# Patient Record
Sex: FEMALE | Race: WHITE | Employment: OTHER | URBAN - NONMETROPOLITAN AREA
[De-identification: names, ages, dates, MRNs, and addresses within clinical notes are randomized per-mention and may not be internally consistent; named-entity substitution may affect disease eponyms.]

---

## 2017-01-15 ENCOUNTER — OFFICE VISIT (OUTPATIENT)
Dept: PRIMARY CARE CLINIC | Age: 67
End: 2017-01-15

## 2017-01-15 VITALS
HEIGHT: 67 IN | DIASTOLIC BLOOD PRESSURE: 80 MMHG | WEIGHT: 230.4 LBS | BODY MASS INDEX: 36.16 KG/M2 | OXYGEN SATURATION: 98 % | SYSTOLIC BLOOD PRESSURE: 132 MMHG | TEMPERATURE: 98.3 F | HEART RATE: 80 BPM

## 2017-01-15 DIAGNOSIS — J31.0 RHINOSINUSITIS: ICD-10-CM

## 2017-01-15 DIAGNOSIS — J32.9 RHINOSINUSITIS: ICD-10-CM

## 2017-01-15 DIAGNOSIS — J06.9 ACUTE URI: Primary | ICD-10-CM

## 2017-01-15 PROCEDURE — 3014F SCREEN MAMMO DOC REV: CPT | Performed by: FAMILY MEDICINE

## 2017-01-15 PROCEDURE — G8419 CALC BMI OUT NRM PARAM NOF/U: HCPCS | Performed by: FAMILY MEDICINE

## 2017-01-15 PROCEDURE — 4040F PNEUMOC VAC/ADMIN/RCVD: CPT | Performed by: FAMILY MEDICINE

## 2017-01-15 PROCEDURE — G8427 DOCREV CUR MEDS BY ELIG CLIN: HCPCS | Performed by: FAMILY MEDICINE

## 2017-01-15 PROCEDURE — 3017F COLORECTAL CA SCREEN DOC REV: CPT | Performed by: FAMILY MEDICINE

## 2017-01-15 PROCEDURE — 99213 OFFICE O/P EST LOW 20 MIN: CPT | Performed by: FAMILY MEDICINE

## 2017-01-15 PROCEDURE — G8400 PT W/DXA NO RESULTS DOC: HCPCS | Performed by: FAMILY MEDICINE

## 2017-01-15 PROCEDURE — 1123F ACP DISCUSS/DSCN MKR DOCD: CPT | Performed by: FAMILY MEDICINE

## 2017-01-15 PROCEDURE — 1090F PRES/ABSN URINE INCON ASSESS: CPT | Performed by: FAMILY MEDICINE

## 2017-01-15 PROCEDURE — 1036F TOBACCO NON-USER: CPT | Performed by: FAMILY MEDICINE

## 2017-01-15 PROCEDURE — G8484 FLU IMMUNIZE NO ADMIN: HCPCS | Performed by: FAMILY MEDICINE

## 2017-01-15 RX ORDER — AZITHROMYCIN 250 MG/1
TABLET, FILM COATED ORAL
Qty: 11 TABLET | Refills: 0 | Status: SHIPPED | OUTPATIENT
Start: 2017-01-15 | End: 2017-04-20 | Stop reason: ALTCHOICE

## 2017-01-15 ASSESSMENT — ENCOUNTER SYMPTOMS
RHINORRHEA: 1
SORE THROAT: 1
SINUS PRESSURE: 1
COUGH: 0
RESPIRATORY NEGATIVE: 1
EYES NEGATIVE: 1
GASTROINTESTINAL NEGATIVE: 1
ALLERGIC/IMMUNOLOGIC NEGATIVE: 1

## 2017-04-13 ENCOUNTER — HOSPITAL ENCOUNTER (OUTPATIENT)
Age: 67
Setting detail: SPECIMEN
Discharge: HOME | End: 2017-04-13
Payer: MEDICARE

## 2017-04-13 LAB
HEPATITIS C ANTIBODY: NONREACTIVE
HIV AG/AB: NONREACTIVE

## 2017-04-20 ENCOUNTER — OFFICE VISIT (OUTPATIENT)
Dept: FAMILY MEDICINE CLINIC | Age: 67
End: 2017-04-20
Payer: MEDICARE

## 2017-04-20 VITALS
WEIGHT: 233.6 LBS | SYSTOLIC BLOOD PRESSURE: 116 MMHG | RESPIRATION RATE: 16 BRPM | HEART RATE: 64 BPM | DIASTOLIC BLOOD PRESSURE: 80 MMHG | HEIGHT: 67 IN | BODY MASS INDEX: 36.66 KG/M2

## 2017-04-20 DIAGNOSIS — K21.9 GASTROESOPHAGEAL REFLUX DISEASE WITHOUT ESOPHAGITIS: ICD-10-CM

## 2017-04-20 DIAGNOSIS — E78.2 MIXED HYPERLIPIDEMIA: ICD-10-CM

## 2017-04-20 DIAGNOSIS — M15.9 PRIMARY OSTEOARTHRITIS INVOLVING MULTIPLE JOINTS: ICD-10-CM

## 2017-04-20 DIAGNOSIS — I10 ESSENTIAL HYPERTENSION: ICD-10-CM

## 2017-04-20 DIAGNOSIS — E11.9 CONTROLLED TYPE 2 DIABETES MELLITUS WITHOUT COMPLICATION, WITHOUT LONG-TERM CURRENT USE OF INSULIN (HCC): Primary | ICD-10-CM

## 2017-04-20 DIAGNOSIS — Z00.00 MEDICARE ANNUAL WELLNESS VISIT, INITIAL: ICD-10-CM

## 2017-04-20 PROCEDURE — G8417 CALC BMI ABV UP PARAM F/U: HCPCS | Performed by: FAMILY MEDICINE

## 2017-04-20 PROCEDURE — 1036F TOBACCO NON-USER: CPT | Performed by: FAMILY MEDICINE

## 2017-04-20 PROCEDURE — 3014F SCREEN MAMMO DOC REV: CPT | Performed by: FAMILY MEDICINE

## 2017-04-20 PROCEDURE — 99214 OFFICE O/P EST MOD 30 MIN: CPT | Performed by: FAMILY MEDICINE

## 2017-04-20 PROCEDURE — G0438 PPPS, INITIAL VISIT: HCPCS | Performed by: FAMILY MEDICINE

## 2017-04-20 PROCEDURE — 3017F COLORECTAL CA SCREEN DOC REV: CPT | Performed by: FAMILY MEDICINE

## 2017-04-20 PROCEDURE — 1123F ACP DISCUSS/DSCN MKR DOCD: CPT | Performed by: FAMILY MEDICINE

## 2017-04-20 PROCEDURE — G8400 PT W/DXA NO RESULTS DOC: HCPCS | Performed by: FAMILY MEDICINE

## 2017-04-20 PROCEDURE — 4040F PNEUMOC VAC/ADMIN/RCVD: CPT | Performed by: FAMILY MEDICINE

## 2017-04-20 PROCEDURE — 1090F PRES/ABSN URINE INCON ASSESS: CPT | Performed by: FAMILY MEDICINE

## 2017-04-20 PROCEDURE — 3044F HG A1C LEVEL LT 7.0%: CPT | Performed by: FAMILY MEDICINE

## 2017-04-20 PROCEDURE — G8427 DOCREV CUR MEDS BY ELIG CLIN: HCPCS | Performed by: FAMILY MEDICINE

## 2017-04-20 RX ORDER — ATENOLOL 25 MG/1
25 TABLET ORAL DAILY
Qty: 90 TABLET | Refills: 1 | Status: SHIPPED | OUTPATIENT
Start: 2017-04-20 | End: 2017-10-17 | Stop reason: SDUPTHER

## 2017-04-20 RX ORDER — LORATADINE 10 MG/1
10 TABLET ORAL DAILY
Qty: 90 TABLET | Refills: 3 | Status: SHIPPED | OUTPATIENT
Start: 2017-04-20 | End: 2018-04-25 | Stop reason: SDUPTHER

## 2017-04-20 RX ORDER — MELOXICAM 7.5 MG/1
7.5 TABLET ORAL DAILY
Qty: 90 TABLET | Refills: 1 | Status: SHIPPED | OUTPATIENT
Start: 2017-04-20 | End: 2017-10-17 | Stop reason: SDUPTHER

## 2017-04-20 ASSESSMENT — PATIENT HEALTH QUESTIONNAIRE - PHQ9
SUM OF ALL RESPONSES TO PHQ9 QUESTIONS 1 & 2: 1
2. FEELING DOWN, DEPRESSED OR HOPELESS: 1
SUM OF ALL RESPONSES TO PHQ QUESTIONS 1-9: 1
1. LITTLE INTEREST OR PLEASURE IN DOING THINGS: 0

## 2017-06-17 ENCOUNTER — OFFICE VISIT (OUTPATIENT)
Dept: PRIMARY CARE CLINIC | Age: 67
End: 2017-06-17
Payer: MEDICARE

## 2017-06-17 VITALS
SYSTOLIC BLOOD PRESSURE: 136 MMHG | RESPIRATION RATE: 14 BRPM | WEIGHT: 232.8 LBS | DIASTOLIC BLOOD PRESSURE: 78 MMHG | TEMPERATURE: 98.7 F | HEART RATE: 77 BPM | BODY MASS INDEX: 36.54 KG/M2 | HEIGHT: 67 IN | OXYGEN SATURATION: 93 %

## 2017-06-17 DIAGNOSIS — K08.89 TOOTH PAIN: ICD-10-CM

## 2017-06-17 DIAGNOSIS — K04.7 DENTAL ABSCESS: Primary | ICD-10-CM

## 2017-06-17 PROCEDURE — 1090F PRES/ABSN URINE INCON ASSESS: CPT | Performed by: FAMILY MEDICINE

## 2017-06-17 PROCEDURE — 3014F SCREEN MAMMO DOC REV: CPT | Performed by: FAMILY MEDICINE

## 2017-06-17 PROCEDURE — 1036F TOBACCO NON-USER: CPT | Performed by: FAMILY MEDICINE

## 2017-06-17 PROCEDURE — G8417 CALC BMI ABV UP PARAM F/U: HCPCS | Performed by: FAMILY MEDICINE

## 2017-06-17 PROCEDURE — 3017F COLORECTAL CA SCREEN DOC REV: CPT | Performed by: FAMILY MEDICINE

## 2017-06-17 PROCEDURE — 4040F PNEUMOC VAC/ADMIN/RCVD: CPT | Performed by: FAMILY MEDICINE

## 2017-06-17 PROCEDURE — G8400 PT W/DXA NO RESULTS DOC: HCPCS | Performed by: FAMILY MEDICINE

## 2017-06-17 PROCEDURE — G8427 DOCREV CUR MEDS BY ELIG CLIN: HCPCS | Performed by: FAMILY MEDICINE

## 2017-06-17 PROCEDURE — 99213 OFFICE O/P EST LOW 20 MIN: CPT | Performed by: FAMILY MEDICINE

## 2017-06-17 PROCEDURE — 1123F ACP DISCUSS/DSCN MKR DOCD: CPT | Performed by: FAMILY MEDICINE

## 2017-06-17 RX ORDER — AZITHROMYCIN 250 MG/1
TABLET, FILM COATED ORAL
Qty: 11 TABLET | Refills: 0 | Status: SHIPPED | OUTPATIENT
Start: 2017-06-17 | End: 2017-07-18 | Stop reason: SDUPTHER

## 2017-06-17 ASSESSMENT — ENCOUNTER SYMPTOMS
GASTROINTESTINAL NEGATIVE: 1
EYES NEGATIVE: 1
SORE THROAT: 0
SINUS PRESSURE: 0
RHINORRHEA: 0
RESPIRATORY NEGATIVE: 1

## 2017-07-03 DIAGNOSIS — E11.9 DIABETES MELLITUS TYPE II, CONTROLLED (HCC): ICD-10-CM

## 2017-07-18 ENCOUNTER — OFFICE VISIT (OUTPATIENT)
Dept: PRIMARY CARE CLINIC | Age: 67
End: 2017-07-18
Payer: MEDICARE

## 2017-07-18 VITALS
SYSTOLIC BLOOD PRESSURE: 130 MMHG | DIASTOLIC BLOOD PRESSURE: 80 MMHG | WEIGHT: 234 LBS | RESPIRATION RATE: 16 BRPM | TEMPERATURE: 98.3 F | OXYGEN SATURATION: 94 % | HEIGHT: 67 IN | BODY MASS INDEX: 36.73 KG/M2 | HEART RATE: 92 BPM

## 2017-07-18 DIAGNOSIS — J30.89 SEASONAL ALLERGIC RHINITIS DUE TO FUNGAL SPORES: ICD-10-CM

## 2017-07-18 DIAGNOSIS — J01.40 ACUTE NON-RECURRENT PANSINUSITIS: Primary | ICD-10-CM

## 2017-07-18 PROCEDURE — 1036F TOBACCO NON-USER: CPT | Performed by: FAMILY MEDICINE

## 2017-07-18 PROCEDURE — 1123F ACP DISCUSS/DSCN MKR DOCD: CPT | Performed by: FAMILY MEDICINE

## 2017-07-18 PROCEDURE — 3017F COLORECTAL CA SCREEN DOC REV: CPT | Performed by: FAMILY MEDICINE

## 2017-07-18 PROCEDURE — 99214 OFFICE O/P EST MOD 30 MIN: CPT | Performed by: FAMILY MEDICINE

## 2017-07-18 PROCEDURE — 3014F SCREEN MAMMO DOC REV: CPT | Performed by: FAMILY MEDICINE

## 2017-07-18 PROCEDURE — G8400 PT W/DXA NO RESULTS DOC: HCPCS | Performed by: FAMILY MEDICINE

## 2017-07-18 PROCEDURE — G8417 CALC BMI ABV UP PARAM F/U: HCPCS | Performed by: FAMILY MEDICINE

## 2017-07-18 PROCEDURE — 1090F PRES/ABSN URINE INCON ASSESS: CPT | Performed by: FAMILY MEDICINE

## 2017-07-18 PROCEDURE — G8427 DOCREV CUR MEDS BY ELIG CLIN: HCPCS | Performed by: FAMILY MEDICINE

## 2017-07-18 PROCEDURE — 96372 THER/PROPH/DIAG INJ SC/IM: CPT | Performed by: FAMILY MEDICINE

## 2017-07-18 PROCEDURE — 4040F PNEUMOC VAC/ADMIN/RCVD: CPT | Performed by: FAMILY MEDICINE

## 2017-07-18 RX ORDER — AZITHROMYCIN 250 MG/1
TABLET, FILM COATED ORAL
Qty: 11 TABLET | Refills: 0 | Status: SHIPPED | OUTPATIENT
Start: 2017-07-18 | End: 2017-07-28

## 2017-07-18 RX ORDER — TRIAMCINOLONE ACETONIDE 40 MG/ML
40 INJECTION, SUSPENSION INTRA-ARTICULAR; INTRAMUSCULAR ONCE
Status: COMPLETED | OUTPATIENT
Start: 2017-07-18 | End: 2017-07-18

## 2017-07-18 RX ADMIN — TRIAMCINOLONE ACETONIDE 40 MG: 40 INJECTION, SUSPENSION INTRA-ARTICULAR; INTRAMUSCULAR at 10:31

## 2017-07-18 ASSESSMENT — ENCOUNTER SYMPTOMS
SINUS PRESSURE: 1
TROUBLE SWALLOWING: 0
SINUS COMPLAINT: 1
NAUSEA: 0
CONSTIPATION: 0
RHINORRHEA: 0
DIARRHEA: 0
EYE DISCHARGE: 0
EYE REDNESS: 0
COUGH: 0
SHORTNESS OF BREATH: 0
ABDOMINAL PAIN: 0
WHEEZING: 0
SORE THROAT: 0
VOMITING: 0

## 2017-07-25 ENCOUNTER — HOSPITAL ENCOUNTER (OUTPATIENT)
Age: 67
Discharge: HOME | End: 2017-07-25
Payer: MEDICARE

## 2017-08-02 ENCOUNTER — HOSPITAL ENCOUNTER (OUTPATIENT)
Age: 67
Discharge: HOME | End: 2017-08-02
Payer: MEDICARE

## 2017-10-13 ENCOUNTER — HOSPITAL ENCOUNTER (OUTPATIENT)
Dept: LAB | Age: 67
Setting detail: SPECIMEN
Discharge: HOME | End: 2017-10-13
Payer: MEDICARE

## 2017-10-13 DIAGNOSIS — E11.9 CONTROLLED TYPE 2 DIABETES MELLITUS WITHOUT COMPLICATION, WITHOUT LONG-TERM CURRENT USE OF INSULIN (HCC): ICD-10-CM

## 2017-10-13 DIAGNOSIS — E78.2 MIXED HYPERLIPIDEMIA: ICD-10-CM

## 2017-10-13 LAB
ABSOLUTE EOS #: 0.2 K/UL (ref 0–0.4)
ABSOLUTE LYMPH #: 2.2 K/UL (ref 1–4.8)
ABSOLUTE MONO #: 0.7 K/UL (ref 0.1–1.2)
ALBUMIN SERPL-MCNC: 4.2 G/DL (ref 3.5–5.2)
ALBUMIN/GLOBULIN RATIO: 1.4 (ref 1–2.5)
ALP BLD-CCNC: 82 U/L (ref 35–104)
ALT SERPL-CCNC: 19 U/L (ref 5–33)
ANION GAP SERPL CALCULATED.3IONS-SCNC: 11 MMOL/L (ref 9–17)
AST SERPL-CCNC: 20 U/L
BASOPHILS # BLD: 1 % (ref 0–1)
BASOPHILS ABSOLUTE: 0 K/UL (ref 0–0.2)
BILIRUB SERPL-MCNC: 0.25 MG/DL (ref 0.3–1.2)
BUN BLDV-MCNC: 12 MG/DL (ref 8–23)
BUN/CREAT BLD: 18 (ref 9–20)
CALCIUM SERPL-MCNC: 9.6 MG/DL (ref 8.6–10.4)
CHLORIDE BLD-SCNC: 103 MMOL/L (ref 98–107)
CHOLESTEROL/HDL RATIO: 5
CHOLESTEROL: 190 MG/DL
CO2: 30 MMOL/L (ref 20–31)
CREAT SERPL-MCNC: 0.67 MG/DL (ref 0.5–0.9)
DIFFERENTIAL TYPE: ABNORMAL
EOSINOPHILS RELATIVE PERCENT: 3 % (ref 1–7)
ESTIMATED AVERAGE GLUCOSE: 143 MG/DL
GFR AFRICAN AMERICAN: >60 ML/MIN
GFR NON-AFRICAN AMERICAN: >60 ML/MIN
GFR SERPL CREATININE-BSD FRML MDRD: ABNORMAL ML/MIN/{1.73_M2}
GFR SERPL CREATININE-BSD FRML MDRD: ABNORMAL ML/MIN/{1.73_M2}
GLUCOSE BLD-MCNC: 124 MG/DL (ref 70–99)
HBA1C MFR BLD: 6.6 % (ref 4.8–5.9)
HCT VFR BLD CALC: 41.3 % (ref 36–46)
HDLC SERPL-MCNC: 38 MG/DL
HEMOGLOBIN: 13.3 G/DL (ref 12–16)
LDL CHOLESTEROL: 111 MG/DL (ref 0–130)
LYMPHOCYTES # BLD: 27 % (ref 16–46)
MCH RBC QN AUTO: 28.2 PG (ref 26–34)
MCHC RBC AUTO-ENTMCNC: 32.2 G/DL (ref 31–37)
MCV RBC AUTO: 87.6 FL (ref 80–100)
MONOCYTES # BLD: 8 % (ref 4–11)
PDW BLD-RTO: 14.6 % (ref 11–14.5)
PLATELET # BLD: 242 K/UL (ref 140–450)
PLATELET ESTIMATE: ABNORMAL
PMV BLD AUTO: 7.8 FL (ref 6–12)
POTASSIUM SERPL-SCNC: 4.4 MMOL/L (ref 3.7–5.3)
RBC # BLD: 4.72 M/UL (ref 4–5.2)
RBC # BLD: ABNORMAL 10*6/UL
SEG NEUTROPHILS: 61 % (ref 43–77)
SEGMENTED NEUTROPHILS ABSOLUTE COUNT: 5 K/UL (ref 1.8–7.7)
SODIUM BLD-SCNC: 144 MMOL/L (ref 135–144)
TOTAL PROTEIN: 7.2 G/DL (ref 6.4–8.3)
TRIGL SERPL-MCNC: 204 MG/DL
TSH SERPL DL<=0.05 MIU/L-ACNC: 2.45 MIU/L (ref 0.3–5)
VLDLC SERPL CALC-MCNC: ABNORMAL MG/DL (ref 1–30)
WBC # BLD: 8.1 K/UL (ref 3.5–11)
WBC # BLD: ABNORMAL 10*3/UL

## 2017-10-13 PROCEDURE — 80061 LIPID PANEL: CPT

## 2017-10-13 PROCEDURE — 84443 ASSAY THYROID STIM HORMONE: CPT

## 2017-10-13 PROCEDURE — 36415 COLL VENOUS BLD VENIPUNCTURE: CPT

## 2017-10-13 PROCEDURE — 80053 COMPREHEN METABOLIC PANEL: CPT

## 2017-10-13 PROCEDURE — 83036 HEMOGLOBIN GLYCOSYLATED A1C: CPT

## 2017-10-13 PROCEDURE — 85025 COMPLETE CBC W/AUTO DIFF WBC: CPT

## 2017-10-17 RX ORDER — ATENOLOL 25 MG/1
25 TABLET ORAL DAILY
Qty: 90 TABLET | Refills: 1 | Status: SHIPPED | OUTPATIENT
Start: 2017-10-17 | End: 2018-04-16 | Stop reason: SDUPTHER

## 2017-10-17 RX ORDER — MELOXICAM 7.5 MG/1
7.5 TABLET ORAL DAILY
Qty: 90 TABLET | Refills: 1 | Status: SHIPPED | OUTPATIENT
Start: 2017-10-17 | End: 2018-04-16 | Stop reason: SDUPTHER

## 2017-10-24 ENCOUNTER — OFFICE VISIT (OUTPATIENT)
Dept: FAMILY MEDICINE CLINIC | Age: 67
End: 2017-10-24
Payer: MEDICARE

## 2017-10-24 VITALS
SYSTOLIC BLOOD PRESSURE: 120 MMHG | HEART RATE: 68 BPM | RESPIRATION RATE: 16 BRPM | BODY MASS INDEX: 36.57 KG/M2 | HEIGHT: 67 IN | WEIGHT: 233 LBS | DIASTOLIC BLOOD PRESSURE: 78 MMHG

## 2017-10-24 DIAGNOSIS — E11.9 CONTROLLED TYPE 2 DIABETES MELLITUS WITHOUT COMPLICATION, WITHOUT LONG-TERM CURRENT USE OF INSULIN (HCC): Primary | ICD-10-CM

## 2017-10-24 DIAGNOSIS — E78.2 MIXED HYPERLIPIDEMIA: ICD-10-CM

## 2017-10-24 DIAGNOSIS — I10 ESSENTIAL HYPERTENSION: ICD-10-CM

## 2017-10-24 PROCEDURE — 1090F PRES/ABSN URINE INCON ASSESS: CPT | Performed by: FAMILY MEDICINE

## 2017-10-24 PROCEDURE — 1036F TOBACCO NON-USER: CPT | Performed by: FAMILY MEDICINE

## 2017-10-24 PROCEDURE — G8400 PT W/DXA NO RESULTS DOC: HCPCS | Performed by: FAMILY MEDICINE

## 2017-10-24 PROCEDURE — G8427 DOCREV CUR MEDS BY ELIG CLIN: HCPCS | Performed by: FAMILY MEDICINE

## 2017-10-24 PROCEDURE — G8417 CALC BMI ABV UP PARAM F/U: HCPCS | Performed by: FAMILY MEDICINE

## 2017-10-24 PROCEDURE — 1123F ACP DISCUSS/DSCN MKR DOCD: CPT | Performed by: FAMILY MEDICINE

## 2017-10-24 PROCEDURE — G8484 FLU IMMUNIZE NO ADMIN: HCPCS | Performed by: FAMILY MEDICINE

## 2017-10-24 PROCEDURE — 3017F COLORECTAL CA SCREEN DOC REV: CPT | Performed by: FAMILY MEDICINE

## 2017-10-24 PROCEDURE — 99214 OFFICE O/P EST MOD 30 MIN: CPT | Performed by: FAMILY MEDICINE

## 2017-10-24 PROCEDURE — 3044F HG A1C LEVEL LT 7.0%: CPT | Performed by: FAMILY MEDICINE

## 2017-10-24 PROCEDURE — 4040F PNEUMOC VAC/ADMIN/RCVD: CPT | Performed by: FAMILY MEDICINE

## 2017-10-24 PROCEDURE — 3014F SCREEN MAMMO DOC REV: CPT | Performed by: FAMILY MEDICINE

## 2017-10-24 RX ORDER — INFLUENZA VACCINE, ADJUVANTED 15; 15; 15 UG/.5ML; UG/.5ML; UG/.5ML
INJECTION, SUSPENSION INTRAMUSCULAR
Refills: 0 | COMMUNITY
Start: 2017-10-06 | End: 2017-10-24 | Stop reason: ALTCHOICE

## 2017-10-24 RX ORDER — ERYTHROMYCIN 5 MG/G
OINTMENT OPHTHALMIC
Refills: 0 | COMMUNITY
Start: 2017-10-20 | End: 2018-04-25 | Stop reason: ALTCHOICE

## 2017-10-24 RX ORDER — PNEUMOCOCCAL VACCINE POLYVALENT 25; 25; 25; 25; 25; 25; 25; 25; 25; 25; 25; 25; 25; 25; 25; 25; 25; 25; 25; 25; 25; 25; 25 UG/.5ML; UG/.5ML; UG/.5ML; UG/.5ML; UG/.5ML; UG/.5ML; UG/.5ML; UG/.5ML; UG/.5ML; UG/.5ML; UG/.5ML; UG/.5ML; UG/.5ML; UG/.5ML; UG/.5ML; UG/.5ML; UG/.5ML; UG/.5ML; UG/.5ML; UG/.5ML; UG/.5ML; UG/.5ML; UG/.5ML
INJECTION, SOLUTION INTRAMUSCULAR; SUBCUTANEOUS
Refills: 0 | COMMUNITY
Start: 2017-10-06 | End: 2017-10-24 | Stop reason: ALTCHOICE

## 2017-10-24 ASSESSMENT — ENCOUNTER SYMPTOMS
TROUBLE SWALLOWING: 0
DIARRHEA: 0
SHORTNESS OF BREATH: 0
SINUS PRESSURE: 0
ABDOMINAL PAIN: 0
SORE THROAT: 0
RHINORRHEA: 0
NAUSEA: 0
CONSTIPATION: 0
EYE REDNESS: 0
EYE DISCHARGE: 0
COUGH: 0
VOMITING: 0
WHEEZING: 0

## 2017-10-24 NOTE — PATIENT INSTRUCTIONS
 ALT 10/13/2017 19  5 - 33 U/L Final    AST 10/13/2017 20  <32 U/L Final    Total Bilirubin 10/13/2017 0.25* 0.3 - 1.2 mg/dL Final    Total Protein 10/13/2017 7.2  6.4 - 8.3 g/dL Final    Alb 10/13/2017 4.2  3.5 - 5.2 g/dL Final    Albumin/Globulin Ratio 10/13/2017 1.4  1.0 - 2.5 Final    GFR Non- 10/13/2017 >60  >60 mL/min Final    GFR  10/13/2017 >60  >60 mL/min Final    GFR Comment 10/13/2017        Final    Comment: Average GFR for 61-76 years old:   80 mL/min/1.73sq m  Chronic Kidney Disease:   <60 mL/min/1.73sq m  Kidney failure:   <15 mL/min/1.73sq m              eGFR calculated using average adult body mass. Additional eGFR calculator   available at:        [email or web address]        Performed at [facility] [address] [facility] [address] Gabrielle Atkinson [phone]      GFR Staging 10/13/2017 NOT REPORTED   Final    Cholesterol 10/13/2017 190  <200 mg/dL Final    Comment:    Cholesterol Guidelines:      <200  Desirable   200-240  Borderline      >240  Undesirable         HDL 10/13/2017 38* >40 mg/dL Final    Comment:    HDL Guidelines:    <40     Undesirable   40-59    Borderline    >59     Desirable         LDL Cholesterol 10/13/2017 111  0 - 130 mg/dL Final    Comment:    LDL Guidelines:     <100    Desirable   100-129   Near to/above Desirable   130-159   Borderline      >159   Undesirable     Direct (measured) LDL and calculated LDL are not interchangeable tests.  Chol/HDL Ratio 10/13/2017 5.0* <5 Final    Triglycerides 10/13/2017 204* <150 mg/dL Final    Comment:    Triglyceride Guidelines:     <150   Desirable   150-199  Borderline   200-499  High     >499   Very high   Based on AHA Guidelines for fasting triglyceride, October 2012. Performed at [facility] [address] [address] [phone]      VLDL 10/13/2017 NOT REPORTED of quitting for good. Follow-up care is a key part of your treatment and safety. Be sure to make and go to all appointments, and call your doctor if you are having problems. It's also a good idea to know your test results and keep a list of the medicines you take. Where can you learn more? Go to [email or web address] and sign in to your [employer] account. Enter N180 in the Replicon box to learn more about \"Learning About Diabetes and Your Teeth. \"     If you do not have an account, please click on the \"Sign Up Now\" link. Current as of: March 13, 2017  Content Version: 11.3  © 9452-3802 [employer]. Care instructions adapted under license by [facility] ([facility]). If you have questions about a medical condition or this instruction, always ask your healthcare professional. [address] any warranty or liability for your use of this information.

## 2017-10-24 NOTE — PROGRESS NOTES
Chronic Disease Visit Information    BP Readings from Last 3 Encounters:   10/24/17 120/78   07/18/17 130/80   06/17/17 136/78          Hemoglobin A1C (%)   Date Value   10/13/2017 6.6 (H)   04/13/2017 6.5 (H)   10/18/2016 6.7 (H)     Microalb/Crt. Ratio (mcg/mg creat)   Date Value   04/13/2017 CANNOT BE CALCULATED     LDL Cholesterol (mg/dL)   Date Value   10/13/2017 111     HDL (mg/dL)   Date Value   10/13/2017 38 (L)     BUN (mg/dL)   Date Value   10/13/2017 12     CREATININE (mg/dL)   Date Value   10/13/2017 0.67     Glucose (mg/dL)   Date Value   10/13/2017 124 (H)            Have you changed or started any medications since your last visit including any over-the-counter medicines, vitamins, or herbal medicines? no   Are you having any side effects from any of your medications? -  no  Have you stopped taking any of your medications? Is so, why? -  no    Have you seen any other physician or provider since your last visit? Yes - Records Obtained  Have you had any other diagnostic tests since your last visit? Yes - Records Obtained  Have you been seen in the emergency room and/or had an admission to a hospital since we last saw you? No  Have you had your annual diabetic retinal (eye) exam? Yes - Records Requested  Have you had your routine dental cleaning in the past 6 months? yes - May 2017    Have you activated your "[employer]" account? If not, what are your barriers?  Yes     Patient Care Team:  Flavoi Hidalgo DO as PCP - General (Family Medicine)  Flavio Hidalgo DO as PCP - [facility] Attributed Provider         Medical History Review  Past Medical, Family, and Social History reviewed and does contribute to the patient presenting condition    Health Maintenance   Topic Date Due    Diabetic retinal exam  03/23/1960    Diabetic microalbuminuria test  04/13/2018    Diabetic foot exam  04/20/2018    Breast cancer screen  05/11/2018    Diabetic hemoglobin A1C test  10/13/2018    Lipid screen  10/13/2018    Colon

## 2017-10-24 NOTE — PROGRESS NOTES
etiology and not the toenail fungus. At this point it's not severe and just noticeable so she is just going to monitor. Patient otherwise has no other acute medical concerns. .  Patient's recent lab reports are as follows:    Results for orders placed or performed during the hospital encounter of 10/13/17   CBC Auto Differential   Result Value Ref Range    WBC 8.1 3.5 - 11.0 k/uL    RBC 4.72 4.0 - 5.2 m/uL    Hemoglobin 13.3 12.0 - 16.0 g/dL    Hematocrit 41.3 36 - 46 %    MCV 87.6 80 - 100 fL    MCH 28.2 26 - 34 pg    MCHC 32.2 31 - 37 g/dL    RDW 14.6 (H) 11.0 - 14.5 %    Platelets 499 063 - 177 k/uL    MPV 7.8 6.0 - 12.0 fL    Differential Type NOT REPORTED     WBC Morphology NOT REPORTED     RBC Morphology NOT REPORTED     Platelet Estimate NOT REPORTED     Seg Neutrophils 61 43 - 77 %    Lymphocytes 27 16 - 46 %    Monocytes 8 4 - 11 %    Eosinophils % 3 1 - 7 %    Basophils 1 0 - 1 %    Segs Absolute 5.00 1.8 - 7.7 k/uL    Absolute Lymph # 2.20 1.0 - 4.8 k/uL    Absolute Mono # 0.70 0.1 - 1.2 k/uL    Absolute Eos # 0.20 0.0 - 0.4 k/uL    Basophils # 0.00 0.0 - 0.2 k/uL   Comprehensive Metabolic Panel   Result Value Ref Range    Glucose 124 (H) 70 - 99 mg/dL    BUN 12 8 - 23 mg/dL    CREATININE 0.67 0.50 - 0.90 mg/dL    Bun/Cre Ratio 18 9 - 20    Calcium 9.6 8.6 - 10.4 mg/dL    Sodium 144 135 - 144 mmol/L    Potassium 4.4 3.7 - 5.3 mmol/L    Chloride 103 98 - 107 mmol/L    CO2 30 20 - 31 mmol/L    Anion Gap 11 9 - 17 mmol/L    Alkaline Phosphatase 82 35 - 104 U/L    ALT 19 5 - 33 U/L    AST 20 <32 U/L    Total Bilirubin 0.25 (L) 0.3 - 1.2 mg/dL    Total Protein 7.2 6.4 - 8.3 g/dL    Alb 4.2 3.5 - 5.2 g/dL    Albumin/Globulin Ratio 1.4 1.0 - 2.5    GFR Non-African American >60 >60 mL/min    GFR African American >60 >60 mL/min    GFR Comment          GFR Staging NOT REPORTED    Lipid Panel   Result Value Ref Range    Cholesterol 190 <200 mg/dL    HDL 38 (L) >40 mg/dL    LDL Cholesterol 111 0 - 130 mg/dL exercise for optimal cholesterol control. Did recommend statin medication but she did not want to add at this time. Patient is to continue with low-carb/low sugar diet and increase exercise for optimal blood sugar control. Patient is to continue on the rest of her current medical regimen. No additional changes are made at this time. Patient is to return to my office in 6 months duration or sooner if any further problems or symptoms arise.     (Please note that portions of this note were completed with a voice recognition program. Efforts were made to edit the dictations but occasionally words are mis-transcribed.)

## 2017-12-29 ENCOUNTER — OFFICE VISIT (OUTPATIENT)
Dept: PRIMARY CARE CLINIC | Age: 67
End: 2017-12-29
Payer: MEDICARE

## 2017-12-29 VITALS
DIASTOLIC BLOOD PRESSURE: 72 MMHG | BODY MASS INDEX: 36.1 KG/M2 | WEIGHT: 230 LBS | HEART RATE: 72 BPM | HEIGHT: 67 IN | OXYGEN SATURATION: 94 % | SYSTOLIC BLOOD PRESSURE: 128 MMHG | TEMPERATURE: 98.1 F

## 2017-12-29 DIAGNOSIS — J01.40 ACUTE NON-RECURRENT PANSINUSITIS: Primary | ICD-10-CM

## 2017-12-29 PROCEDURE — 4040F PNEUMOC VAC/ADMIN/RCVD: CPT | Performed by: FAMILY MEDICINE

## 2017-12-29 PROCEDURE — G8427 DOCREV CUR MEDS BY ELIG CLIN: HCPCS | Performed by: FAMILY MEDICINE

## 2017-12-29 PROCEDURE — 1123F ACP DISCUSS/DSCN MKR DOCD: CPT | Performed by: FAMILY MEDICINE

## 2017-12-29 PROCEDURE — 3014F SCREEN MAMMO DOC REV: CPT | Performed by: FAMILY MEDICINE

## 2017-12-29 PROCEDURE — G8484 FLU IMMUNIZE NO ADMIN: HCPCS | Performed by: FAMILY MEDICINE

## 2017-12-29 PROCEDURE — 99214 OFFICE O/P EST MOD 30 MIN: CPT | Performed by: FAMILY MEDICINE

## 2017-12-29 PROCEDURE — G8400 PT W/DXA NO RESULTS DOC: HCPCS | Performed by: FAMILY MEDICINE

## 2017-12-29 PROCEDURE — 1090F PRES/ABSN URINE INCON ASSESS: CPT | Performed by: FAMILY MEDICINE

## 2017-12-29 PROCEDURE — G8417 CALC BMI ABV UP PARAM F/U: HCPCS | Performed by: FAMILY MEDICINE

## 2017-12-29 PROCEDURE — 1036F TOBACCO NON-USER: CPT | Performed by: FAMILY MEDICINE

## 2017-12-29 PROCEDURE — 3017F COLORECTAL CA SCREEN DOC REV: CPT | Performed by: FAMILY MEDICINE

## 2017-12-29 RX ORDER — PREDNISONE 20 MG/1
20 TABLET ORAL DAILY
Qty: 5 TABLET | Refills: 0 | Status: SHIPPED | OUTPATIENT
Start: 2017-12-29 | End: 2018-01-03

## 2017-12-29 RX ORDER — CEFDINIR 300 MG/1
300 CAPSULE ORAL 2 TIMES DAILY
Qty: 20 CAPSULE | Refills: 0 | Status: SHIPPED | OUTPATIENT
Start: 2017-12-29 | End: 2018-01-08

## 2017-12-29 ASSESSMENT — ENCOUNTER SYMPTOMS
TROUBLE SWALLOWING: 0
SORE THROAT: 1
CONSTIPATION: 0
SINUS COMPLAINT: 1
DIARRHEA: 0
WHEEZING: 0
COUGH: 1
SINUS PRESSURE: 1
NAUSEA: 0
CHEST TIGHTNESS: 0
SHORTNESS OF BREATH: 1
CHOKING: 0
SINUS PAIN: 1

## 2017-12-29 NOTE — PROGRESS NOTES
[facility]  [address]  Dept: [phone]  Dept Fax: [phone]  Loc: [phone]    Marisa Nieves is a 78 y.o. female who presents today for her medical conditions/complaints as noted below. Marisa Nieves is c/o of   Chief Complaint   Patient presents with    Sinus Problem     facial pain, fever, cough, body aches, HA       HPI:     Here today for sinusitis. Sinus Problem   This is a new problem. The current episode started 1 to 4 weeks ago (10 days). The problem has been gradually worsening since onset. The maximum temperature recorded prior to her arrival was 100.4 - 100.9 F. The fever has been present for less than 1 day. Her pain is at a severity of 6/10. The pain is moderate. Associated symptoms include chills, congestion, coughing, ear pain (right ear), headaches, shortness of breath, sinus pressure and a sore throat. Pertinent negatives include no sneezing. Past treatments include oral decongestants and acetaminophen (afrin, robitussion, claritin). The treatment provided no relief. Past Medical History:   Diagnosis Date    Allergic rhinitis     Anxiety     due to situational stress    Bursitis     Bursitis of hip, right     recurrent    Carpal tunnel syndrome, bilateral     GERD (gastroesophageal reflux disease)     Hyperlipidemia     Hypertension     Impaired fasting glucose     Labyrinthitis     causing continuted vertigo    Melanoma in situ ([facility] [address] 10/2017     to bridge of nose; Dr Sky Tellez in [address] in situ of nose ([facility] [address] 10/05/2017    biopsy confirmed on 10/05/2017 by Dr [facility]ah [facility]egas [facility]     Osteoarthritis     Palpitations           Social History   Substance Use Topics    Smoking status: Passive Smoke Exposure - Never Smoker    Smokeless tobacco: Never Used      Comment: very remotely would smoke a few cigarettes on weekends/college days    Alcohol use 0.0 oz/week

## 2018-02-15 ENCOUNTER — OFFICE VISIT (OUTPATIENT)
Dept: PRIMARY CARE CLINIC | Age: 68
End: 2018-02-15
Payer: MEDICARE

## 2018-02-15 VITALS
DIASTOLIC BLOOD PRESSURE: 82 MMHG | HEART RATE: 84 BPM | OXYGEN SATURATION: 94 % | HEIGHT: 66 IN | SYSTOLIC BLOOD PRESSURE: 130 MMHG | BODY MASS INDEX: 36.8 KG/M2 | TEMPERATURE: 97 F | WEIGHT: 229 LBS

## 2018-02-15 DIAGNOSIS — J32.9 SINUSITIS, UNSPECIFIED CHRONICITY, UNSPECIFIED LOCATION: Primary | ICD-10-CM

## 2018-02-15 PROCEDURE — 4040F PNEUMOC VAC/ADMIN/RCVD: CPT | Performed by: NURSE PRACTITIONER

## 2018-02-15 PROCEDURE — 1036F TOBACCO NON-USER: CPT | Performed by: NURSE PRACTITIONER

## 2018-02-15 PROCEDURE — 99213 OFFICE O/P EST LOW 20 MIN: CPT | Performed by: NURSE PRACTITIONER

## 2018-02-15 PROCEDURE — G8427 DOCREV CUR MEDS BY ELIG CLIN: HCPCS | Performed by: NURSE PRACTITIONER

## 2018-02-15 PROCEDURE — 3017F COLORECTAL CA SCREEN DOC REV: CPT | Performed by: NURSE PRACTITIONER

## 2018-02-15 PROCEDURE — G8417 CALC BMI ABV UP PARAM F/U: HCPCS | Performed by: NURSE PRACTITIONER

## 2018-02-15 PROCEDURE — G8484 FLU IMMUNIZE NO ADMIN: HCPCS | Performed by: NURSE PRACTITIONER

## 2018-02-15 PROCEDURE — 1123F ACP DISCUSS/DSCN MKR DOCD: CPT | Performed by: NURSE PRACTITIONER

## 2018-02-15 PROCEDURE — 1090F PRES/ABSN URINE INCON ASSESS: CPT | Performed by: NURSE PRACTITIONER

## 2018-02-15 PROCEDURE — 3014F SCREEN MAMMO DOC REV: CPT | Performed by: NURSE PRACTITIONER

## 2018-02-15 PROCEDURE — G8400 PT W/DXA NO RESULTS DOC: HCPCS | Performed by: NURSE PRACTITIONER

## 2018-02-15 RX ORDER — METHYLPREDNISOLONE 4 MG/1
TABLET ORAL
Qty: 1 KIT | Refills: 0 | Status: SHIPPED | OUTPATIENT
Start: 2018-02-15 | End: 2018-04-25 | Stop reason: ALTCHOICE

## 2018-02-15 RX ORDER — AZITHROMYCIN 250 MG/1
TABLET, FILM COATED ORAL
Qty: 11 TABLET | Refills: 0 | Status: SHIPPED | OUTPATIENT
Start: 2018-02-15 | End: 2018-04-25 | Stop reason: ALTCHOICE

## 2018-02-15 ASSESSMENT — ENCOUNTER SYMPTOMS
SINUS PRESSURE: 1
SORE THROAT: 1
COUGH: 1

## 2018-02-15 NOTE — PROGRESS NOTES
(MEDROL DOSEPACK) 4 MG tablet           Plan:      Stay hydrated and drink plenty of fluids. May use cough suppressant prn comfort as directed. Encouraged patient to complete full course of antibiotic and to return to clinic if no improvement in 3-4 days. May use OTC acetaminophen or ibuprofen prn pain/fever. Recommend Mucinex, Neti Pot. Recommend OTC Flonase and/or Antihistamine daily. Take Prednisone as directed. Allergies   Allergen Reactions    Aspirin Other (See Comments)     Abdominal pain    Bactrim [Sulfamethoxazole-Trimethoprim] Nausea Only    Ibuprofen Other (See Comments)     Abdominal pain    Penicillins Rash     Current Outpatient Prescriptions   Medication Sig Dispense Refill    azithromycin (ZITHROMAX) 250 MG tablet Take 500 mg on day 1 and then 250 mg on days 2-9 11 tablet 0    methylPREDNISolone (MEDROL DOSEPACK) 4 MG tablet Take as directed 1 kit 0    erythromycin (ROMYCIN) 5 MG/GM ophthalmic ointment apply into affected eye twice a day  0    meloxicam (MOBIC) 7.5 MG tablet Take 1 tablet by mouth daily 90 tablet 1    atenolol (TENORMIN) 25 MG tablet Take 1 tablet by mouth daily 90 tablet 1    glucose blood VI test strips (ONE TOUCH TEST STRIPS) strip Tests 1 X daily. DX E11.9 100 each 3    ONE TOUCH LANCETS MISC Tests 1 x daily DX. E11.9 100 each 3    loratadine (CLARITIN) 10 MG tablet Take 1 tablet by mouth daily 90 tablet 3    omeprazole (PRILOSEC) 20 MG capsule Take 1 capsule by mouth 2 times daily. (Patient taking differently: Take 20 mg by mouth Daily OTC) 30 capsule 6    pseudoephedrine (SUDAFED) 30 MG tablet Take 30 mg by mouth every 4 hours as needed.  diphenhydrAMINE-APAP, sleep, (TYLENOL PM EXTRA STRENGTH)  MG tablet Take 2 tablets by mouth nightly as needed.  therapeutic multivitamin-minerals (THERAGRAN-M) tablet Take 1 tablet by mouth daily.  Ascorbic Acid (VITAMIN C) 500 MG tablet Take 500 mg by mouth daily.        No current

## 2018-04-17 RX ORDER — ATENOLOL 25 MG/1
TABLET ORAL
Qty: 90 TABLET | Refills: 1 | Status: SHIPPED | OUTPATIENT
Start: 2018-04-17 | End: 2018-10-12 | Stop reason: SDUPTHER

## 2018-04-17 RX ORDER — MELOXICAM 7.5 MG/1
TABLET ORAL
Qty: 90 TABLET | Refills: 1 | Status: SHIPPED | OUTPATIENT
Start: 2018-04-17 | End: 2018-10-26 | Stop reason: SDUPTHER

## 2018-04-18 ENCOUNTER — HOSPITAL ENCOUNTER (OUTPATIENT)
Dept: LAB | Age: 68
Setting detail: SPECIMEN
Discharge: HOME | End: 2018-04-18
Payer: MEDICARE

## 2018-04-18 DIAGNOSIS — E11.9 CONTROLLED TYPE 2 DIABETES MELLITUS WITHOUT COMPLICATION, WITHOUT LONG-TERM CURRENT USE OF INSULIN (HCC): ICD-10-CM

## 2018-04-18 DIAGNOSIS — E78.2 MIXED HYPERLIPIDEMIA: ICD-10-CM

## 2018-04-18 LAB
ABSOLUTE EOS #: 0.2 K/UL (ref 0–0.4)
ABSOLUTE IMMATURE GRANULOCYTE: ABNORMAL K/UL (ref 0–0.3)
ABSOLUTE LYMPH #: 2.3 K/UL (ref 1–4.8)
ABSOLUTE MONO #: 0.6 K/UL (ref 0.1–1.2)
ALBUMIN SERPL-MCNC: 4.2 G/DL (ref 3.5–5.2)
ALBUMIN/GLOBULIN RATIO: 1.2 (ref 1–2.5)
ALP BLD-CCNC: 79 U/L (ref 35–104)
ALT SERPL-CCNC: 17 U/L (ref 5–33)
ANION GAP SERPL CALCULATED.3IONS-SCNC: 10 MMOL/L (ref 9–17)
AST SERPL-CCNC: 18 U/L
BASOPHILS # BLD: 1 % (ref 0–1)
BASOPHILS ABSOLUTE: 0.1 K/UL (ref 0–0.2)
BILIRUB SERPL-MCNC: 0.28 MG/DL (ref 0.3–1.2)
BUN BLDV-MCNC: 14 MG/DL (ref 8–23)
BUN/CREAT BLD: 21 (ref 9–20)
CALCIUM SERPL-MCNC: 9.4 MG/DL (ref 8.6–10.4)
CHLORIDE BLD-SCNC: 101 MMOL/L (ref 98–107)
CHOLESTEROL/HDL RATIO: 5.1
CHOLESTEROL: 182 MG/DL
CO2: 31 MMOL/L (ref 20–31)
CREAT SERPL-MCNC: 0.67 MG/DL (ref 0.5–0.9)
CREATININE URINE: 130.6 MG/DL (ref 28–217)
DIFFERENTIAL TYPE: ABNORMAL
EOSINOPHILS RELATIVE PERCENT: 3 % (ref 1–7)
ESTIMATED AVERAGE GLUCOSE: 148 MG/DL
GFR AFRICAN AMERICAN: >60 ML/MIN
GFR NON-AFRICAN AMERICAN: >60 ML/MIN
GFR SERPL CREATININE-BSD FRML MDRD: ABNORMAL ML/MIN/{1.73_M2}
GFR SERPL CREATININE-BSD FRML MDRD: ABNORMAL ML/MIN/{1.73_M2}
GLUCOSE BLD-MCNC: 131 MG/DL (ref 70–99)
HBA1C MFR BLD: 6.8 % (ref 4.8–5.9)
HCT VFR BLD CALC: 41.5 % (ref 36–46)
HDLC SERPL-MCNC: 36 MG/DL
HEMOGLOBIN: 13.7 G/DL (ref 12–16)
IMMATURE GRANULOCYTES: ABNORMAL %
LDL CHOLESTEROL: 101 MG/DL (ref 0–130)
LYMPHOCYTES # BLD: 29 % (ref 16–46)
MCH RBC QN AUTO: 28.7 PG (ref 26–34)
MCHC RBC AUTO-ENTMCNC: 32.9 G/DL (ref 31–37)
MCV RBC AUTO: 87.3 FL (ref 80–100)
MICROALBUMIN/CREAT 24H UR: <12 MG/L
MICROALBUMIN/CREAT UR-RTO: NORMAL MCG/MG CREAT
MONOCYTES # BLD: 7 % (ref 4–11)
NRBC AUTOMATED: ABNORMAL PER 100 WBC
PDW BLD-RTO: 15 % (ref 11–14.5)
PLATELET # BLD: 241 K/UL (ref 140–450)
PLATELET ESTIMATE: ABNORMAL
PMV BLD AUTO: 8 FL (ref 6–12)
POTASSIUM SERPL-SCNC: 4.4 MMOL/L (ref 3.7–5.3)
RBC # BLD: 4.75 M/UL (ref 4–5.2)
RBC # BLD: ABNORMAL 10*6/UL
SEG NEUTROPHILS: 60 % (ref 43–77)
SEGMENTED NEUTROPHILS ABSOLUTE COUNT: 4.8 K/UL (ref 1.8–7.7)
SODIUM BLD-SCNC: 142 MMOL/L (ref 135–144)
TOTAL PROTEIN: 7.6 G/DL (ref 6.4–8.3)
TRIGL SERPL-MCNC: 227 MG/DL
VLDLC SERPL CALC-MCNC: ABNORMAL MG/DL (ref 1–30)
WBC # BLD: 8 K/UL (ref 3.5–11)
WBC # BLD: ABNORMAL 10*3/UL

## 2018-04-18 PROCEDURE — 83036 HEMOGLOBIN GLYCOSYLATED A1C: CPT

## 2018-04-18 PROCEDURE — 80061 LIPID PANEL: CPT

## 2018-04-18 PROCEDURE — 85025 COMPLETE CBC W/AUTO DIFF WBC: CPT

## 2018-04-18 PROCEDURE — 82043 UR ALBUMIN QUANTITATIVE: CPT

## 2018-04-18 PROCEDURE — 36415 COLL VENOUS BLD VENIPUNCTURE: CPT

## 2018-04-18 PROCEDURE — 80053 COMPREHEN METABOLIC PANEL: CPT

## 2018-04-18 PROCEDURE — 82570 ASSAY OF URINE CREATININE: CPT

## 2018-04-25 ENCOUNTER — OFFICE VISIT (OUTPATIENT)
Dept: FAMILY MEDICINE CLINIC | Age: 68
End: 2018-04-25
Payer: MEDICARE

## 2018-04-25 ENCOUNTER — HOSPITAL ENCOUNTER (OUTPATIENT)
Age: 68
Setting detail: SPECIMEN
Discharge: HOME | End: 2018-04-25
Payer: MEDICARE

## 2018-04-25 VITALS
HEART RATE: 66 BPM | BODY MASS INDEX: 36.38 KG/M2 | DIASTOLIC BLOOD PRESSURE: 82 MMHG | SYSTOLIC BLOOD PRESSURE: 138 MMHG | HEIGHT: 67 IN | WEIGHT: 231.8 LBS

## 2018-04-25 DIAGNOSIS — Z01.419 CERVICAL SMEAR, AS PART OF ROUTINE GYNECOLOGICAL EXAMINATION: ICD-10-CM

## 2018-04-25 DIAGNOSIS — I10 ESSENTIAL HYPERTENSION: ICD-10-CM

## 2018-04-25 DIAGNOSIS — G56.01 RIGHT CARPAL TUNNEL SYNDROME: ICD-10-CM

## 2018-04-25 DIAGNOSIS — K21.9 GASTROESOPHAGEAL REFLUX DISEASE WITHOUT ESOPHAGITIS: ICD-10-CM

## 2018-04-25 DIAGNOSIS — M70.62 TROCHANTERIC BURSITIS OF LEFT HIP: ICD-10-CM

## 2018-04-25 DIAGNOSIS — E11.9 CONTROLLED TYPE 2 DIABETES MELLITUS WITHOUT COMPLICATION, WITHOUT LONG-TERM CURRENT USE OF INSULIN (HCC): Primary | ICD-10-CM

## 2018-04-25 DIAGNOSIS — E78.2 MIXED HYPERLIPIDEMIA: ICD-10-CM

## 2018-04-25 DIAGNOSIS — M77.11 RIGHT LATERAL EPICONDYLITIS: ICD-10-CM

## 2018-04-25 PROCEDURE — G0145 SCR C/V CYTO,THINLAYER,RESCR: HCPCS

## 2018-04-25 PROCEDURE — 3017F COLORECTAL CA SCREEN DOC REV: CPT | Performed by: FAMILY MEDICINE

## 2018-04-25 PROCEDURE — 99214 OFFICE O/P EST MOD 30 MIN: CPT | Performed by: FAMILY MEDICINE

## 2018-04-25 PROCEDURE — 2022F DILAT RTA XM EVC RTNOPTHY: CPT | Performed by: FAMILY MEDICINE

## 2018-04-25 PROCEDURE — G8417 CALC BMI ABV UP PARAM F/U: HCPCS | Performed by: FAMILY MEDICINE

## 2018-04-25 PROCEDURE — G0101 CA SCREEN;PELVIC/BREAST EXAM: HCPCS | Performed by: FAMILY MEDICINE

## 2018-04-25 PROCEDURE — 1090F PRES/ABSN URINE INCON ASSESS: CPT | Performed by: FAMILY MEDICINE

## 2018-04-25 PROCEDURE — 1123F ACP DISCUSS/DSCN MKR DOCD: CPT | Performed by: FAMILY MEDICINE

## 2018-04-25 PROCEDURE — 3044F HG A1C LEVEL LT 7.0%: CPT | Performed by: FAMILY MEDICINE

## 2018-04-25 PROCEDURE — G8427 DOCREV CUR MEDS BY ELIG CLIN: HCPCS | Performed by: FAMILY MEDICINE

## 2018-04-25 PROCEDURE — G8400 PT W/DXA NO RESULTS DOC: HCPCS | Performed by: FAMILY MEDICINE

## 2018-04-25 PROCEDURE — 1036F TOBACCO NON-USER: CPT | Performed by: FAMILY MEDICINE

## 2018-04-25 PROCEDURE — 4040F PNEUMOC VAC/ADMIN/RCVD: CPT | Performed by: FAMILY MEDICINE

## 2018-04-25 RX ORDER — PREDNISONE 20 MG/1
TABLET ORAL
Qty: 15 TABLET | Refills: 0 | Status: SHIPPED | OUTPATIENT
Start: 2018-04-25 | End: 2018-10-26 | Stop reason: ALTCHOICE

## 2018-04-25 RX ORDER — LORATADINE 10 MG/1
10 TABLET ORAL DAILY
Qty: 90 TABLET | Refills: 3 | Status: SHIPPED | OUTPATIENT
Start: 2018-04-25 | End: 2019-06-17 | Stop reason: SDUPTHER

## 2018-04-25 ASSESSMENT — ENCOUNTER SYMPTOMS
WHEEZING: 0
COUGH: 0
SHORTNESS OF BREATH: 0
EYE REDNESS: 0
SORE THROAT: 0
TROUBLE SWALLOWING: 0
NAUSEA: 0
VOMITING: 0
RHINORRHEA: 0
CONSTIPATION: 0
EYE DISCHARGE: 0
SINUS PRESSURE: 0
DIARRHEA: 0
ABDOMINAL PAIN: 0

## 2018-04-25 ASSESSMENT — PATIENT HEALTH QUESTIONNAIRE - PHQ9
1. LITTLE INTEREST OR PLEASURE IN DOING THINGS: 0
SUM OF ALL RESPONSES TO PHQ QUESTIONS 1-9: 0
SUM OF ALL RESPONSES TO PHQ9 QUESTIONS 1 & 2: 0
2. FEELING DOWN, DEPRESSED OR HOPELESS: 0

## 2018-05-09 ENCOUNTER — TELEPHONE (OUTPATIENT)
Dept: MAMMOGRAPHY | Age: 68
End: 2018-05-09

## 2018-05-09 DIAGNOSIS — Z12.39 SCREENING FOR BREAST CANCER: Primary | ICD-10-CM

## 2018-05-15 ENCOUNTER — HOSPITAL ENCOUNTER (OUTPATIENT)
Dept: MAMMOGRAPHY | Age: 68
Discharge: HOME | End: 2018-05-17
Payer: MEDICARE

## 2018-05-15 DIAGNOSIS — Z12.39 SCREENING FOR BREAST CANCER: ICD-10-CM

## 2018-05-15 PROCEDURE — 77067 SCR MAMMO BI INCL CAD: CPT

## 2018-05-18 LAB — CYTOLOGY REPORT: NORMAL

## 2018-09-26 PROBLEM — Z00.00 MEDICARE ANNUAL WELLNESS VISIT, INITIAL: Status: RESOLVED | Noted: 2017-04-20 | Resolved: 2018-09-26

## 2018-10-12 RX ORDER — ATENOLOL 25 MG/1
25 TABLET ORAL DAILY
Qty: 90 TABLET | Refills: 1 | Status: SHIPPED | OUTPATIENT
Start: 2018-10-12 | End: 2019-03-24 | Stop reason: SDUPTHER

## 2018-10-12 RX ORDER — ATENOLOL 25 MG/1
TABLET ORAL
Qty: 90 TABLET | Refills: 1 | OUTPATIENT
Start: 2018-10-12

## 2018-10-12 NOTE — TELEPHONE ENCOUNTER
Asha Buchanan called requesting a refill of the below medication which has been pended for you: duplicate request    Requested Prescriptions     Pending Prescriptions Disp Refills    atenolol (TENORMIN) 25 MG tablet [Pharmacy Med Name: ATENOLOL 25 MG TABLET] 90 tablet 1     Sig: take 1 tablet by mouth once daily       Last Appointment Date: 4/25/2018  Next Appointment Date: 10/12/2018    Allergies   Allergen Reactions    Aspirin Other (See Comments)     Abdominal pain    Bactrim [Sulfamethoxazole-Trimethoprim] Nausea Only    Ibuprofen Other (See Comments)     Abdominal pain    Penicillins Rash

## 2018-10-17 ENCOUNTER — PATIENT MESSAGE (OUTPATIENT)
Dept: FAMILY MEDICINE CLINIC | Age: 68
End: 2018-10-17

## 2018-10-17 NOTE — TELEPHONE ENCOUNTER
From: Shamar Singh  To: Lanette Mata DO  Sent: 10/17/2018 1:19 PM EDT  Subject: Non-Urgent Medical Question    I have an appointment with Dr. Ciarra Marks next friday the 26 for a check-up. Tomorrow morning I am scheduled for the lab work. Yesterday I received two cortisone shots in my arm which they told me it would raise my sugar up by about a 100 points. This morning my sugar was 191. My question is do you still want me to do the lab work tomorrow or would you rather reschedule it for a day next week for the sugar to come down.

## 2018-10-18 ENCOUNTER — HOSPITAL ENCOUNTER (OUTPATIENT)
Dept: LAB | Age: 68
Discharge: HOME | End: 2018-10-18
Payer: MEDICARE

## 2018-10-18 DIAGNOSIS — I10 ESSENTIAL HYPERTENSION: ICD-10-CM

## 2018-10-18 DIAGNOSIS — E11.9 CONTROLLED TYPE 2 DIABETES MELLITUS WITHOUT COMPLICATION, WITHOUT LONG-TERM CURRENT USE OF INSULIN (HCC): ICD-10-CM

## 2018-10-18 DIAGNOSIS — E78.2 MIXED HYPERLIPIDEMIA: ICD-10-CM

## 2018-10-18 LAB
ABSOLUTE EOS #: 0.1 K/UL (ref 0–0.4)
ABSOLUTE IMMATURE GRANULOCYTE: ABNORMAL K/UL (ref 0–0.3)
ABSOLUTE LYMPH #: 2.4 K/UL (ref 1–4.8)
ABSOLUTE MONO #: 0.6 K/UL (ref 0.1–1.2)
ALBUMIN SERPL-MCNC: 4.1 G/DL (ref 3.5–5.2)
ALBUMIN/GLOBULIN RATIO: 1.2 (ref 1–2.5)
ALP BLD-CCNC: 81 U/L (ref 35–104)
ALT SERPL-CCNC: 16 U/L (ref 5–33)
ANION GAP SERPL CALCULATED.3IONS-SCNC: 12 MMOL/L (ref 9–17)
AST SERPL-CCNC: 20 U/L
BASOPHILS # BLD: 1 % (ref 0–1)
BASOPHILS ABSOLUTE: 0.1 K/UL (ref 0–0.2)
BILIRUB SERPL-MCNC: 0.28 MG/DL (ref 0.3–1.2)
BUN BLDV-MCNC: 14 MG/DL (ref 8–23)
BUN/CREAT BLD: 23 (ref 9–20)
CALCIUM SERPL-MCNC: 9.5 MG/DL (ref 8.6–10.4)
CHLORIDE BLD-SCNC: 104 MMOL/L (ref 98–107)
CHOLESTEROL/HDL RATIO: 4.3
CHOLESTEROL: 187 MG/DL
CO2: 28 MMOL/L (ref 20–31)
CREAT SERPL-MCNC: 0.62 MG/DL (ref 0.5–0.9)
DIFFERENTIAL TYPE: ABNORMAL
EOSINOPHILS RELATIVE PERCENT: 1 % (ref 1–7)
ESTIMATED AVERAGE GLUCOSE: 143 MG/DL
GFR AFRICAN AMERICAN: >60 ML/MIN
GFR NON-AFRICAN AMERICAN: >60 ML/MIN
GFR SERPL CREATININE-BSD FRML MDRD: ABNORMAL ML/MIN/{1.73_M2}
GFR SERPL CREATININE-BSD FRML MDRD: ABNORMAL ML/MIN/{1.73_M2}
GLUCOSE BLD-MCNC: 111 MG/DL (ref 70–99)
HBA1C MFR BLD: 6.6 % (ref 4.8–5.9)
HCT VFR BLD CALC: 41.6 % (ref 36–46)
HDLC SERPL-MCNC: 44 MG/DL
HEMOGLOBIN: 13.4 G/DL (ref 12–16)
IMMATURE GRANULOCYTES: ABNORMAL %
LDL CHOLESTEROL: 118 MG/DL (ref 0–130)
LYMPHOCYTES # BLD: 23 % (ref 16–46)
MCH RBC QN AUTO: 28.1 PG (ref 26–34)
MCHC RBC AUTO-ENTMCNC: 32.1 G/DL (ref 31–37)
MCV RBC AUTO: 87.6 FL (ref 80–100)
MONOCYTES # BLD: 6 % (ref 4–11)
NRBC AUTOMATED: ABNORMAL PER 100 WBC
PDW BLD-RTO: 15 % (ref 11–14.5)
PLATELET # BLD: 268 K/UL (ref 140–450)
PLATELET ESTIMATE: ABNORMAL
PMV BLD AUTO: 8 FL (ref 6–12)
POTASSIUM SERPL-SCNC: 4 MMOL/L (ref 3.7–5.3)
RBC # BLD: 4.75 M/UL (ref 4–5.2)
RBC # BLD: ABNORMAL 10*6/UL
SEG NEUTROPHILS: 69 % (ref 43–77)
SEGMENTED NEUTROPHILS ABSOLUTE COUNT: 7.2 K/UL (ref 1.8–7.7)
SODIUM BLD-SCNC: 144 MMOL/L (ref 135–144)
TOTAL PROTEIN: 7.5 G/DL (ref 6.4–8.3)
TRIGL SERPL-MCNC: 124 MG/DL
TSH SERPL DL<=0.05 MIU/L-ACNC: 2.74 MIU/L (ref 0.3–5)
VLDLC SERPL CALC-MCNC: NORMAL MG/DL (ref 1–30)
WBC # BLD: 10.5 K/UL (ref 3.5–11)
WBC # BLD: ABNORMAL 10*3/UL

## 2018-10-18 PROCEDURE — 80053 COMPREHEN METABOLIC PANEL: CPT

## 2018-10-18 PROCEDURE — 85025 COMPLETE CBC W/AUTO DIFF WBC: CPT

## 2018-10-18 PROCEDURE — 36415 COLL VENOUS BLD VENIPUNCTURE: CPT

## 2018-10-18 PROCEDURE — 83036 HEMOGLOBIN GLYCOSYLATED A1C: CPT

## 2018-10-18 PROCEDURE — 84443 ASSAY THYROID STIM HORMONE: CPT

## 2018-10-18 PROCEDURE — 80061 LIPID PANEL: CPT

## 2018-10-26 ENCOUNTER — OFFICE VISIT (OUTPATIENT)
Dept: FAMILY MEDICINE CLINIC | Age: 68
End: 2018-10-26
Payer: MEDICARE

## 2018-10-26 VITALS
WEIGHT: 221 LBS | BODY MASS INDEX: 35.52 KG/M2 | RESPIRATION RATE: 16 BRPM | SYSTOLIC BLOOD PRESSURE: 124 MMHG | HEIGHT: 66 IN | HEART RATE: 64 BPM | DIASTOLIC BLOOD PRESSURE: 70 MMHG

## 2018-10-26 DIAGNOSIS — E78.2 MIXED HYPERLIPIDEMIA: ICD-10-CM

## 2018-10-26 DIAGNOSIS — Z12.11 ENCOUNTER FOR SCREENING COLONOSCOPY: ICD-10-CM

## 2018-10-26 DIAGNOSIS — I10 ESSENTIAL HYPERTENSION: ICD-10-CM

## 2018-10-26 DIAGNOSIS — K21.9 GASTROESOPHAGEAL REFLUX DISEASE WITHOUT ESOPHAGITIS: ICD-10-CM

## 2018-10-26 DIAGNOSIS — Z12.31 SCREENING MAMMOGRAM, ENCOUNTER FOR: ICD-10-CM

## 2018-10-26 DIAGNOSIS — E11.9 CONTROLLED TYPE 2 DIABETES MELLITUS WITHOUT COMPLICATION, WITHOUT LONG-TERM CURRENT USE OF INSULIN (HCC): Primary | ICD-10-CM

## 2018-10-26 DIAGNOSIS — Z00.00 MEDICARE ANNUAL WELLNESS VISIT, SUBSEQUENT: ICD-10-CM

## 2018-10-26 PROCEDURE — G8400 PT W/DXA NO RESULTS DOC: HCPCS | Performed by: FAMILY MEDICINE

## 2018-10-26 PROCEDURE — G0439 PPPS, SUBSEQ VISIT: HCPCS | Performed by: FAMILY MEDICINE

## 2018-10-26 PROCEDURE — 1036F TOBACCO NON-USER: CPT | Performed by: FAMILY MEDICINE

## 2018-10-26 PROCEDURE — 1123F ACP DISCUSS/DSCN MKR DOCD: CPT | Performed by: FAMILY MEDICINE

## 2018-10-26 PROCEDURE — G8427 DOCREV CUR MEDS BY ELIG CLIN: HCPCS | Performed by: FAMILY MEDICINE

## 2018-10-26 PROCEDURE — 4040F PNEUMOC VAC/ADMIN/RCVD: CPT | Performed by: FAMILY MEDICINE

## 2018-10-26 PROCEDURE — 99214 OFFICE O/P EST MOD 30 MIN: CPT | Performed by: FAMILY MEDICINE

## 2018-10-26 PROCEDURE — G8482 FLU IMMUNIZE ORDER/ADMIN: HCPCS | Performed by: FAMILY MEDICINE

## 2018-10-26 PROCEDURE — 3017F COLORECTAL CA SCREEN DOC REV: CPT | Performed by: FAMILY MEDICINE

## 2018-10-26 PROCEDURE — 3044F HG A1C LEVEL LT 7.0%: CPT | Performed by: FAMILY MEDICINE

## 2018-10-26 PROCEDURE — 2022F DILAT RTA XM EVC RTNOPTHY: CPT | Performed by: FAMILY MEDICINE

## 2018-10-26 PROCEDURE — 1101F PT FALLS ASSESS-DOCD LE1/YR: CPT | Performed by: FAMILY MEDICINE

## 2018-10-26 PROCEDURE — 1090F PRES/ABSN URINE INCON ASSESS: CPT | Performed by: FAMILY MEDICINE

## 2018-10-26 PROCEDURE — G8417 CALC BMI ABV UP PARAM F/U: HCPCS | Performed by: FAMILY MEDICINE

## 2018-10-26 RX ORDER — MELOXICAM 7.5 MG/1
TABLET ORAL
Qty: 90 TABLET | Refills: 1 | Status: SHIPPED | OUTPATIENT
Start: 2018-10-26 | End: 2019-05-30 | Stop reason: SDUPTHER

## 2018-10-26 ASSESSMENT — ENCOUNTER SYMPTOMS
DIARRHEA: 0
EYE REDNESS: 0
CONSTIPATION: 0
ABDOMINAL PAIN: 0
SHORTNESS OF BREATH: 0
EYE DISCHARGE: 0
SINUS PRESSURE: 0
RHINORRHEA: 0
VOMITING: 0
NAUSEA: 0
TROUBLE SWALLOWING: 0
WHEEZING: 0
COUGH: 0
SORE THROAT: 0

## 2018-10-26 ASSESSMENT — PATIENT HEALTH QUESTIONNAIRE - PHQ9
SUM OF ALL RESPONSES TO PHQ QUESTIONS 1-9: 0
SUM OF ALL RESPONSES TO PHQ QUESTIONS 1-9: 0

## 2018-10-26 ASSESSMENT — LIFESTYLE VARIABLES: HOW OFTEN DO YOU HAVE A DRINK CONTAINING ALCOHOL: 0

## 2018-10-26 ASSESSMENT — ANXIETY QUESTIONNAIRES: GAD7 TOTAL SCORE: 0

## 2018-10-26 NOTE — PROGRESS NOTES
Influenza Virus Vaccine 08/27/2012, 10/17/2013, 10/01/2014    Influenza, Triv, inactivated, subunit, adjuvanted, IM (Fluad 65 yrs and older) 10/06/2017, 09/27/2018    Pneumococcal 13-valent Conjugate ([id number]) 10/20/2016    Pneumococcal Polysaccharide ([id number]) 10/06/2017    Zoster Live (Zostavax) 11/12/2013        Health Maintenance   Topic Date Due    Shingles Vaccine (1 of 2 - 2 Dose Series) 01/12/2014, waiting on injection to be available    Colon cancer screen colonoscopy  12/08/2018, to schedule    Diabetic retinal exam  04/16/2019    Diabetic microalbuminuria test  04/18/2019    Diabetic foot exam  04/25/2019    Breast cancer screen  05/15/2019    A1C test (Diabetic or Prediabetic)  10/18/2019    Lipid screen  10/18/2019    DTaP/Tdap/Td vaccine (2 - Tdap) 12/30/2019    DEXA (modify frequency per FRAX score)  Addressed, Recommended, patient declined    Flu vaccine  Completed    Pneumococcal low/med risk  Completed    Hepatitis C screen  Completed     Recommendations for Preventive Services Due: see orders and patient instructions/AVS.  .   Recommended screening schedule for the next 5-10 years is provided to the patient in written form: see Patient Instructions/AVS.

## 2018-10-26 NOTE — PROGRESS NOTES
list, allergies, social history, fam history, pmhx and pshx today as noted in the record. Preventative measures are reviewed today. See health maintenance section for complete preventative plan of care. Review of Systems   Constitutional: Negative for chills, fatigue and fever. HENT: Negative for congestion, ear pain, postnasal drip, rhinorrhea, sinus pressure, sore throat and trouble swallowing. Eyes: Negative for discharge and redness. Respiratory: Negative for cough, shortness of breath and wheezing. Cardiovascular: Negative for chest pain. Gastrointestinal: Negative for abdominal pain, constipation, diarrhea, nausea and vomiting. Musculoskeletal: Positive for arthralgias (chronic bilateral hip pain, better with PRB). Negative for myalgias and neck pain. Skin: Negative for rash and wound. Allergic/Immunologic: Negative for environmental allergies. Neurological: Negative for dizziness, weakness, light-headedness and headaches. Hematological: Negative for adenopathy. Psychiatric/Behavioral: Negative. Prior to Visit Medications    Medication Sig Taking? Authorizing Provider   meloxicam (MOBIC) 7.5 MG tablet take 1 tablet by mouth once daily Yes Rafael Encarnacion DO   atenolol (TENORMIN) 25 MG tablet Take 1 tablet by mouth daily Yes Rafael Encarnacion DO   loratadine (CLARITIN) 10 MG tablet Take 1 tablet by mouth daily Yes Rafael Encarnacion DO   glucose blood VI test strips (ONE TOUCH TEST STRIPS) strip Tests 1 X daily. DX E11.9 Yes Rafael Encarnacion DO   ONE TOUCH LANCETS MISC Tests 1 x daily DX. E11.9 Yes Rafael Encarnacion DO   omeprazole (PRILOSEC) 20 MG capsule Take 1 capsule by mouth 2 times daily. Patient taking differently: Take 20 mg by mouth Daily OTC Yes Rafael Encarnacion DO   pseudoephedrine (SUDAFED) 30 MG tablet Take 30 mg by mouth every 4 hours as needed.  Yes Historical Provider, MD   diphenhydrAMINE-APAP, sleep, (TYLENOL PM EXTRA STRENGTH)  MG tablet Psychiatric: She has a normal mood and affect. Her behavior is normal. Judgment and thought content normal.   Nursing note and vitals reviewed. ASSESSMENT/PLAN:  1. Controlled type 2 diabetes mellitus without complication, without long-term current use of insulin (HCC)  Stable and controlled with dietary efforts. Continued low carb/low sugar diet and increased exercise for optimal blood sugar control.  - Hemoglobin A1C; Future  - Microalbumin, Ur; Future    2. Essential hypertension  Stable and controlled on her current medical therapy. - CBC Auto Differential; Future  - Comprehensive Metabolic Panel; Future    3. Mixed hyperlipidemia  Stable and controlled with dietary efforts. Continue low-fat/high-fiber diet keep this optimally controlled. - Lipid Panel; Future    4. Gastroesophageal reflux disease without esophagitis  GERD is stable and controlled on her current mental therapy. 5. Screening mammogram, encounter for  Will schedule for patient. - TITI DIGITAL SCREEN W CAD BILATERAL; Future    6. Encounter for screening colonoscopy  Will schedule for patient. - Maryam Montenegro MD, General Surgery [place]    7. Medicare annual wellness visit, subsequent  See separate annual wellness visit note for further review. Patient is to return to my office in 6 months duration or sooner if any further problems or symptoms arise. (Please note that portions of this note were completed with a voice recognition program. Efforts were made to edit the dictations but occasionally words are mis-transcribed.)          Return in about 6 months (around 4/26/2019). An electronic signature was used to authenticate this note.     --Tabitha Gtz, DO on 10/26/2018 at 3:46 PM

## 2018-12-12 ENCOUNTER — NURSE ONLY (OUTPATIENT)
Dept: SURGERY | Age: 68
End: 2018-12-12

## 2018-12-12 VITALS
HEIGHT: 66 IN | SYSTOLIC BLOOD PRESSURE: 140 MMHG | WEIGHT: 209 LBS | DIASTOLIC BLOOD PRESSURE: 88 MMHG | BODY MASS INDEX: 33.59 KG/M2 | HEART RATE: 70 BPM

## 2018-12-12 DIAGNOSIS — Z80.0 FAMILY HISTORY OF COLON CANCER: Primary | ICD-10-CM

## 2018-12-12 NOTE — PROGRESS NOTES
H &P EGD and Colonoscopy  Patient: Keven Helm                 :1950  (yes) patient has seen Dr. Kayden Matthews prior to procedure  PCP: Dr. Bustamante Ill cramps, rectal bleeding         Family history of colon cancer (sister), family history of UC (mother)        Heartburn    HPI:        Colonoscopy  Abd pain: gets lower abd cramps prior to and during BM  Anemia: no  Bloating:no  Diarrhea: no  Constipation: yes  Melena: no  Hematochezia:yes  Rectal Bleeding:yes  Rectal/Anal Pain:occasional  Pruritus: no  Family history colon Cancer: yes, sister age 52's or 63's, mother with UC  Previous colon cancer: no  Previous Colon Polyp: no  Change in bowels: no  Decrease caliber of stool: yes  Change in color of stool: no  Previous work up date: -colonoscopy,normal except for hemorrhoids, Dr. Kayden Matthews     EGD  Nausea: yes  Vomiting: yes  Heartburn:yes  Dysphagia:very seldom  Hematemesis:no  Epigastric pain:no  Anemia: no  Previous work up date:no previous EGD  Current Treatment:Prilosec daily and TUMS for many years    Family History   Problem Relation Age of Onset    Prostate Cancer Father     Breast Cancer Sister     Cancer Sister         breast    Cancer Sister         colorectal    Other Mother         ulcerative colitis    Other Brother 79        coronary artery disease    Other Son         ulcerative colitis     Social History     Social History    Marital status:      Spouse name: N/A    Number of children: N/A    Years of education: N/A     Occupational History    Not on file.      Social History Main Topics    Smoking status: Passive Smoke Exposure - Never Smoker    Smokeless tobacco: Never Used      Comment: very remotely would smoke a few cigarettes on weekends/college days    Alcohol use 0.0 oz/week      Comment: occasional use    Drug use: No    Sexual activity: Not on file     Other Topics Concern    Not on file     Social History Narrative    No narrative on file     Past

## 2019-01-18 DIAGNOSIS — E11.9 DIABETES MELLITUS TYPE II, CONTROLLED (HCC): ICD-10-CM

## 2019-01-21 ENCOUNTER — OFFICE VISIT (OUTPATIENT)
Dept: PRIMARY CARE CLINIC | Age: 69
End: 2019-01-21
Payer: MEDICARE

## 2019-01-21 VITALS
HEIGHT: 66 IN | SYSTOLIC BLOOD PRESSURE: 138 MMHG | BODY MASS INDEX: 34.55 KG/M2 | RESPIRATION RATE: 18 BRPM | DIASTOLIC BLOOD PRESSURE: 80 MMHG | WEIGHT: 215 LBS | OXYGEN SATURATION: 96 % | HEART RATE: 64 BPM | TEMPERATURE: 98.2 F

## 2019-01-21 DIAGNOSIS — S76.311A STRAIN OF PIRIFORMIS MUSCLE, RIGHT, INITIAL ENCOUNTER: Primary | ICD-10-CM

## 2019-01-21 PROCEDURE — 99214 OFFICE O/P EST MOD 30 MIN: CPT | Performed by: FAMILY MEDICINE

## 2019-01-21 PROCEDURE — 4040F PNEUMOC VAC/ADMIN/RCVD: CPT | Performed by: FAMILY MEDICINE

## 2019-01-21 PROCEDURE — 1101F PT FALLS ASSESS-DOCD LE1/YR: CPT | Performed by: FAMILY MEDICINE

## 2019-01-21 PROCEDURE — G8417 CALC BMI ABV UP PARAM F/U: HCPCS | Performed by: FAMILY MEDICINE

## 2019-01-21 PROCEDURE — G8427 DOCREV CUR MEDS BY ELIG CLIN: HCPCS | Performed by: FAMILY MEDICINE

## 2019-01-21 PROCEDURE — 3017F COLORECTAL CA SCREEN DOC REV: CPT | Performed by: FAMILY MEDICINE

## 2019-01-21 PROCEDURE — 1090F PRES/ABSN URINE INCON ASSESS: CPT | Performed by: FAMILY MEDICINE

## 2019-01-21 PROCEDURE — G8482 FLU IMMUNIZE ORDER/ADMIN: HCPCS | Performed by: FAMILY MEDICINE

## 2019-01-21 PROCEDURE — 1123F ACP DISCUSS/DSCN MKR DOCD: CPT | Performed by: FAMILY MEDICINE

## 2019-01-21 PROCEDURE — G8400 PT W/DXA NO RESULTS DOC: HCPCS | Performed by: FAMILY MEDICINE

## 2019-01-21 PROCEDURE — 1036F TOBACCO NON-USER: CPT | Performed by: FAMILY MEDICINE

## 2019-01-21 RX ORDER — PREDNISONE 20 MG/1
TABLET ORAL
Qty: 15 TABLET | Refills: 0 | Status: SHIPPED | OUTPATIENT
Start: 2019-01-21 | End: 2019-02-06 | Stop reason: ALTCHOICE

## 2019-01-21 ASSESSMENT — PATIENT HEALTH QUESTIONNAIRE - PHQ9
SUM OF ALL RESPONSES TO PHQ9 QUESTIONS 1 & 2: 0
1. LITTLE INTEREST OR PLEASURE IN DOING THINGS: 0
SUM OF ALL RESPONSES TO PHQ QUESTIONS 1-9: 0
SUM OF ALL RESPONSES TO PHQ QUESTIONS 1-9: 0
2. FEELING DOWN, DEPRESSED OR HOPELESS: 0

## 2019-01-21 ASSESSMENT — ENCOUNTER SYMPTOMS
EYES NEGATIVE: 1
GASTROINTESTINAL NEGATIVE: 1
RESPIRATORY NEGATIVE: 1
BACK PAIN: 1

## 2019-02-06 ENCOUNTER — OFFICE VISIT (OUTPATIENT)
Dept: PRIMARY CARE CLINIC | Age: 69
End: 2019-02-06
Payer: MEDICARE

## 2019-02-06 VITALS
OXYGEN SATURATION: 95 % | TEMPERATURE: 97.7 F | RESPIRATION RATE: 16 BRPM | BODY MASS INDEX: 33.91 KG/M2 | HEIGHT: 66 IN | WEIGHT: 211 LBS | HEART RATE: 72 BPM | SYSTOLIC BLOOD PRESSURE: 130 MMHG | DIASTOLIC BLOOD PRESSURE: 80 MMHG

## 2019-02-06 DIAGNOSIS — G57.01 PIRIFORMIS SYNDROME OF RIGHT SIDE: Primary | ICD-10-CM

## 2019-02-06 PROCEDURE — 1036F TOBACCO NON-USER: CPT | Performed by: FAMILY MEDICINE

## 2019-02-06 PROCEDURE — G8417 CALC BMI ABV UP PARAM F/U: HCPCS | Performed by: FAMILY MEDICINE

## 2019-02-06 PROCEDURE — G8400 PT W/DXA NO RESULTS DOC: HCPCS | Performed by: FAMILY MEDICINE

## 2019-02-06 PROCEDURE — G8482 FLU IMMUNIZE ORDER/ADMIN: HCPCS | Performed by: FAMILY MEDICINE

## 2019-02-06 PROCEDURE — 1123F ACP DISCUSS/DSCN MKR DOCD: CPT | Performed by: FAMILY MEDICINE

## 2019-02-06 PROCEDURE — 99214 OFFICE O/P EST MOD 30 MIN: CPT | Performed by: FAMILY MEDICINE

## 2019-02-06 PROCEDURE — 1101F PT FALLS ASSESS-DOCD LE1/YR: CPT | Performed by: FAMILY MEDICINE

## 2019-02-06 PROCEDURE — G8427 DOCREV CUR MEDS BY ELIG CLIN: HCPCS | Performed by: FAMILY MEDICINE

## 2019-02-06 PROCEDURE — 3017F COLORECTAL CA SCREEN DOC REV: CPT | Performed by: FAMILY MEDICINE

## 2019-02-06 PROCEDURE — 4040F PNEUMOC VAC/ADMIN/RCVD: CPT | Performed by: FAMILY MEDICINE

## 2019-02-06 PROCEDURE — 1090F PRES/ABSN URINE INCON ASSESS: CPT | Performed by: FAMILY MEDICINE

## 2019-02-06 RX ORDER — PREDNISONE 20 MG/1
TABLET ORAL
Qty: 15 TABLET | Refills: 0 | Status: SHIPPED | OUTPATIENT
Start: 2019-02-06 | End: 2019-02-20

## 2019-02-06 ASSESSMENT — ENCOUNTER SYMPTOMS
EYES NEGATIVE: 1
BACK PAIN: 0
GASTROINTESTINAL NEGATIVE: 1
RESPIRATORY NEGATIVE: 1

## 2019-02-14 ENCOUNTER — ANESTHESIA (OUTPATIENT)
Dept: OPERATING ROOM | Age: 69
End: 2019-02-14
Payer: MEDICARE

## 2019-02-14 ENCOUNTER — HOSPITAL ENCOUNTER (OUTPATIENT)
Age: 69
Setting detail: OUTPATIENT SURGERY
Discharge: HOME | End: 2019-02-14
Attending: SURGERY | Admitting: SURGERY
Payer: MEDICARE

## 2019-02-14 ENCOUNTER — ANESTHESIA EVENT (OUTPATIENT)
Dept: OPERATING ROOM | Age: 69
End: 2019-02-14
Payer: MEDICARE

## 2019-02-14 VITALS
WEIGHT: 206.13 LBS | OXYGEN SATURATION: 98 % | SYSTOLIC BLOOD PRESSURE: 136 MMHG | BODY MASS INDEX: 32.35 KG/M2 | RESPIRATION RATE: 18 BRPM | HEART RATE: 68 BPM | HEIGHT: 67 IN | TEMPERATURE: 98.1 F | DIASTOLIC BLOOD PRESSURE: 60 MMHG

## 2019-02-14 VITALS — SYSTOLIC BLOOD PRESSURE: 113 MMHG | OXYGEN SATURATION: 96 % | DIASTOLIC BLOOD PRESSURE: 56 MMHG

## 2019-02-14 LAB — GLUCOSE BLD-MCNC: 115 MG/DL (ref 65–105)

## 2019-02-14 PROCEDURE — 3700000000 HC ANESTHESIA ATTENDED CARE: Performed by: SURGERY

## 2019-02-14 PROCEDURE — 6360000002 HC RX W HCPCS: Performed by: NURSE ANESTHETIST, CERTIFIED REGISTERED

## 2019-02-14 PROCEDURE — 7100000011 HC PHASE II RECOVERY - ADDTL 15 MIN: Performed by: SURGERY

## 2019-02-14 PROCEDURE — 6370000000 HC RX 637 (ALT 250 FOR IP): Performed by: NURSE ANESTHETIST, CERTIFIED REGISTERED

## 2019-02-14 PROCEDURE — 88305 TISSUE EXAM BY PATHOLOGIST: CPT

## 2019-02-14 PROCEDURE — 2580000003 HC RX 258: Performed by: SURGERY

## 2019-02-14 PROCEDURE — 3609009500 HC COLONOSCOPY DIAGNOSTIC OR SCREENING: Performed by: SURGERY

## 2019-02-14 PROCEDURE — 3700000001 HC ADD 15 MINUTES (ANESTHESIA): Performed by: SURGERY

## 2019-02-14 PROCEDURE — 43239 EGD BIOPSY SINGLE/MULTIPLE: CPT | Performed by: SURGERY

## 2019-02-14 PROCEDURE — 00813 ANES UPR LWR GI NDSC PX: CPT | Performed by: NURSE ANESTHETIST, CERTIFIED REGISTERED

## 2019-02-14 PROCEDURE — 2709999900 HC NON-CHARGEABLE SUPPLY: Performed by: SURGERY

## 2019-02-14 PROCEDURE — 2500000003 HC RX 250 WO HCPCS: Performed by: NURSE ANESTHETIST, CERTIFIED REGISTERED

## 2019-02-14 PROCEDURE — 45378 DIAGNOSTIC COLONOSCOPY: CPT | Performed by: SURGERY

## 2019-02-14 PROCEDURE — 3609012400 HC EGD TRANSORAL BIOPSY SINGLE/MULTIPLE: Performed by: SURGERY

## 2019-02-14 PROCEDURE — 7100000010 HC PHASE II RECOVERY - FIRST 15 MIN: Performed by: SURGERY

## 2019-02-14 PROCEDURE — 82947 ASSAY GLUCOSE BLOOD QUANT: CPT

## 2019-02-14 RX ORDER — PROPOFOL 10 MG/ML
INJECTION, EMULSION INTRAVENOUS PRN
Status: DISCONTINUED | OUTPATIENT
Start: 2019-02-14 | End: 2019-02-14 | Stop reason: SDUPTHER

## 2019-02-14 RX ORDER — SODIUM CHLORIDE, SODIUM LACTATE, POTASSIUM CHLORIDE, CALCIUM CHLORIDE 600; 310; 30; 20 MG/100ML; MG/100ML; MG/100ML; MG/100ML
INJECTION, SOLUTION INTRAVENOUS CONTINUOUS
Status: DISCONTINUED | OUTPATIENT
Start: 2019-02-14 | End: 2019-02-14 | Stop reason: HOSPADM

## 2019-02-14 RX ORDER — LIDOCAINE HYDROCHLORIDE 20 MG/ML
INJECTION, SOLUTION EPIDURAL; INFILTRATION; INTRACAUDAL; PERINEURAL PRN
Status: DISCONTINUED | OUTPATIENT
Start: 2019-02-14 | End: 2019-02-14 | Stop reason: SDUPTHER

## 2019-02-14 RX ORDER — SCOLOPAMINE TRANSDERMAL SYSTEM 1 MG/1
1 PATCH, EXTENDED RELEASE TRANSDERMAL
Status: DISCONTINUED | OUTPATIENT
Start: 2019-02-14 | End: 2019-02-14 | Stop reason: HOSPADM

## 2019-02-14 RX ADMIN — LIDOCAINE HYDROCHLORIDE 50 MG: 20 INJECTION, SOLUTION EPIDURAL; INFILTRATION; INTRACAUDAL; PERINEURAL at 11:02

## 2019-02-14 RX ADMIN — SODIUM CHLORIDE, POTASSIUM CHLORIDE, SODIUM LACTATE AND CALCIUM CHLORIDE: 600; 310; 30; 20 INJECTION, SOLUTION INTRAVENOUS at 09:55

## 2019-02-14 RX ADMIN — SODIUM CHLORIDE, POTASSIUM CHLORIDE, SODIUM LACTATE AND CALCIUM CHLORIDE: 600; 310; 30; 20 INJECTION, SOLUTION INTRAVENOUS at 11:10

## 2019-02-14 RX ADMIN — PROPOFOL 120 MG: 10 INJECTION, EMULSION INTRAVENOUS at 11:11

## 2019-02-14 RX ADMIN — PROPOFOL 200 MG: 10 INJECTION, EMULSION INTRAVENOUS at 11:02

## 2019-02-14 ASSESSMENT — PAIN SCALES - GENERAL
PAINLEVEL_OUTOF10: 0

## 2019-02-14 ASSESSMENT — PAIN - FUNCTIONAL ASSESSMENT: PAIN_FUNCTIONAL_ASSESSMENT: 0-10

## 2019-02-15 LAB — COMMENT: NORMAL

## 2019-02-18 LAB — SURGICAL PATHOLOGY REPORT: NORMAL

## 2019-02-20 ENCOUNTER — PATIENT MESSAGE (OUTPATIENT)
Dept: FAMILY MEDICINE CLINIC | Age: 69
End: 2019-02-20

## 2019-02-20 RX ORDER — PREDNISONE 20 MG/1
TABLET ORAL
Qty: 15 TABLET | Refills: 0 | Status: SHIPPED | OUTPATIENT
Start: 2019-02-20 | End: 2019-03-28 | Stop reason: ALTCHOICE

## 2019-03-13 ENCOUNTER — TELEPHONE (OUTPATIENT)
Dept: SURGERY | Age: 69
End: 2019-03-13

## 2019-03-13 RX ORDER — RANITIDINE 150 MG/1
150 TABLET ORAL NIGHTLY
Qty: 60 TABLET | Refills: 3 | Status: SHIPPED | OUTPATIENT
Start: 2019-03-13 | End: 2019-11-21

## 2019-03-25 RX ORDER — ATENOLOL 25 MG/1
TABLET ORAL
Qty: 90 TABLET | Refills: 0 | Status: SHIPPED | OUTPATIENT
Start: 2019-03-25 | End: 2019-07-11 | Stop reason: SDUPTHER

## 2019-03-28 ENCOUNTER — OFFICE VISIT (OUTPATIENT)
Dept: PRIMARY CARE CLINIC | Age: 69
End: 2019-03-28
Payer: MEDICARE

## 2019-03-28 VITALS
RESPIRATION RATE: 14 BRPM | DIASTOLIC BLOOD PRESSURE: 64 MMHG | HEART RATE: 67 BPM | HEIGHT: 67 IN | SYSTOLIC BLOOD PRESSURE: 116 MMHG | BODY MASS INDEX: 33.59 KG/M2 | OXYGEN SATURATION: 95 % | WEIGHT: 214 LBS | TEMPERATURE: 96.4 F

## 2019-03-28 DIAGNOSIS — H61.22 CERUMINOSIS, LEFT: ICD-10-CM

## 2019-03-28 DIAGNOSIS — J32.4 CHRONIC PANSINUSITIS: Primary | ICD-10-CM

## 2019-03-28 PROCEDURE — 3017F COLORECTAL CA SCREEN DOC REV: CPT | Performed by: PHYSICIAN ASSISTANT

## 2019-03-28 PROCEDURE — G8482 FLU IMMUNIZE ORDER/ADMIN: HCPCS | Performed by: PHYSICIAN ASSISTANT

## 2019-03-28 PROCEDURE — 1090F PRES/ABSN URINE INCON ASSESS: CPT | Performed by: PHYSICIAN ASSISTANT

## 2019-03-28 PROCEDURE — 99213 OFFICE O/P EST LOW 20 MIN: CPT | Performed by: PHYSICIAN ASSISTANT

## 2019-03-28 PROCEDURE — 96372 THER/PROPH/DIAG INJ SC/IM: CPT | Performed by: PHYSICIAN ASSISTANT

## 2019-03-28 PROCEDURE — 1123F ACP DISCUSS/DSCN MKR DOCD: CPT | Performed by: PHYSICIAN ASSISTANT

## 2019-03-28 PROCEDURE — G8427 DOCREV CUR MEDS BY ELIG CLIN: HCPCS | Performed by: PHYSICIAN ASSISTANT

## 2019-03-28 PROCEDURE — G8400 PT W/DXA NO RESULTS DOC: HCPCS | Performed by: PHYSICIAN ASSISTANT

## 2019-03-28 PROCEDURE — 69210 REMOVE IMPACTED EAR WAX UNI: CPT | Performed by: PHYSICIAN ASSISTANT

## 2019-03-28 PROCEDURE — 1036F TOBACCO NON-USER: CPT | Performed by: PHYSICIAN ASSISTANT

## 2019-03-28 PROCEDURE — G8417 CALC BMI ABV UP PARAM F/U: HCPCS | Performed by: PHYSICIAN ASSISTANT

## 2019-03-28 PROCEDURE — 4040F PNEUMOC VAC/ADMIN/RCVD: CPT | Performed by: PHYSICIAN ASSISTANT

## 2019-03-28 RX ORDER — TRIAMCINOLONE ACETONIDE 40 MG/ML
40 INJECTION, SUSPENSION INTRA-ARTICULAR; INTRAMUSCULAR ONCE
Status: COMPLETED | OUTPATIENT
Start: 2019-03-28 | End: 2019-03-28

## 2019-03-28 RX ORDER — AZITHROMYCIN 250 MG/1
250 TABLET, FILM COATED ORAL SEE ADMIN INSTRUCTIONS
Qty: 6 TABLET | Refills: 1 | Status: SHIPPED | OUTPATIENT
Start: 2019-03-28 | End: 2019-04-02

## 2019-03-28 RX ADMIN — TRIAMCINOLONE ACETONIDE 40 MG: 40 INJECTION, SUSPENSION INTRA-ARTICULAR; INTRAMUSCULAR at 10:38

## 2019-03-28 ASSESSMENT — ENCOUNTER SYMPTOMS
COUGH: 0
SINUS PAIN: 1
SORE THROAT: 1
SHORTNESS OF BREATH: 0
RHINORRHEA: 1
SINUS PRESSURE: 1
GASTROINTESTINAL NEGATIVE: 1

## 2019-03-28 ASSESSMENT — PATIENT HEALTH QUESTIONNAIRE - PHQ9
1. LITTLE INTEREST OR PLEASURE IN DOING THINGS: 0
SUM OF ALL RESPONSES TO PHQ9 QUESTIONS 1 & 2: 0
2. FEELING DOWN, DEPRESSED OR HOPELESS: 0
SUM OF ALL RESPONSES TO PHQ QUESTIONS 1-9: 0
SUM OF ALL RESPONSES TO PHQ QUESTIONS 1-9: 0

## 2019-05-09 ENCOUNTER — HOSPITAL ENCOUNTER (OUTPATIENT)
Dept: LAB | Age: 69
Discharge: HOME | End: 2019-05-09
Payer: MEDICARE

## 2019-05-09 DIAGNOSIS — I10 ESSENTIAL HYPERTENSION: ICD-10-CM

## 2019-05-09 DIAGNOSIS — E11.9 CONTROLLED TYPE 2 DIABETES MELLITUS WITHOUT COMPLICATION, WITHOUT LONG-TERM CURRENT USE OF INSULIN (HCC): ICD-10-CM

## 2019-05-09 DIAGNOSIS — E78.2 MIXED HYPERLIPIDEMIA: ICD-10-CM

## 2019-05-09 LAB
ABSOLUTE EOS #: 0.2 K/UL (ref 0–0.4)
ABSOLUTE IMMATURE GRANULOCYTE: NORMAL K/UL (ref 0–0.3)
ABSOLUTE LYMPH #: 2.1 K/UL (ref 1–4.8)
ABSOLUTE MONO #: 0.6 K/UL (ref 0.1–1.2)
ALBUMIN SERPL-MCNC: 4.3 G/DL (ref 3.5–5.2)
ALBUMIN/GLOBULIN RATIO: 1.3 (ref 1–2.5)
ALP BLD-CCNC: 81 U/L (ref 35–104)
ALT SERPL-CCNC: 16 U/L (ref 5–33)
ANION GAP SERPL CALCULATED.3IONS-SCNC: 13 MMOL/L (ref 9–17)
AST SERPL-CCNC: 21 U/L
BASOPHILS # BLD: 0 % (ref 0–1)
BASOPHILS ABSOLUTE: 0 K/UL (ref 0–0.2)
BILIRUB SERPL-MCNC: 0.35 MG/DL (ref 0.3–1.2)
BUN BLDV-MCNC: 15 MG/DL (ref 8–23)
BUN/CREAT BLD: 19 (ref 9–20)
CALCIUM SERPL-MCNC: 9.6 MG/DL (ref 8.6–10.4)
CHLORIDE BLD-SCNC: 101 MMOL/L (ref 98–107)
CHOLESTEROL/HDL RATIO: 4.5
CHOLESTEROL: 198 MG/DL
CO2: 29 MMOL/L (ref 20–31)
CREAT SERPL-MCNC: 0.78 MG/DL (ref 0.5–0.9)
CREATININE URINE: 133.4 MG/DL (ref 28–217)
DIFFERENTIAL TYPE: NORMAL
EOSINOPHILS RELATIVE PERCENT: 2 % (ref 1–7)
ESTIMATED AVERAGE GLUCOSE: 126 MG/DL
GFR AFRICAN AMERICAN: >60 ML/MIN
GFR NON-AFRICAN AMERICAN: >60 ML/MIN
GFR SERPL CREATININE-BSD FRML MDRD: ABNORMAL ML/MIN/{1.73_M2}
GFR SERPL CREATININE-BSD FRML MDRD: ABNORMAL ML/MIN/{1.73_M2}
GLUCOSE BLD-MCNC: 118 MG/DL (ref 70–99)
HBA1C MFR BLD: 6 % (ref 4.8–5.9)
HCT VFR BLD CALC: 44 % (ref 36–46)
HDLC SERPL-MCNC: 44 MG/DL
HEMOGLOBIN: 14.2 G/DL (ref 12–16)
IMMATURE GRANULOCYTES: NORMAL %
LDL CHOLESTEROL: 125 MG/DL (ref 0–130)
LYMPHOCYTES # BLD: 26 % (ref 16–46)
MCH RBC QN AUTO: 29 PG (ref 26–34)
MCHC RBC AUTO-ENTMCNC: 32.4 G/DL (ref 31–37)
MCV RBC AUTO: 89.7 FL (ref 80–100)
MICROALBUMIN/CREAT 24H UR: <12 MG/L
MICROALBUMIN/CREAT UR-RTO: NORMAL MCG/MG CREAT
MONOCYTES # BLD: 7 % (ref 4–11)
NRBC AUTOMATED: NORMAL PER 100 WBC
PDW BLD-RTO: 14.5 % (ref 11–14.5)
PLATELET # BLD: 263 K/UL (ref 140–450)
PLATELET ESTIMATE: NORMAL
PMV BLD AUTO: 8.1 FL (ref 6–12)
POTASSIUM SERPL-SCNC: 4.3 MMOL/L (ref 3.7–5.3)
RBC # BLD: 4.91 M/UL (ref 4–5.2)
RBC # BLD: NORMAL 10*6/UL
SEG NEUTROPHILS: 65 % (ref 43–77)
SEGMENTED NEUTROPHILS ABSOLUTE COUNT: 5.1 K/UL (ref 1.8–7.7)
SODIUM BLD-SCNC: 143 MMOL/L (ref 135–144)
TOTAL PROTEIN: 7.6 G/DL (ref 6.4–8.3)
TRIGL SERPL-MCNC: 145 MG/DL
VLDLC SERPL CALC-MCNC: NORMAL MG/DL (ref 1–30)
WBC # BLD: 8 K/UL (ref 3.5–11)
WBC # BLD: NORMAL 10*3/UL

## 2019-05-09 PROCEDURE — 82570 ASSAY OF URINE CREATININE: CPT

## 2019-05-09 PROCEDURE — 80053 COMPREHEN METABOLIC PANEL: CPT

## 2019-05-09 PROCEDURE — 85025 COMPLETE CBC W/AUTO DIFF WBC: CPT

## 2019-05-09 PROCEDURE — 82043 UR ALBUMIN QUANTITATIVE: CPT

## 2019-05-09 PROCEDURE — 83036 HEMOGLOBIN GLYCOSYLATED A1C: CPT

## 2019-05-09 PROCEDURE — 36415 COLL VENOUS BLD VENIPUNCTURE: CPT

## 2019-05-09 PROCEDURE — 80061 LIPID PANEL: CPT

## 2019-05-16 ENCOUNTER — OFFICE VISIT (OUTPATIENT)
Dept: FAMILY MEDICINE CLINIC | Age: 69
End: 2019-05-16
Payer: MEDICARE

## 2019-05-16 ENCOUNTER — HOSPITAL ENCOUNTER (OUTPATIENT)
Dept: MAMMOGRAPHY | Age: 69
Discharge: HOME | End: 2019-05-18
Payer: MEDICARE

## 2019-05-16 VITALS
BODY MASS INDEX: 33.74 KG/M2 | WEIGHT: 215 LBS | RESPIRATION RATE: 16 BRPM | HEART RATE: 72 BPM | HEIGHT: 67 IN | SYSTOLIC BLOOD PRESSURE: 120 MMHG | DIASTOLIC BLOOD PRESSURE: 80 MMHG

## 2019-05-16 DIAGNOSIS — E11.9 CONTROLLED TYPE 2 DIABETES MELLITUS WITHOUT COMPLICATION, WITHOUT LONG-TERM CURRENT USE OF INSULIN (HCC): ICD-10-CM

## 2019-05-16 DIAGNOSIS — E78.2 MIXED HYPERLIPIDEMIA: ICD-10-CM

## 2019-05-16 DIAGNOSIS — Z12.31 SCREENING MAMMOGRAM, ENCOUNTER FOR: ICD-10-CM

## 2019-05-16 DIAGNOSIS — I10 ESSENTIAL HYPERTENSION: Primary | ICD-10-CM

## 2019-05-16 DIAGNOSIS — K21.9 GASTROESOPHAGEAL REFLUX DISEASE WITHOUT ESOPHAGITIS: ICD-10-CM

## 2019-05-16 PROCEDURE — 4040F PNEUMOC VAC/ADMIN/RCVD: CPT | Performed by: FAMILY MEDICINE

## 2019-05-16 PROCEDURE — G8427 DOCREV CUR MEDS BY ELIG CLIN: HCPCS | Performed by: FAMILY MEDICINE

## 2019-05-16 PROCEDURE — 1123F ACP DISCUSS/DSCN MKR DOCD: CPT | Performed by: FAMILY MEDICINE

## 2019-05-16 PROCEDURE — G8400 PT W/DXA NO RESULTS DOC: HCPCS | Performed by: FAMILY MEDICINE

## 2019-05-16 PROCEDURE — 99214 OFFICE O/P EST MOD 30 MIN: CPT | Performed by: FAMILY MEDICINE

## 2019-05-16 PROCEDURE — 2022F DILAT RTA XM EVC RTNOPTHY: CPT | Performed by: FAMILY MEDICINE

## 2019-05-16 PROCEDURE — 77063 BREAST TOMOSYNTHESIS BI: CPT

## 2019-05-16 PROCEDURE — 1090F PRES/ABSN URINE INCON ASSESS: CPT | Performed by: FAMILY MEDICINE

## 2019-05-16 PROCEDURE — 3044F HG A1C LEVEL LT 7.0%: CPT | Performed by: FAMILY MEDICINE

## 2019-05-16 PROCEDURE — 3017F COLORECTAL CA SCREEN DOC REV: CPT | Performed by: FAMILY MEDICINE

## 2019-05-16 PROCEDURE — 1036F TOBACCO NON-USER: CPT | Performed by: FAMILY MEDICINE

## 2019-05-16 PROCEDURE — G8417 CALC BMI ABV UP PARAM F/U: HCPCS | Performed by: FAMILY MEDICINE

## 2019-05-16 RX ORDER — ATORVASTATIN CALCIUM 20 MG/1
20 TABLET, FILM COATED ORAL DAILY
Qty: 90 TABLET | Refills: 1 | Status: SHIPPED | OUTPATIENT
Start: 2019-05-16 | End: 2019-11-21

## 2019-05-16 ASSESSMENT — ENCOUNTER SYMPTOMS
DIARRHEA: 0
NAUSEA: 0
RHINORRHEA: 0
EYE REDNESS: 0
VOMITING: 0
SORE THROAT: 0
COUGH: 0
TROUBLE SWALLOWING: 0
SHORTNESS OF BREATH: 0
EYE DISCHARGE: 0
SINUS PRESSURE: 0
CONSTIPATION: 0
ABDOMINAL PAIN: 0
WHEEZING: 0

## 2019-05-16 ASSESSMENT — PATIENT HEALTH QUESTIONNAIRE - PHQ9
SUM OF ALL RESPONSES TO PHQ QUESTIONS 1-9: 0
2. FEELING DOWN, DEPRESSED OR HOPELESS: 0
SUM OF ALL RESPONSES TO PHQ9 QUESTIONS 1 & 2: 0
1. LITTLE INTEREST OR PLEASURE IN DOING THINGS: 0
SUM OF ALL RESPONSES TO PHQ QUESTIONS 1-9: 0

## 2019-05-16 NOTE — PROGRESS NOTES
2019     Ernestina Kulkarni (:  1950) is a 77 y.o. female, here for evaluation of the following medical concerns:    HPI  Patient comes in today for follow up of chronic health issues   Chief Complaint   Patient presents with    Hypertension     6 mo f/u    Hyperlipidemia     6 mo f/u    Anxiety     with depression; 6 mo f/u    Gastroesophageal Reflux     6 mo f/u    Other     fibromyalgia    Discuss Labs     drawn 19    Diabetes     6 mo f/u; foot exam   . Patient seems to be doing relatively well overall. Continues to work on maintaining adequate exercise and dietary measures to help with weight loss and overall health. Does have a known history of hypertension which is stable and controlled on her current medical therapy. Has a known history of hyperlipidemia and her cholesterol levels are just borderline at this time but I did have a lengthy discussion with her regarding adding statin medication due to known history of diabetes mellitus type 2. She has really been trying to work hard on dietary efforts in order to avoid additional medical therapy but after discussion a day about primary prevention she is agreeable to starting statin medication. Did discuss potential side effects of this medical therapy. She has a known history of diabetes -2 which is stable and controlled with dietary efforts. Encouraged continued low carb/low sugar diet and routine exercise to keep this optimally controlled. Has a known history of anxiety and depression which is stable and controlled without any ongoing medical therapy at this time. Does have known GERD and this remained stable controlled with continued use of Prilosec and ranitidine. Otherwise today no other acute medical concerns. .  Patient's recent lab reports are as follows:    Results for orders placed or performed during the hospital encounter of 19   Hemoglobin A1C   Result Value Ref Range    Hemoglobin A1C 6.0 (H) 4.8 - 5.9 % Estimated Avg Glucose 126 mg/dL   Lipid Panel   Result Value Ref Range    Cholesterol 198 <200 mg/dL    HDL 44 >40 mg/dL    LDL Cholesterol 125 0 - 130 mg/dL    Chol/HDL Ratio 4.5 <5    Triglycerides 145 <150 mg/dL    VLDL NOT REPORTED 1 - 30 mg/dL   Comprehensive Metabolic Panel   Result Value Ref Range    Glucose 118 (H) 70 - 99 mg/dL    BUN 15 8 - 23 mg/dL    CREATININE 0.78 0.50 - 0.90 mg/dL    Bun/Cre Ratio 19 9 - 20    Calcium 9.6 8.6 - 10.4 mg/dL    Sodium 143 135 - 144 mmol/L    Potassium 4.3 3.7 - 5.3 mmol/L    Chloride 101 98 - 107 mmol/L    CO2 29 20 - 31 mmol/L    Anion Gap 13 9 - 17 mmol/L    Alkaline Phosphatase 81 35 - 104 U/L    ALT 16 5 - 33 U/L    AST 21 <32 U/L    Total Bilirubin 0.35 0.3 - 1.2 mg/dL    Total Protein 7.6 6.4 - 8.3 g/dL    Alb 4.3 3.5 - 5.2 g/dL    Albumin/Globulin Ratio 1.3 1.0 - 2.5    GFR Non-African American >60 >60 mL/min    GFR African American >60 >60 mL/min    GFR Comment          GFR Staging NOT REPORTED    CBC Auto Differential   Result Value Ref Range    WBC 8.0 3.5 - 11.0 k/uL    RBC 4.91 4.0 - 5.2 m/uL    Hemoglobin 14.2 12.0 - 16.0 g/dL    Hematocrit 44.0 36 - 46 %    MCV 89.7 80 - 100 fL    MCH 29.0 26 - 34 pg    MCHC 32.4 31 - 37 g/dL    RDW 14.5 11.0 - 14.5 %    Platelets 492 099 - 043 k/uL    MPV 8.1 6.0 - 12.0 fL    NRBC Automated NOT REPORTED per 100 WBC    Differential Type NOT REPORTED     Immature Granulocytes NOT REPORTED 0 %    Absolute Immature Granulocyte NOT REPORTED 0.00 - 0.30 k/uL    WBC Morphology NOT REPORTED     RBC Morphology NOT REPORTED     Platelet Estimate NOT REPORTED     Seg Neutrophils 65 43 - 77 %    Lymphocytes 26 16 - 46 %    Monocytes 7 4 - 11 %    Eosinophils % 2 1 - 7 %    Basophils 0 0 - 1 %    Segs Absolute 5.10 1.8 - 7.7 k/uL    Absolute Lymph # 2.10 1.0 - 4.8 k/uL    Absolute Mono # 0.60 0.1 - 1.2 k/uL    Absolute Eos # 0.20 0.0 - 0.4 k/uL    Basophils # 0.00 0.0 - 0.2 k/uL   Microalbumin, Ur   Result Value Ref Range    Microalb, Ur <12 <21 mg/L    Creatinine, Ur 133.4 28.0 - 217.0 mg/dL    Microalb/Crt. Ratio CANNOT BE CALCULATED <25 mcg/mg creat     Did discuss dietary modification and increased exercise to keep cholesterol and blood sugar under good control. Other review of systems are as noted below. Did review patient's med list, allergies, social history, fam history, pmhx and pshx today as noted in the record. Preventative measures are reviewed today. See health maintenance section for complete preventative plan of care. Review of Systems   Constitutional: Negative for chills, fatigue and fever. HENT: Negative for congestion, ear pain, postnasal drip, rhinorrhea, sinus pressure, sore throat and trouble swallowing. Eyes: Negative for discharge and redness. Respiratory: Negative for cough, shortness of breath and wheezing. Cardiovascular: Negative for chest pain. Gastrointestinal: Negative for abdominal pain, constipation, diarrhea, nausea and vomiting. Genitourinary: Negative. Musculoskeletal: Negative for arthralgias, myalgias and neck pain. Skin: Negative for rash and wound. Allergic/Immunologic: Negative for environmental allergies. Neurological: Negative for dizziness, weakness, light-headedness and headaches. Hematological: Negative for adenopathy. Psychiatric/Behavioral: Negative. Prior to Visit Medications    Medication Sig Taking? Authorizing Provider   atorvastatin (LIPITOR) 20 MG tablet Take 1 tablet by mouth daily Yes Joanne Simon DO   atenolol (TENORMIN) 25 MG tablet take 1 tablet by mouth once daily Yes Yeny Sarabia MD   ranitidine (ZANTAC) 150 MG tablet Take 1 tablet by mouth nightly Yes Wilma Velez MD   blood glucose test strips (ONE TOUCH TEST STRIPS) strip Tests 1 X daily.   DX E11.9 Yes Joanne Simon DO   ONE TOUCH LANCETS MISC Tests 1 x daily DX. E11.9 Yes Joanne Simon DO   meloxicam (MOBIC) 7.5 MG tablet take 1 tablet by mouth once daily Yes Karl Bernstein Perri Velarde, DO   loratadine (CLARITIN) 10 MG tablet Take 1 tablet by mouth daily Yes Ivana Menendez DO   omeprazole (PRILOSEC) 20 MG capsule Take 1 capsule by mouth 2 times daily. Patient taking differently: Take 20 mg by mouth Daily OTC Yes Ivana Menendez DO   pseudoephedrine (SUDAFED) 30 MG tablet Take 30 mg by mouth every 4 hours as needed. Yes Historical Provider, MD   diphenhydrAMINE-APAP, sleep, (TYLENOL PM EXTRA STRENGTH)  MG tablet Take 2 tablets by mouth nightly as needed. Yes Historical Provider, MD   therapeutic multivitamin-minerals (THERAGRAN-M) tablet Take 1 tablet by mouth daily. Yes Historical Provider, MD   Ascorbic Acid (VITAMIN C) 500 MG tablet Take 500 mg by mouth daily. Yes Historical Provider, MD        Social History     Tobacco Use    Smoking status: Passive Smoke Exposure - Never Smoker    Smokeless tobacco: Never Used    Tobacco comment: very remotely would smoke a few cigarettes on weekends/college days   Substance Use Topics    Alcohol use: Yes     Alcohol/week: 0.0 oz     Comment: occasional use        Vitals:    05/16/19 1011   BP: 120/80   Site: Left Upper Arm   Position: Sitting   Cuff Size: Large Adult   Pulse: 72   Resp: 16   Weight: 215 lb (97.5 kg)   Height: 5' 7\" (1.702 m)     Estimated body mass index is 33.67 kg/m² as calculated from the following:    Height as of this encounter: 5' 7\" (1.702 m). Weight as of this encounter: 215 lb (97.5 kg). Physical Exam   Constitutional: She is oriented to person, place, and time. She appears well-developed and well-nourished. No distress. HENT:   Head: Normocephalic and atraumatic. Right Ear: External ear normal.   Left Ear: External ear normal.   Nose: Nose normal.   Mouth/Throat: Oropharynx is clear and moist. No oropharyngeal exudate. Eyes: Pupils are equal, round, and reactive to light. Conjunctivae and EOM are normal. Right eye exhibits no discharge. Left eye exhibits no discharge.    Neck: Normal range of motion. Neck supple. No thyromegaly present. Cardiovascular: Normal rate, regular rhythm and normal heart sounds. Pulmonary/Chest: Effort normal and breath sounds normal. She has no wheezes. She has no rales. Abdominal: Soft. Bowel sounds are normal.   Musculoskeletal: She exhibits no edema. Patient had a diabetic foot exam today. No open areas or ulcerations noted. Fine filament testing to the entire dorsal and plantar aspect of the foot reveals good sensation in all areas. No cyanosis. +2/4 pedal pulses, symmetric bilaterally   Lymphadenopathy:     She has no cervical adenopathy. Neurological: She is alert and oriented to person, place, and time. Skin: Skin is warm and dry. No rash noted. She is not diaphoretic. Psychiatric: She has a normal mood and affect. Her behavior is normal. Judgment and thought content normal.   Nursing note and vitals reviewed. ASSESSMENT/PLAN:  1. Essential hypertension  Stable and controlled on her current medical therapy. - CBC Auto Differential; Future  - Comprehensive Metabolic Panel; Future    2. Mixed hyperlipidemia  After discussion today patient agreeable to adding statin medication. Cholesterol levels are borderline at this time and with known diabetes and is advisable for her to take statin for primary prevention.  - Lipid Panel; Future    3. Controlled type 2 diabetes mellitus without complication, without long-term current use of insulin (HCC)  Stable and controlled with dietary efforts. Continue low carb/low sugar diet and routine exercise for optimal health. - Hemoglobin A1C; Future  -  DIABETES FOOT EXAM    4.  GERD  Stable and controlled on her current medical therapy    Orders Placed This Encounter   Medications    atorvastatin (LIPITOR) 20 MG tablet     Sig: Take 1 tablet by mouth daily     Dispense:  90 tablet     Refill:  1     Orders Placed This Encounter   Procedures    CBC Auto Differential     To be done in 6 months     Standing Status: Future     Standing Expiration Date:   5/15/2020    Comprehensive Metabolic Panel     To be done in 6 months     Standing Status:   Future     Standing Expiration Date:   5/15/2020    Lipid Panel     To be done in 6 months     Standing Status:   Future     Standing Expiration Date:   5/15/2020     Order Specific Question:   Is Patient Fasting?/# of Hours     Answer:   12 hours    Hemoglobin A1C     To be done in 6 months     Standing Status:   Future     Standing Expiration Date:   5/15/2020     DIABETES FOOT EXAM       Patient is to return to my office in 6 months duration or sooner if any further problems or symptoms arise. (Please note that portions of this note were completed with a voice recognition program. Efforts were made to edit the dictations but occasionally words are mis-transcribed.)      Return in about 6 months (around 11/16/2019). An electronic signature was used to authenticate this note.     --India Orona, DO on 5/16/2019 at 2:52 PM

## 2019-05-16 NOTE — PROGRESS NOTES
Patient is on a wait list for shingles vaccine; had eye exam recently with Robi Pedroza at [facility]. Will attempt to get records.

## 2019-05-16 NOTE — PATIENT INSTRUCTIONS
 GFR Non- 05/09/2019 >60  >60 mL/min Final    GFR  05/09/2019 >60  >60 mL/min Final    GFR Comment 05/09/2019        Final    Comment: Average GFR for 61-76 years old:   80 mL/min/1.73sq m  Chronic Kidney Disease:   <60 mL/min/1.73sq m  Kidney failure:   <15 mL/min/1.73sq m              eGFR calculated using average adult body mass.  Additional eGFR calculator available at:        [email or web address]            GFR Staging 05/09/2019 NOT REPORTED   Final    WBC 05/09/2019 8.0  3.5 - 11.0 k/uL Final    RBC 05/09/2019 4.91  4.0 - 5.2 m/uL Final    Hemoglobin 05/09/2019 14.2  12.0 - 16.0 g/dL Final    Hematocrit 05/09/2019 44.0  36 - 46 % Final    MCV 05/09/2019 89.7  80 - 100 fL Final    MCH 05/09/2019 29.0  26 - 34 pg Final    MCHC 05/09/2019 32.4  31 - 37 g/dL Final    RDW 05/09/2019 14.5  11.0 - 14.5 % Final    Platelets 06/51/7560 263  140 - 450 k/uL Final    MPV 05/09/2019 8.1  6.0 - 12.0 fL Final    NRBC Automated 05/09/2019 NOT REPORTED  per 100 WBC Final    Differential Type 05/09/2019 NOT REPORTED   Final    Immature Granulocytes 05/09/2019 NOT REPORTED  0 % Final    Absolute Immature Granulocyte 05/09/2019 NOT REPORTED  0.00 - 0.30 k/uL Final    WBC Morphology 05/09/2019 NOT REPORTED   Final    RBC Morphology 05/09/2019 NOT REPORTED   Final    Platelet Estimate 24/64/9234 NOT REPORTED   Final    Seg Neutrophils 05/09/2019 65  43 - 77 % Final    Lymphocytes 05/09/2019 26  16 - 46 % Final    Monocytes 05/09/2019 7  4 - 11 % Final    Eosinophils % 05/09/2019 2  1 - 7 % Final    Basophils 05/09/2019 0  0 - 1 % Final    Segs Absolute 05/09/2019 5.10  1.8 - 7.7 k/uL Final    Absolute Lymph # 05/09/2019 2.10  1.0 - 4.8 k/uL Final    Absolute Mono # 05/09/2019 0.60  0.1 - 1.2 k/uL Final    Absolute Eos # 05/09/2019 0.20  0.0 - 0.4 k/uL Final    Basophils # 05/09/2019 0.00  0.0 - 0.2 k/uL Final    Microalb, Ur 05/09/2019 <12 <21 mg/L Final    Creatinine, Ur 05/09/2019 133.4  28.0 - 217.0 mg/dL Final    Microalb/Crt.  Ratio 05/09/2019 CANNOT BE CALCULATED  <25 mcg/mg creat Final

## 2019-05-31 RX ORDER — MELOXICAM 7.5 MG/1
TABLET ORAL
Qty: 90 TABLET | Refills: 1 | Status: SHIPPED | OUTPATIENT
Start: 2019-05-31 | End: 2019-11-21 | Stop reason: SDUPTHER

## 2019-06-17 RX ORDER — LORATADINE 10 MG/1
TABLET ORAL
Qty: 90 TABLET | Refills: 3 | Status: SHIPPED | OUTPATIENT
Start: 2019-06-17 | End: 2020-05-28

## 2019-07-08 ENCOUNTER — PATIENT MESSAGE (OUTPATIENT)
Dept: FAMILY MEDICINE CLINIC | Age: 69
End: 2019-07-08

## 2019-07-08 ENCOUNTER — OFFICE VISIT (OUTPATIENT)
Dept: PRIMARY CARE CLINIC | Age: 69
End: 2019-07-08
Payer: MEDICARE

## 2019-07-08 ENCOUNTER — HOSPITAL ENCOUNTER (OUTPATIENT)
Age: 69
Setting detail: SPECIMEN
Discharge: HOME | End: 2019-07-08
Payer: MEDICARE

## 2019-07-08 ENCOUNTER — TELEPHONE (OUTPATIENT)
Dept: PRIMARY CARE CLINIC | Age: 69
End: 2019-07-08

## 2019-07-08 VITALS
TEMPERATURE: 96.7 F | BODY MASS INDEX: 32.8 KG/M2 | DIASTOLIC BLOOD PRESSURE: 82 MMHG | WEIGHT: 209 LBS | HEIGHT: 67 IN | HEART RATE: 64 BPM | SYSTOLIC BLOOD PRESSURE: 132 MMHG | RESPIRATION RATE: 16 BRPM

## 2019-07-08 DIAGNOSIS — R10.2 PELVIC PAIN IN FEMALE: Primary | ICD-10-CM

## 2019-07-08 DIAGNOSIS — R10.2 PELVIC PAIN IN FEMALE: ICD-10-CM

## 2019-07-08 LAB
-: NORMAL
AMORPHOUS: NORMAL
BACTERIA: NORMAL
BILIRUBIN URINE: NEGATIVE
CASTS UA: NORMAL /LPF (ref 0–2)
COLOR: ABNORMAL
COMMENT UA: ABNORMAL
CRYSTALS, UA: NORMAL /HPF
DIRECT EXAM: NORMAL
EPITHELIAL CELLS UA: NORMAL /HPF (ref 0–5)
GLUCOSE URINE: NEGATIVE
KETONES, URINE: NEGATIVE
LEUKOCYTE ESTERASE, URINE: NEGATIVE
Lab: NORMAL
MUCUS: NORMAL
NITRITE, URINE: NEGATIVE
OTHER OBSERVATIONS UA: NORMAL
PH UA: 5.5 (ref 5–6)
PROTEIN UA: NEGATIVE
RBC UA: NORMAL /HPF (ref 0–4)
RENAL EPITHELIAL, UA: NORMAL /HPF
SPECIFIC GRAVITY UA: 1 (ref 1.01–1.02)
SPECIMEN DESCRIPTION: NORMAL
TRICHOMONAS: NORMAL
TURBIDITY: ABNORMAL
URINE HGB: NEGATIVE
UROBILINOGEN, URINE: NORMAL
WBC UA: NORMAL /HPF (ref 0–4)
YEAST: NORMAL

## 2019-07-08 PROCEDURE — 1123F ACP DISCUSS/DSCN MKR DOCD: CPT | Performed by: NURSE PRACTITIONER

## 2019-07-08 PROCEDURE — G8417 CALC BMI ABV UP PARAM F/U: HCPCS | Performed by: NURSE PRACTITIONER

## 2019-07-08 PROCEDURE — 87510 GARDNER VAG DNA DIR PROBE: CPT

## 2019-07-08 PROCEDURE — 3017F COLORECTAL CA SCREEN DOC REV: CPT | Performed by: NURSE PRACTITIONER

## 2019-07-08 PROCEDURE — 4040F PNEUMOC VAC/ADMIN/RCVD: CPT | Performed by: NURSE PRACTITIONER

## 2019-07-08 PROCEDURE — 87660 TRICHOMONAS VAGIN DIR PROBE: CPT

## 2019-07-08 PROCEDURE — 99213 OFFICE O/P EST LOW 20 MIN: CPT | Performed by: NURSE PRACTITIONER

## 2019-07-08 PROCEDURE — 1036F TOBACCO NON-USER: CPT | Performed by: NURSE PRACTITIONER

## 2019-07-08 PROCEDURE — 81001 URINALYSIS AUTO W/SCOPE: CPT

## 2019-07-08 PROCEDURE — 87480 CANDIDA DNA DIR PROBE: CPT

## 2019-07-08 PROCEDURE — G8427 DOCREV CUR MEDS BY ELIG CLIN: HCPCS | Performed by: NURSE PRACTITIONER

## 2019-07-08 PROCEDURE — G8400 PT W/DXA NO RESULTS DOC: HCPCS | Performed by: NURSE PRACTITIONER

## 2019-07-08 PROCEDURE — 1090F PRES/ABSN URINE INCON ASSESS: CPT | Performed by: NURSE PRACTITIONER

## 2019-07-08 ASSESSMENT — ENCOUNTER SYMPTOMS
NAUSEA: 0
RESPIRATORY NEGATIVE: 1
DIARRHEA: 0
CONSTIPATION: 1
ABDOMINAL PAIN: 0

## 2019-07-08 NOTE — TELEPHONE ENCOUNTER
Patient notified of negative results of urinalysis and was told results of other testing would be available tomorrow. Reminded to f/u with referral to Gyn.

## 2019-07-08 NOTE — TELEPHONE ENCOUNTER
----- Message from JUDE Hay CNP sent at 7/8/2019 12:05 PM EDT -----  Please call patient with normal UA results. No UTI. We will call with other lab results tomorrow.

## 2019-07-08 NOTE — PROGRESS NOTES
[facility]             [address], [address]                        Telephone [phone]             Fax [phone]     Ronal Thacker  1950  [facility]:[id number]   Date of visit:  7/8/2019    Subjective: Ronal Thacker is a 77 y.o.  female who presents to [facility] today (7/8/2019) for evaluation of:    Chief Complaint   Patient presents with    Pelvic Pain     repeating stabbing pain       Pelvic Pain   The patient's primary symptoms include pelvic pain. The patient's pertinent negatives include no genital itching, genital lesions, genital odor, genital rash, vaginal bleeding or vaginal discharge. This is a new problem. The current episode started in the past 7 days (X 3 days). The problem occurs intermittently. The problem has been gradually improving. Associated symptoms include constipation (chronic; did have some straining with bowel movement on Saturday night and pains began after) and urgency. Pertinent negatives include no abdominal pain, chills, diarrhea, dysuria, fever, frequency, hematuria or nausea. Associated symptoms comments: Sharp pain lasting of a second every 5-20 minutes on Saturday night and all day Sunday. Feels like \"cervix\". No sharp pains this morning, but she feels a \"discomfort\" in middle pelvic area. . Nothing aggravates the symptoms. She has tried nothing for the symptoms. She is not sexually active. No, her partner does not have an STD. She uses nothing for contraception.  She is postmenopausal.       She has the following problem list:  Patient Active Problem List   Diagnosis    Hyperlipidemia    HTN (hypertension)    Osteoarthritis    Allergic rhinitis    GERD (gastroesophageal reflux disease)    Anxiety    Diabetes mellitus type II, controlled ([facility] [address]        Current medications are:  Current Outpatient Medications   Medication Sig Dispense Refill    loratadine

## 2019-07-08 NOTE — PATIENT INSTRUCTIONS
Patient Education        Pelvic Pain: Care Instructions  Your Care Instructions    Pelvic pain, or pain in the lower belly, can have many causes. Often pelvic pain is not serious and gets better in a few days. If your pain continues or gets worse, you may need tests and treatment. Tell your doctor about any new symptoms. These may be signs of a serious problem. Follow-up care is a key part of your treatment and safety. Be sure to make and go to all appointments, and call your doctor if you are having problems. It's also a good idea to know your test results and keep a list of the medicines you take. How can you care for yourself at home? · Rest until you feel better. Lie down, and raise your legs by placing a pillow under your knees. · Drink plenty of fluids. You may find that small, frequent sips are easier on your stomach than if you drink a lot at once. Avoid drinks with carbonation or caffeine, such as soda pop, tea, or coffee. · Try eating several small meals instead of 2 or 3 large ones. Eat mild foods, such as rice, dry toast or crackers, bananas, and applesauce. Avoid fatty and spicy foods, other fruits, and alcohol until 48 hours after your symptoms have gone away. · Take an over-the-counter pain medicine, such as acetaminophen (Tylenol), ibuprofen (Advil, Motrin), or naproxen (Aleve). Read and follow all instructions on the label. · Do not take two or more pain medicines at the same time unless the doctor told you to. Many pain medicines have acetaminophen, which is Tylenol. Too much acetaminophen (Tylenol) can be harmful. · You can put a heating pad, a warm cloth, or moist heat on your belly to relieve pain. When should you call for help?   Call your doctor now or seek immediate medical care if:    · You have a new or higher fever.     · You have unusual vaginal bleeding.     · You have new or worse belly or pelvic pain.     · You have vaginal discharge that has increased in amount or smells

## 2019-07-09 ENCOUNTER — OFFICE VISIT (OUTPATIENT)
Dept: OBGYN | Age: 69
End: 2019-07-09
Payer: MEDICARE

## 2019-07-09 VITALS
BODY MASS INDEX: 34.21 KG/M2 | DIASTOLIC BLOOD PRESSURE: 70 MMHG | RESPIRATION RATE: 20 BRPM | WEIGHT: 218 LBS | SYSTOLIC BLOOD PRESSURE: 124 MMHG | HEIGHT: 67 IN | HEART RATE: 86 BPM

## 2019-07-09 DIAGNOSIS — R10.2 PELVIC PAIN: Primary | ICD-10-CM

## 2019-07-09 PROCEDURE — 99202 OFFICE O/P NEW SF 15 MIN: CPT | Performed by: OBSTETRICS & GYNECOLOGY

## 2019-07-11 ENCOUNTER — HOSPITAL ENCOUNTER (OUTPATIENT)
Dept: ULTRASOUND IMAGING | Age: 69
Discharge: HOME | End: 2019-07-13
Payer: MEDICARE

## 2019-07-11 DIAGNOSIS — R10.2 PELVIC PAIN: ICD-10-CM

## 2019-07-11 PROCEDURE — 76856 US EXAM PELVIC COMPLETE: CPT

## 2019-07-11 PROCEDURE — 76830 TRANSVAGINAL US NON-OB: CPT

## 2019-07-12 RX ORDER — ATENOLOL 25 MG/1
TABLET ORAL
Qty: 90 TABLET | Refills: 1 | Status: SHIPPED | OUTPATIENT
Start: 2019-07-12 | End: 2019-12-30

## 2019-07-15 ENCOUNTER — OFFICE VISIT (OUTPATIENT)
Dept: OBGYN | Age: 69
End: 2019-07-15
Payer: MEDICARE

## 2019-07-15 VITALS
SYSTOLIC BLOOD PRESSURE: 150 MMHG | DIASTOLIC BLOOD PRESSURE: 88 MMHG | HEART RATE: 70 BPM | WEIGHT: 217 LBS | BODY MASS INDEX: 34.06 KG/M2 | HEIGHT: 67 IN

## 2019-07-15 DIAGNOSIS — R10.2 PELVIC PAIN: ICD-10-CM

## 2019-07-15 DIAGNOSIS — D25.1 INTRAMURAL LEIOMYOMA OF UTERUS: ICD-10-CM

## 2019-07-15 DIAGNOSIS — N84.0 ENDOMETRIAL POLYP: Primary | ICD-10-CM

## 2019-07-15 PROCEDURE — 99213 OFFICE O/P EST LOW 20 MIN: CPT | Performed by: OBSTETRICS & GYNECOLOGY

## 2019-07-15 RX ORDER — MISOPROSTOL 100 UG/1
100 TABLET ORAL 4 TIMES DAILY
Qty: 4 TABLET | Refills: 0 | Status: ON HOLD | OUTPATIENT
Start: 2019-07-15 | End: 2019-08-07 | Stop reason: HOSPADM

## 2019-07-30 RX ORDER — SCOLOPAMINE TRANSDERMAL SYSTEM 1 MG/1
1 PATCH, EXTENDED RELEASE TRANSDERMAL
Qty: 1 PATCH | Refills: 0 | Status: SHIPPED | OUTPATIENT
Start: 2019-07-30 | End: 2019-07-31

## 2019-08-01 ENCOUNTER — TELEPHONE (OUTPATIENT)
Dept: OBGYN | Age: 69
End: 2019-08-01

## 2019-08-01 DIAGNOSIS — Z01.818 PRE-OP TESTING: Primary | ICD-10-CM

## 2019-08-01 NOTE — TELEPHONE ENCOUNTER
Height:   Ht Readings from Last 1 Encounters:   07/15/19 5' 7\" (1.702 m)      BMI:  There is no height or weight on file to calculate BMI. Estimated body mass index is 33.99 kg/m² as calculated from the following:    Height as of 7/15/19: 5' 7\" (1.702 m). Weight as of 7/15/19: 217 lb (98.4 kg). body mass index is unknown because there is no height or weight on file. Cardiac Clearance: None   Medical Clearance:None   Appointment for surgery Clearance scheduled for:None     Preoperative Testing: These are the current and completed labs:  CBC:   Lab Results   Component Value Date    WBC 8.0 05/09/2019    RBC 4.91 05/09/2019    HGB 14.2 05/09/2019    HCT 44.0 05/09/2019    MCV 89.7 05/09/2019    RDW 14.5 05/09/2019     05/09/2019     CMP:   Lab Results   Component Value Date     05/09/2019    K 4.3 05/09/2019     05/09/2019    CO2 29 05/09/2019    BUN 15 05/09/2019    CREATININE 0.78 05/09/2019    GFRAA >60 05/09/2019    LABGLOM >60 05/09/2019    GLUCOSE 118 05/09/2019    PROT 7.6 05/09/2019    CALCIUM 9.6 05/09/2019    BILITOT 0.35 05/09/2019    ALKPHOS 81 05/09/2019    AST 21 05/09/2019    ALT 16 05/09/2019     POC Tests: No results for input(s): POCGLU, POCNA, POCK, POCCL, POCBUN, POCHEMO, POCHCT in the last 72 hours.   Coags  No results found for: PROTIME, INR, APTT  HCG (If Applicable) No results found for: PREGTESTUR, PREGSERUM, HCG, HCGQUANT   ABGs No results found for: PHART, PO2ART, MYO6PYY, JPT4LZI, BEART, B9FYWART   Type & Screen (If Applicable)  No results found for: Yudy Needles    Additional ordered pre-operative testing:  [x]CBC    []ABG      [] BMP   []URINALYSIS   []CMP    [x]HCG   []COAGS PT/INR  []T&C  []LFTs   []TYPE AND SCREEN    [] EKG  [] Chest X-Ray  [] Other Radiology    [] Sent to Hospitalist None  [] Sent to Anesthesia for your review: yes   [] Additional Orders: None     Comments:None   Requests: No Special requests    Signed: Sarah Caceres LPN 1/0/6888 7:19 PM

## 2019-08-02 NOTE — TELEPHONE ENCOUNTER
Please add Marco Anderson CRNA to the CRNA pool. This pt needs a EKG due to her HTN and DM. Please refer to the preoperative testing guidelines. Thanks.

## 2019-08-06 ENCOUNTER — HOSPITAL ENCOUNTER (OUTPATIENT)
Dept: NON INVASIVE DIAGNOSTICS | Age: 69
Discharge: HOME | End: 2019-08-06
Payer: MEDICARE

## 2019-08-06 ENCOUNTER — PREP FOR PROCEDURE (OUTPATIENT)
Dept: OBGYN | Age: 69
End: 2019-08-06

## 2019-08-06 ENCOUNTER — HOSPITAL ENCOUNTER (OUTPATIENT)
Dept: LAB | Age: 69
Discharge: HOME | End: 2019-08-06
Payer: MEDICARE

## 2019-08-06 DIAGNOSIS — Z01.818 PRE-OP TESTING: Primary | ICD-10-CM

## 2019-08-06 DIAGNOSIS — Z01.818 PRE-OP TESTING: ICD-10-CM

## 2019-08-06 LAB
ABSOLUTE EOS #: 0.1 K/UL (ref 0–0.4)
ABSOLUTE IMMATURE GRANULOCYTE: ABNORMAL K/UL (ref 0–0.3)
ABSOLUTE LYMPH #: 2 K/UL (ref 1–4.8)
ABSOLUTE MONO #: 0.7 K/UL (ref 0.1–1.2)
BASOPHILS # BLD: 1 % (ref 0–1)
BASOPHILS ABSOLUTE: 0 K/UL (ref 0–0.2)
DIFFERENTIAL TYPE: ABNORMAL
EKG ATRIAL RATE: 66 BPM
EKG P AXIS: 36 DEGREES
EKG P-R INTERVAL: 134 MS
EKG Q-T INTERVAL: 430 MS
EKG QRS DURATION: 94 MS
EKG QTC CALCULATION (BAZETT): 450 MS
EKG R AXIS: 9 DEGREES
EKG T AXIS: 31 DEGREES
EKG VENTRICULAR RATE: 66 BPM
EOSINOPHILS RELATIVE PERCENT: 2 % (ref 1–7)
HCT VFR BLD CALC: 41 % (ref 36–46)
HEMOGLOBIN: 13.7 G/DL (ref 12–16)
IMMATURE GRANULOCYTES: ABNORMAL %
LYMPHOCYTES # BLD: 26 % (ref 16–46)
MCH RBC QN AUTO: 29.7 PG (ref 26–34)
MCHC RBC AUTO-ENTMCNC: 33.5 G/DL (ref 31–37)
MCV RBC AUTO: 88.6 FL (ref 80–100)
MONOCYTES # BLD: 9 % (ref 4–11)
NRBC AUTOMATED: ABNORMAL PER 100 WBC
PDW BLD-RTO: 14.6 % (ref 11–14.5)
PLATELET # BLD: 269 K/UL (ref 140–450)
PLATELET ESTIMATE: ABNORMAL
PMV BLD AUTO: 7.8 FL (ref 6–12)
RBC # BLD: 4.63 M/UL (ref 4–5.2)
RBC # BLD: ABNORMAL 10*6/UL
SEG NEUTROPHILS: 62 % (ref 43–77)
SEGMENTED NEUTROPHILS ABSOLUTE COUNT: 4.9 K/UL (ref 1.8–7.7)
WBC # BLD: 7.7 K/UL (ref 3.5–11)
WBC # BLD: ABNORMAL 10*3/UL

## 2019-08-06 PROCEDURE — 93005 ELECTROCARDIOGRAM TRACING: CPT | Performed by: OBSTETRICS & GYNECOLOGY

## 2019-08-06 PROCEDURE — 85025 COMPLETE CBC W/AUTO DIFF WBC: CPT

## 2019-08-06 PROCEDURE — 36415 COLL VENOUS BLD VENIPUNCTURE: CPT

## 2019-08-06 RX ORDER — SODIUM CHLORIDE 0.9 % (FLUSH) 0.9 %
10 SYRINGE (ML) INJECTION PRN
Status: CANCELLED | OUTPATIENT
Start: 2019-08-06

## 2019-08-06 RX ORDER — SODIUM CHLORIDE 0.9 % (FLUSH) 0.9 %
10 SYRINGE (ML) INJECTION EVERY 12 HOURS SCHEDULED
Status: CANCELLED | OUTPATIENT
Start: 2019-08-06

## 2019-08-06 NOTE — H&P
Pre-operative Visit    Martin Bernabe  1950  8/6/2019  Primary Care Physician: King Luo DO    HISTORY & PHYSICAL      8/6/2019       HOSPITAL: [facility]    HPI: Martin Bernabe is a 77 y.o. female No obstetric history on file. For Hysteroscopic polypectomy with D&C  HPI    No chief complaint on file. No diagnosis found. Patient Active Problem List   Diagnosis    Hyperlipidemia    HTN (hypertension)    Osteoarthritis    Allergic rhinitis    GERD (gastroesophageal reflux disease)    Anxiety    Diabetes mellitus type II, controlled ([address]       OB History   No data available     Past Medical History:   Diagnosis Date    Allergic rhinitis     Anxiety     due to situational stress    Bursitis     Bursitis of hip, right     recurrent    Carpal tunnel syndrome, bilateral     GERD (gastroesophageal reflux disease)     Hyperlipidemia     Hypertension     Impaired fasting glucose     Labyrinthitis     causing continuted vertigo    Melanoma in situ ([address] 10/2017     to bridge of nose; Dr Gutiérrez Standing in [address] in situ of nose ([facility] [address] 10/05/2017    biopsy confirmed on 10/05/2017 by [facility] Steff [facility]l [facility]     Osteoarthritis     Palpitations        Past Surgical History:   Procedure Laterality Date    BREAST LUMPECTOMY Right 2003    BREAST SURGERY Right     lumpectomy    CATARACT REMOVAL WITH IMPLANT Right 07/29/2017    Dr Elyssa Sharma Left 08/05/2017    Dr Emily Wright COLONOSCOPY  12/2008    normal, repeat in 5 years   [facility]  2003    COLONOSCOPY N/A 2/14/2019    few diverticuli, Dr. Joleen Roque Right 2003    SOFT TISSUE BIOPSY Left 12/21/2017    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 Bianca Landeros  07/03/1983   [id number] [facility] GASTROINTESTINAL ENDOSCOPY N/A 2/14/2019 risk of postoperative infection, the risk of damage to adjacent organs (incuding but not limited to the bowel and bladder) that could require more surgery than expected. The patient is aware that this procedure may not alleviate her symptoms. That there may be a necessity for a second surgery and that there may be an incomplete removal of abnormal tissue.

## 2019-08-06 NOTE — H&P (VIEW-ONLY)
mild gastritis, mild esophagitis, Dr. Gordon Finn History     Socioeconomic History    Marital status:      Spouse name: Not on file    Number of children: Not on file    Years of education: Not on file    Highest education level: Not on file   Occupational History    Not on file   Social Needs    Financial resource strain: Not on file    Food insecurity:     Worry: Not on file     Inability: Not on file    Transportation needs:     Medical: Not on file     Non-medical: Not on file   Tobacco Use    Smoking status: Passive Smoke Exposure - Never Smoker    Smokeless tobacco: Never Used    Tobacco comment: very remotely would smoke a few cigarettes on weekends/college days   Substance and Sexual Activity    Alcohol use:  Yes     Alcohol/week: 0.0 standard drinks     Comment: occasional use    Drug use: No    Sexual activity: Not on file   Lifestyle    Physical activity:     Days per week: Not on file     Minutes per session: Not on file    Stress: Not on file   Relationships    Social connections:     Talks on phone: Not on file     Gets together: Not on file     Attends Judaism service: Not on file     Active member of club or organization: Not on file     Attends meetings of clubs or organizations: Not on file     Relationship status: Not on file    Intimate partner violence:     Fear of current or ex partner: Not on file     Emotionally abused: Not on file     Physically abused: Not on file     Forced sexual activity: Not on file   Other Topics Concern    Not on file   Social History Narrative    Not on file         MEDICATIONS:  Current Outpatient Medications   Medication Sig Dispense Refill    misoprostol (CYTOTEC) 100 MCG tablet Take 1 tablet by mouth 4 times daily for 1 day The day before surgery 4 tablet 0    atenolol (TENORMIN) 25 MG tablet take 1 tablet by mouth once daily 90 tablet 1    loratadine (CLARITIN) 10 MG tablet take 1 tablet by mouth once daily 90 tablet 3    x 2.1 cm Ultrasound Findings: Uterus: Small fibroid measuring 1.0 x 1.0 x 0.7 cm. Endometrial stripe: A polyp is identified within the endometrial canal at the level of the fundus measuring 1.8 x 1.4 x 0.8 cm with surrounding fluid/blood. Right Ovary: Right ovary is within normal limits. Left Ovary:  Left ovary is within normal limits. Free Fluid: No evidence of free fluid. 1. A polyp is identified within the endometrial canal at the level the fundus measuring 1.8 x 1.4 x 0.8 cm. 2. Fibroid uterus. 3. Normal ovaries. Labs:  Results for orders placed or performed during the hospital encounter of 08/06/19   EKG 12 Lead   Result Value Ref Range    Ventricular Rate 66 BPM    Atrial Rate 66 BPM    P-R Interval 134 ms    QRS Duration 94 ms    Q-T Interval 430 ms    QTc Calculation (Bazett) 450 ms    P Axis 36 degrees    R Axis 9 degrees    T Axis 31 degrees           ASSESSMENT:    For Hysteroscopic polypectomy with D&C    Patient Active Problem List    Diagnosis Date Noted    [facility]22[facility]2016    Hyperlipidemia 09/20/2013    HTN (hypertension) 09/20/2013    Osteoarthritis 09/20/2013    Allergic rhinitis 09/20/2013    GERD (gastroesophageal reflux disease) 09/20/2013    Anxiety 09/20/2013          Plan:  For Hysteroscopic polypectomy with D&C    Counseling: The patient was counseled on all options both medical and surgical, conservative as well as definitive. She has elected to proceed with the procedure as stated above. The patient was counseled on the procedure. The patients orders and labs have been completed. The history and physical as well as all supporting surgical documentation will be forwarded to the pre-operative holding area. Consent was explained and signed. All questions were answered.   Specifically, the patient was informed of the possibility of anesthesia complications, the risk of bleeding (including possibility of blood transfusion), the risk of postoperative infection, the risk of damage to adjacent organs (incuding but not limited to the bowel and bladder) that could require more surgery than expected. The patient is aware that this procedure may not alleviate her symptoms. That there may be a necessity for a second surgery and that there may be an incomplete removal of abnormal tissue.

## 2019-08-07 ENCOUNTER — HOSPITAL ENCOUNTER (OUTPATIENT)
Age: 69
Setting detail: OUTPATIENT SURGERY
Discharge: HOME | End: 2019-08-07
Attending: OBSTETRICS & GYNECOLOGY | Admitting: OBSTETRICS & GYNECOLOGY
Payer: MEDICARE

## 2019-08-07 ENCOUNTER — ANESTHESIA EVENT (OUTPATIENT)
Dept: OPERATING ROOM | Age: 69
End: 2019-08-07
Payer: MEDICARE

## 2019-08-07 ENCOUNTER — ANESTHESIA (OUTPATIENT)
Dept: OPERATING ROOM | Age: 69
End: 2019-08-07
Payer: MEDICARE

## 2019-08-07 VITALS
HEART RATE: 53 BPM | DIASTOLIC BLOOD PRESSURE: 72 MMHG | TEMPERATURE: 97.9 F | BODY MASS INDEX: 33.65 KG/M2 | SYSTOLIC BLOOD PRESSURE: 138 MMHG | HEIGHT: 67 IN | RESPIRATION RATE: 16 BRPM | OXYGEN SATURATION: 97 % | WEIGHT: 214.4 LBS

## 2019-08-07 VITALS
RESPIRATION RATE: 12 BRPM | TEMPERATURE: 96.8 F | OXYGEN SATURATION: 97 % | SYSTOLIC BLOOD PRESSURE: 112 MMHG | DIASTOLIC BLOOD PRESSURE: 58 MMHG

## 2019-08-07 LAB — GLUCOSE BLD-MCNC: 114 MG/DL (ref 65–105)

## 2019-08-07 PROCEDURE — 82947 ASSAY GLUCOSE BLOOD QUANT: CPT

## 2019-08-07 PROCEDURE — 3700000001 HC ADD 15 MINUTES (ANESTHESIA): Performed by: OBSTETRICS & GYNECOLOGY

## 2019-08-07 PROCEDURE — 58558 HYSTEROSCOPY BIOPSY: CPT | Performed by: OBSTETRICS & GYNECOLOGY

## 2019-08-07 PROCEDURE — 2500000003 HC RX 250 WO HCPCS: Performed by: NURSE ANESTHETIST, CERTIFIED REGISTERED

## 2019-08-07 PROCEDURE — 2580000003 HC RX 258: Performed by: OBSTETRICS & GYNECOLOGY

## 2019-08-07 PROCEDURE — 7100000001 HC PACU RECOVERY - ADDTL 15 MIN: Performed by: OBSTETRICS & GYNECOLOGY

## 2019-08-07 PROCEDURE — 2709999900 HC NON-CHARGEABLE SUPPLY: Performed by: OBSTETRICS & GYNECOLOGY

## 2019-08-07 PROCEDURE — 7100000000 HC PACU RECOVERY - FIRST 15 MIN: Performed by: OBSTETRICS & GYNECOLOGY

## 2019-08-07 PROCEDURE — 3600000003 HC SURGERY LEVEL 3 BASE: Performed by: OBSTETRICS & GYNECOLOGY

## 2019-08-07 PROCEDURE — 6360000002 HC RX W HCPCS: Performed by: NURSE ANESTHETIST, CERTIFIED REGISTERED

## 2019-08-07 PROCEDURE — 6370000000 HC RX 637 (ALT 250 FOR IP): Performed by: NURSE ANESTHETIST, CERTIFIED REGISTERED

## 2019-08-07 PROCEDURE — 88305 TISSUE EXAM BY PATHOLOGIST: CPT

## 2019-08-07 PROCEDURE — 7100000011 HC PHASE II RECOVERY - ADDTL 15 MIN: Performed by: OBSTETRICS & GYNECOLOGY

## 2019-08-07 PROCEDURE — 3700000000 HC ANESTHESIA ATTENDED CARE: Performed by: OBSTETRICS & GYNECOLOGY

## 2019-08-07 PROCEDURE — 7100000010 HC PHASE II RECOVERY - FIRST 15 MIN: Performed by: OBSTETRICS & GYNECOLOGY

## 2019-08-07 PROCEDURE — 3600000013 HC SURGERY LEVEL 3 ADDTL 15MIN: Performed by: OBSTETRICS & GYNECOLOGY

## 2019-08-07 PROCEDURE — 2500000003 HC RX 250 WO HCPCS: Performed by: OBSTETRICS & GYNECOLOGY

## 2019-08-07 RX ORDER — SODIUM CHLORIDE 0.9 % (FLUSH) 0.9 %
10 SYRINGE (ML) INJECTION PRN
Status: DISCONTINUED | OUTPATIENT
Start: 2019-08-07 | End: 2019-08-07 | Stop reason: HOSPADM

## 2019-08-07 RX ORDER — ACETAMINOPHEN 325 MG/1
650 TABLET ORAL EVERY 4 HOURS PRN
Status: CANCELLED | OUTPATIENT
Start: 2019-08-07

## 2019-08-07 RX ORDER — LIDOCAINE HYDROCHLORIDE 20 MG/ML
INJECTION, SOLUTION EPIDURAL; INFILTRATION; INTRACAUDAL; PERINEURAL PRN
Status: DISCONTINUED | OUTPATIENT
Start: 2019-08-07 | End: 2019-08-07 | Stop reason: SDUPTHER

## 2019-08-07 RX ORDER — OXYCODONE HYDROCHLORIDE AND ACETAMINOPHEN 5; 325 MG/1; MG/1
2 TABLET ORAL EVERY 4 HOURS PRN
Status: CANCELLED | OUTPATIENT
Start: 2019-08-07

## 2019-08-07 RX ORDER — OXYCODONE HYDROCHLORIDE AND ACETAMINOPHEN 5; 325 MG/1; MG/1
1 TABLET ORAL EVERY 4 HOURS PRN
Status: CANCELLED | OUTPATIENT
Start: 2019-08-07

## 2019-08-07 RX ORDER — TRANEXAMIC ACID 650 1/1
1300 TABLET ORAL 3 TIMES DAILY
Qty: 15 TABLET | Refills: 0 | Status: SHIPPED | OUTPATIENT
Start: 2019-08-07 | End: 2019-09-06 | Stop reason: ALTCHOICE

## 2019-08-07 RX ORDER — SODIUM CHLORIDE 0.9 % (FLUSH) 0.9 %
10 SYRINGE (ML) INJECTION EVERY 12 HOURS SCHEDULED
Status: DISCONTINUED | OUTPATIENT
Start: 2019-08-07 | End: 2019-08-07 | Stop reason: HOSPADM

## 2019-08-07 RX ORDER — SODIUM CHLORIDE 0.9 % (FLUSH) 0.9 %
10 SYRINGE (ML) INJECTION PRN
Status: CANCELLED | OUTPATIENT
Start: 2019-08-07

## 2019-08-07 RX ORDER — ONDANSETRON 2 MG/ML
4 INJECTION INTRAMUSCULAR; INTRAVENOUS EVERY 8 HOURS PRN
Status: CANCELLED | OUTPATIENT
Start: 2019-08-07

## 2019-08-07 RX ORDER — MIDAZOLAM HYDROCHLORIDE 1 MG/ML
INJECTION INTRAMUSCULAR; INTRAVENOUS PRN
Status: DISCONTINUED | OUTPATIENT
Start: 2019-08-07 | End: 2019-08-07 | Stop reason: SDUPTHER

## 2019-08-07 RX ORDER — PROPOFOL 10 MG/ML
INJECTION, EMULSION INTRAVENOUS PRN
Status: DISCONTINUED | OUTPATIENT
Start: 2019-08-07 | End: 2019-08-07 | Stop reason: SDUPTHER

## 2019-08-07 RX ORDER — SODIUM CHLORIDE, SODIUM LACTATE, POTASSIUM CHLORIDE, CALCIUM CHLORIDE 600; 310; 30; 20 MG/100ML; MG/100ML; MG/100ML; MG/100ML
INJECTION, SOLUTION INTRAVENOUS CONTINUOUS
Status: DISCONTINUED | OUTPATIENT
Start: 2019-08-07 | End: 2019-08-07 | Stop reason: HOSPADM

## 2019-08-07 RX ORDER — ONDANSETRON 2 MG/ML
INJECTION INTRAMUSCULAR; INTRAVENOUS PRN
Status: DISCONTINUED | OUTPATIENT
Start: 2019-08-07 | End: 2019-08-07 | Stop reason: SDUPTHER

## 2019-08-07 RX ORDER — MORPHINE SULFATE 2 MG/ML
2 INJECTION, SOLUTION INTRAMUSCULAR; INTRAVENOUS
Status: CANCELLED | OUTPATIENT
Start: 2019-08-07

## 2019-08-07 RX ORDER — SODIUM CHLORIDE 0.9 % (FLUSH) 0.9 %
10 SYRINGE (ML) INJECTION EVERY 12 HOURS SCHEDULED
Status: CANCELLED | OUTPATIENT
Start: 2019-08-07

## 2019-08-07 RX ORDER — PROPOFOL 10 MG/ML
INJECTION, EMULSION INTRAVENOUS CONTINUOUS PRN
Status: DISCONTINUED | OUTPATIENT
Start: 2019-08-07 | End: 2019-08-07 | Stop reason: SDUPTHER

## 2019-08-07 RX ORDER — DIPHENHYDRAMINE HYDROCHLORIDE 50 MG/ML
INJECTION INTRAMUSCULAR; INTRAVENOUS PRN
Status: DISCONTINUED | OUTPATIENT
Start: 2019-08-07 | End: 2019-08-07 | Stop reason: SDUPTHER

## 2019-08-07 RX ORDER — DEXAMETHASONE SODIUM PHOSPHATE 4 MG/ML
INJECTION, SOLUTION INTRA-ARTICULAR; INTRALESIONAL; INTRAMUSCULAR; INTRAVENOUS; SOFT TISSUE PRN
Status: DISCONTINUED | OUTPATIENT
Start: 2019-08-07 | End: 2019-08-07 | Stop reason: SDUPTHER

## 2019-08-07 RX ORDER — FENTANYL CITRATE 50 UG/ML
INJECTION, SOLUTION INTRAMUSCULAR; INTRAVENOUS PRN
Status: DISCONTINUED | OUTPATIENT
Start: 2019-08-07 | End: 2019-08-07 | Stop reason: SDUPTHER

## 2019-08-07 RX ORDER — MORPHINE SULFATE 4 MG/ML
4 INJECTION, SOLUTION INTRAMUSCULAR; INTRAVENOUS
Status: CANCELLED | OUTPATIENT
Start: 2019-08-07

## 2019-08-07 RX ADMIN — FENTANYL CITRATE 50 MCG: 50 INJECTION, SOLUTION INTRAMUSCULAR; INTRAVENOUS at 07:38

## 2019-08-07 RX ADMIN — DOXYCYCLINE 100 MG: 100 INJECTION, POWDER, LYOPHILIZED, FOR SOLUTION INTRAVENOUS at 07:50

## 2019-08-07 RX ADMIN — SODIUM CHLORIDE, POTASSIUM CHLORIDE, SODIUM LACTATE AND CALCIUM CHLORIDE: 600; 310; 30; 20 INJECTION, SOLUTION INTRAVENOUS at 07:41

## 2019-08-07 RX ADMIN — SODIUM CHLORIDE, POTASSIUM CHLORIDE, SODIUM LACTATE AND CALCIUM CHLORIDE: 600; 310; 30; 20 INJECTION, SOLUTION INTRAVENOUS at 07:16

## 2019-08-07 RX ADMIN — ACETAMINOPHEN 650 MG: 325 SUPPOSITORY RECTAL at 07:50

## 2019-08-07 RX ADMIN — LIDOCAINE HYDROCHLORIDE 100 MG: 20 INJECTION, SOLUTION EPIDURAL; INFILTRATION; INTRACAUDAL; PERINEURAL at 07:44

## 2019-08-07 RX ADMIN — MIDAZOLAM HYDROCHLORIDE 2 MG: 1 INJECTION, SOLUTION INTRAMUSCULAR; INTRAVENOUS at 07:38

## 2019-08-07 RX ADMIN — DIPHENHYDRAMINE HYDROCHLORIDE 15 MG: 50 INJECTION, SOLUTION INTRAMUSCULAR; INTRAVENOUS at 07:50

## 2019-08-07 RX ADMIN — ONDANSETRON 4 MG: 2 INJECTION INTRAMUSCULAR; INTRAVENOUS at 07:50

## 2019-08-07 RX ADMIN — PROPOFOL 150 MCG/KG/MIN: 10 INJECTION, EMULSION INTRAVENOUS at 07:44

## 2019-08-07 RX ADMIN — PROPOFOL 200 MG: 10 INJECTION, EMULSION INTRAVENOUS at 07:44

## 2019-08-07 RX ADMIN — DEXAMETHASONE SODIUM PHOSPHATE 4 MG: 4 INJECTION, SOLUTION INTRAMUSCULAR; INTRAVENOUS at 07:50

## 2019-08-07 RX ADMIN — FENTANYL CITRATE 50 MCG: 50 INJECTION, SOLUTION INTRAMUSCULAR; INTRAVENOUS at 07:44

## 2019-08-07 ASSESSMENT — PULMONARY FUNCTION TESTS
PIF_VALUE: 22
PIF_VALUE: 0
PIF_VALUE: 3
PIF_VALUE: 13
PIF_VALUE: 16
PIF_VALUE: 0
PIF_VALUE: 18
PIF_VALUE: 0
PIF_VALUE: 15
PIF_VALUE: 16
PIF_VALUE: 17
PIF_VALUE: 15
PIF_VALUE: 15
PIF_VALUE: 13
PIF_VALUE: 0
PIF_VALUE: 14
PIF_VALUE: 16
PIF_VALUE: 14
PIF_VALUE: 12
PIF_VALUE: 0
PIF_VALUE: 13
PIF_VALUE: 13
PIF_VALUE: 3
PIF_VALUE: 19
PIF_VALUE: 15
PIF_VALUE: 15
PIF_VALUE: 2
PIF_VALUE: 14
PIF_VALUE: 6
PIF_VALUE: 13
PIF_VALUE: 3
PIF_VALUE: 0
PIF_VALUE: 15
PIF_VALUE: 15

## 2019-08-07 ASSESSMENT — PAIN SCALES - GENERAL
PAINLEVEL_OUTOF10: 0
PAINLEVEL_OUTOF10: 3
PAINLEVEL_OUTOF10: 3
PAINLEVEL_OUTOF10: 0
PAINLEVEL_OUTOF10: 3
PAINLEVEL_OUTOF10: 3

## 2019-08-07 ASSESSMENT — PAIN DESCRIPTION - ORIENTATION
ORIENTATION: LOWER

## 2019-08-07 ASSESSMENT — PAIN DESCRIPTION - LOCATION
LOCATION: ABDOMEN

## 2019-08-07 ASSESSMENT — PAIN DESCRIPTION - PAIN TYPE
TYPE: SURGICAL PAIN

## 2019-08-07 ASSESSMENT — PAIN DESCRIPTION - DESCRIPTORS
DESCRIPTORS: CRAMPING

## 2019-08-07 ASSESSMENT — PAIN - FUNCTIONAL ASSESSMENT: PAIN_FUNCTIONAL_ASSESSMENT: 0-10

## 2019-08-07 NOTE — INTERVAL H&P NOTE
H&P Update    Patient's History and Physical from August 6,  was reviewed. Patient examined. There has been no change.     Stanislaw Garcia

## 2019-08-07 NOTE — ANESTHESIA POSTPROCEDURE EVALUATION
Department of Anesthesiology  Postprocedure Note    Patient: Andres Amin  MRN: [id number]  YOB: 1950  Date of evaluation: 8/7/2019  Time:  8:22 AM     Procedure Summary     Date:  08/07/19 Room / Location:  [address] [place] [address]    Anesthesia Start:  4926 Anesthesia Stop:      Procedure:  D & C Hysteroscopy & polypectomy (N/A ) Diagnosis:  (endometrial polyp, intramural leiomyoma of uterus)    Surgeon:  Eliazar Maldonado MD Responsible Provider:      Anesthesia Type:  general, TIVA ASA Status:  3          Anesthesia Type: general, TIVA    Lissette Phase I: Lissette Score: 10    Lissette Phase II:      Last vitals: Reviewed and per EMR flowsheets.        Anesthesia Post Evaluation    Patient location during evaluation: PACU  Patient participation: complete - patient participated  Level of consciousness: awake and alert  Pain score: 0  Airway patency: patent  Nausea & Vomiting: no nausea  Complications: no  Cardiovascular status: blood pressure returned to baseline and hemodynamically stable  Respiratory status: acceptable  Hydration status: euvolemic

## 2019-08-07 NOTE — DISCHARGE SUMMARY
tablet by mouth nightly             therapeutic multivitamin-minerals (THERAGRAN-M) tablet  Take 1 tablet by mouth daily. tranexamic acid (LYSTEDA) 650 MG TABS tablet  Take 2 tablets by mouth 3 times daily for 5 days                  HPI and Hospital Course:   77 y.o. female presented on 8/7/2019 for HYSTEROSCOPIC POLYPECTOMY WITH D& 2[facility] course was unremarkable. On day of discharge pt was tolerating regular diet, pain controlled with oral medications and ambulating without difficulty.       Electronically signed by Stanislaw Garcia MD on 8/7/2019 at 8:18 AM

## 2019-08-08 LAB — SURGICAL PATHOLOGY REPORT: NORMAL

## 2019-08-22 ENCOUNTER — OFFICE VISIT (OUTPATIENT)
Dept: OBGYN | Age: 69
End: 2019-08-22
Payer: MEDICARE

## 2019-08-22 VITALS
SYSTOLIC BLOOD PRESSURE: 124 MMHG | WEIGHT: 216 LBS | DIASTOLIC BLOOD PRESSURE: 70 MMHG | RESPIRATION RATE: 20 BRPM | HEART RATE: 84 BPM | HEIGHT: 67 IN | BODY MASS INDEX: 33.9 KG/M2

## 2019-08-22 DIAGNOSIS — N84.0 ENDOMETRIAL POLYP: ICD-10-CM

## 2019-08-22 DIAGNOSIS — D25.1 INTRAMURAL LEIOMYOMA OF UTERUS: ICD-10-CM

## 2019-08-22 DIAGNOSIS — Z09 POSTOP CHECK: Primary | ICD-10-CM

## 2019-08-22 PROCEDURE — 99212 OFFICE O/P EST SF 10 MIN: CPT | Performed by: OBSTETRICS & GYNECOLOGY

## 2019-09-03 ENCOUNTER — OFFICE VISIT (OUTPATIENT)
Dept: PRIMARY CARE CLINIC | Age: 69
End: 2019-09-03
Payer: MEDICARE

## 2019-09-03 VITALS
RESPIRATION RATE: 18 BRPM | WEIGHT: 213.4 LBS | TEMPERATURE: 97.2 F | DIASTOLIC BLOOD PRESSURE: 92 MMHG | HEART RATE: 68 BPM | HEIGHT: 67 IN | OXYGEN SATURATION: 97 % | BODY MASS INDEX: 33.49 KG/M2 | SYSTOLIC BLOOD PRESSURE: 138 MMHG

## 2019-09-03 DIAGNOSIS — J01.40 ACUTE NON-RECURRENT PANSINUSITIS: Primary | ICD-10-CM

## 2019-09-03 PROCEDURE — 3017F COLORECTAL CA SCREEN DOC REV: CPT | Performed by: NURSE PRACTITIONER

## 2019-09-03 PROCEDURE — G8427 DOCREV CUR MEDS BY ELIG CLIN: HCPCS | Performed by: NURSE PRACTITIONER

## 2019-09-03 PROCEDURE — 1036F TOBACCO NON-USER: CPT | Performed by: NURSE PRACTITIONER

## 2019-09-03 PROCEDURE — 1090F PRES/ABSN URINE INCON ASSESS: CPT | Performed by: NURSE PRACTITIONER

## 2019-09-03 PROCEDURE — 99213 OFFICE O/P EST LOW 20 MIN: CPT | Performed by: NURSE PRACTITIONER

## 2019-09-03 PROCEDURE — 1123F ACP DISCUSS/DSCN MKR DOCD: CPT | Performed by: NURSE PRACTITIONER

## 2019-09-03 PROCEDURE — 96372 THER/PROPH/DIAG INJ SC/IM: CPT | Performed by: NURSE PRACTITIONER

## 2019-09-03 PROCEDURE — G8400 PT W/DXA NO RESULTS DOC: HCPCS | Performed by: NURSE PRACTITIONER

## 2019-09-03 PROCEDURE — 4040F PNEUMOC VAC/ADMIN/RCVD: CPT | Performed by: NURSE PRACTITIONER

## 2019-09-03 PROCEDURE — G8417 CALC BMI ABV UP PARAM F/U: HCPCS | Performed by: NURSE PRACTITIONER

## 2019-09-03 RX ORDER — AZITHROMYCIN 250 MG/1
TABLET, FILM COATED ORAL
Qty: 1 PACKET | Refills: 0 | Status: SHIPPED | OUTPATIENT
Start: 2019-09-03 | End: 2019-10-11

## 2019-09-03 RX ORDER — TRIAMCINOLONE ACETONIDE 40 MG/ML
40 INJECTION, SUSPENSION INTRA-ARTICULAR; INTRAMUSCULAR ONCE
Status: COMPLETED | OUTPATIENT
Start: 2019-09-03 | End: 2019-09-03

## 2019-09-03 RX ADMIN — TRIAMCINOLONE ACETONIDE 40 MG: 40 INJECTION, SUSPENSION INTRA-ARTICULAR; INTRAMUSCULAR at 15:26

## 2019-09-03 ASSESSMENT — ENCOUNTER SYMPTOMS
SHORTNESS OF BREATH: 0
RHINORRHEA: 1
SINUS COMPLAINT: 1
SINUS PRESSURE: 1
SORE THROAT: 1
COUGH: 1
GASTROINTESTINAL NEGATIVE: 1

## 2019-09-03 NOTE — PATIENT INSTRUCTIONS
Patient Education        Sinusitis: Care Instructions  Your Care Instructions    Sinusitis is an infection of the lining of the sinus cavities in your head. Sinusitis often follows a cold. It causes pain and pressure in your head and face. In most cases, sinusitis gets better on its own in 1 to 2 weeks. But some mild symptoms may last for several weeks. Sometimes antibiotics are needed. Follow-up care is a key part of your treatment and safety. Be sure to make and go to all appointments, and call your doctor if you are having problems. It's also a good idea to know your test results and keep a list of the medicines you take. How can you care for yourself at home? · Take an over-the-counter pain medicine, such as acetaminophen (Tylenol), ibuprofen (Advil, Motrin), or naproxen (Aleve). Read and follow all instructions on the label. · If the doctor prescribed antibiotics, take them as directed. Do not stop taking them just because you feel better. You need to take the full course of antibiotics. · Be careful when taking over-the-counter cold or flu medicines and Tylenol at the same time. Many of these medicines have acetaminophen, which is Tylenol. Read the labels to make sure that you are not taking more than the recommended dose. Too much acetaminophen (Tylenol) can be harmful. · Breathe warm, moist air from a steamy shower, a hot bath, or a sink filled with hot water. Avoid cold, dry air. Using a humidifier in your home may help. Follow the directions for cleaning the machine. · Use saline (saltwater) nasal washes to help keep your nasal passages open and wash out mucus and bacteria. You can buy saline nose drops at a grocery store or drugstore. Or you can make your own at home by adding 1 teaspoon of salt and 1 teaspoon of baking soda to 2 cups of distilled water. If you make your own, fill a bulb syringe with the solution, insert the tip into your nostril, and squeeze gently. [place] your nose.   · Put a hot, wet towel or a warm gel pack on your face 3 or 4 times a day for 5 to 10 minutes each time. · Try a decongestant nasal spray like oxymetazoline (Afrin). Do not use it for more than 3 days in a row. Using it for more than 3 days can make your congestion worse. When should you call for help? Call your doctor now or seek immediate medical care if:    · You have new or worse swelling or redness in your face or around your eyes.     · You have a new or higher fever.    Watch closely for changes in your health, and be sure to contact your doctor if:    · You have new or worse facial pain.     · The mucus from your nose becomes thicker (like pus) or has new blood in it.     · You are not getting better as expected. Where can you learn more? Go to https://[employer].BUYSTAND. org and sign in to your [employer] account. Enter U702 in the [employer] box to learn more about \"Sinusitis: Care Instructions. \"     If you do not have an account, please click on the \"Sign Up Now\" link. Current as of: October 21, 2018  Content Version: 12.1  © 3457-1289 [employer]. Care instructions adapted under license by [facility] ([facility]). If you have questions about a medical condition or this instruction, always ask your healthcare professional. [place] [address] any warranty or liability for your use of this information. Patient Education        Saline Nasal Washes: Care Instructions  Your Care Instructions  Saline nasal washes help keep the nasal passages open by washing out thick or dried mucus. This simple remedy can help relieve symptoms of allergies, sinusitis, and colds. It also can make the nose feel more comfortable by keeping the mucous membranes moist. You may notice a little burning sensation in your nose the first few times you use the solution, but this usually gets better in a few days. Follow-up care is a key part of your treatment and safety.  Be sure to make and go

## 2019-09-06 ENCOUNTER — OFFICE VISIT (OUTPATIENT)
Dept: FAMILY MEDICINE CLINIC | Age: 69
End: 2019-09-06
Payer: MEDICARE

## 2019-09-06 VITALS
RESPIRATION RATE: 16 BRPM | BODY MASS INDEX: 33.59 KG/M2 | HEART RATE: 68 BPM | SYSTOLIC BLOOD PRESSURE: 130 MMHG | DIASTOLIC BLOOD PRESSURE: 76 MMHG | OXYGEN SATURATION: 95 % | WEIGHT: 214 LBS | TEMPERATURE: 97.1 F | HEIGHT: 67 IN

## 2019-09-06 DIAGNOSIS — J45.901 BRONCHITIS, ALLERGIC, UNSPECIFIED ASTHMA SEVERITY, WITH ACUTE EXACERBATION: Primary | ICD-10-CM

## 2019-09-06 DIAGNOSIS — J98.01 ACUTE BRONCHOSPASM: ICD-10-CM

## 2019-09-06 PROCEDURE — 1036F TOBACCO NON-USER: CPT | Performed by: FAMILY MEDICINE

## 2019-09-06 PROCEDURE — 1123F ACP DISCUSS/DSCN MKR DOCD: CPT | Performed by: FAMILY MEDICINE

## 2019-09-06 PROCEDURE — 1090F PRES/ABSN URINE INCON ASSESS: CPT | Performed by: FAMILY MEDICINE

## 2019-09-06 PROCEDURE — 4040F PNEUMOC VAC/ADMIN/RCVD: CPT | Performed by: FAMILY MEDICINE

## 2019-09-06 PROCEDURE — G8400 PT W/DXA NO RESULTS DOC: HCPCS | Performed by: FAMILY MEDICINE

## 2019-09-06 PROCEDURE — G8417 CALC BMI ABV UP PARAM F/U: HCPCS | Performed by: FAMILY MEDICINE

## 2019-09-06 PROCEDURE — G8427 DOCREV CUR MEDS BY ELIG CLIN: HCPCS | Performed by: FAMILY MEDICINE

## 2019-09-06 PROCEDURE — 99214 OFFICE O/P EST MOD 30 MIN: CPT | Performed by: FAMILY MEDICINE

## 2019-09-06 PROCEDURE — 3017F COLORECTAL CA SCREEN DOC REV: CPT | Performed by: FAMILY MEDICINE

## 2019-09-06 RX ORDER — PREDNISONE 20 MG/1
TABLET ORAL
Qty: 15 TABLET | Refills: 0 | Status: SHIPPED | OUTPATIENT
Start: 2019-09-06 | End: 2019-10-11

## 2019-09-06 RX ORDER — ALBUTEROL SULFATE 90 UG/1
2 AEROSOL, METERED RESPIRATORY (INHALATION) EVERY 4 HOURS PRN
Qty: 1 INHALER | Refills: 3 | Status: SHIPPED | OUTPATIENT
Start: 2019-09-06 | End: 2021-01-11 | Stop reason: SDUPTHER

## 2019-09-06 ASSESSMENT — ENCOUNTER SYMPTOMS
WHEEZING: 1
CHEST TIGHTNESS: 1
SHORTNESS OF BREATH: 0
EYE REDNESS: 0
NAUSEA: 0
EYE DISCHARGE: 0
ABDOMINAL PAIN: 0
CONSTIPATION: 0
SINUS PRESSURE: 1
DIARRHEA: 0
VOMITING: 0
TROUBLE SWALLOWING: 0
COUGH: 1
SORE THROAT: 1
RHINORRHEA: 1
SINUS PAIN: 1

## 2019-09-06 NOTE — PROGRESS NOTES
mouth once daily Yes Wood Vergara DO   loratadine (CLARITIN) 10 MG tablet take 1 tablet by mouth once daily Yes Fabby Lima DO   meloxicam (MOBIC) 7.5 MG tablet take 1 tablet by mouth once daily Yes Wood Vergara DO   ranitidine (ZANTAC) 150 MG tablet Take 1 tablet by mouth nightly Yes Mana Ramirez MD   blood glucose test strips (ONE TOUCH TEST STRIPS) strip Tests 1 X daily. DX E11.9 Yes Wood Vergara DO   ONE TOUCH LANCETS MISC Tests 1 x daily DX. E11.9 Yes Wood Vergara DO   omeprazole (PRILOSEC) 20 MG capsule Take 1 capsule by mouth 2 times daily. Patient taking differently: Take 20 mg by mouth Daily OTC Yes Wood Vergara DO   pseudoephedrine (SUDAFED) 30 MG tablet Take 30 mg by mouth every 4 hours as needed. Yes Historical Provider, MD   diphenhydrAMINE-APAP, sleep, (TYLENOL PM EXTRA STRENGTH)  MG tablet Take 2 tablets by mouth nightly as needed. Yes Historical Provider, MD   therapeutic multivitamin-minerals (THERAGRAN-M) tablet Take 1 tablet by mouth daily. Yes Historical Provider, MD   Ascorbic Acid (VITAMIN C) 500 MG tablet Take 500 mg by mouth daily. Yes Historical Provider, MD   atorvastatin (LIPITOR) 20 MG tablet Take 1 tablet by mouth daily  Patient not taking: Reported on 9/6/2019  Wood Vergara DO        Social History     Tobacco Use    Smoking status: Passive Smoke Exposure - Never Smoker    Smokeless tobacco: Never Used    Tobacco comment: very remotely would smoke a few cigarettes on weekends/college days   Substance Use Topics    Alcohol use:  Yes     Alcohol/week: 0.0 standard drinks     Comment: occasional use        Vitals:    09/06/19 0951   BP: 130/76   Site: Right Upper Arm   Position: Sitting   Cuff Size: Large Adult   Pulse: 68   Resp: 16   Temp: 97.1 °F (36.2 °C)   TempSrc: Tympanic   SpO2: 95%   Weight: 214 lb (97.1 kg)   Height: 5' 7\" (1.702 m)     Estimated body mass index is 33.52 kg/m² as calculated from the following:    Height as of authenticate this note.     --Jonathan Rodriguez DO on 9/6/2019 at 10:28 AM

## 2019-10-11 ENCOUNTER — HOSPITAL ENCOUNTER (OUTPATIENT)
Age: 69
Discharge: HOME | End: 2019-10-13
Payer: MEDICARE

## 2019-10-11 ENCOUNTER — HOSPITAL ENCOUNTER (OUTPATIENT)
Dept: GENERAL RADIOLOGY | Age: 69
Discharge: HOME | End: 2019-10-13
Payer: MEDICARE

## 2019-10-11 ENCOUNTER — OFFICE VISIT (OUTPATIENT)
Dept: PRIMARY CARE CLINIC | Age: 69
End: 2019-10-11
Payer: MEDICARE

## 2019-10-11 VITALS
RESPIRATION RATE: 16 BRPM | OXYGEN SATURATION: 94 % | HEIGHT: 67 IN | TEMPERATURE: 98.4 F | DIASTOLIC BLOOD PRESSURE: 88 MMHG | HEART RATE: 70 BPM | BODY MASS INDEX: 34.06 KG/M2 | SYSTOLIC BLOOD PRESSURE: 170 MMHG | WEIGHT: 217 LBS

## 2019-10-11 DIAGNOSIS — J30.1 SEASONAL ALLERGIC RHINITIS DUE TO POLLEN: ICD-10-CM

## 2019-10-11 DIAGNOSIS — M25.552 LEFT HIP PAIN: ICD-10-CM

## 2019-10-11 DIAGNOSIS — M25.552 LEFT HIP PAIN: Primary | ICD-10-CM

## 2019-10-11 PROCEDURE — G8482 FLU IMMUNIZE ORDER/ADMIN: HCPCS | Performed by: FAMILY MEDICINE

## 2019-10-11 PROCEDURE — 1036F TOBACCO NON-USER: CPT | Performed by: FAMILY MEDICINE

## 2019-10-11 PROCEDURE — G8417 CALC BMI ABV UP PARAM F/U: HCPCS | Performed by: FAMILY MEDICINE

## 2019-10-11 PROCEDURE — 99214 OFFICE O/P EST MOD 30 MIN: CPT | Performed by: FAMILY MEDICINE

## 2019-10-11 PROCEDURE — 1090F PRES/ABSN URINE INCON ASSESS: CPT | Performed by: FAMILY MEDICINE

## 2019-10-11 PROCEDURE — G8427 DOCREV CUR MEDS BY ELIG CLIN: HCPCS | Performed by: FAMILY MEDICINE

## 2019-10-11 PROCEDURE — G8400 PT W/DXA NO RESULTS DOC: HCPCS | Performed by: FAMILY MEDICINE

## 2019-10-11 PROCEDURE — 4040F PNEUMOC VAC/ADMIN/RCVD: CPT | Performed by: FAMILY MEDICINE

## 2019-10-11 PROCEDURE — 1123F ACP DISCUSS/DSCN MKR DOCD: CPT | Performed by: FAMILY MEDICINE

## 2019-10-11 PROCEDURE — 73502 X-RAY EXAM HIP UNI 2-3 VIEWS: CPT

## 2019-10-11 PROCEDURE — 3017F COLORECTAL CA SCREEN DOC REV: CPT | Performed by: FAMILY MEDICINE

## 2019-10-11 RX ORDER — PREDNISONE 20 MG/1
TABLET ORAL
Qty: 15 TABLET | Refills: 0 | Status: SHIPPED | OUTPATIENT
Start: 2019-10-11 | End: 2019-11-21 | Stop reason: ALTCHOICE

## 2019-10-11 RX ORDER — CETIRIZINE HYDROCHLORIDE 10 MG/1
10 TABLET ORAL DAILY
Qty: 90 TABLET | Refills: 1 | Status: SHIPPED | OUTPATIENT
Start: 2019-10-11 | End: 2019-11-21

## 2019-10-11 RX ORDER — MONTELUKAST SODIUM 10 MG/1
10 TABLET ORAL DAILY
Qty: 90 TABLET | Refills: 1 | Status: SHIPPED | OUTPATIENT
Start: 2019-10-11 | End: 2019-11-21

## 2019-10-12 ASSESSMENT — ENCOUNTER SYMPTOMS
RESPIRATORY NEGATIVE: 1
SORE THROAT: 0
RHINORRHEA: 1
BACK PAIN: 0
EYE DISCHARGE: 1
EYE ITCHING: 1
SINUS PRESSURE: 0
SINUS PAIN: 0

## 2019-10-30 ENCOUNTER — PATIENT MESSAGE (OUTPATIENT)
Dept: PRIMARY CARE CLINIC | Age: 69
End: 2019-10-30

## 2019-11-19 ENCOUNTER — HOSPITAL ENCOUNTER (OUTPATIENT)
Dept: LAB | Age: 69
Discharge: HOME | End: 2019-11-19
Payer: MEDICARE

## 2019-11-19 DIAGNOSIS — E11.9 CONTROLLED TYPE 2 DIABETES MELLITUS WITHOUT COMPLICATION, WITHOUT LONG-TERM CURRENT USE OF INSULIN (HCC): ICD-10-CM

## 2019-11-19 DIAGNOSIS — I10 ESSENTIAL HYPERTENSION: ICD-10-CM

## 2019-11-19 DIAGNOSIS — E78.2 MIXED HYPERLIPIDEMIA: ICD-10-CM

## 2019-11-19 LAB
ABSOLUTE EOS #: 0.2 K/UL (ref 0–0.4)
ABSOLUTE IMMATURE GRANULOCYTE: ABNORMAL K/UL (ref 0–0.3)
ABSOLUTE LYMPH #: 2.9 K/UL (ref 1–4.8)
ABSOLUTE MONO #: 0.9 K/UL (ref 0.1–1.2)
ALBUMIN SERPL-MCNC: 4.4 G/DL (ref 3.5–5.2)
ALBUMIN/GLOBULIN RATIO: 1.4 (ref 1–2.5)
ALP BLD-CCNC: 80 U/L (ref 35–104)
ALT SERPL-CCNC: 17 U/L (ref 5–33)
ANION GAP SERPL CALCULATED.3IONS-SCNC: 13 MMOL/L (ref 9–17)
AST SERPL-CCNC: 18 U/L
BASOPHILS # BLD: 1 % (ref 0–1)
BASOPHILS ABSOLUTE: 0.1 K/UL (ref 0–0.2)
BILIRUB SERPL-MCNC: 0.35 MG/DL (ref 0.3–1.2)
BUN BLDV-MCNC: 14 MG/DL (ref 8–23)
BUN/CREAT BLD: 21 (ref 9–20)
CALCIUM SERPL-MCNC: 9.7 MG/DL (ref 8.6–10.4)
CHLORIDE BLD-SCNC: 101 MMOL/L (ref 98–107)
CHOLESTEROL/HDL RATIO: 4.1
CHOLESTEROL: 204 MG/DL
CO2: 28 MMOL/L (ref 20–31)
CREAT SERPL-MCNC: 0.66 MG/DL (ref 0.5–0.9)
DIFFERENTIAL TYPE: ABNORMAL
EOSINOPHILS RELATIVE PERCENT: 2 % (ref 1–7)
ESTIMATED AVERAGE GLUCOSE: 143 MG/DL
GFR AFRICAN AMERICAN: >60 ML/MIN
GFR NON-AFRICAN AMERICAN: >60 ML/MIN
GFR SERPL CREATININE-BSD FRML MDRD: ABNORMAL ML/MIN/{1.73_M2}
GFR SERPL CREATININE-BSD FRML MDRD: ABNORMAL ML/MIN/{1.73_M2}
GLUCOSE BLD-MCNC: 121 MG/DL (ref 70–99)
HBA1C MFR BLD: 6.6 % (ref 4.8–5.9)
HCT VFR BLD CALC: 43.6 % (ref 36–46)
HDLC SERPL-MCNC: 50 MG/DL
HEMOGLOBIN: 14.3 G/DL (ref 12–16)
IMMATURE GRANULOCYTES: ABNORMAL %
LDL CHOLESTEROL: 128 MG/DL (ref 0–130)
LYMPHOCYTES # BLD: 26 % (ref 16–46)
MCH RBC QN AUTO: 29.3 PG (ref 26–34)
MCHC RBC AUTO-ENTMCNC: 32.7 G/DL (ref 31–37)
MCV RBC AUTO: 89.5 FL (ref 80–100)
MONOCYTES # BLD: 8 % (ref 4–11)
NRBC AUTOMATED: ABNORMAL PER 100 WBC
PDW BLD-RTO: 14.5 % (ref 11–14.5)
PLATELET # BLD: 323 K/UL (ref 140–450)
PLATELET ESTIMATE: ABNORMAL
PMV BLD AUTO: 7.9 FL (ref 6–12)
POTASSIUM SERPL-SCNC: 4.1 MMOL/L (ref 3.7–5.3)
RBC # BLD: 4.87 M/UL (ref 4–5.2)
RBC # BLD: ABNORMAL 10*6/UL
SEG NEUTROPHILS: 63 % (ref 43–77)
SEGMENTED NEUTROPHILS ABSOLUTE COUNT: 7.1 K/UL (ref 1.8–7.7)
SODIUM BLD-SCNC: 142 MMOL/L (ref 135–144)
TOTAL PROTEIN: 7.6 G/DL (ref 6.4–8.3)
TRIGL SERPL-MCNC: 128 MG/DL
VLDLC SERPL CALC-MCNC: ABNORMAL MG/DL (ref 1–30)
WBC # BLD: 11.2 K/UL (ref 3.5–11)
WBC # BLD: ABNORMAL 10*3/UL

## 2019-11-19 PROCEDURE — 80053 COMPREHEN METABOLIC PANEL: CPT

## 2019-11-19 PROCEDURE — 85025 COMPLETE CBC W/AUTO DIFF WBC: CPT

## 2019-11-19 PROCEDURE — 80061 LIPID PANEL: CPT

## 2019-11-19 PROCEDURE — 36415 COLL VENOUS BLD VENIPUNCTURE: CPT

## 2019-11-19 PROCEDURE — 83036 HEMOGLOBIN GLYCOSYLATED A1C: CPT

## 2019-11-21 ENCOUNTER — OFFICE VISIT (OUTPATIENT)
Dept: FAMILY MEDICINE CLINIC | Age: 69
End: 2019-11-21
Payer: MEDICARE

## 2019-11-21 ENCOUNTER — HOSPITAL ENCOUNTER (OUTPATIENT)
Dept: ULTRASOUND IMAGING | Age: 69
Discharge: HOME | End: 2019-11-23
Payer: MEDICARE

## 2019-11-21 VITALS
HEART RATE: 60 BPM | WEIGHT: 220 LBS | RESPIRATION RATE: 16 BRPM | HEIGHT: 67 IN | DIASTOLIC BLOOD PRESSURE: 80 MMHG | SYSTOLIC BLOOD PRESSURE: 120 MMHG | BODY MASS INDEX: 34.53 KG/M2

## 2019-11-21 DIAGNOSIS — E11.9 CONTROLLED TYPE 2 DIABETES MELLITUS WITHOUT COMPLICATION, WITHOUT LONG-TERM CURRENT USE OF INSULIN (HCC): ICD-10-CM

## 2019-11-21 DIAGNOSIS — Z09 POSTOP CHECK: ICD-10-CM

## 2019-11-21 DIAGNOSIS — Z00.00 ROUTINE GENERAL MEDICAL EXAMINATION AT A HEALTH CARE FACILITY: ICD-10-CM

## 2019-11-21 DIAGNOSIS — K21.9 GASTROESOPHAGEAL REFLUX DISEASE WITHOUT ESOPHAGITIS: ICD-10-CM

## 2019-11-21 DIAGNOSIS — D25.1 INTRAMURAL LEIOMYOMA OF UTERUS: ICD-10-CM

## 2019-11-21 DIAGNOSIS — I10 ESSENTIAL HYPERTENSION: ICD-10-CM

## 2019-11-21 DIAGNOSIS — E78.2 MIXED HYPERLIPIDEMIA: ICD-10-CM

## 2019-11-21 DIAGNOSIS — Z13.6 SCREENING FOR CARDIOVASCULAR CONDITION: ICD-10-CM

## 2019-11-21 DIAGNOSIS — N84.0 ENDOMETRIAL POLYP: ICD-10-CM

## 2019-11-21 DIAGNOSIS — Z12.31 SCREENING MAMMOGRAM, ENCOUNTER FOR: ICD-10-CM

## 2019-11-21 DIAGNOSIS — M25.552 LEFT HIP PAIN: Primary | ICD-10-CM

## 2019-11-21 PROCEDURE — 3017F COLORECTAL CA SCREEN DOC REV: CPT | Performed by: FAMILY MEDICINE

## 2019-11-21 PROCEDURE — 99213 OFFICE O/P EST LOW 20 MIN: CPT | Performed by: FAMILY MEDICINE

## 2019-11-21 PROCEDURE — G8417 CALC BMI ABV UP PARAM F/U: HCPCS | Performed by: FAMILY MEDICINE

## 2019-11-21 PROCEDURE — G8427 DOCREV CUR MEDS BY ELIG CLIN: HCPCS | Performed by: FAMILY MEDICINE

## 2019-11-21 PROCEDURE — 2022F DILAT RTA XM EVC RTNOPTHY: CPT | Performed by: FAMILY MEDICINE

## 2019-11-21 PROCEDURE — 76856 US EXAM PELVIC COMPLETE: CPT

## 2019-11-21 PROCEDURE — 1090F PRES/ABSN URINE INCON ASSESS: CPT | Performed by: FAMILY MEDICINE

## 2019-11-21 PROCEDURE — G8400 PT W/DXA NO RESULTS DOC: HCPCS | Performed by: FAMILY MEDICINE

## 2019-11-21 PROCEDURE — 4040F PNEUMOC VAC/ADMIN/RCVD: CPT | Performed by: FAMILY MEDICINE

## 2019-11-21 PROCEDURE — G0446 INTENS BEHAVE THER CARDIO DX: HCPCS | Performed by: FAMILY MEDICINE

## 2019-11-21 PROCEDURE — 3044F HG A1C LEVEL LT 7.0%: CPT | Performed by: FAMILY MEDICINE

## 2019-11-21 PROCEDURE — 1036F TOBACCO NON-USER: CPT | Performed by: FAMILY MEDICINE

## 2019-11-21 PROCEDURE — 76830 TRANSVAGINAL US NON-OB: CPT

## 2019-11-21 PROCEDURE — G8482 FLU IMMUNIZE ORDER/ADMIN: HCPCS | Performed by: FAMILY MEDICINE

## 2019-11-21 PROCEDURE — 1123F ACP DISCUSS/DSCN MKR DOCD: CPT | Performed by: FAMILY MEDICINE

## 2019-11-21 PROCEDURE — G0439 PPPS, SUBSEQ VISIT: HCPCS | Performed by: FAMILY MEDICINE

## 2019-11-21 RX ORDER — MELOXICAM 7.5 MG/1
TABLET ORAL
Qty: 90 TABLET | Refills: 1 | Status: SHIPPED | OUTPATIENT
Start: 2019-11-21 | End: 2020-05-28 | Stop reason: SDUPTHER

## 2019-11-21 RX ORDER — PREDNISONE 20 MG/1
TABLET ORAL
Qty: 15 TABLET | Refills: 0 | Status: SHIPPED | OUTPATIENT
Start: 2019-11-21 | End: 2020-05-28

## 2019-11-21 ASSESSMENT — ENCOUNTER SYMPTOMS
CONSTIPATION: 0
WHEEZING: 0
SORE THROAT: 0
COUGH: 0
SINUS PRESSURE: 0
VOMITING: 0
EYE DISCHARGE: 0
DIARRHEA: 0
SINUS PAIN: 0
SHORTNESS OF BREATH: 0
RHINORRHEA: 1
EYE REDNESS: 0
TROUBLE SWALLOWING: 0
NAUSEA: 0
ABDOMINAL PAIN: 0

## 2019-11-21 ASSESSMENT — PATIENT HEALTH QUESTIONNAIRE - PHQ9
SUM OF ALL RESPONSES TO PHQ QUESTIONS 1-9: 0
SUM OF ALL RESPONSES TO PHQ QUESTIONS 1-9: 0

## 2019-11-21 ASSESSMENT — LIFESTYLE VARIABLES: HOW OFTEN DO YOU HAVE A DRINK CONTAINING ALCOHOL: 0

## 2019-12-30 RX ORDER — ATENOLOL 25 MG/1
TABLET ORAL
Qty: 90 TABLET | Refills: 0 | Status: SHIPPED | OUTPATIENT
Start: 2019-12-30 | End: 2020-04-02 | Stop reason: SDUPTHER

## 2020-01-07 ENCOUNTER — PATIENT MESSAGE (OUTPATIENT)
Dept: FAMILY MEDICINE CLINIC | Age: 70
End: 2020-01-07

## 2020-01-07 NOTE — TELEPHONE ENCOUNTER
From: Apoorva Barboza  To: Gurjit Hou DO  Sent: 1/7/2020 1:51 PM EST  Subject: Non-Urgent Medical Question    I have recently noticed some small bleeding under the skin on my right arm. The first one went away but I just got another one. Is this something I should be concerned about or is this from taking Mobic? I looked it up and it sounds like purpura? They were both in the same area and quite small.     Apoorva Barboza

## 2020-03-17 ENCOUNTER — PATIENT MESSAGE (OUTPATIENT)
Dept: FAMILY MEDICINE CLINIC | Age: 70
End: 2020-03-17

## 2020-03-17 RX ORDER — SCOLOPAMINE TRANSDERMAL SYSTEM 1 MG/1
1 PATCH, EXTENDED RELEASE TRANSDERMAL ONCE
Qty: 1 PATCH | Refills: 0 | Status: SHIPPED | OUTPATIENT
Start: 2020-03-17 | End: 2020-03-17

## 2020-04-03 RX ORDER — ATENOLOL 25 MG/1
25 TABLET ORAL DAILY
Qty: 90 TABLET | Refills: 1 | Status: SHIPPED | OUTPATIENT
Start: 2020-04-03 | End: 2020-09-28

## 2020-05-21 ENCOUNTER — HOSPITAL ENCOUNTER (OUTPATIENT)
Dept: LAB | Age: 70
Discharge: HOME | End: 2020-05-21
Payer: MEDICARE

## 2020-05-21 ENCOUNTER — HOSPITAL ENCOUNTER (OUTPATIENT)
Dept: MAMMOGRAPHY | Age: 70
Discharge: HOME | End: 2020-05-23
Payer: MEDICARE

## 2020-05-21 LAB
ABSOLUTE EOS #: 0.22 K/UL (ref 0–0.44)
ABSOLUTE IMMATURE GRANULOCYTE: 0.03 K/UL (ref 0–0.3)
ABSOLUTE LYMPH #: 2.25 K/UL (ref 1.1–3.7)
ABSOLUTE MONO #: 0.76 K/UL (ref 0.1–1.2)
ALBUMIN SERPL-MCNC: 4.3 G/DL (ref 3.5–5.2)
ALBUMIN/GLOBULIN RATIO: 1.3 (ref 1–2.5)
ALP BLD-CCNC: 88 U/L (ref 35–104)
ALT SERPL-CCNC: 17 U/L (ref 5–33)
ANION GAP SERPL CALCULATED.3IONS-SCNC: 12 MMOL/L (ref 9–17)
AST SERPL-CCNC: 21 U/L
BASOPHILS # BLD: 0 % (ref 0–2)
BASOPHILS ABSOLUTE: 0.04 K/UL (ref 0–0.2)
BILIRUB SERPL-MCNC: 0.3 MG/DL (ref 0.3–1.2)
BUN BLDV-MCNC: 14 MG/DL (ref 8–23)
BUN/CREAT BLD: 21 (ref 9–20)
CALCIUM SERPL-MCNC: 9.4 MG/DL (ref 8.6–10.4)
CHLORIDE BLD-SCNC: 102 MMOL/L (ref 98–107)
CHOLESTEROL/HDL RATIO: 4.3
CHOLESTEROL: 173 MG/DL
CO2: 27 MMOL/L (ref 20–31)
CREAT SERPL-MCNC: 0.68 MG/DL (ref 0.5–0.9)
CREATININE URINE: 137.5 MG/DL (ref 28–217)
DIFFERENTIAL TYPE: ABNORMAL
EOSINOPHILS RELATIVE PERCENT: 2 % (ref 1–4)
ESTIMATED AVERAGE GLUCOSE: 148 MG/DL
GFR AFRICAN AMERICAN: >60 ML/MIN
GFR NON-AFRICAN AMERICAN: >60 ML/MIN
GFR SERPL CREATININE-BSD FRML MDRD: ABNORMAL ML/MIN/{1.73_M2}
GFR SERPL CREATININE-BSD FRML MDRD: ABNORMAL ML/MIN/{1.73_M2}
GLUCOSE BLD-MCNC: 125 MG/DL (ref 70–99)
HBA1C MFR BLD: 6.8 % (ref 4.8–5.9)
HCT VFR BLD CALC: 42.6 % (ref 36.3–47.1)
HDLC SERPL-MCNC: 40 MG/DL
HEMOGLOBIN: 13.8 G/DL (ref 11.9–15.1)
IMMATURE GRANULOCYTES: 0 %
LDL CHOLESTEROL: 96 MG/DL (ref 0–130)
LYMPHOCYTES # BLD: 22 % (ref 24–43)
MCH RBC QN AUTO: 28.6 PG (ref 25.2–33.5)
MCHC RBC AUTO-ENTMCNC: 32.4 G/DL (ref 25.2–33.5)
MCV RBC AUTO: 88.2 FL (ref 82.6–102.9)
MICROALBUMIN/CREAT 24H UR: <12 MG/L
MICROALBUMIN/CREAT UR-RTO: NORMAL MCG/MG CREAT
MONOCYTES # BLD: 7 % (ref 3–12)
NRBC AUTOMATED: 0 PER 100 WBC
PDW BLD-RTO: 14 % (ref 11.8–14.4)
PLATELET # BLD: 257 K/UL (ref 138–453)
PLATELET ESTIMATE: ABNORMAL
PMV BLD AUTO: 10 FL (ref 8.1–13.5)
POTASSIUM SERPL-SCNC: 4.1 MMOL/L (ref 3.7–5.3)
RBC # BLD: 4.83 M/UL (ref 3.95–5.11)
RBC # BLD: ABNORMAL 10*6/UL
SEG NEUTROPHILS: 69 % (ref 36–65)
SEGMENTED NEUTROPHILS ABSOLUTE COUNT: 7.18 K/UL (ref 1.5–8.1)
SODIUM BLD-SCNC: 141 MMOL/L (ref 135–144)
TOTAL PROTEIN: 7.5 G/DL (ref 6.4–8.3)
TRIGL SERPL-MCNC: 185 MG/DL
TSH SERPL DL<=0.05 MIU/L-ACNC: 2.67 MIU/L (ref 0.3–5)
VLDLC SERPL CALC-MCNC: ABNORMAL MG/DL (ref 1–30)
WBC # BLD: 10.5 K/UL (ref 3.5–11.3)
WBC # BLD: ABNORMAL 10*3/UL

## 2020-05-21 PROCEDURE — 80053 COMPREHEN METABOLIC PANEL: CPT

## 2020-05-21 PROCEDURE — 85025 COMPLETE CBC W/AUTO DIFF WBC: CPT

## 2020-05-21 PROCEDURE — 82570 ASSAY OF URINE CREATININE: CPT

## 2020-05-21 PROCEDURE — 77063 BREAST TOMOSYNTHESIS BI: CPT

## 2020-05-21 PROCEDURE — 83036 HEMOGLOBIN GLYCOSYLATED A1C: CPT

## 2020-05-21 PROCEDURE — 80061 LIPID PANEL: CPT

## 2020-05-21 PROCEDURE — 82043 UR ALBUMIN QUANTITATIVE: CPT

## 2020-05-21 PROCEDURE — 84443 ASSAY THYROID STIM HORMONE: CPT

## 2020-05-21 PROCEDURE — 36415 COLL VENOUS BLD VENIPUNCTURE: CPT

## 2020-05-28 ENCOUNTER — OFFICE VISIT (OUTPATIENT)
Dept: FAMILY MEDICINE CLINIC | Age: 70
End: 2020-05-28
Payer: MEDICARE

## 2020-05-28 VITALS
HEIGHT: 67 IN | BODY MASS INDEX: 34.84 KG/M2 | SYSTOLIC BLOOD PRESSURE: 160 MMHG | DIASTOLIC BLOOD PRESSURE: 85 MMHG | WEIGHT: 222 LBS | HEART RATE: 60 BPM | RESPIRATION RATE: 18 BRPM

## 2020-05-28 PROCEDURE — 3044F HG A1C LEVEL LT 7.0%: CPT | Performed by: FAMILY MEDICINE

## 2020-05-28 PROCEDURE — 3017F COLORECTAL CA SCREEN DOC REV: CPT | Performed by: FAMILY MEDICINE

## 2020-05-28 PROCEDURE — 1123F ACP DISCUSS/DSCN MKR DOCD: CPT | Performed by: FAMILY MEDICINE

## 2020-05-28 PROCEDURE — G8417 CALC BMI ABV UP PARAM F/U: HCPCS | Performed by: FAMILY MEDICINE

## 2020-05-28 PROCEDURE — 4040F PNEUMOC VAC/ADMIN/RCVD: CPT | Performed by: FAMILY MEDICINE

## 2020-05-28 PROCEDURE — G8400 PT W/DXA NO RESULTS DOC: HCPCS | Performed by: FAMILY MEDICINE

## 2020-05-28 PROCEDURE — G8427 DOCREV CUR MEDS BY ELIG CLIN: HCPCS | Performed by: FAMILY MEDICINE

## 2020-05-28 PROCEDURE — 99213 OFFICE O/P EST LOW 20 MIN: CPT | Performed by: FAMILY MEDICINE

## 2020-05-28 PROCEDURE — 1090F PRES/ABSN URINE INCON ASSESS: CPT | Performed by: FAMILY MEDICINE

## 2020-05-28 PROCEDURE — 99214 OFFICE O/P EST MOD 30 MIN: CPT | Performed by: FAMILY MEDICINE

## 2020-05-28 PROCEDURE — 2022F DILAT RTA XM EVC RTNOPTHY: CPT | Performed by: FAMILY MEDICINE

## 2020-05-28 PROCEDURE — 1036F TOBACCO NON-USER: CPT | Performed by: FAMILY MEDICINE

## 2020-05-28 RX ORDER — OMEPRAZOLE 20 MG/1
20 CAPSULE, DELAYED RELEASE ORAL 2 TIMES DAILY
Qty: 180 CAPSULE | Refills: 1 | Status: SHIPPED | OUTPATIENT
Start: 2020-05-28 | End: 2021-03-01

## 2020-05-28 RX ORDER — LEVOCETIRIZINE DIHYDROCHLORIDE 5 MG/1
5 TABLET, FILM COATED ORAL DAILY
Qty: 90 TABLET | Refills: 1 | Status: SHIPPED | OUTPATIENT
Start: 2020-05-28 | End: 2020-11-30

## 2020-05-28 RX ORDER — MELOXICAM 7.5 MG/1
TABLET ORAL
Qty: 90 TABLET | Refills: 1 | Status: SHIPPED | OUTPATIENT
Start: 2020-05-28 | End: 2020-11-30 | Stop reason: SDUPTHER

## 2020-05-28 RX ORDER — PREDNISONE 20 MG/1
TABLET ORAL
Qty: 15 TABLET | Refills: 0 | Status: SHIPPED | OUTPATIENT
Start: 2020-05-28 | End: 2020-11-30

## 2020-05-28 ASSESSMENT — PATIENT HEALTH QUESTIONNAIRE - PHQ9
1. LITTLE INTEREST OR PLEASURE IN DOING THINGS: 0
2. FEELING DOWN, DEPRESSED OR HOPELESS: 2
SUM OF ALL RESPONSES TO PHQ QUESTIONS 1-9: 2
SUM OF ALL RESPONSES TO PHQ9 QUESTIONS 1 & 2: 2
SUM OF ALL RESPONSES TO PHQ QUESTIONS 1-9: 2

## 2020-05-28 ASSESSMENT — ENCOUNTER SYMPTOMS
SHORTNESS OF BREATH: 0
CONSTIPATION: 0
TROUBLE SWALLOWING: 0
DIARRHEA: 0
ABDOMINAL PAIN: 0
VOMITING: 0
SINUS PRESSURE: 0
SORE THROAT: 0
RHINORRHEA: 0
CHEST TIGHTNESS: 1
EYE DISCHARGE: 0
NAUSEA: 0
EYE REDNESS: 0
WHEEZING: 1
COUGH: 0

## 2020-05-28 NOTE — PATIENT INSTRUCTIONS
05/21/2020 12  9 - 17 mmol/L Final    Alkaline Phosphatase 05/21/2020 88  35 - 104 U/L Final    ALT 05/21/2020 17  5 - 33 U/L Final    AST 05/21/2020 21  <32 U/L Final    Total Bilirubin 05/21/2020 0.30  0.3 - 1.2 mg/dL Final    Total Protein 05/21/2020 7.5  6.4 - 8.3 g/dL Final    Alb 05/21/2020 4.3  3.5 - 5.2 g/dL Final    Albumin/Globulin Ratio 05/21/2020 1.3  1.0 - 2.5 Final    GFR Non- 05/21/2020 >60  >60 mL/min Final    GFR  05/21/2020 >60  >60 mL/min Final    GFR Comment 05/21/2020        Final    Comment: Average GFR for 61-76 years old:   80 mL/min/1.73sq m  Chronic Kidney Disease:   <60 mL/min/1.73sq m  Kidney failure:   <15 mL/min/1.73sq m              eGFR calculated using average adult body mass. Additional eGFR calculator available at:        [employer]            GFR Staging 05/21/2020 NOT REPORTED   Final    Hemoglobin A1C 05/21/2020 6.8* 4.8 - 5.9 % Final    Estimated Avg Glucose 05/21/2020 148  mg/dL Final    Cholesterol 05/21/2020 173  <200 mg/dL Final    Comment:    Cholesterol Guidelines:      <200  Desirable   200-240  Borderline      >240  Undesirable         HDL 05/21/2020 40* >40 mg/dL Final    Comment:    HDL Guidelines:    <40     Undesirable   40-59    Borderline    >59     Desirable         LDL Cholesterol 05/21/2020 96  0 - 130 mg/dL Final    Comment:    LDL Guidelines:     <100    Desirable   100-129   Near to/above Desirable   130-159   Borderline      >159   Undesirable     Direct (measured) LDL and calculated LDL are not interchangeable tests.  Chol/HDL Ratio 05/21/2020 4.3  <5 Final            Triglycerides 05/21/2020 185* <150 mg/dL Final    Comment:    Triglyceride Guidelines:     <150   Desirable   150-199  Borderline   200-499  High     >499   Very high   Based on AHA Guidelines for fasting triglyceride, October 2012.          VLDL 05/21/2020 NOT REPORTED* 1 - 30 mg/dL Final    TSH

## 2020-05-28 NOTE — PROGRESS NOTES
2020     Tong Sharma (:  1950) is a 72 y.o. female, here for evaluation of the following medical concerns:    HPI  Patient comes in today for follow up of chronic health issues Patient does seem to be doing relatively well overall. Had a couple of acute issues. She was working in her basement after had gotten down from all the rain and does feel that she has a flare of her underlying asthma. She does typically react to mold and she states over the last couple of days she has had some increased tightness and wheezing. Has used her albuterol inhaler which mildly helps. Is doing Xyzal as well. Has not had any cough or chest congestion. No fever or chills associated with her symptoms. Also she notes that she notices at night when she sitting down to rest the bottoms of her feet get a little bit sore and are burning. Typically after she sits for a while it gets better but she is concerned about possible neuropathy. Does have a known history of diabetes mellitus type 2 and states that she notices that the pain in her feet is worse when she has eaten some she knows she should not. She has higher sugar foods she notices the irritation little bit more. I did advise her this sounds like underlying neuropathy and we could consider medical therapy if it becomes problematic but at this point she knows she does need to really work on dietary measures to keep this from getting flared. She has a known history of hypertension her blood pressure is elevated today but she checks it at home and it typically runs normal.  She states she really has not been around her house much that is coming in here today may have caused her to have her blood pressure elevated she is not used to being around other people. She does have a known history of hyperlipidemia and her cholesterol levels do show a high triglyceride with a low HDL level. Her total cholesterol and LDL are within normal limits.   She has declined time.   Psychiatric:         Behavior: Behavior normal.         Thought Content: Thought content normal.         Judgment: Judgment normal.         ASSESSMENT/PLAN:  Encounter Diagnoses   Name Primary?  Essential hypertension Yes    Mixed hyperlipidemia     Controlled type 2 diabetes mellitus with diabetic polyneuropathy, without long-term current use of insulin ([facility])     Gastroesophageal reflux disease without esophagitis     Mild intermittent asthma with acute exacerbation     Polyneuropathy associated with underlying disease ([facility]     Need for Tdap vaccination      Orders Placed This Encounter   Medications    omeprazole (PRILOSEC) 20 MG delayed release capsule     Sig: Take 1 capsule by mouth 2 times daily     Dispense:  180 capsule     Refill:  1    meloxicam (MOBIC) 7.5 MG tablet     Sig: TAKE 1 TABLET ONCE A DAY     Dispense:  90 tablet     Refill:  1    levocetirizine (XYZAL ALLERGY 24HR) 5 MG tablet     Sig: Take 1 tablet by mouth daily     Dispense:  90 tablet     Refill:  1    Tdap (ADACEL) 5-2-15.5 LF-MCG/0.5 injection     Sig: Inject 0.5 mLs into the muscle once for 1 dose     Dispense:  1 each     Refill:  0    predniSONE (DELTASONE) 20 MG tablet     Si bid for 5 days, 1 qd for 5 days     Dispense:  15 tablet     Refill:  0     Orders Placed This Encounter   Procedures    CBC Auto Differential     Standing Status:   Future     Standing Expiration Date:   2021    Comprehensive Metabolic Panel     Standing Status:   Future     Standing Expiration Date:   2021    Hemoglobin A1C     Standing Status:   Future     Standing Expiration Date:   2021    Lipid Panel     Standing Status:   Future     Standing Expiration Date:   2021     Order Specific Question:   Is Patient Fasting?/# of Hours     Answer:   12 hours    HM DIABETES FOOT EXAM     Patient is given prednisone to help with the acute asthma exacerbation. Likely is related to mold exposure.     Patient is to

## 2020-06-02 ENCOUNTER — PATIENT MESSAGE (OUTPATIENT)
Dept: FAMILY MEDICINE CLINIC | Age: 70
End: 2020-06-02

## 2020-06-02 RX ORDER — AZITHROMYCIN 250 MG/1
TABLET, FILM COATED ORAL
Qty: 1 PACKET | Refills: 1 | Status: SHIPPED | OUTPATIENT
Start: 2020-06-02 | End: 2020-11-30

## 2020-06-02 NOTE — TELEPHONE ENCOUNTER
From: Angel Roa  To: Florence Mcwilliams DO  Sent: 6/2/2020 11:50 AM EDT  Subject: Visit Julienne Munoz Dr, I am taking the steriods as prescribed. I thought they would be enough to get rid of the allergy issues I am having, but I am now having sinus problems , scratchy throat burning nose and sinus pain in my forehead and a drippy nose. I have no fever or body aches. I am wondering if a z-pack would take care of the issue. I have been gargling with salt water and using the rachana pot but am losing the rousseau.     Angel Roa

## 2020-06-15 ENCOUNTER — PATIENT MESSAGE (OUTPATIENT)
Dept: FAMILY MEDICINE CLINIC | Age: 70
End: 2020-06-15

## 2020-06-15 NOTE — TELEPHONE ENCOUNTER
From: Benjy Peck  To: Lauretta Holter, DO  Sent: 6/15/2020 11:32 AM EDT  Subject: Visit Follow-Up Question    Hi, I am still struggling with a burning mouth and throat and my sinus is also burning and hurting. I get chills once in awhile but no fever. One day I think its getting better and the next day it is worse. I don't know if the two are related or separate issues. Last summer I got the burning mouth after working in my garden. It got better after the steroids and antibiotic. Not sure what is causing this,if it is from allergies but it isn't going away. I can come in if you would like me to.     Benjy Peck

## 2020-07-17 ENCOUNTER — OFFICE VISIT (OUTPATIENT)
Dept: PRIMARY CARE CLINIC | Age: 70
End: 2020-07-17
Payer: MEDICARE

## 2020-07-17 VITALS
SYSTOLIC BLOOD PRESSURE: 146 MMHG | DIASTOLIC BLOOD PRESSURE: 90 MMHG | HEART RATE: 72 BPM | HEIGHT: 67 IN | OXYGEN SATURATION: 93 % | WEIGHT: 225 LBS | TEMPERATURE: 97.7 F | BODY MASS INDEX: 35.31 KG/M2

## 2020-07-17 PROCEDURE — G8400 PT W/DXA NO RESULTS DOC: HCPCS | Performed by: NURSE PRACTITIONER

## 2020-07-17 PROCEDURE — 4040F PNEUMOC VAC/ADMIN/RCVD: CPT | Performed by: NURSE PRACTITIONER

## 2020-07-17 PROCEDURE — G8417 CALC BMI ABV UP PARAM F/U: HCPCS | Performed by: NURSE PRACTITIONER

## 2020-07-17 PROCEDURE — 99212 OFFICE O/P EST SF 10 MIN: CPT | Performed by: NURSE PRACTITIONER

## 2020-07-17 PROCEDURE — 1036F TOBACCO NON-USER: CPT | Performed by: NURSE PRACTITIONER

## 2020-07-17 PROCEDURE — G8427 DOCREV CUR MEDS BY ELIG CLIN: HCPCS | Performed by: NURSE PRACTITIONER

## 2020-07-17 PROCEDURE — 1090F PRES/ABSN URINE INCON ASSESS: CPT | Performed by: NURSE PRACTITIONER

## 2020-07-17 PROCEDURE — 1123F ACP DISCUSS/DSCN MKR DOCD: CPT | Performed by: NURSE PRACTITIONER

## 2020-07-17 PROCEDURE — 99213 OFFICE O/P EST LOW 20 MIN: CPT | Performed by: NURSE PRACTITIONER

## 2020-07-17 PROCEDURE — 3017F COLORECTAL CA SCREEN DOC REV: CPT | Performed by: NURSE PRACTITIONER

## 2020-07-17 RX ORDER — ONDANSETRON 4 MG/1
4 TABLET, ORALLY DISINTEGRATING ORAL ONCE
Status: COMPLETED | OUTPATIENT
Start: 2020-07-17 | End: 2020-07-17

## 2020-07-17 RX ORDER — MECLIZINE HYDROCHLORIDE 25 MG/1
25 TABLET ORAL 3 TIMES DAILY PRN
Qty: 30 TABLET | Refills: 0 | Status: SHIPPED | OUTPATIENT
Start: 2020-07-17 | End: 2020-07-27

## 2020-07-17 RX ADMIN — ONDANSETRON 4 MG: 4 TABLET, ORALLY DISINTEGRATING ORAL at 16:25

## 2020-07-17 ASSESSMENT — ENCOUNTER SYMPTOMS
DIARRHEA: 0
ABDOMINAL PAIN: 0
NAUSEA: 1
RESPIRATORY NEGATIVE: 1
VOMITING: 1
SHORTNESS OF BREATH: 0
COUGH: 0
SORE THROAT: 0

## 2020-07-17 NOTE — PROGRESS NOTES
[facility]             [address]                        Telephone [phone]             Fax [phone]     Sabine Whitman  1950  MYE:[id number]   Date of visit:  7/17/2020    Subjective: Sabine Whitman is a 72 y.o.  female who presents to [facility] today (7/17/2020) for evaluation of:    Chief Complaint   Patient presents with    Dizziness     and vomiting. History of virtigo. Onset: today       Dizziness   This is a new problem. The current episode started today (while in the shower she tilted head back and became dizziness). The problem occurs constantly. The problem has been waxing and waning. Associated symptoms include nausea and vomiting. Pertinent negatives include no abdominal pain, chest pain, chills, congestion, coughing, fatigue, fever, headaches, myalgias, rash or sore throat. Nothing aggravates the symptoms. Treatments tried: dramamine. The treatment provided no relief.        She has the following problem list:  Patient Active Problem List   Diagnosis    Hyperlipidemia    HTN (hypertension)    Osteoarthritis    Allergic rhinitis    GERD (gastroesophageal reflux disease)    Anxiety    Diabetes mellitus type II, controlled ([facility] [address]    Endometrial polyp    Postmenopausal bleeding        Current medications are:  Current Outpatient Medications   Medication Sig Dispense Refill    meclizine (ANTIVERT) 25 MG tablet Take 1 tablet by mouth 3 times daily as needed for Dizziness 30 tablet 0    omeprazole (PRILOSEC) 20 MG delayed release capsule Take 1 capsule by mouth 2 times daily 180 capsule 1    meloxicam (MOBIC) 7.5 MG tablet TAKE 1 TABLET ONCE A DAY 90 tablet 1    levocetirizine (XYZAL ALLERGY 24HR) 5 MG tablet Take 1 tablet by mouth daily 90 tablet 1    atenolol (TENORMIN) 25 MG tablet Take 1 tablet by mouth daily 90 tablet 1    pseudoephedrine (SUDAFED) 30 MG tablet Take 30 mg by mouth every 4 hours as needed.  therapeutic multivitamin-minerals (THERAGRAN-M) tablet Take 1 tablet by mouth daily.  Ascorbic Acid (VITAMIN C) 500 MG tablet Take 500 mg by mouth daily.  azithromycin (ZITHROMAX Z-LUIS ALBERTO) 250 MG tablet Take as directed (Patient not taking: Reported on 7/17/2020) 1 packet 1    predniSONE (DELTASONE) 20 MG tablet 1 bid for 5 days, 1 qd for 5 days 15 tablet 0    albuterol sulfate HFA (VENTOLIN HFA) 108 (90 Base) MCG/ACT inhaler Inhale 2 puffs into the lungs every 4 hours as needed for Wheezing (Patient not taking: Reported on 7/17/2020) 1 Inhaler 3    blood glucose test strips (ONE TOUCH TEST STRIPS) strip Tests 1 X daily. DX E11.9 100 each 3    ONE TOUCH LANCETS MISC Tests 1 x daily DX. E11.9 100 each 3     No current facility-administered medications for this visit. She is allergic to aspirin; ibuprofen; sulfamethoxazole-trimethoprim; and penicillins. .    She  reports that she is a non-smoker but has been exposed to tobacco smoke. She has never used smokeless tobacco.      Objective:    Vitals:    07/17/20 1559 07/17/20 1602   BP: (!) 144/88 (!) 146/90   Site: Left Upper Arm Left Upper Arm   Position: Sitting    Pulse: 72    Temp: 97.7 °F (36.5 °C)    TempSrc: Tympanic    SpO2: 93%    Weight: 225 lb (102.1 kg)    Height: 5' 7\" (1.702 m)      Body mass index is 35.24 kg/m². Review of Systems   Constitutional: Negative. Negative for chills, fatigue and fever. HENT: Negative. Negative for congestion and sore throat. Respiratory: Negative. Negative for cough and shortness of breath. Cardiovascular: Positive for palpitations. Negative for chest pain. Gastrointestinal: Positive for nausea and vomiting. Negative for abdominal pain and diarrhea. Musculoskeletal: Negative for myalgias. Skin: Negative for rash. Neurological: Positive for dizziness. Negative for headaches.    Psychiatric/Behavioral: The patient is nervous/anxious. Physical Exam  Vitals signs and nursing note reviewed. Constitutional:       Appearance: She is well-developed. HENT:      Head: Normocephalic. Jaw: There is normal jaw occlusion. Right Ear: Tympanic membrane, ear canal and external ear normal.      Left Ear: Tympanic membrane, ear canal and external ear normal.      Nose: Nose normal.      Mouth/Throat:      Lips: Pink. Mouth: Mucous membranes are moist.      Pharynx: Oropharynx is clear. Uvula midline. Eyes:      Pupils: Pupils are equal, round, and reactive to light. Neck:      Musculoskeletal: Normal range of motion and neck supple. Cardiovascular:      Rate and Rhythm: Normal rate and regular rhythm. Heart sounds: Normal heart sounds. Pulmonary:      Effort: Pulmonary effort is normal.      Breath sounds: Normal breath sounds. Lymphadenopathy:      Cervical: No cervical adenopathy. Skin:     General: Skin is warm and dry. Neurological:      Mental Status: She is alert and oriented to person, place, and time. Comments: Positive left Hermilo Hallpike maneuver with nystagmus   Psychiatric:         Behavior: Behavior normal.         Thought Content: Thought content normal.       Assessment and Plan:    No results found for this visit on 07/17/20. Diagnosis Orders   1. Dizziness  meclizine (ANTIVERT) 25 MG tablet   2. History of vertigo     3. Nausea  ondansetron (ZOFRAN-ODT) disintegrating tablet 4 mg     Physical therapy is not available until 07/21/20. I suggested for patient to go to ER for treatment, patient would like to go home. Take meclizine as directed. Change position slowly. Go to ER if symptoms worsen. The use, risks, benefits, and side effects of prescribed or recommended medications were discussed. All questions were answered and the patient/caregiver voiced understanding. No orders of the defined types were placed in this encounter.         Electronically signed by Dorene Cavazos Aniceto Jimenez - CNP on 7/17/20 at 4:13 PM EDT

## 2020-07-17 NOTE — PATIENT INSTRUCTIONS
Patient Education        Dizziness: Care Instructions  Your Care Instructions  Dizziness is the feeling of unsteadiness or fuzziness in your head. It is different than having vertigo, which is a feeling that the room is spinning or that you are moving or falling. It is also different from lightheadedness, which is the feeling that you are about to faint. It can be hard to know what causes dizziness. Some people feel dizzy when they have migraine headaches. Sometimes bouts of flu can make you feel dizzy. Some medical conditions, such as heart problems or high blood pressure, can make you feel dizzy. Many medicines can cause dizziness, including medicines for high blood pressure, pain, or anxiety. If a medicine causes your symptoms, your doctor may recommend that you stop or change the medicine. If it is a problem with your heart, you may need medicine to help your heart work better. If there is no clear reason for your symptoms, your doctor may suggest watching and waiting for a while to see if the dizziness goes away on its own. Follow-up care is a key part of your treatment and safety. Be sure to make and go to all appointments, and call your doctor if you are having problems. It's also a good idea to know your test results and keep a list of the medicines you take. How can you care for yourself at home? · If your doctor recommends or prescribes medicine, take it exactly as directed. Call your doctor if you think you are having a problem with your medicine. · Do not drive while you feel dizzy. · Try to prevent falls. Steps you can take include:  ? Using nonskid mats, adding grab bars near the tub, and using night-lights. ? Clearing your home so that walkways are free of anything you might trip on.  ? Letting family and friends know that you have been feeling dizzy. This will help them know how to help you. When should you call for help? [id number] anytime you think you may need emergency care.  For example, call if:  · You passed out (lost consciousness). · You have dizziness along with symptoms of a heart attack. These may include:  ? Chest pain or pressure, or a strange feeling in the chest.  ? Sweating. ? Shortness of breath. ? Nausea or vomiting. ? Pain, pressure, or a strange feeling in the back, neck, jaw, or upper belly or in one or both shoulders or arms. ? Lightheadedness or sudden weakness. ? A fast or irregular heartbeat. · You have symptoms of a stroke. These may include:  ? Sudden numbness, tingling, weakness, or loss of movement in your face, arm, or leg, especially on only one side of your body. ? Sudden vision changes. ? Sudden trouble speaking. ? Sudden confusion or trouble understanding simple statements. ? Sudden problems with walking or balance. ? A sudden, severe headache that is different from past headaches. Call your doctor now or seek immediate medical care if:  · You feel dizzy and have a fever, headache, or ringing in your ears. · You have new or increased nausea and vomiting. · Your dizziness does not go away or comes back. Watch closely for changes in your health, and be sure to contact your doctor if:  · You do not get better as expected. Where can you learn more? Go to https://[email or web address].Beats [email or web address] org and sign in to your Gamersband account. Enter P833 in the Futuris.tk box to learn more about \"Dizziness: Care Instructions. \"     If you do not have an account, please click on the \"Sign Up Now\" link. Current as of: June 26, 2019               Content Version: 12.5  © 1104-3771 Healthwise, Incorporated. Care instructions adapted under license by [facility] [email or web address] [facility] ([facility]). If you have questions about a medical condition or this instruction, always ask your healthcare professional. [place] [address] any warranty or liability for your use of this information.          Patient Education        Vertigo: Care Instructions  Your Care Instructions Vertigo is the feeling that you or your surroundings are moving when there is no actual movement. It is often described as a feeling of spinning, whirling, falling, or tilting. Vertigo may make you vomit or feel nauseated. You may have trouble standing or walking and may lose your balance. Vertigo is often related to an inner ear problem, but it can have other more serious causes. If vertigo continues, you may need more tests to find its cause. Follow-up care is a key part of your treatment and safety. Be sure to make and go to all appointments, and call your doctor if you are having problems. It's also a good idea to know your test results and keep a list of the medicines you take. How can you care for yourself at home? · Do not lie flat on your back. Prop yourself up slightly. This may reduce the spinning feeling. Keep your eyes open. · Move slowly so that you do not fall. · If your doctor recommends medicine, take it exactly as directed. · Do not drive while you are having vertigo. Certain exercises, called Felton-Daroff exercises, can help decrease vertigo. To do Felton-Daroff exercises:  · Sit on the edge of a bed or sofa and quickly lie down on the side that causes the worst vertigo. Lie on your side with your ear down. · Stay in this position for at least 30 seconds or until the vertigo goes away. · Sit up. If this causes vertigo, wait for it to stop. · Repeat the procedure on the other side. · Repeat this 10 times. Do these exercises 2 times a day until the vertigo is gone. When should you call for help? [id number] anytime you think you may need emergency care. For example, call if:  · You passed out (lost consciousness). · You have symptoms of a stroke. These may include:  ? Sudden numbness, tingling, weakness, or loss of movement in your face, arm, or leg, especially on only one side of your body. ? Sudden vision changes. ? Sudden trouble speaking.   ? Sudden confusion or trouble understanding simple statements. ? Sudden problems with walking or balance. ? A sudden, severe headache that is different from past headaches. Call your doctor now or seek immediate medical care if:  · Vertigo occurs with a fever, a headache, or ringing in your ears. · You have new or increased nausea and vomiting. Watch closely for changes in your health, and be sure to contact your doctor if:  · Vertigo gets worse or happens more often. · Vertigo has not gotten better after 2 weeks. Where can you learn more? Go to https://[employer] - Amniofilmeb.Xoopit. org and sign in to your Exterity account. Enter D995 in the Mobile Digital Media box to learn more about \"Vertigo: Care Instructions. \"     If you do not have an account, please click on the \"Sign Up Now\" link. Current as of: July 29, 2019               Content Version: 12.5  © 6295-6302 Healthwise, Incorporated. Care instructions adapted under license by [facility] ([facility]). If you have questions about a medical condition or this instruction, always ask your healthcare professional. [address] any warranty or liability for your use of this information.

## 2020-09-23 ENCOUNTER — PATIENT MESSAGE (OUTPATIENT)
Dept: FAMILY MEDICINE CLINIC | Age: 70
End: 2020-09-23

## 2020-09-23 NOTE — TELEPHONE ENCOUNTER
From: Harika Montana  To: Maryanne Michelle DO  Sent: 9/23/2020 1:44 PM EDT  Subject: Non-Urgent Medical Question    Would like your opinion on covid -19. My grandson and his two roommates were in our house for about fifteen minutes on Saturday visiting us. My grandson had a test for work on Saturday morning. On Sunday night and Monday all three started getting symptoms of covid-19. Tuesday morning my grandson received his results from his test and it was positive. [facility] is advising us to quarantine for 14 days but not to get tested unless we show symptoms. I am having a lot of symptoms that are probably allergies or anxiety. No fever or loss of taste which my grandson say they are experiencing. So all three were probably positive when they were in our home. Do you think we should get tested or wait to see if we get symptoms?

## 2020-09-26 ENCOUNTER — OFFICE VISIT (OUTPATIENT)
Dept: PRIMARY CARE CLINIC | Age: 70
End: 2020-09-26
Payer: MEDICARE

## 2020-09-26 ENCOUNTER — HOSPITAL ENCOUNTER (OUTPATIENT)
Age: 70
Setting detail: SPECIMEN
Discharge: HOME | End: 2020-09-26
Payer: MEDICARE

## 2020-09-26 VITALS
OXYGEN SATURATION: 96 % | HEART RATE: 99 BPM | DIASTOLIC BLOOD PRESSURE: 82 MMHG | HEIGHT: 67 IN | RESPIRATION RATE: 18 BRPM | BODY MASS INDEX: 35 KG/M2 | WEIGHT: 223 LBS | TEMPERATURE: 98.5 F | SYSTOLIC BLOOD PRESSURE: 152 MMHG

## 2020-09-26 PROCEDURE — 1090F PRES/ABSN URINE INCON ASSESS: CPT | Performed by: PHYSICIAN ASSISTANT

## 2020-09-26 PROCEDURE — 99212 OFFICE O/P EST SF 10 MIN: CPT | Performed by: PHYSICIAN ASSISTANT

## 2020-09-26 PROCEDURE — U0003 INFECTIOUS AGENT DETECTION BY NUCLEIC ACID (DNA OR RNA); SEVERE ACUTE RESPIRATORY SYNDROME CORONAVIRUS 2 (SARS-COV-2) (CORONAVIRUS DISEASE [COVID-19]), AMPLIFIED PROBE TECHNIQUE, MAKING USE OF HIGH THROUGHPUT TECHNOLOGIES AS DESCRIBED BY CMS-2020-01-R: HCPCS

## 2020-09-26 PROCEDURE — G8400 PT W/DXA NO RESULTS DOC: HCPCS | Performed by: PHYSICIAN ASSISTANT

## 2020-09-26 PROCEDURE — 1123F ACP DISCUSS/DSCN MKR DOCD: CPT | Performed by: PHYSICIAN ASSISTANT

## 2020-09-26 PROCEDURE — 3017F COLORECTAL CA SCREEN DOC REV: CPT | Performed by: PHYSICIAN ASSISTANT

## 2020-09-26 PROCEDURE — G8427 DOCREV CUR MEDS BY ELIG CLIN: HCPCS | Performed by: PHYSICIAN ASSISTANT

## 2020-09-26 PROCEDURE — 1036F TOBACCO NON-USER: CPT | Performed by: PHYSICIAN ASSISTANT

## 2020-09-26 PROCEDURE — G8417 CALC BMI ABV UP PARAM F/U: HCPCS | Performed by: PHYSICIAN ASSISTANT

## 2020-09-26 PROCEDURE — 4040F PNEUMOC VAC/ADMIN/RCVD: CPT | Performed by: PHYSICIAN ASSISTANT

## 2020-09-26 PROCEDURE — 99213 OFFICE O/P EST LOW 20 MIN: CPT | Performed by: PHYSICIAN ASSISTANT

## 2020-09-26 RX ORDER — PREDNISONE 10 MG/1
10 TABLET ORAL 2 TIMES DAILY
Qty: 10 TABLET | Refills: 0 | Status: SHIPPED | OUTPATIENT
Start: 2020-09-26 | End: 2020-10-01

## 2020-09-26 RX ORDER — AZITHROMYCIN 250 MG/1
250 TABLET, FILM COATED ORAL SEE ADMIN INSTRUCTIONS
Qty: 6 TABLET | Refills: 0 | Status: SHIPPED | OUTPATIENT
Start: 2020-09-26 | End: 2020-10-01

## 2020-09-26 ASSESSMENT — ENCOUNTER SYMPTOMS
VOMITING: 0
SORE THROAT: 1
SINUS PAIN: 1
RHINORRHEA: 1
DIARRHEA: 0
TROUBLE SWALLOWING: 0
NAUSEA: 1
SHORTNESS OF BREATH: 0
COUGH: 1
WHEEZING: 0
CHEST TIGHTNESS: 1

## 2020-09-26 ASSESSMENT — PATIENT HEALTH QUESTIONNAIRE - PHQ9
1. LITTLE INTEREST OR PLEASURE IN DOING THINGS: 0
2. FEELING DOWN, DEPRESSED OR HOPELESS: 0
SUM OF ALL RESPONSES TO PHQ9 QUESTIONS 1 & 2: 0
SUM OF ALL RESPONSES TO PHQ QUESTIONS 1-9: 0
SUM OF ALL RESPONSES TO PHQ QUESTIONS 1-9: 0

## 2020-09-26 NOTE — PROGRESS NOTES
[facility]    Subjective:      Patient ID: Umberto Goodwin is a 72 y.o. y.o. female. Patient is seen due to exposure to covid 19 one week ago. Her son and grandson stopped by house briefly and then they notified her they tested positive for covid several days ago. Her sx started 9/22/2020 started with scratchy throat. Using rahel pot. Has runny nose congestion. Slight cough. Productive cough mucous is clear at this point. Taste and smell are intact. Past Medical History:   Diagnosis Date    Allergic rhinitis     Anxiety     due to situational stress    Bursitis     Bursitis of hip, right     recurrent    Carpal tunnel syndrome, bilateral     GERD (gastroesophageal reflux disease)     Hyperlipidemia     Hypertension     Impaired fasting glucose     Labyrinthitis     causing continuted vertigo    Melanoma in situ ([facility] [address] 10/2017     to bridge of nose; Dr Erasmo Solo in [address] in situ of nose ([facility] [address] 10/05/2017    biopsy confirmed on 10/05/2017 [facility]     Osteoarthritis     Palpitations        Past Surgical History:   Procedure Laterality Date    BREAST LUMPECTOMY Right 2003    BREAST SURGERY Right     lumpectomy    CATARACT REMOVAL WITH IMPLANT Right 07/29/2017    Dr Flavio Murray WITH IMPLANT Left 08/05/2017    Dr Omega Lora    COLONOSCOPY  12/2008    normal, repeat in 5 years   [facility] [place]  [facility]    COLONOSCOPY N/A 2/14/2019    few diverticuli, Dr. Mahajan Goods N/A 8/7/2019    D & C Hysteroscopy & polypectomy performed by Cristian Briseno MD at [address] [place] [address] Bilateral     [address] Right 2003    HYSTEROSCOPY  08/07/2019    hysteroscopy with polypectomy and D and C    SOFT TISSUE BIOPSY Left 12/21/2017    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 Sheliah Carrel  07/03/1983    TUBAL LIGATION  1983    UPPER GASTROINTESTINAL ENDOSCOPY N/A 2/14/2019    mild gastritis, mild esophagitis, Dr. Fe Burger       Family History   Problem Relation Age of Onset    Prostate Cancer Father     Breast Cancer Sister     Cancer Sister         breast    Cancer Sister         colorectal    Other Mother         ulcerative colitis    Other Brother 79        coronary artery disease    Other Son         ulcerative colitis       Allergies   Allergen Reactions    Aspirin Other (See Comments)     Abdominal pain    Ibuprofen Other (See Comments)     Abdominal pain    Sulfamethoxazole-Trimethoprim Nausea Only and Other (See Comments)    Penicillins Rash       Current Outpatient Medications   Medication Sig Dispense Refill    predniSONE (DELTASONE) 10 MG tablet Take 1 tablet by mouth 2 times daily for 5 days 10 tablet 0    azithromycin (ZITHROMAX) 250 MG tablet Take 1 tablet by mouth See Admin Instructions for 5 days 500mg on day 1 followed by 250mg on days 2 - 5 6 tablet 0    omeprazole (PRILOSEC) 20 MG delayed release capsule Take 1 capsule by mouth 2 times daily 180 capsule 1    meloxicam (MOBIC) 7.5 MG tablet TAKE 1 TABLET ONCE A DAY 90 tablet 1    levocetirizine (XYZAL ALLERGY 24HR) 5 MG tablet Take 1 tablet by mouth daily 90 tablet 1    atenolol (TENORMIN) 25 MG tablet Take 1 tablet by mouth daily 90 tablet 1    blood glucose test strips (ONE TOUCH TEST STRIPS) strip Tests 1 X daily. DX E11.9 100 each 3    ONE TOUCH LANCETS MISC Tests 1 x daily DX. E11.9 100 each 3    pseudoephedrine (SUDAFED) 30 MG tablet Take 30 mg by mouth every 4 hours as needed.  therapeutic multivitamin-minerals (THERAGRAN-M) tablet Take 1 tablet by mouth daily.       azithromycin (ZITHROMAX Z-LUIS ALBERTO) 250 MG tablet Take as directed (Patient not taking: Reported on 7/17/2020) 1 packet 1    predniSONE (DELTASONE) 20 MG tablet 1 bid for 5 days, 1 qd for 5 days (Patient not taking: Reported on 9/26/2020) 15 tablet 0    albuterol sulfate HFA (VENTOLIN HFA) 108 (90 Base) MCG/ACT inhaler Inhale 2 puffs into the lungs every 4 hours as needed for Wheezing (Patient not taking: Reported on 7/17/2020) 1 Inhaler 3    Ascorbic Acid (VITAMIN C) 500 MG tablet Take 500 mg by mouth daily. No current facility-administered medications for this visit. Review of Systems   Constitutional: Positive for appetite change. Negative for chills, diaphoresis, fatigue and fever. HENT: Positive for congestion, postnasal drip, rhinorrhea, sinus pain and sore throat. Negative for sneezing and trouble swallowing. 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 Does get sinus infections frequently. This feels like nl sinus. Respiratory: Positive for cough and chest tightness. Negative for shortness of breath and wheezing. On and off noting chest pain and tightness wonders if just anxiety. Cardiovascular: Positive for chest pain. Negative for palpitations. Gastrointestinal: Positive for nausea. Negative for diarrhea and vomiting. Thinks her stomach sx are due to nerves. Genitourinary: Negative for difficulty urinating. Musculoskeletal: Negative for myalgias. Skin: Negative for rash. Neurological: Positive for headaches. Negative for dizziness, light-headedness and numbness. Psychiatric/Behavioral: Negative for sleep disturbance. The patient is nervous/anxious. Taking xyzal and 2 tylenol PM's prn. Objective:      BP (!) 152/82   Pulse 99   Temp 98.5 °F (36.9 °C) (Tympanic)   Resp 18   Ht 5' 7\" (1.702 m)   Wt 223 lb (101.2 kg)   SpO2 96%   BMI 34.93 kg/m²     Physical Exam  Vitals signs and nursing note reviewed. Constitutional:       General: She is not in acute distress. Appearance: Normal appearance. She is well-developed. She is not ill-appearing. HENT:      Head: Normocephalic and atraumatic.       Right Ear: Ear canal and external ear normal.      Left Ear: Ear canal and external ear normal.      Ears:      Comments: TM's are dull. She has maxillary pain bilaterally with palpation. Nose: Congestion present. No rhinorrhea. Mouth/Throat:      Mouth: Mucous membranes are moist.      Pharynx: Posterior oropharyngeal erythema present. No oropharyngeal exudate. Comments: Postnasal drip cobblestoning noted. Eyes:      General: No scleral icterus. Conjunctiva/sclera: Conjunctivae normal.   Neck:      Musculoskeletal: Normal range of motion and neck supple. No neck rigidity or muscular tenderness. Cardiovascular:      Rate and Rhythm: Normal rate and regular rhythm. Heart sounds: Normal heart sounds. No murmur. Pulmonary:      Effort: Pulmonary effort is normal. No respiratory distress. Breath sounds: Normal breath sounds. No stridor. No wheezing, rhonchi or rales. Abdominal:      General: Bowel sounds are normal. There is no distension. Palpations: Abdomen is soft. There is no mass. Tenderness: There is no abdominal tenderness. There is no guarding or rebound. Hernia: No hernia is present. Musculoskeletal:         General: No swelling, tenderness, deformity or signs of injury. Right lower leg: No edema. Left lower leg: No edema. Lymphadenopathy:      Cervical: No cervical adenopathy. Skin:     General: Skin is warm and dry. Findings: No erythema or rash. Neurological:      General: No focal deficit present. Mental Status: She is alert and oriented to person, place, and time. Sensory: No sensory deficit. Motor: No weakness. Gait: Gait normal.   Psychiatric:         Mood and Affect: Mood normal.         Behavior: Behavior normal.         Thought Content: Thought content normal.         Judgment: Judgment normal.      Comments: Is anxious. Assessment & Plan:     1. Chronic maxillary sinusitis    - predniSONE (DELTASONE) 10 MG tablet;  Take 1 tablet by mouth 2 times daily for 5 days Dispense: 10 tablet; Refill: 0  - azithromycin (ZITHROMAX) 250 MG tablet; Take 1 tablet by mouth See Admin Instructions for 5 days 500mg on day 1 followed by 250mg on days 2 - 5  Dispense: 6 tablet; Refill: 0    2. Chronic pansinusitis    - predniSONE (DELTASONE) 10 MG tablet; Take 1 tablet by mouth 2 times daily for 5 days  Dispense: 10 tablet; Refill: 0    3. Cough    - COVID-19 Ambulatory; Future    4. Acute rhinitis    - COVID-19 Ambulatory; Future    5. Exposure to COVID-19 virus    - COVID-19 Ambulatory; Future      Fluids tylenol rest  Answered her questions  Diet as tolerated  Return if sx change or worsens  Will be notified of covid results  She states zpak and prednisone really help with sinus sx and allergies and her MD nl prescribes for these sx  Recommended taking zyrtec and singulair at night this is ok.   Her son is worried about her taking the tylenol PM  The xyzal does not help her allergies as much as zyrtec and does not help her sleep  Tried to reassure patient    Abby Recinos  9/26/2020 12:04 PM EDT    (Pleasenote that portions of this note were completed with a voice recognition program.Efforts were made to edit the dictations but occasionally words are mis-transcribed.)

## 2020-09-28 RX ORDER — ATENOLOL 25 MG/1
TABLET ORAL
Qty: 90 TABLET | Refills: 1 | Status: SHIPPED | OUTPATIENT
Start: 2020-09-28 | End: 2021-04-22 | Stop reason: SDUPTHER

## 2020-09-28 NOTE — TELEPHONE ENCOUNTER
Grecia Rivera called requesting a refill of the below medication which has been pended for you:     Requested Prescriptions     Pending Prescriptions Disp Refills    atenolol (TENORMIN) 25 MG tablet [Pharmacy Med Name: ATENOLOL 25 MG TABLET] 90 tablet 1     Sig: take 1 tablet by mouth once daily       Last Appointment Date: 5/28/2020  Next Appointment Date: 11/30/2020    Allergies   Allergen Reactions    Aspirin Other (See Comments)     Abdominal pain    Ibuprofen Other (See Comments)     Abdominal pain    Sulfamethoxazole-Trimethoprim Nausea Only and Other (See Comments)    Penicillins Rash

## 2020-09-30 ENCOUNTER — TELEPHONE (OUTPATIENT)
Dept: PRIMARY CARE CLINIC | Age: 70
End: 2020-09-30

## 2020-09-30 LAB — SARS-COV-2, NAA: NOT DETECTED

## 2020-11-23 ASSESSMENT — LIFESTYLE VARIABLES
HOW OFTEN DURING THE LAST YEAR HAVE YOU BEEN UNABLE TO REMEMBER WHAT HAPPENED THE NIGHT BEFORE BECAUSE YOU HAD BEEN DRINKING: NEVER
HOW OFTEN DO YOU HAVE A DRINK CONTAINING ALCOHOL: 1
AUDIT-C TOTAL SCORE: 1
HOW MANY STANDARD DRINKS CONTAINING ALCOHOL DO YOU HAVE ON A TYPICAL DAY: ONE OR TWO
HAS A RELATIVE, FRIEND, DOCTOR, OR ANOTHER HEALTH PROFESSIONAL EXPRESSED CONCERN ABOUT YOUR DRINKING OR SUGGESTED YOU CUT DOWN: NO
AUDIT TOTAL SCORE: 0
HOW OFTEN DURING THE LAST YEAR HAVE YOU FOUND THAT YOU WERE NOT ABLE TO STOP DRINKING ONCE YOU HAD STARTED: NEVER
AUDIT TOTAL SCORE: 1
HOW MANY STANDARD DRINKS CONTAINING ALCOHOL DO YOU HAVE ON A TYPICAL DAY: 0
HOW OFTEN DURING THE LAST YEAR HAVE YOU FAILED TO DO WHAT WAS NORMALLY EXPECTED FROM YOU BECAUSE OF DRINKING: NEVER
HOW OFTEN DO YOU HAVE A DRINK CONTAINING ALCOHOL: MONTHLY OR LESS
HOW OFTEN DURING THE LAST YEAR HAVE YOU FAILED TO DO WHAT WAS NORMALLY EXPECTED FROM YOU BECAUSE OF DRINKING: 0
HOW OFTEN DURING THE LAST YEAR HAVE YOU NEEDED AN ALCOHOLIC DRINK FIRST THING IN THE MORNING TO GET YOURSELF GOING AFTER A NIGHT OF HEAVY DRINKING: 0
HOW OFTEN DO YOU HAVE SIX OR MORE DRINKS ON ONE OCCASION: 0
HOW OFTEN DURING THE LAST YEAR HAVE YOU NEEDED AN ALCOHOLIC DRINK FIRST THING IN THE MORNING TO GET YOURSELF GOING AFTER A NIGHT OF HEAVY DRINKING: NEVER
HAVE YOU OR SOMEONE ELSE BEEN INJURED AS A RESULT OF YOUR DRINKING: NO
HOW OFTEN DO YOU HAVE SIX OR MORE DRINKS ON ONE OCCASION: NEVER
HOW OFTEN DURING THE LAST YEAR HAVE YOU HAD A FEELING OF GUILT OR REMORSE AFTER DRINKING: NEVER
HOW OFTEN DURING THE LAST YEAR HAVE YOU HAD A FEELING OF GUILT OR REMORSE AFTER DRINKING: 0
HOW OFTEN DURING THE LAST YEAR HAVE YOU BEEN UNABLE TO REMEMBER WHAT HAPPENED THE NIGHT BEFORE BECAUSE YOU HAD BEEN DRINKING: 0
AUDIT-C TOTAL SCORE: 0
HAS A RELATIVE, FRIEND, DOCTOR, OR ANOTHER HEALTH PROFESSIONAL EXPRESSED CONCERN ABOUT YOUR DRINKING OR SUGGESTED YOU CUT DOWN: 0
HAVE YOU OR SOMEONE ELSE BEEN INJURED AS A RESULT OF YOUR DRINKING: 0
HOW OFTEN DURING THE LAST YEAR HAVE YOU FOUND THAT YOU WERE NOT ABLE TO STOP DRINKING ONCE YOU HAD STARTED: 0

## 2020-11-24 ENCOUNTER — HOSPITAL ENCOUNTER (OUTPATIENT)
Dept: LAB | Age: 70
Discharge: HOME | End: 2020-11-24
Payer: MEDICARE

## 2020-11-24 LAB
ABSOLUTE EOS #: 0.25 K/UL (ref 0–0.44)
ABSOLUTE IMMATURE GRANULOCYTE: <0.03 K/UL (ref 0–0.3)
ABSOLUTE LYMPH #: 2.59 K/UL (ref 1.1–3.7)
ABSOLUTE MONO #: 0.62 K/UL (ref 0.1–1.2)
ALBUMIN SERPL-MCNC: 4.2 G/DL (ref 3.5–5.2)
ALBUMIN/GLOBULIN RATIO: 1.4 (ref 1–2.5)
ALP BLD-CCNC: 92 U/L (ref 35–104)
ALT SERPL-CCNC: 17 U/L (ref 5–33)
ANION GAP SERPL CALCULATED.3IONS-SCNC: 9 MMOL/L (ref 9–17)
AST SERPL-CCNC: 21 U/L
BASOPHILS # BLD: 1 % (ref 0–2)
BASOPHILS ABSOLUTE: 0.06 K/UL (ref 0–0.2)
BILIRUB SERPL-MCNC: 0.28 MG/DL (ref 0.3–1.2)
BUN BLDV-MCNC: 13 MG/DL (ref 8–23)
BUN/CREAT BLD: 18 (ref 9–20)
CALCIUM SERPL-MCNC: 10.1 MG/DL (ref 8.6–10.4)
CHLORIDE BLD-SCNC: 102 MMOL/L (ref 98–107)
CHOLESTEROL/HDL RATIO: 4.8
CHOLESTEROL: 192 MG/DL
CO2: 30 MMOL/L (ref 20–31)
CREAT SERPL-MCNC: 0.72 MG/DL (ref 0.5–0.9)
DIFFERENTIAL TYPE: ABNORMAL
EOSINOPHILS RELATIVE PERCENT: 3 % (ref 1–4)
ESTIMATED AVERAGE GLUCOSE: 151 MG/DL
GFR AFRICAN AMERICAN: >60 ML/MIN
GFR NON-AFRICAN AMERICAN: >60 ML/MIN
GFR SERPL CREATININE-BSD FRML MDRD: ABNORMAL ML/MIN/{1.73_M2}
GFR SERPL CREATININE-BSD FRML MDRD: ABNORMAL ML/MIN/{1.73_M2}
GLUCOSE BLD-MCNC: 120 MG/DL (ref 70–99)
HBA1C MFR BLD: 6.9 % (ref 4.8–5.9)
HCT VFR BLD CALC: 43.9 % (ref 36.3–47.1)
HDLC SERPL-MCNC: 40 MG/DL
HEMOGLOBIN: 13.5 G/DL (ref 11.9–15.1)
IMMATURE GRANULOCYTES: 0 %
LDL CHOLESTEROL: 111 MG/DL (ref 0–130)
LYMPHOCYTES # BLD: 28 % (ref 24–43)
MCH RBC QN AUTO: 27.6 PG (ref 25.2–33.5)
MCHC RBC AUTO-ENTMCNC: 30.8 G/DL (ref 25.2–33.5)
MCV RBC AUTO: 89.6 FL (ref 82.6–102.9)
MONOCYTES # BLD: 7 % (ref 3–12)
NRBC AUTOMATED: 0 PER 100 WBC
PDW BLD-RTO: 14.6 % (ref 11.8–14.4)
PLATELET # BLD: 258 K/UL (ref 138–453)
PLATELET ESTIMATE: ABNORMAL
PMV BLD AUTO: 9.7 FL (ref 8.1–13.5)
POTASSIUM SERPL-SCNC: 4.1 MMOL/L (ref 3.7–5.3)
RBC # BLD: 4.9 M/UL (ref 3.95–5.11)
RBC # BLD: ABNORMAL 10*6/UL
SEG NEUTROPHILS: 61 % (ref 36–65)
SEGMENTED NEUTROPHILS ABSOLUTE COUNT: 5.69 K/UL (ref 1.5–8.1)
SODIUM BLD-SCNC: 141 MMOL/L (ref 135–144)
TOTAL PROTEIN: 7.3 G/DL (ref 6.4–8.3)
TRIGL SERPL-MCNC: 203 MG/DL
VLDLC SERPL CALC-MCNC: ABNORMAL MG/DL (ref 1–30)
WBC # BLD: 9.2 K/UL (ref 3.5–11.3)
WBC # BLD: ABNORMAL 10*3/UL

## 2020-11-24 PROCEDURE — 85025 COMPLETE CBC W/AUTO DIFF WBC: CPT

## 2020-11-24 PROCEDURE — 83036 HEMOGLOBIN GLYCOSYLATED A1C: CPT

## 2020-11-24 PROCEDURE — 80053 COMPREHEN METABOLIC PANEL: CPT

## 2020-11-24 PROCEDURE — 36415 COLL VENOUS BLD VENIPUNCTURE: CPT

## 2020-11-24 PROCEDURE — 80061 LIPID PANEL: CPT

## 2020-11-30 ENCOUNTER — OFFICE VISIT (OUTPATIENT)
Dept: FAMILY MEDICINE CLINIC | Age: 70
End: 2020-11-30
Payer: MEDICARE

## 2020-11-30 VITALS
BODY MASS INDEX: 35.31 KG/M2 | DIASTOLIC BLOOD PRESSURE: 84 MMHG | WEIGHT: 225 LBS | RESPIRATION RATE: 18 BRPM | OXYGEN SATURATION: 96 % | HEART RATE: 72 BPM | HEIGHT: 67 IN | SYSTOLIC BLOOD PRESSURE: 126 MMHG

## 2020-11-30 PROCEDURE — G8417 CALC BMI ABV UP PARAM F/U: HCPCS | Performed by: FAMILY MEDICINE

## 2020-11-30 PROCEDURE — 99214 OFFICE O/P EST MOD 30 MIN: CPT | Performed by: FAMILY MEDICINE

## 2020-11-30 PROCEDURE — 2022F DILAT RTA XM EVC RTNOPTHY: CPT | Performed by: FAMILY MEDICINE

## 2020-11-30 PROCEDURE — G8427 DOCREV CUR MEDS BY ELIG CLIN: HCPCS | Performed by: FAMILY MEDICINE

## 2020-11-30 PROCEDURE — 93005 ELECTROCARDIOGRAM TRACING: CPT | Performed by: FAMILY MEDICINE

## 2020-11-30 PROCEDURE — 3017F COLORECTAL CA SCREEN DOC REV: CPT | Performed by: FAMILY MEDICINE

## 2020-11-30 PROCEDURE — 1036F TOBACCO NON-USER: CPT | Performed by: FAMILY MEDICINE

## 2020-11-30 PROCEDURE — G8400 PT W/DXA NO RESULTS DOC: HCPCS | Performed by: FAMILY MEDICINE

## 2020-11-30 PROCEDURE — 1123F ACP DISCUSS/DSCN MKR DOCD: CPT | Performed by: FAMILY MEDICINE

## 2020-11-30 PROCEDURE — 93010 ELECTROCARDIOGRAM REPORT: CPT | Performed by: FAMILY MEDICINE

## 2020-11-30 PROCEDURE — 3044F HG A1C LEVEL LT 7.0%: CPT | Performed by: FAMILY MEDICINE

## 2020-11-30 PROCEDURE — G8484 FLU IMMUNIZE NO ADMIN: HCPCS | Performed by: FAMILY MEDICINE

## 2020-11-30 PROCEDURE — 1090F PRES/ABSN URINE INCON ASSESS: CPT | Performed by: FAMILY MEDICINE

## 2020-11-30 PROCEDURE — 4040F PNEUMOC VAC/ADMIN/RCVD: CPT | Performed by: FAMILY MEDICINE

## 2020-11-30 RX ORDER — MONTELUKAST SODIUM 10 MG/1
10 TABLET ORAL DAILY
Qty: 90 TABLET | Refills: 3 | Status: SHIPPED | OUTPATIENT
Start: 2020-11-30 | End: 2021-11-26 | Stop reason: SDUPTHER

## 2020-11-30 RX ORDER — NYSTATIN 100000 [USP'U]/G
POWDER TOPICAL
Qty: 60 G | Refills: 5 | Status: SHIPPED | OUTPATIENT
Start: 2020-11-30 | End: 2022-06-09

## 2020-11-30 RX ORDER — FLUCONAZOLE 150 MG/1
150 TABLET ORAL EVERY OTHER DAY
Qty: 3 TABLET | Refills: 0 | Status: SHIPPED | OUTPATIENT
Start: 2020-11-30 | End: 2020-12-06

## 2020-11-30 RX ORDER — CETIRIZINE HYDROCHLORIDE 10 MG/1
10 TABLET ORAL DAILY
Qty: 90 TABLET | Refills: 3 | Status: SHIPPED | OUTPATIENT
Start: 2020-11-30 | End: 2021-11-26 | Stop reason: SDUPTHER

## 2020-11-30 RX ORDER — CETIRIZINE HYDROCHLORIDE 10 MG/1
10 TABLET ORAL DAILY
COMMUNITY
End: 2020-11-30

## 2020-11-30 RX ORDER — MELOXICAM 7.5 MG/1
TABLET ORAL
Qty: 90 TABLET | Refills: 1 | Status: SHIPPED | OUTPATIENT
Start: 2020-11-30 | End: 2021-06-07 | Stop reason: SDUPTHER

## 2020-11-30 RX ORDER — MONTELUKAST SODIUM 10 MG/1
10 TABLET ORAL NIGHTLY
COMMUNITY
End: 2020-11-30

## 2020-11-30 ASSESSMENT — ENCOUNTER SYMPTOMS
WHEEZING: 0
RHINORRHEA: 0
TROUBLE SWALLOWING: 0
COUGH: 0
ABDOMINAL PAIN: 0
EYE DISCHARGE: 0
VOMITING: 0
SINUS PRESSURE: 0
CONSTIPATION: 0
NAUSEA: 0
SORE THROAT: 0
SHORTNESS OF BREATH: 0
EYE REDNESS: 0
DIARRHEA: 0

## 2020-11-30 NOTE — PATIENT INSTRUCTIONS
Hospital Outpatient Visit on 11/24/2020   Component Date Value Ref Range Status    Cholesterol 11/24/2020 192  <200 mg/dL Final    Comment:    Cholesterol Guidelines:      <200  Desirable   200-240  Borderline      >240  Undesirable         HDL 11/24/2020 40* >40 mg/dL Final    Comment:    HDL Guidelines:    <40     Undesirable   40-59    Borderline    >59     Desirable         LDL Cholesterol 11/24/2020 111  0 - 130 mg/dL Final    Comment:    LDL Guidelines:     <100    Desirable   100-129   Near to/above Desirable   130-159   Borderline      >159   Undesirable     Direct (measured) LDL and calculated LDL are not interchangeable tests.  Chol/HDL Ratio 11/24/2020 4.8  <5 Final            Triglycerides 11/24/2020 203* <150 mg/dL Final    Comment:    Triglyceride Guidelines:     <150   Desirable   150-199  Borderline   200-499  High     >499   Very high   Based on AHA Guidelines for fasting triglyceride, October 2012.          VLDL 11/24/2020 NOT REPORTED* 1 - 30 mg/dL Final    Hemoglobin A1C 11/24/2020 6.9* 4.8 - 5.9 % Final    Estimated Avg Glucose 11/24/2020 151  mg/dL Final    Glucose 11/24/2020 120* 70 - 99 mg/dL Final    BUN 11/24/2020 13  8 - 23 mg/dL Final    CREATININE 11/24/2020 0.72  0.50 - 0.90 mg/dL Final    Bun/Cre Ratio 11/24/2020 18  9 - 20 Final    Calcium 11/24/2020 10.1  8.6 - 10.4 mg/dL Final    Sodium 11/24/2020 141  135 - 144 mmol/L Final    Potassium 11/24/2020 4.1  3.7 - 5.3 mmol/L Final    Chloride 11/24/2020 102  98 - 107 mmol/L Final    CO2 11/24/2020 30  20 - 31 mmol/L Final    Anion Gap 11/24/2020 9  9 - 17 mmol/L Final    Alkaline Phosphatase 11/24/2020 92  35 - 104 U/L Final    ALT 11/24/2020 17  5 - 33 U/L Final    AST 11/24/2020 21  <32 U/L Final    Total Bilirubin 11/24/2020 0.28* 0.3 - 1.2 mg/dL Final    Total Protein 11/24/2020 7.3  6.4 - 8.3 g/dL Final    Alb 11/24/2020 4.2  3.5 - 5.2 g/dL Final    Albumin/Globulin Ratio 11/24/2020 1.4  1.0 - 2.5 Final  GFR Non- 11/24/2020 >60  >60 mL/min Final    GFR  11/24/2020 >60  >60 mL/min Final    GFR Comment 11/24/2020        Final    Comment: Average GFR for 61-76 years old:   80 mL/min/1.73sq m  Chronic Kidney Disease:   <60 mL/min/1.73sq m  Kidney failure:   <15 mL/min/1.73sq m              eGFR calculated using average adult body mass.  Additional eGFR calculator available at:        [email or web address]            GFR Staging 11/24/2020 NOT REPORTED   Final    WBC 11/24/2020 9.2  3.5 - 11.3 k/uL Final    RBC 11/24/2020 4.90  3.95 - 5.11 m/uL Final    Hemoglobin 11/24/2020 13.5  11.9 - 15.1 g/dL Final    Hematocrit 11/24/2020 43.9  36.3 - 47.1 % Final    MCV 11/24/2020 89.6  82.6 - 102.9 fL Final    MCH 11/24/2020 27.6  25.2 - 33.5 pg Final    MCHC 11/24/2020 30.8  25.2 - 33.5 g/dL Final    RDW 11/24/2020 14.6* 11.8 - 14.4 % Final    Platelets 15/78/2738 258  138 - 453 k/uL Final    MPV 11/24/2020 9.7  8.1 - 13.5 fL Final    NRBC Automated 11/24/2020 0.0  0.0 per 100 WBC Final    Differential Type 11/24/2020 NOT REPORTED   Final    Seg Neutrophils 11/24/2020 61  36 - 65 % Final    Lymphocytes 11/24/2020 28  24 - 43 % Final    Monocytes 11/24/2020 7  3 - 12 % Final    Eosinophils % 11/24/2020 3  1 - 4 % Final    Basophils 11/24/2020 1  0 - 2 % Final    Immature Granulocytes 11/24/2020 0  0 % Final    Segs Absolute 11/24/2020 5.69  1.50 - 8.10 k/uL Final    Absolute Lymph # 11/24/2020 2.59  1.10 - 3.70 k/uL Final    Absolute Mono # 11/24/2020 0.62  0.10 - 1.20 k/uL Final    Absolute Eos # 11/24/2020 0.25  0.00 - 0.44 k/uL Final    Basophils Absolute 11/24/2020 0.06  0.00 - 0.20 k/uL Final    Absolute Immature Granulocyte 11/24/2020 <0.03  0.00 - 0.30 k/uL Final    WBC Morphology 11/24/2020 NOT REPORTED   Final    RBC Morphology 11/24/2020 ANISOCYTOSIS PRESENT   Final    Platelet Estimate 89/34/0097 NOT REPORTED   Final

## 2020-11-30 NOTE — PROGRESS NOTES
2020     Lisbeth More (:  1950) is a 72 y.o. female, here for evaluation of the following medical concerns:    HPI  Patient comes in today for follow up of chronic health issues Patient does state that she has had continued intermittent issues with chest pain that seems to be more left-sided. She states she has had this for a long time but it seems to be more noticeable over the last 2 to 3 months duration. Had a stress test back in 2016 and at that time showed some mild anterior ischemia. She did see cardiology and at that time they had advised her that as she was able to ambulate and do her exercise without having chest pain they could just monitor. She has not had any further cardiac work-up since that time. I did talk to her about the concern that this could be underlying cardiac disease and she states she does not want to do further cardiac testing at this time due to the Covid pandemic. I did advise her that underlying heart disease that would go undiagnosed could cause a heart attack and put her at high risk for increased mortality and ultimately she agrees to do an EKG in the office today. Did advise her that if she has persistent chest pain she does need to go to the ER for evaluation. She has a known history of diabetes mellitus type 2 which is stable and controlled on her current medical regimen. Did discuss with her continued low-carb/low sugar diet and routine exercise to keep this optimally controlled. Has a known history of hypertension and her blood pressure is stable and controlled on her current medical therapy. Has a known history of hyperlipidemia and her cholesterol levels are little more elevated than previous check. She states she is not been eating as well so we did talk about lipid-lowering diet in order to get this under better control. Has a known history of GERD which is stable and controlled on her current medical therapy.   Patient does report to me that she has been having some numbness and swelling in her left hand and did see the orthopedist for this. Was to have an EMG but this did get rescheduled due to the fact that she was on quarantine for possible Covid exposure. This was rescheduled in January but she states she is going to hold off on doing this for now until the Covid pandemic improves. Functionally is able to use her hand but just has a lot of itching and numbness. Without further testing difficult to say where the impingement is coming from. She does express understanding of this. Patient does report that she has had off-and-on vaginal itching as well. Has not had a discharge. She does swim for exercise and wondered if this could be contributing to her symptoms. Also notes that she wears a small panty liner due to some urinary incontinence issues and she does not change it probably as frequently as she should. She notes that the itching developed after having a recent antibiotic for a sinus infection. With this in mind likely is underlying yeast and so I did talk about treatment for yeast.  Patient otherwise has no other acute medical concerns. .  Patient's recent lab reports are as follows:    Results for orders placed or performed during the hospital encounter of 11/24/20   Lipid Panel   Result Value Ref Range    Cholesterol 192 <200 mg/dL    HDL 40 (L) >40 mg/dL    LDL Cholesterol 111 0 - 130 mg/dL    Chol/HDL Ratio 4.8 <5    Triglycerides 203 (H) <150 mg/dL    VLDL NOT REPORTED (H) 1 - 30 mg/dL   Hemoglobin A1C   Result Value Ref Range    Hemoglobin A1C 6.9 (H) 4.8 - 5.9 %    Estimated Avg Glucose 151 mg/dL   Comprehensive Metabolic Panel   Result Value Ref Range    Glucose 120 (H) 70 - 99 mg/dL    BUN 13 8 - 23 mg/dL    CREATININE 0.72 0.50 - 0.90 mg/dL    Bun/Cre Ratio 18 9 - 20    Calcium 10.1 8.6 - 10.4 mg/dL    Sodium 141 135 - 144 mmol/L    Potassium 4.1 3.7 - 5.3 mmol/L    Chloride 102 98 - 107 mmol/L    CO2 30 20 - 31 mmol/L Anion Gap 9 9 - 17 mmol/L    Alkaline Phosphatase 92 35 - 104 U/L    ALT 17 5 - 33 U/L    AST 21 <32 U/L    Total Bilirubin 0.28 (L) 0.3 - 1.2 mg/dL    Total Protein 7.3 6.4 - 8.3 g/dL    Alb 4.2 3.5 - 5.2 g/dL    Albumin/Globulin Ratio 1.4 1.0 - 2.5    GFR Non-African American >60 >60 mL/min    GFR African American >60 >60 mL/min    GFR Comment          GFR Staging NOT REPORTED    CBC Auto Differential   Result Value Ref Range    WBC 9.2 3.5 - 11.3 k/uL    RBC 4.90 3.95 - 5.11 m/uL    Hemoglobin 13.5 11.9 - 15.1 g/dL    Hematocrit 43.9 36.3 - 47.1 %    MCV 89.6 82.6 - 102.9 fL    MCH 27.6 25.2 - 33.5 pg    MCHC 30.8 25.2 - 33.5 g/dL    RDW 14.6 (H) 11.8 - 14.4 %    Platelets 097 601 - 475 k/uL    MPV 9.7 8.1 - 13.5 fL    NRBC Automated 0.0 0.0 per 100 WBC    Differential Type NOT REPORTED     Seg Neutrophils 61 36 - 65 %    Lymphocytes 28 24 - 43 %    Monocytes 7 3 - 12 %    Eosinophils % 3 1 - 4 %    Basophils 1 0 - 2 %    Immature Granulocytes 0 0 %    Segs Absolute 5.69 1.50 - 8.10 k/uL    Absolute Lymph # 2.59 1.10 - 3.70 k/uL    Absolute Mono # 0.62 0.10 - 1.20 k/uL    Absolute Eos # 0.25 0.00 - 0.44 k/uL    Basophils Absolute 0.06 0.00 - 0.20 k/uL    Absolute Immature Granulocyte <0.03 0.00 - 0.30 k/uL    WBC Morphology NOT REPORTED     RBC Morphology ANISOCYTOSIS PRESENT     Platelet Estimate NOT REPORTED       Other review of systems are as noted below. Preventative measures are reviewed today. See health maintenance section for complete preventative plan of care. Did review patient's med list, allergies, social history, fam history, pmhx and pshx today as noted in the record. Review of Systems   Constitutional: Negative for chills, fatigue and fever. HENT: Negative for congestion, ear pain, postnasal drip, rhinorrhea, sinus pressure, sore throat and trouble swallowing. Eyes: Negative for discharge and redness. Respiratory: Negative for cough, shortness of breath and wheezing. (VITAMIN C) 500 MG tablet Take 500 mg by mouth daily. Historical Provider, MD        Social History     Tobacco Use    Smoking status: Passive Smoke Exposure - Never Smoker    Smokeless tobacco: Never Used    Tobacco comment: very remotely would smoke a few cigarettes on weekends/college days   Substance Use Topics    Alcohol use: Yes     Alcohol/week: 0.0 standard drinks     Comment: occasional use        There were no vitals filed for this visit. Estimated body mass index is 34.93 kg/m² as calculated from the following:    Height as of 9/26/20: 5' 7\" (1.702 m). Weight as of 9/26/20: 223 lb (101.2 kg). Physical Exam  Vitals signs and nursing note reviewed. Constitutional:       General: She is not in acute distress. Appearance: Normal appearance. She is well-developed. She is not diaphoretic. HENT:      Head: Normocephalic and atraumatic. Right Ear: Tympanic membrane, ear canal and external ear normal.      Left Ear: Tympanic membrane, ear canal and external ear normal.      Nose: Nose normal.      Mouth/Throat:      Mouth: Mucous membranes are moist.      Pharynx: Oropharynx is clear. No oropharyngeal exudate. Eyes:      General:         Right eye: No discharge. Left eye: No discharge. Conjunctiva/sclera: Conjunctivae normal.      Pupils: Pupils are equal, round, and reactive to light. Neck:      Musculoskeletal: Normal range of motion and neck supple. Thyroid: No thyromegaly. Cardiovascular:      Rate and Rhythm: Normal rate and regular rhythm. Heart sounds: Normal heart sounds. Pulmonary:      Effort: Pulmonary effort is normal.      Breath sounds: Normal breath sounds. No wheezing or rales. Abdominal:      General: Bowel sounds are normal. There is no distension. Palpations: Abdomen is soft. Tenderness: There is no abdominal tenderness. Musculoskeletal: Normal range of motion. General: No swelling or tenderness.    Lymphadenopathy: Cervical: No cervical adenopathy. Skin:     General: Skin is warm and dry. Findings: No rash. Neurological:      Mental Status: She is alert and oriented to person, place, and time. Psychiatric:         Behavior: Behavior normal.         Thought Content: Thought content normal.         Judgment: Judgment normal.           ASSESSMENT/PLAN:  Encounter Diagnoses   Name Primary?  Chest pain, unspecified type Yes    Controlled type 2 diabetes mellitus with diabetic polyneuropathy, without long-term current use of insulin (HCC)     Essential hypertension     Mixed hyperlipidemia     Vaginal itching     Gastroesophageal reflux disease without esophagitis     Encounter for screening mammogram for malignant neoplasm of breast     Need for Tdap vaccination        Orders Placed This Encounter   Medications    meloxicam (MOBIC) 7.5 MG tablet     Sig: TAKE 1 TABLET ONCE A DAY     Dispense:  90 tablet     Refill:  1    montelukast (SINGULAIR) 10 MG tablet     Sig: Take 1 tablet by mouth daily     Dispense:  90 tablet     Refill:  3    cetirizine (ZYRTEC ALLERGY) 10 MG tablet     Sig: Take 1 tablet by mouth daily     Dispense:  90 tablet     Refill:  3    Tdap (ADACEL) 5-2-15.5 LF-MCG/0.5 injection     Sig: Inject 0.5 mLs into the muscle once for 1 dose     Dispense:  1 each     Refill:  0    fluconazole (DIFLUCAN) 150 MG tablet     Sig: Take 1 tablet by mouth every other day for 6 days     Dispense:  3 tablet     Refill:  0    nystatin (MYCOSTATIN) 750199 UNIT/GM powder     Sig: Apply 2 times daily as needed for itching or irritation.      Dispense:  60 g     Refill:  5     Orders Placed This Encounter   Procedures    TITI CODY DIGITAL SCREEN BILATERAL     Standing Status:   Future     Standing Expiration Date:   1/30/2022     Order Specific Question:   Reason for exam:     Answer:   screening mammogram    CBC Auto Differential     Standing Status:   Future     Standing Expiration Date:   11/30/2021  Comprehensive Metabolic Panel     Standing Status:   Future     Standing Expiration Date:   11/30/2021    Hemoglobin A1C     Standing Status:   Future     Standing Expiration Date:   11/30/2021    Lipid Panel     Standing Status:   Future     Standing Expiration Date:   11/30/2021     Order Specific Question:   Is Patient Fasting?/# of Hours     Answer:   12 hours    Microalbumin, Ur     Standing Status:   Future     Standing Expiration Date:   11/30/2021     Scheduling Instructions: To be done in 6 months    EKG 12 Lead     Order Specific Question:   Reason for Exam?     Answer:   Chest pain     EKG is performed today which does show NSR    Patient is advised that she should have cardiac follow-up for further evaluation. She declines at this time. I did advise her that the chest pain is concerning for underlying cardiac issue and with an abnormal stress test 4 years ago would be concerned that there could be underlying cardiac disease that would go undiagnosed and untreated without further evaluation. She does express understanding of this but declines at this time. Patient is to follow a low-carb/low sugar/low-fat diet and increase exercise for optimal blood sugar and cholesterol control. Patient is given Diflucan to help decolonize any yeast infection and also gave her nystatin to use to the inguinal and genital region as needed to help wick away moisture and help with any further yeast colonization. Patient is to continue on the rest of her current medical therapy. No additional changes are made at this time. Patient is to return to my office in 6 months duration or sooner if any further problems or symptoms arise. (Please note that portions of this note were completed with a voice recognition program. Efforts were made to edit the dictations but occasionally words are mis-transcribed.)        No follow-ups on file.     An electronic signature was used to authenticate this note.    --Joy Ortiz,  on 11/30/2020 at 7:42 AM

## 2020-12-02 ENCOUNTER — VIRTUAL VISIT (OUTPATIENT)
Dept: FAMILY MEDICINE CLINIC | Age: 70
End: 2020-12-02
Payer: MEDICARE

## 2020-12-02 VITALS — TEMPERATURE: 97.9 F | BODY MASS INDEX: 35.31 KG/M2 | HEIGHT: 67 IN | WEIGHT: 225 LBS

## 2020-12-02 PROCEDURE — 3017F COLORECTAL CA SCREEN DOC REV: CPT | Performed by: FAMILY MEDICINE

## 2020-12-02 PROCEDURE — 4040F PNEUMOC VAC/ADMIN/RCVD: CPT | Performed by: FAMILY MEDICINE

## 2020-12-02 PROCEDURE — G8484 FLU IMMUNIZE NO ADMIN: HCPCS | Performed by: FAMILY MEDICINE

## 2020-12-02 PROCEDURE — G0439 PPPS, SUBSEQ VISIT: HCPCS | Performed by: FAMILY MEDICINE

## 2020-12-02 PROCEDURE — 1123F ACP DISCUSS/DSCN MKR DOCD: CPT | Performed by: FAMILY MEDICINE

## 2020-12-02 ASSESSMENT — PATIENT HEALTH QUESTIONNAIRE - PHQ9
SUM OF ALL RESPONSES TO PHQ QUESTIONS 1-9: 0
2. FEELING DOWN, DEPRESSED OR HOPELESS: 0
SUM OF ALL RESPONSES TO PHQ9 QUESTIONS 1 & 2: 0
SUM OF ALL RESPONSES TO PHQ QUESTIONS 1-9: 0
SUM OF ALL RESPONSES TO PHQ QUESTIONS 1-9: 0
1. LITTLE INTEREST OR PLEASURE IN DOING THINGS: 0

## 2020-12-02 ASSESSMENT — LIFESTYLE VARIABLES
HOW OFTEN DO YOU HAVE A DRINK CONTAINING ALCOHOL: 1
AUDIT-C TOTAL SCORE: 1
HOW OFTEN DURING THE LAST YEAR HAVE YOU NEEDED AN ALCOHOLIC DRINK FIRST THING IN THE MORNING TO GET YOURSELF GOING AFTER A NIGHT OF HEAVY DRINKING: 0
HOW OFTEN DO YOU HAVE SIX OR MORE DRINKS ON ONE OCCASION: 0
HAS A RELATIVE, FRIEND, DOCTOR, OR ANOTHER HEALTH PROFESSIONAL EXPRESSED CONCERN ABOUT YOUR DRINKING OR SUGGESTED YOU CUT DOWN: 0
HOW OFTEN DURING THE LAST YEAR HAVE YOU FOUND THAT YOU WERE NOT ABLE TO STOP DRINKING ONCE YOU HAD STARTED: 0
HAVE YOU OR SOMEONE ELSE BEEN INJURED AS A RESULT OF YOUR DRINKING: 0
HOW MANY STANDARD DRINKS CONTAINING ALCOHOL DO YOU HAVE ON A TYPICAL DAY: 0
HOW OFTEN DURING THE LAST YEAR HAVE YOU FAILED TO DO WHAT WAS NORMALLY EXPECTED FROM YOU BECAUSE OF DRINKING: 0
AUDIT TOTAL SCORE: 1
HOW OFTEN DURING THE LAST YEAR HAVE YOU HAD A FEELING OF GUILT OR REMORSE AFTER DRINKING: 0
HOW OFTEN DURING THE LAST YEAR HAVE YOU BEEN UNABLE TO REMEMBER WHAT HAPPENED THE NIGHT BEFORE BECAUSE YOU HAD BEEN DRINKING: 0

## 2020-12-02 NOTE — PROGRESS NOTES
Medicare Annual Wellness Visit  Name: Alyssia Read Date: 2020   MRN: [id number] Sex: Female   Age: 72 y.o. Ethnicity: Non-/Non    : 1950 Race: Lizy Ruelas is here for Medicare AWV (subsequent; last 2019)    Screenings for behavioral, psychosocial and functional/safety risks, and cognitive dysfunction are all negative except as indicated below. These results, as well as other patient data from the [address] form, are documented in Flowsheets linked to this Encounter. Allergies   Allergen Reactions    Aspirin Other (See Comments)     Abdominal pain    Ibuprofen Other (See Comments)     Abdominal pain    Sulfamethoxazole-Trimethoprim Nausea Only and Other (See Comments)    Penicillins Rash       Prior to Visit Medications    Medication Sig Taking? Authorizing Provider   VITAMIN D PO Take 2,000 Units by mouth daily Yes Historical Provider, MD   meloxicam (MOBIC) 7.5 MG tablet TAKE 1 TABLET ONCE A DAY Yes Fabby Lima, DO   montelukast (SINGULAIR) 10 MG tablet Take 1 tablet by mouth daily Yes Roldan Loge, DO   cetirizine (ZYRTEC ALLERGY) 10 MG tablet Take 1 tablet by mouth daily Yes Roldan Loge, DO   fluconazole (DIFLUCAN) 150 MG tablet Take 1 tablet by mouth every other day for 6 days Yes Roldan Loge, DO   nystatin (MYCOSTATIN) 869380 UNIT/GM powder Apply 2 times daily as needed for itching or irritation. Yes Roldan Loge, DO   atenolol (TENORMIN) 25 MG tablet take 1 tablet by mouth once daily Yes Roldan Loge, DO   omeprazole (PRILOSEC) 20 MG delayed release capsule Take 1 capsule by mouth 2 times daily Yes Roldan Loge, DO   albuterol sulfate HFA (VENTOLIN HFA) 108 (90 Base) MCG/ACT inhaler Inhale 2 puffs into the lungs every 4 hours as needed for Wheezing Yes Roldan Loge, DO   blood glucose test strips (ONE TOUCH TEST STRIPS) strip Tests 1 X daily.   DX E11.9 Yes Roldan Loge, DO   ONE [facility] LANCETS MISC Tests 1 x daily DX. E11.9 Yes Allie Ramirez,    pseudoephedrine (SUDAFED) 30 MG tablet Take 30 mg by mouth every 4 hours as needed. Yes Historical Provider, MD   therapeutic multivitamin-minerals (THERAGRAN-M) tablet Take 1 tablet by mouth daily. Yes Historical Provider, MD   Ascorbic Acid (VITAMIN C) 500 MG tablet Take 500 mg by mouth daily. Yes Historical Provider, MD       Past Medical History:   Diagnosis Date    Allergic rhinitis     Anxiety     due to situational stress    Bursitis     Bursitis of hip, right     recurrent    Carpal tunnel syndrome, bilateral     GERD (gastroesophageal reflux disease)     Hyperlipidemia     Hypertension     Impaired fasting glucose     Labyrinthitis     causing continuted vertigo    Melanoma in situ ([facility] [address] 10/2017     to bridge of nose; Dr Bear Karla in [address] in situ of nose ([facility] [address] 10/05/2017    biopsy confirmed on 10/05/2017 by [facility]     Osteoarthritis     Palpitations        Past Surgical History:   Procedure Laterality Date    BREAST LUMPECTOMY Right 2003    BREAST SURGERY Right     lumpectomy    CATARACT REMOVAL WITH IMPLANT Right 07/29/2017    Dr Ishan Hung WITH IMPLANT Left 08/05/2017    Dr Cristy Vences    COLONOSCOPY  12/2008    normal, repeat in 5 years   [facility]  2003    COLONOSCOPY N/A 2/14/2019    few diverticuli, Dr. Jan Basilio N/A 8/7/2019    D & C Hysteroscopy & polypectomy performed by Jeanette Manuel MD at [address] [place] [address] Bilateral    [facility] Right 2003    HYSTEROSCOPY  08/07/2019    hysteroscopy with polypectomy and D and C    SOFT TISSUE BIOPSY Left 12/21/2017    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 Ramírez Para  07/03/1983   [facility] ENDOSCOPY N/A 2/14/2019    mild gastritis, mild esophagitis, Dr. Harrison Lowers       Family History   Problem Relation Age of Onset    Prostate Cancer Father     Breast Cancer Sister     Cancer Sister         breast    Cancer Sister         colorectal    Other Mother         ulcerative colitis    Other Brother 79        coronary artery disease    Other Son         ulcerative colitis       CareTeam (Including outside providers/suppliers regularly involved in providing care):   Patient Care Team:  Roldan Ferreira DO as PCP - General (Family Medicine)  Roldan Ferreira DO as PCP - [facility] Empaneled Provider    Wt Readings from Last 3 Encounters:   12/02/20 225 lb (102.1 kg)   11/30/20 225 lb (102.1 kg)   09/26/20 223 lb (101.2 kg)     Vitals:    12/02/20 1439   Temp: 97.9 °F (36.6 °C)   TempSrc: Tympanic   Weight: 225 lb (102.1 kg)   Height: 5' 7\" (1.702 m)     Body mass index is 35.24 kg/m². Based upon direct observation of the patient, evaluation of cognition reveals recent and remote memory intact. Patient's complete Health Risk Assessment and screening values have been reviewed and are found in Flowsheets. The following problems were reviewed today and where indicated follow up appointments were made and/or referrals ordered. Positive Risk Factor Screenings with Interventions:     General Health and ACP:  General  In general, how would you say your health is?: Good  In the past 7 days, have you experienced any of the following?  New or Increased Pain, New or Increased Fatigue, Loneliness, Social Isolation, Stress or Anger?: None of These  Do you get the social and emotional support that you need?: Yes  Do you have a Living Will?: Yes  Advance Directives     Power of  Living Will ACP-Advance Directive ACP-Power of     Not on File Not on File Filed [address] [facility] [place] Risk Interventions:  · No Living Will: Advance Care Planning addressed with patient today-discussed bringing a copy to scan to [address] Habits/Nutrition:  Health Habits/Nutrition  Do you exercise for at least 20 minutes 2-3 times per week?: Yes  Have you lost any weight without trying in the past 3 months?: No  Do you eat fewer than 2 meals per day?: No  Have you seen a dentist within the past year?: Yes  Body mass index: (!) 35.24  Health Habits/Nutrition Interventions:  · Nutritional issues:  educational materials for healthy, well-balanced diet provided    Personalized Preventive Plan   Current Health Maintenance Status  Immunization History   Administered Date(s) Administered    DTaP 12/30/2009    Influenza Virus Vaccine 09/30/2010, 08/27/2012, 10/17/2013, 10/01/2014, 10/30/2015, 09/14/2020    Influenza, High Dose (Fluzone 65 yrs and older) 10/20/2016    Influenza, Triv, inactivated, subunit, adjuvanted, IM (Fluad 65 yrs and older) 10/06/2017, 09/27/2018, 09/24/2019    Pneumococcal Conjugate 13-valent ([id number]) 10/20/2016    Pneumococcal Polysaccharide ([id number]) 10/06/2017    Zoster Live (Zostavax) 11/12/2013    Zoster Recombinant (Shingrix) 04/13/2020, 07/01/2020        Health Maintenance   Topic Date Due    Annual Wellness Visit (AWV)  Completed today.  DTaP/Tdap/Td vaccine (2 - Tdap) Recommended. Patient declines.  Diabetic retinal exam  05/18/2021    Diabetic microalbuminuria test  05/21/2021    Breast cancer screen  05/21/2021    Diabetic foot exam  05/28/2021    A1C test (Diabetic or Prediabetic)  11/24/2021    Lipid screen  11/24/2021    Colon cancer screen colonoscopy  02/14/2024    Flu vaccine  Completed    Shingles Vaccine  Completed    Pneumococcal 65+ years Vaccine  Completed    Hepatitis C screen  Completed    DEXA (modify frequency per FRAX score)  Addressed    Hepatitis A vaccine  Aged Out    Hib vaccine  Aged Out    Meningococcal (ACWY) vaccine  Aged Out     Recommendations for Webcollage Due: see orders and patient instructions/AVS.  .   Recommended screening schedule for the next 5-10 years is provided to the patient in written form: see Patient Instructions/AVS.    Nina Canseco RN, 12/2/2020, performed the documented evaluation under the direct supervision of the attending physician. Shira Rothman is a 72 y.o. female being evaluated by a Virtual Visit (telephone visit) encounter to address concerns as mentioned above. A caregiver was present when appropriate. Due to this being a TeleHealth encounter (During [facility] public health emergency), evaluation of the following organ systems was limited: Vitals/Constitutional/EENT/Resp/CV/GI//MS/Neuro/Skin/Heme-Lymph-Imm. Pursuant to the emergency declaration under the [address] [place] [address], [address] [place] [address] authority and the [employer] Act, this Virtual Visit was conducted with patient's (and/or legal guardian's) consent, to reduce the patient's risk of exposure to COVID-19 and provide necessary medical care. The patient (and/or legal guardian) has also been advised to contact this office for worsening conditions or problems, and seek emergency medical treatment and/or call 911 if deemed necessary. Patient identification was verified at the start of the visit: Yes    Total time spent for this encounter: Not billed by time    Services were provided through a telephone synchronous discussion virtually to substitute for in-person clinic visit. Patient and provider were located at their individual homes. --Chaitanya Billingsley RN on 12/2/2020 at 3:28 PM    An electronic signature was used to authenticate this note.

## 2020-12-02 NOTE — PATIENT INSTRUCTIONS
Personalized Preventive Plan for Jamil Bañuelos - 12/2/2020  Medicare offers a range of preventive health benefits. Some of the tests and screenings are paid in full while other may be subject to a deductible, co-insurance, and/or copay. Some of these benefits include a comprehensive review of your medical history including lifestyle, illnesses that may run in your family, and various assessments and screenings as appropriate. After reviewing your medical record and screening and assessments performed today your provider may have ordered immunizations, labs, imaging, and/or referrals for you. A list of these orders (if applicable) as well as your Preventive Care list are included within your After Visit Summary for your review. Other Preventive Recommendations:    · A preventive eye exam performed by an eye specialist is recommended every 1-2 years to screen for glaucoma; cataracts, macular degeneration, and other eye disorders. · A preventive dental visit is recommended every 6 months. · Try to get at least 150 minutes of exercise per week or 10,000 steps per day on a pedometer . · Order or download the FREE \"Exercise & Physical Activity: Your Everyday Guide\" from [employer]. Call 1-734.638.6765 or search [employer] online. · You need 9866-7886 mg of calcium and 9501-9041 IU of vitamin D per day. It is possible to meet your calcium requirement with diet alone, but a vitamin D supplement is usually necessary to meet this goal.  · When exposed to the sun, use a sunscreen that protects against both UVA and UVB radiation with an SPF of 30 or greater. Reapply every 2 to 3 hours or after sweating, drying off with a towel, or swimming. · Always wear a seat belt when traveling in a car. Always wear a helmet when riding a bicycle or motorcycle. Heart-Healthy Diet   Sodium, Fat, and Cholesterol Controlled Diet       What Is a Heart Healthy Diet?    A heart-healthy diet is one that limits sodium , certain types of fat , and cholesterol . This type of diet is recommended for:   People with any form of cardiovascular disease (eg, coronary heart disease , peripheral vascular disease , previous heart attack , previous stroke )   People with risk factors for cardiovascular disease, such as high blood pressure , high cholesterol , or diabetes   Anyone who wants to lower their risk of developing cardiovascular disease   Sodium    Sodium is a mineral found in many foods. In general, most people consume much more sodium than they need. Diets high in sodium can increase blood pressure and lead to edema (water retention). On a heart-healthy diet, you should consume no more than 2,300 mg (milligrams) of sodium per dayabout the amount in one teaspoon of table salt. The foods highest in sodium include table salt (about 50% sodium), processed foods, convenience foods, and preserved foods. Cholesterol    Cholesterol is a fat-like, waxy substance in your blood. Our bodies make some cholesterol. It is also found in animal products, with the highest amounts in fatty meat, egg yolks, whole milk, cheese, shellfish, and organ meats. On a heart-healthy diet, you should limit your cholesterol intake to less than 200 mg per day. It is normal and important to have some cholesterol in your bloodstream. But too much cholesterol can cause plaque to build up within your arteries, which can eventually lead to a heart attack or stroke. The two types of cholesterol that are most commonly referred to are:   Low-density lipoprotein (LDL) cholesterol  Also known as bad cholesterol, this is the cholesterol that tends to build up along your arteries. Bad cholesterol levels are increased by eating fats that are saturated or hydrogenated. Optimal level of this cholesterol is less than 100. Over 130 starts to get risky for heart disease.    High-density lipoprotein (HDL) cholesterol  Also known as good cholesterol, this type of cholesterol actually carries cholesterol away from your arteries and may, therefore, help lower your risk of having a heart attack. You want this level to be high (ideally greater than 60). It is a risk to have a level less than 40. You can raise this good cholesterol by eating olive oil, canola oil, avocados, or nuts. Exercise raises this level, too. Fat    Fat is calorie dense and packs a lot of calories into a small amount of food. Even though fats should be limited due to their high calorie content, not all fats are bad. In fact, some fats are quite healthful. Fat can be broken down into four main types. The good-for-you fats are:   Monounsaturated fat  found in oils such as olive and canola, avocados, and nuts and natural nut butters; can decrease cholesterol levels, while keeping levels of HDL cholesterol high   Polyunsaturated fat  found in oils such as safflower, sunflower, soybean, corn, and sesame; can decrease total cholesterol and LDL cholesterol   Omega-3 fatty acids  particularly those found in fatty fish (such as salmon, trout, tuna, mackerel, herring, and sardines); can decrease risk of arrhythmias, decrease triglyceride levels, and slightly lower blood pressure   The fats that you want to limit are:   Saturated fat  found in animal products, many fast foods, and a few vegetables; increases total blood cholesterol, including LDL levels   Animal fats that are saturated include: butter, lard, whole-milk dairy products, meat fat, and poultry skin   Vegetable fats that are saturated include: hydrogenated shortening, palm oil, coconut oil, cocoa butter   Hydrogenated or trans fat  found in margarine and vegetable shortening, most shelf stable snack foods, and fried foods; increases LDL and decreases HDL     It is generally recommended that you limit your total fat for the day to less than 30% of your total calories.  If you follow an 1800-calorie heart healthy diet, for example, this would mean 60 grams of fat or less per day. Saturated fat and trans fat in your diet raises your blood cholesterol the most, much more than dietary cholesterol does. For this reason, on a heart-healthy diet, less than 7% of your calories should come from saturated fat and ideally 0% from trans fat. On an 1800-calorie diet, this translates into less than 14 grams of saturated fat per day, leaving 46 grams of fat to come from mono- and polyunsaturated fats.    Food Choices on a Heart Healthy Diet   Food Category   Foods Recommended   Foods to Avoid   Grains   Breads and rolls without salted tops Most dry and cooked cereals Unsalted crackers and breadsticks Low-sodium or homemade breadcrumbs or stuffing All rice and pastas   Breads, rolls, and crackers with salted tops High-fat baked goods (eg, muffins, donuts, pastries) Quick breads, self-rising flour, and biscuit mixes Regular bread crumbs Instant hot cereals Commercially prepared rice, pasta, or stuffing mixes   Vegetables   Most fresh, frozen, and low-sodium canned vegetables Low-sodium and salt-free vegetable juices Canned vegetables if unsalted or rinsed   Regular canned vegetables and juices, including sauerkraut and pickled vegetables Frozen vegetables with sauces Commercially prepared potato and vegetable mixes   Fruits   Most fresh, frozen, and canned fruits All fruit juices   Fruits processed with salt or sodium   Milk   Nonfat or low-fat (1%) milk Nonfat or low-fat yogurt Cottage cheese, low-fat ricotta, cheeses labeled as low-fat and low-sodium   Whole milk Reduced-fat (2%) milk Malted and chocolate milk Full fat yogurt Most cheeses (unless low-fat and low salt) Buttermilk (no more than 1 cup per week)   Meats and Beans   Lean cuts of fresh or frozen beef, veal, lamb, or pork (look for the word loin) Fresh or frozen poultry without the skin Fresh or frozen fish and some shellfish Egg whites and egg substitutes (Limit whole eggs to three per week) Tofu Nuts or seeds (unsalted, dry-roasted), low-sodium peanut butter Dried peas, beans, and lentils   Any smoked, cured, salted, or canned meat, fish, or poultry (including wilder, chipped beef, cold cuts, hot dogs, sausages, sardines, and anchovies) Poultry skins Breaded and/or fried fish or meats Canned peas, beans, and lentils Salted nuts   Fats and Oils   Olive oil and canola oil Low-sodium, low-fat salad dressings and mayonnaise   Butter, margarine, coconut and palm oils, wilder fat   Snacks, Sweets, and Condiments   Low-sodium or unsalted versions of broths, soups, soy sauce, and condiments Pepper, herbs, and spices; vinegar, lemon, or lime juice Low-fat frozen desserts (yogurt, sherbet, fruit bars) Sugar, cocoa powder, honey, syrup, jam, and preserves Low-fat, trans-fat free cookies, cakes, and pies Kamlesh and animal crackers, fig bars, miriam snaps   High-fat desserts Broth, soups, gravies, and sauces, made from instant mixes or other high-sodium ingredients Salted snack foods Canned olives Meat tenderizers, seasoning salt, and most flavored vinegars   Beverages   Low-sodium carbonated beverages Tea and coffee in moderation Soy milk   Commercially softened water   Suggestions   Make whole grains, fruits, and vegetables the base of your diet. Choose heart-healthy fats such as canola, olive, and flaxseed oil, and foods high in heart-healthy fats, such as nuts, seeds, soybeans, tofu, and fish. Eat fish at least twice per week; the fish highest in omega-3 fatty acids and lowest in mercury include salmon, herring, mackerel, sardines, and canned chunk light tuna. If you eat fish less than twice per week or have high triglycerides, talk to your doctor about taking fish oil supplements. Read food labels.    For products low in fat and cholesterol, look for fat free, low-fat, cholesterol free, saturated fat free, and trans fat freeAlso scan the Nutrition Facts Label, which lists saturated fat, trans fat, and cholesterol amounts. For products low in sodium, look for sodium free, very low sodium, low sodium, no added salt, and unsalted   Skip the salt when cooking or at the table; if food needs more flavor, get creative and try out different herbs and spices. Garlic and onion also add substantial flavor to foods. Trim any visible fat off meat and poultry before cooking, and drain the fat off after partida. Use cooking methods that require little or no added fat, such as grilling, boiling, baking, poaching, broiling, roasting, steaming, stir-frying, and sauting. Avoid fast food and convenience food. They tend to be high in saturated and trans fat and have a lot of added salt. Talk to a registered dietitian for individualized diet advice. Last Reviewed: March 2011 Herminia Tan MS, MPH, RD   Updated: 3/29/2011   ·     Keep Your Memory Arlice Eis       Many factors can affect your ability to remembera hectic lifestyle, aging, stress, chronic disease, and certain medicines. But, there are steps you can take to sharpen your mind and help preserve your memory. Challenge Your Brain   Regularly challenging your mind may help keeps it in top shape. Good mental exercises include:   Crossword puzzlesUse a dictionary if you need it; you will learn more that way. Brainteasers Try some! Crafts, such as wood working and sewing   Hobbies, such as gardening and building model airplanes   SocializingVisit old friends or join groups to meet new ones. Reading   Learning a new language   Taking a class, whether it be art history or jaxon chi   TravelingExperience the food, history, and culture of your destination   Learning to use a computer   Going to museums, the theater, or thought-provoking movies   Changing things in your daily life, such as reversing your pattern in the grocery store or brushing your teeth using your nondominant hand   Use Memory Aids   There is no need to remember every detail on your own.  These memory aids can help:   Calendars and day planners   Electronic organizers to store all sorts of helpful informationThese devices can \"beep\" to remind you of appointments. A book of days to record birthdays, anniversaries, and other occasions that occur on the same date every year   Detailed \"to-do\" lists and strategically placed sticky notes   Quick \"study\" sessionsBefore a gathering, review who will be there so their names will be fresh in your mind. Establish routinesFor example, keep your keys, wallet, and umbrella in the same place all the time or take medicine with your 8:00 AM glass of juice   Live a Healthy Life   Many actions that will keep your body strong will do the same for your mind. For example:   Talk to Your Doctor About Herbs and Supplements    Malnutrition and vitamin deficiencies can impair your mental function. For example, vitamin B12 deficiency can cause a range of symptoms, including confusion. But, what if your nutritional needs are being met? Can herbs and supplements still offer a benefit? Researchers have investigated a range of natural remedies, such as ginkgo , ginseng , and the supplement phosphatidylserine (PS). So far, though, the evidence is inconsistent as to whether these products can improve memory or thinking. If you are interested in taking herbs and supplements, talk to your doctor first because they may interact with other medicines that you are taking. Exercise Regularly    Among the many benefits of regular exercise are increased blood flow to the brain and decreased risk of certain diseases that can interfere with memory function. One study found that even moderate exercise has a beneficial effect. Examples of \"moderate\" exercise include:   Playing 18 holes of golf once a week, without a cart   Playing tennis twice a week   Walking one mile per day   Manage Stress    It can be tough to remember what is important when your mind is cluttered.  Make time for sleep, talk to your doctor. Limit your intake of alcohol. If necessary, use a cane or walker to help maintain your balance. Wear supportive, rubber-soled shoes, even at home. If you live in a region that gets wintry weather, you may want to put special cleats on your shoes to prevent you from slipping on the snow and ice. Exercise regularly to help maintain muscle tone, agility, and balance. Always hold the banister when going up or down stairs. Also, use  bars when getting in or out of the bath or shower, or using the toilet. To avoid dizziness, get up slowly from a lying down position. Sit up first, dangling your legs for a minute or two before rising to a standing position. Overall Home Safety Check   According to the Consumer Product Safety Commision's \"Older Consumer Home Safety Checklist,\" it is important to check for potential hazards in each room. And remember, proper lighting is an essential factor in home safety. If you cannot see clearly, you are more likely to fall. Important questions to ask yourself include:   Are lamp, electric, extension, and telephone cords placed out of the flow of traffic and maintained in good condition? Have frayed cords been replaced? Are all small rugs and runners slip resistant? If not, you can secure them to the floor with a special double-sided carpet tape. Are smoke detectors properly locatedone on every floor of your home and one outside of every sleeping area? Are they in good working order? Are batteries replaced at least once a year? Do you have a well-maintained carbon monoxide detector outside every sleeping are in your home? Does your furniture layout leave plenty of space to maneuver between and around chairs, tables, beds, and sofas? Are hallways, stairs and passages between rooms well lit? Can you reach a lamp without getting out of bed? Are floor surfaces well maintained?  Shag rugs, high-pile carpeting, tile floors, and polished wood floors can be particularly slippery. Stairs should always have handrails and be carpeted or fitted with a non-skid tread. Is your telephone easily reachable. Is the cord safely tucked away? Room by Room   According to the [employer]ging, bathrooms and julito are the two most potentially hazardous rooms in your home. In the Kitchen    Be sure your stove is in proper working order and always make sure burners and the oven are off before you go out or go to sleep. Keep pots on the back burners, turn handles away from the front of the stove, and keep stove clean and free of grease build-up. Kitchen ventilation systems and range exhausts should be working properly. Keep flammable objects such as towels and pot holders away from the cooking area except when in use. Make sure kitchen curtains are tied back. Move cords and appliances away from the sink and hot surfaces. If extension cords are needed, install wiring guides so they do not hang over the sink, range, or working areas. Look for coffee pots, kettles and toaster ovens with automatic shut-offs. Keep a mop handy in the kitchen so you can wipe up spills instantly. You should also have a small fire extinguisher. Arrange your kitchen with frequently used items on lower shelves to avoid the need to stand on a stepstool to reach them. Make sure countertops are well-lit to avoid injuries while cutting and preparing food. In the Bathroom    Use a non-slip mat or decals in the tub and shower, since wet, soapy tile or porcelain surfaces are extremely slippery. Make sure bathroom rugs are non-skid or tape them firmly to the floor. Bathtubs should have at least one, preferably two, grab bars, firmly attached to structural supports in the wall. (Do not use built-in soap holders or glass shower doors as grab bars.)    Tub seats fitted with non-slip material on the legs allow you to wash sitting down.  For people with limited the development of new products to help older adults live safely and independently in spite of age-related changes. Making Life More Livable  , by [employer], lists over 1,000 products for \"living well in the mature years,\" such as bathing and mobility aids, household security devices, ergonomically designed knives and peelers, and faucet valves and knobs for temperature control. Medical supply stores and organizations are good sources of information about products that improve your quality of life and insure your safety.      Last Reviewed: November 2009 Byron Zhong MD   Updated: 3/7/2011     ·

## 2020-12-09 ENCOUNTER — OFFICE VISIT (OUTPATIENT)
Dept: PRIMARY CARE CLINIC | Age: 70
End: 2020-12-09
Payer: MEDICARE

## 2020-12-09 ENCOUNTER — HOSPITAL ENCOUNTER (OUTPATIENT)
Age: 70
Setting detail: SPECIMEN
Discharge: HOME | End: 2020-12-09
Payer: MEDICARE

## 2020-12-09 VITALS
BODY MASS INDEX: 34.93 KG/M2 | DIASTOLIC BLOOD PRESSURE: 80 MMHG | HEART RATE: 72 BPM | WEIGHT: 223 LBS | SYSTOLIC BLOOD PRESSURE: 130 MMHG | OXYGEN SATURATION: 96 % | TEMPERATURE: 98 F

## 2020-12-09 PROCEDURE — 1036F TOBACCO NON-USER: CPT | Performed by: FAMILY MEDICINE

## 2020-12-09 PROCEDURE — 99214 OFFICE O/P EST MOD 30 MIN: CPT | Performed by: FAMILY MEDICINE

## 2020-12-09 PROCEDURE — 1090F PRES/ABSN URINE INCON ASSESS: CPT | Performed by: FAMILY MEDICINE

## 2020-12-09 PROCEDURE — G8417 CALC BMI ABV UP PARAM F/U: HCPCS | Performed by: FAMILY MEDICINE

## 2020-12-09 PROCEDURE — 1123F ACP DISCUSS/DSCN MKR DOCD: CPT | Performed by: FAMILY MEDICINE

## 2020-12-09 PROCEDURE — 3017F COLORECTAL CA SCREEN DOC REV: CPT | Performed by: FAMILY MEDICINE

## 2020-12-09 PROCEDURE — 4040F PNEUMOC VAC/ADMIN/RCVD: CPT | Performed by: FAMILY MEDICINE

## 2020-12-09 PROCEDURE — G8427 DOCREV CUR MEDS BY ELIG CLIN: HCPCS | Performed by: FAMILY MEDICINE

## 2020-12-09 PROCEDURE — 99212 OFFICE O/P EST SF 10 MIN: CPT

## 2020-12-09 PROCEDURE — G8484 FLU IMMUNIZE NO ADMIN: HCPCS | Performed by: FAMILY MEDICINE

## 2020-12-09 PROCEDURE — G8400 PT W/DXA NO RESULTS DOC: HCPCS | Performed by: FAMILY MEDICINE

## 2020-12-09 PROCEDURE — U0003 INFECTIOUS AGENT DETECTION BY NUCLEIC ACID (DNA OR RNA); SEVERE ACUTE RESPIRATORY SYNDROME CORONAVIRUS 2 (SARS-COV-2) (CORONAVIRUS DISEASE [COVID-19]), AMPLIFIED PROBE TECHNIQUE, MAKING USE OF HIGH THROUGHPUT TECHNOLOGIES AS DESCRIBED BY CMS-2020-01-R: HCPCS

## 2020-12-09 RX ORDER — AZITHROMYCIN 250 MG/1
TABLET, FILM COATED ORAL
Qty: 1 PACKET | Refills: 1 | Status: SHIPPED | OUTPATIENT
Start: 2020-12-09 | End: 2021-01-15

## 2020-12-09 RX ORDER — PREDNISONE 20 MG/1
TABLET ORAL
Qty: 10 TABLET | Refills: 0 | Status: SHIPPED | OUTPATIENT
Start: 2020-12-09 | End: 2021-01-08 | Stop reason: SDUPTHER

## 2020-12-09 ASSESSMENT — ENCOUNTER SYMPTOMS
CONSTIPATION: 0
TROUBLE SWALLOWING: 0
RHINORRHEA: 1
SORE THROAT: 1
WHEEZING: 0
DIARRHEA: 0
SINUS PRESSURE: 1
NAUSEA: 0
VOMITING: 0
SINUS COMPLAINT: 1
ABDOMINAL PAIN: 0
SINUS PAIN: 1
SHORTNESS OF BREATH: 0
EYE REDNESS: 0
EYE DISCHARGE: 0
COUGH: 1

## 2020-12-09 NOTE — PROGRESS NOTES
2020     Garrett Arriola (:  1950) is a 72 y.o. female, here for evaluation of the following medical concerns:    Sinus Problem   This is a new problem. The current episode started in the past 7 days (Saturday started with symptoms of sinus infection). The problem has been gradually worsening since onset. There has been no fever. Associated symptoms include chills, congestion, coughing, headaches, sinus pressure and a sore throat (scratchy once in a while). Pertinent negatives include no ear pain, neck pain or shortness of breath. (Stomach ache off and on which is unusual for patient with sinus infection. Has had some canker sores in her mouth. No loss of taste or smell. No known COVID exposure.) Treatments tried: sudafed, tylenol, gas x for the stomach pain. The treatment provided no relief. Did review patient's med list, allergies, social history,pmhx and pshx today as noted in the record. Review of Systems   Constitutional: Positive for chills. Negative for fatigue and fever. HENT: Positive for congestion, mouth sores, postnasal drip, rhinorrhea, sinus pressure, sinus pain and sore throat (scratchy once in a while). Negative for ear pain and trouble swallowing. Eyes: Negative for discharge and redness. Respiratory: Positive for cough. Negative for shortness of breath and wheezing. Cardiovascular: Negative for chest pain. Gastrointestinal: Negative for abdominal pain, constipation, diarrhea, nausea and vomiting. Genitourinary: Negative for dysuria, flank pain, frequency and urgency. Musculoskeletal: Negative for arthralgias, myalgias and neck pain. Skin: Negative for rash and wound. Allergic/Immunologic: Negative for environmental allergies. Neurological: Positive for headaches. Negative for dizziness, weakness and light-headedness. Hematological: Negative for adenopathy. Psychiatric/Behavioral: Negative.         Prior to Visit Medications    Medication Sig Estimated body mass index is 34.93 kg/m² as calculated from the following:    Height as of 20: 5' 7\" (1.702 m). Weight as of this encounter: 223 lb (101.2 kg). Physical Exam  Vitals signs and nursing note reviewed. Constitutional:       General: She is not in acute distress. Appearance: Normal appearance. She is well-developed. She is not diaphoretic. HENT:      Head: Normocephalic and atraumatic. Right Ear: External ear normal.      Left Ear: External ear normal.      Ears:      Comments: TMs dull with fluid behind the TM     Nose: Congestion and rhinorrhea present. Mouth/Throat:      Pharynx: No posterior oropharyngeal erythema. Comments: Post nasal drainage noted. Small ulcerated area to the right lower gumline under the middle molars. Eyes:      General: No scleral icterus. Right eye: No discharge. Left eye: No discharge. Conjunctiva/sclera: Conjunctivae normal.      Pupils: Pupils are equal, round, and reactive to light. Neck:      Musculoskeletal: Normal range of motion and neck supple. Thyroid: No thyromegaly. Cardiovascular:      Rate and Rhythm: Normal rate and regular rhythm. Heart sounds: Normal heart sounds. Pulmonary:      Effort: Pulmonary effort is normal. No respiratory distress. Breath sounds: Normal breath sounds. No wheezing. Lymphadenopathy:      Cervical: Cervical adenopathy present. Skin:     General: Skin is warm and dry. Findings: No rash. Neurological:      Mental Status: She is alert and oriented to person, place, and time. Psychiatric:         Behavior: Behavior normal.         Thought Content: Thought content normal.         Judgment: Judgment normal.         ASSESSMENT/PLAN:  Encounter Diagnosis   Name Primary?     Acute non-recurrent pansinusitis Yes     Orders Placed This Encounter   Medications    predniSONE (DELTASONE) 20 MG tablet     Si bid for 5 days     Dispense:  10 tablet Refill:  0    azithromycin (ZITHROMAX Z-LUIS ALBERTO) 250 MG tablet     Sig: Take as directed     Dispense:  1 packet     Refill:  1     RX as noted above. Likely sinusitis as patient has had in the past with similar symptoms. Will however rule out covid. Orders Placed This Encounter   Procedures    COVID-19 Ambulatory     Standing Status:   Future     Standing Expiration Date:   12/9/2021     Scheduling Instructions:      Saline media preferred given current shortage of viral transport media but both acceptable     Order Specific Question:   Is this test for diagnosis or screening? Answer:   Diagnosis of ill patient     Order Specific Question:   Symptomatic for COVID-19 as defined by CDC? Answer:   Yes     Order Specific Question:   Date of Symptom Onset     Answer:   12/5/2020     Order Specific Question:   Hospitalized for COVID-19? Answer:   No     Order Specific Question:   Admitted to ICU for COVID-19? Answer:   No     Order Specific Question:   Employed in healthcare setting? Answer:   No     Order Specific Question:   Resident in a congregate (group) care setting? Answer:   No     Order Specific Question:   Pregnant? Answer:   No     Order Specific Question:   Previously tested for COVID-19? Answer:   Yes     Will order covid testing. In interim patient is to quarantine until testing reports are available    Increase fluids and rest    Tylenol 1-2 tabs po q4h prn    Can use mucinex or mucinex DM or comparable for congestion or cough. Return  if no improvement in symptoms or if any further symptoms arise. No follow-ups on file. An electronic signature was used to authenticate this note.     --Jessica Walsh, DO on 12/9/2020 at 4:58 PM

## 2020-12-13 LAB — SARS-COV-2, NAA: NOT DETECTED

## 2021-01-04 ENCOUNTER — PATIENT MESSAGE (OUTPATIENT)
Dept: FAMILY MEDICINE CLINIC | Age: 71
End: 2021-01-04

## 2021-01-04 NOTE — TELEPHONE ENCOUNTER
From: Rhonda Montero  To: Tiera Dela Cruz DO  Sent: 1/4/2021 10:40 AM EST  Subject: Non-Urgent Medical Question    Hi, I brought my  into urgent care on Saturday. He had symptoms of covid and had a test done. His symptoms started Monday or Tuesday On our way home a friend he had been with called him to say he tested positive. I've had a sinus headache since thursday and last night my chest started to hurt and I have been coughing. Do I need to get tested or can I just assume if his test comes back positive that I have it too? Do you have any suggestions for care or just wait it out?   Thanks, Rhonda Montero

## 2021-01-07 ENCOUNTER — TELEPHONE (OUTPATIENT)
Dept: FAMILY MEDICINE CLINIC | Age: 71
End: 2021-01-07

## 2021-01-07 NOTE — TELEPHONE ENCOUNTER
Patient states that she has Covid (never tested) but  has it, would like some help, states that her nose is very congested has tried everything, rahel pot, suddafed, saline pray, she states that if she is walking around she is fine but as soon as she sits down its bad again. Patient is aware [facility] is out of office this afternoon.  Please advise

## 2021-01-08 ENCOUNTER — PATIENT MESSAGE (OUTPATIENT)
Dept: FAMILY MEDICINE CLINIC | Age: 71
End: 2021-01-08

## 2021-01-08 RX ORDER — PREDNISONE 20 MG/1
TABLET ORAL
Qty: 10 TABLET | Refills: 0 | Status: SHIPPED | OUTPATIENT
Start: 2021-01-08 | End: 2021-01-15

## 2021-01-08 NOTE — TELEPHONE ENCOUNTER
From: Temitope Minus  To: Edison Lozoya DO  Sent: 1/8/2021 9:38 AM EST  Subject: Non-Urgent Medical Question    I am sorry can't I try steriods or any other medication. Mucinex has never worked for me. I have been using sudefed from behind the pharmacy counter for a week. I can not sleep or rest because I cannot breathe. My oxygen levels are 89-93      ----- Message -----   From:Fabby Jorge Current, DO   Sent:1/8/2021 9:03 AM EST   To:Huyen Mojica   Subject:RE: Non-Urgent Medical Question    You can try mucinex which may help thin out the mucus so it can at least open up the sinuses. Temporarily you can also use afrin nasal spray for a few days which opens up the nasal passages. Hope you feel better soon! Dr. Allen De La Rosa      ----- Message -----   From: Temitope Minus   Sent:1/8/2021 8:32 AM EST   To:Fabby Jorge Current, DO   Subject:Non-Urgent Medical Question    My  tested positive for the virus. He got symptoms on the thursday the 31. I started coughing on Sunday the 3rd. but I also had sinus headache starting on the 31. I coughed for two days and had a 100 degree temperature for three days. The worst problem I am having is my sinuses are completely plugged. It feels like my sinuses are so plugged they are taking the air out of my lungs. I have been pacing the floors for two days trying to breathe. I have tried everything humidifier,nettiepot,sensimist,sudefed, even nose strips nothing is giving me any relief. Please is there anything you can do to help me?   Temitope Minus

## 2021-01-10 ENCOUNTER — APPOINTMENT (OUTPATIENT)
Dept: CT IMAGING | Age: 71
End: 2021-01-10
Payer: MEDICARE

## 2021-01-10 ENCOUNTER — HOSPITAL ENCOUNTER (EMERGENCY)
Age: 71
Discharge: HOME | End: 2021-01-10
Attending: EMERGENCY MEDICINE
Payer: MEDICARE

## 2021-01-10 VITALS
WEIGHT: 220 LBS | HEART RATE: 63 BPM | TEMPERATURE: 98.4 F | BODY MASS INDEX: 34.53 KG/M2 | RESPIRATION RATE: 20 BRPM | OXYGEN SATURATION: 95 % | SYSTOLIC BLOOD PRESSURE: 168 MMHG | HEIGHT: 67 IN | DIASTOLIC BLOOD PRESSURE: 77 MMHG

## 2021-01-10 DIAGNOSIS — Z20.822 SUSPECTED COVID-19 VIRUS INFECTION: Primary | ICD-10-CM

## 2021-01-10 LAB
ABSOLUTE EOS #: <0.03 K/UL (ref 0–0.44)
ABSOLUTE IMMATURE GRANULOCYTE: 0.05 K/UL (ref 0–0.3)
ABSOLUTE LYMPH #: 1.07 K/UL (ref 1.1–3.7)
ABSOLUTE MONO #: 0.53 K/UL (ref 0.1–1.2)
ANION GAP SERPL CALCULATED.3IONS-SCNC: 11 MMOL/L (ref 9–17)
BASOPHILS # BLD: 0 % (ref 0–2)
BASOPHILS ABSOLUTE: <0.03 K/UL (ref 0–0.2)
BUN BLDV-MCNC: 14 MG/DL (ref 8–23)
BUN/CREAT BLD: 22 (ref 9–20)
CALCIUM SERPL-MCNC: 9.4 MG/DL (ref 8.6–10.4)
CHLORIDE BLD-SCNC: 103 MMOL/L (ref 98–107)
CO2: 26 MMOL/L (ref 20–31)
CREAT SERPL-MCNC: 0.64 MG/DL (ref 0.5–0.9)
D-DIMER QUANTITATIVE: 0.89 MG/L FEU (ref 0–0.59)
DIFFERENTIAL TYPE: ABNORMAL
EKG ATRIAL RATE: 68 BPM
EKG P AXIS: 31 DEGREES
EKG P-R INTERVAL: 152 MS
EKG Q-T INTERVAL: 420 MS
EKG QRS DURATION: 98 MS
EKG QTC CALCULATION (BAZETT): 446 MS
EKG R AXIS: -3 DEGREES
EKG T AXIS: 20 DEGREES
EKG VENTRICULAR RATE: 68 BPM
EOSINOPHILS RELATIVE PERCENT: 0 % (ref 1–4)
GFR AFRICAN AMERICAN: >60 ML/MIN
GFR NON-AFRICAN AMERICAN: >60 ML/MIN
GFR SERPL CREATININE-BSD FRML MDRD: ABNORMAL ML/MIN/{1.73_M2}
GFR SERPL CREATININE-BSD FRML MDRD: ABNORMAL ML/MIN/{1.73_M2}
GLUCOSE BLD-MCNC: 185 MG/DL (ref 70–99)
HCT VFR BLD CALC: 40.9 % (ref 36.3–47.1)
HEMOGLOBIN: 13.2 G/DL (ref 11.9–15.1)
IMMATURE GRANULOCYTES: 1 %
LYMPHOCYTES # BLD: 11 % (ref 24–43)
MCH RBC QN AUTO: 28.4 PG (ref 25.2–33.5)
MCHC RBC AUTO-ENTMCNC: 32.3 G/DL (ref 25.2–33.5)
MCV RBC AUTO: 88 FL (ref 82.6–102.9)
MONOCYTES # BLD: 5 % (ref 3–12)
NRBC AUTOMATED: 0 PER 100 WBC
PDW BLD-RTO: 14.3 % (ref 11.8–14.4)
PLATELET # BLD: 216 K/UL (ref 138–453)
PLATELET ESTIMATE: ABNORMAL
PMV BLD AUTO: 9.5 FL (ref 8.1–13.5)
POTASSIUM SERPL-SCNC: 3.5 MMOL/L (ref 3.7–5.3)
RBC # BLD: 4.65 M/UL (ref 3.95–5.11)
RBC # BLD: ABNORMAL 10*6/UL
SEG NEUTROPHILS: 83 % (ref 36–65)
SEGMENTED NEUTROPHILS ABSOLUTE COUNT: 8.16 K/UL (ref 1.5–8.1)
SODIUM BLD-SCNC: 140 MMOL/L (ref 135–144)
TROPONIN INTERP: NORMAL
TROPONIN T: NORMAL NG/ML
TROPONIN, HIGH SENSITIVITY: <6 NG/L (ref 0–14)
WBC # BLD: 9.8 K/UL (ref 3.5–11.3)
WBC # BLD: ABNORMAL 10*3/UL

## 2021-01-10 PROCEDURE — 85379 FIBRIN DEGRADATION QUANT: CPT

## 2021-01-10 PROCEDURE — 80048 BASIC METABOLIC PNL TOTAL CA: CPT

## 2021-01-10 PROCEDURE — 93005 ELECTROCARDIOGRAM TRACING: CPT | Performed by: EMERGENCY MEDICINE

## 2021-01-10 PROCEDURE — 6360000004 HC RX CONTRAST MEDICATION: Performed by: EMERGENCY MEDICINE

## 2021-01-10 PROCEDURE — 2709999900 CT CHEST PULMONARY EMBOLISM W CONTRAST

## 2021-01-10 PROCEDURE — 99284 EMERGENCY DEPT VISIT MOD MDM: CPT

## 2021-01-10 PROCEDURE — 84484 ASSAY OF TROPONIN QUANT: CPT

## 2021-01-10 PROCEDURE — 85025 COMPLETE CBC W/AUTO DIFF WBC: CPT

## 2021-01-10 RX ADMIN — IOPAMIDOL 100 ML: 755 INJECTION, SOLUTION INTRAVENOUS at 12:15

## 2021-01-10 ASSESSMENT — ENCOUNTER SYMPTOMS
SHORTNESS OF BREATH: 1
COUGH: 1

## 2021-01-10 ASSESSMENT — PAIN DESCRIPTION - LOCATION: LOCATION: CHEST

## 2021-01-10 ASSESSMENT — PAIN SCALES - GENERAL: PAINLEVEL_OUTOF10: 5

## 2021-01-10 NOTE — ED NOTES
Wife request [facility] supply in  [place].  Contacted and will check supply and will call back to JOON Gamble RN  01/10/21 [id number]

## 2021-01-10 NOTE — ED PROVIDER NOTES
[facility] ED  [address]  [place] [address]  Phone: [phone]        Pt Name: Jorge Lentz  MRN: [id number]  Ericgfhank 1950  Date of evaluation: 1/10/21      CHIEF COMPLAINT     Chief Complaint   Patient presents with    Concern For COVID-19     SINCE LAST SUNDAY         HISTORY OF PRESENT ILLNESS  (Location/Symptom, Timing/Onset, Context/Setting, Quality, Duration, Modifying Factors, Severity.)    Jorge Lentz is a 72 y.o. female who presents with Covid type symptoms. The patient dates for slightly over a week she has had Covid type symptoms her  was diagnosed with Covid the patient states she has had fever chills cough congestion and she has had some chest discomfort loss of smell and taste the patient states the fever and chills recently have went away she has had some slight chest discomfort with this of which nothing she does makes her symptoms better or worse no unusual swelling of the arms or legs. Patient states her family doctor did place her on steroids      REVIEW OF SYSTEMS    (2-9 systems for level 4, 10 or more for level 5)     Review of Systems   Constitutional: Positive for chills and fever. Respiratory: Positive for cough and shortness of breath. Cardiovascular: Positive for chest pain. All other systems reviewed and are negative. 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     SURGICAL HISTORY is allergic to aspirin; ibuprofen; sulfamethoxazole-trimethoprim; and penicillins. FAMILY HISTORY     She indicated that her mother is . She indicated that her father is . She indicated that both of her sisters are alive. She indicated that her brother is alive. She indicated that her son is alive. family history includes Breast Cancer in her sister; Cancer in her sister and sister; Other in her mother and son; Other (age of onset: 79) in her brother; Prostate Cancer in her father. SOCIAL HISTORY      reports that she is a non-smoker but has been exposed to tobacco smoke. She has never used smokeless tobacco. She reports current alcohol use. She reports that she does not use drugs. PHYSICAL EXAM    (up to 7 for level 4, 8 or more for level 5)   INITIAL VITALS:  height is 5' 7\" (1.702 m) and weight is 220 lb (99.8 kg). Her temperature is 98.4 °F (36.9 °C). Her blood pressure is 168/77 (abnormal) and her pulse is 63. Her respiration is 20 and oxygen saturation is 95%. Physical Exam  Vitals signs and nursing note reviewed. Constitutional:       Appearance: Normal appearance. HENT:      Head: Normocephalic and atraumatic. Eyes:      Conjunctiva/sclera: Conjunctivae normal.   Neck:      Musculoskeletal: Normal range of motion and neck supple. No neck rigidity or muscular tenderness. Cardiovascular:      Rate and Rhythm: Normal rate and regular rhythm. Pulses: Normal pulses. Heart sounds: Normal heart sounds. Pulmonary:      Effort: Pulmonary effort is normal. No respiratory distress. Breath sounds: Normal breath sounds. No stridor. No wheezing, rhonchi or rales. Abdominal:      General: There is no distension. Palpations: Abdomen is soft. Tenderness: There is no abdominal tenderness. There is no guarding. Musculoskeletal: Normal range of motion. General: No swelling or tenderness.       Comments: No calf swelling or tenderness appreciated Lymphadenopathy:      Cervical: No cervical adenopathy. Skin:     General: Skin is warm and dry. Findings: No rash. Neurological:      General: No focal deficit present. Mental Status: She is alert. DIFFERENTIAL DIAGNOSIS/ MDM:     We will establish an IV check labs EKG and imaging    DIAGNOSTIC RESULTS     EKG: All EKG's are interpreted by the Emergency Department Physician who either signs or Co-signs this chart in the [address] a cardiologist.      Interpreted by Keily Humphrey MD     Rhythm: normal sinus   Rate: 68  Axis: -3  Ectopy: none  Conduction: normal  ST Segments: no acute change  T Waves: no acute change  Q Waves: none    Clinical Impression: normal sinus rhythm with no acute changes/normal EKG. No acute infarction/ischemia noted. RADIOLOGY:  Non-plain film images such as CT, Ultrasound and MRI are read by the radiologist. Plain radiographic images are visualized and the radiologist interpretations are reviewed as follows:         Interpretation per the Radiologist below, if available at the time of this note:    CT CHEST PULMONARY EMBOLISM W CONTRAST (Final result)  Result time 01/10/21 12:42:58  Final result by Panda Richardson DO (01/10/21 12:42:58)                Impression:    *No CT evidence of pulmonary embolism. *Ill-defined areas of diffuse ground-glass opacities within the lungs. Finding is nonspecific.  COVID pneumonia cannot be excluded. *Cholelithiasis. *Left adrenal gland adenomas. Narrative:    EXAMINATION:   CTA OF THE CHEST 1/10/2021 12:04 pm     TECHNIQUE:   CTA of the chest was performed after the administration of intravenous   contrast.  Multiplanar reformatted images are provided for review.  MIP   images are provided for review. Dose modulation, iterative reconstruction,   and/or weight based adjustment of the mA/kV was utilized to reduce the   radiation dose to as low as reasonably achievable. COMPARISON:   None. Basophils 0 0 - 2 %    Immature Granulocytes 1 (H) 0 %    Segs Absolute 8.16 (H) 1.50 - 8.10 k/uL    Absolute Lymph # 1.07 (L) 1.10 - 3.70 k/uL    Absolute Mono # 0.53 0.10 - 1.20 k/uL    Absolute Eos # <0.03 0.00 - 0.44 k/uL    Basophils Absolute <0.03 0.00 - 0.20 k/uL    Absolute Immature Granulocyte 0.05 0.00 - 0.30 k/uL    WBC Morphology NOT REPORTED     RBC Morphology NOT REPORTED     Platelet Estimate NOT REPORTED    Basic Metabolic Panel   Result Value Ref Range    Glucose 185 (H) 70 - 99 mg/dL    BUN 14 8 - 23 mg/dL    CREATININE 0.64 0.50 - 0.90 mg/dL    Bun/Cre Ratio 22 (H) 9 - 20    Calcium 9.4 8.6 - 10.4 mg/dL    Sodium 140 135 - 144 mmol/L    Potassium 3.5 (L) 3.7 - 5.3 mmol/L    Chloride 103 98 - 107 mmol/L    CO2 26 20 - 31 mmol/L    Anion Gap 11 9 - 17 mmol/L    GFR Non-African American >60 >60 mL/min    GFR African American >60 >60 mL/min    GFR Comment          GFR Staging NOT REPORTED    EKG 12 Lead   Result Value Ref Range    Ventricular Rate 68 BPM    Atrial Rate 68 BPM    P-R Interval 152 ms    QRS Duration 98 ms    Q-T Interval 420 ms    QTc Calculation (Bazett) 446 ms    P Axis 31 degrees    R Axis -3 degrees    T Axis 20 degrees           EMERGENCY DEPARTMENT COURSE:   Vitals:    Vitals:    01/10/21 1021 01/10/21 1038 01/10/21 1110   BP: (!) 204/86 (!) 136/97 (!) 168/77   Pulse: 70 73 63   Resp: 22 20 20   Temp: 98.4 °F (36.9 °C)     SpO2: 96% 94% 95%   Weight: 220 lb (99.8 kg)     Height: 5' 7\" (1.702 m)       -------------------------  BP: (!) 168/77, Temp: 98.4 °F (36.9 °C), Pulse: 63, Resp: 20      RE-EVALUATION: Patient was noted to have an elevated D-dimer CT shows no evidence of a pulmonary embolism but does show findings consistent with Covid the patient exposed to her  who also has Covid patient is already on steroids states that with exertion her oxygen level will drop we have not had any hypoxia documented here in the emergency department as her pulse ox has been in the mid 90s throughout her stay here however she states with exertion at home and will drop into the upper 80s so I will write the patient for oxygen  The patient presented with viral symptoms which are worrisome for COVID-19. The patient's symptoms are not suggestive in nature of pulmonary embolus, cardiac ischemia, meningitis, epiglottitis, airway obstruction or other serious etiology. Given the extremely low risk of these diagnoses further testing and evaluation of these possibilities are not indicated at this time. In my opinion, the patient currently does not have an emergency medical condition as defined by EMTALA. The patient has improved here and currently, vital signs are stable and the patient is not hypoxic or tachypneic. In my opinion, the patient has been adequately stabilized and I feel that it is unlikely that the patient will decompensate in the near future. They have been instructed to self quarantine for at least 14 days from the onset of symptoms or 72 hours after resolution of their fever without any antipyretic medications (whichever is shorter). Isolation strategies have been discussed and reinforced to protect their friends and families. I have answered questions asked to the patients satisfaction. In light of the current pandemic and in the effort to best utilize resources, the patient will be discharged with the knowledge that there is additional risk for progression of their disease that can be serious. The patient understands that at this time there is no evidence for a more malignant underlying process, but the patient also understands that early in the process of an illness or injury, an emergency department workup can be falsely reassuring. Routine discharge counseling was given, and the patient understands that worsening, changing or persistent symptoms should prompt an immediate call or follow up with their primary physician or return to the emergency department. The importance of appropriate follow up was also discussed. I have reviewed the disposition diagnosis with the patient and or their family/guardian. I have answered their questions and given discharge instructions. They voiced understanding of these instructions and did not have any further questions or complaints. PROCEDURES:  None    FINAL IMPRESSION      1. Suspected COVID-19 virus infection          DISPOSITION/PLAN   DISPOSITION        CONDITION ON DISPOSITION:   Stable    PATIENT REFERRED TO:  Latyoa Meredith DO  [address] [place] [id number] [phone]    Call in 1 day        DISCHARGE MEDICATIONS:  New Prescriptions    No medications on file       (Please note that portions of this note were completed with a voicerecognition program.  Efforts were made to edit the dictations but occasionally words are mis-transcribed.)    [place] MD, F.A.C.E.P.   Attending Emergency Medicine Physician       Genesis Mariscal MD  01/10/21 [id number]

## 2021-01-10 NOTE — ED NOTES
Wife reports that she has recorded pulse ox readings of 88 to 89 at home at intervals.      Madhavi Alonzo RN  01/10/21 [id number]

## 2021-01-10 NOTE — ED NOTES
[facility] does not have the equipment for oxygen. Wife request [facility] .  Message left for Carla Mejia RN  01/10/21 [id number]

## 2021-01-10 NOTE — ED NOTES
[facility] called back and will be able to supply oxygen.  Request to have staff call when patient is discharged     Nolan Perez RN  01/10/21 [id number]

## 2021-01-10 NOTE — ED NOTES
Patient given oxygen tank to take home and instructions on how to operate oxygen and how to place nasal cannula      Pearl Skaggs RN  01/10/21 1037

## 2021-01-11 ENCOUNTER — PATIENT MESSAGE (OUTPATIENT)
Dept: FAMILY MEDICINE CLINIC | Age: 71
End: 2021-01-11

## 2021-01-11 RX ORDER — ALBUTEROL SULFATE 90 UG/1
2 AEROSOL, METERED RESPIRATORY (INHALATION) EVERY 4 HOURS PRN
Qty: 1 INHALER | Refills: 1 | Status: SHIPPED | OUTPATIENT
Start: 2021-01-11 | End: 2021-03-03 | Stop reason: SDUPTHER

## 2021-01-11 NOTE — TELEPHONE ENCOUNTER
From: Kelly Khan  To: Latoya Meredith DO  Sent: 1/11/2021 8:33 AM EST  Subject: Visit Follow-Up Question    I was in the er yesterday. They did a cat scan that showed covid pnuemonia. Was sent home with oxygen. Unfortunately I can't get any air through my nasal passages most of the time. i am using the afrin sparingly this is the fourth day,it helps for a little while then closes back up. I have today and tomorrow left on the steroids. I am also taking robitussin dm and using my inhaler. I have had very little sleep because i can't breathe through the nose and my chest hurts. Last night was only three hours. My blood pressure this morning was 196 over 96 before atenolol. I am very scared, I don't think this is going to go well. I had a couple really bad flu bouts in my [age] and [age]ty's where i ended up with bronchitis.    Kelly Khan

## 2021-01-11 NOTE — TELEPHONE ENCOUNTER
Deborah Guillen called requesting a refill of the below medication which has been pended for you:     Requested Prescriptions     Pending Prescriptions Disp Refills    albuterol sulfate HFA (VENTOLIN HFA) 108 (90 Base) MCG/ACT inhaler 1 Inhaler 1     Sig: Inhale 2 puffs into the lungs every 4 hours as needed for Wheezing       Last Appointment Date: 12/9/2020  Next Appointment Date: 6/1/2021    Allergies   Allergen Reactions    Aspirin Other (See Comments)     Abdominal pain    Ibuprofen Other (See Comments)     Abdominal pain    Sulfamethoxazole-Trimethoprim Nausea Only and Other (See Comments)    Penicillins Rash

## 2021-01-13 ENCOUNTER — HOSPITAL ENCOUNTER (EMERGENCY)
Age: 71
Discharge: HOME | End: 2021-01-13
Attending: EMERGENCY MEDICINE
Payer: MEDICARE

## 2021-01-13 ENCOUNTER — APPOINTMENT (OUTPATIENT)
Dept: GENERAL RADIOLOGY | Age: 71
End: 2021-01-13
Payer: MEDICARE

## 2021-01-13 VITALS
TEMPERATURE: 96.6 F | DIASTOLIC BLOOD PRESSURE: 78 MMHG | HEART RATE: 65 BPM | HEIGHT: 67 IN | WEIGHT: 220 LBS | OXYGEN SATURATION: 95 % | RESPIRATION RATE: 16 BRPM | BODY MASS INDEX: 34.53 KG/M2 | SYSTOLIC BLOOD PRESSURE: 159 MMHG

## 2021-01-13 DIAGNOSIS — J12.82 PNEUMONIA DUE TO COVID-19 VIRUS: Primary | ICD-10-CM

## 2021-01-13 DIAGNOSIS — U07.1 PNEUMONIA DUE TO COVID-19 VIRUS: Primary | ICD-10-CM

## 2021-01-13 LAB
-: NORMAL
ABSOLUTE EOS #: <0.03 K/UL (ref 0–0.44)
ABSOLUTE IMMATURE GRANULOCYTE: 0.05 K/UL (ref 0–0.3)
ABSOLUTE LYMPH #: 1.87 K/UL (ref 1.1–3.7)
ABSOLUTE MONO #: 0.87 K/UL (ref 0.1–1.2)
ANION GAP SERPL CALCULATED.3IONS-SCNC: 9 MMOL/L (ref 9–17)
BASOPHILS # BLD: 0 % (ref 0–2)
BASOPHILS ABSOLUTE: 0.03 K/UL (ref 0–0.2)
BNP INTERPRETATION: NORMAL
BUN BLDV-MCNC: 14 MG/DL (ref 8–23)
BUN/CREAT BLD: 20 (ref 9–20)
CALCIUM SERPL-MCNC: 8.6 MG/DL (ref 8.6–10.4)
CHLORIDE BLD-SCNC: 104 MMOL/L (ref 98–107)
CO2: 27 MMOL/L (ref 20–31)
CREAT SERPL-MCNC: 0.69 MG/DL (ref 0.5–0.9)
DIFFERENTIAL TYPE: ABNORMAL
EKG ATRIAL RATE: 70 BPM
EKG P AXIS: 36 DEGREES
EKG P-R INTERVAL: 154 MS
EKG Q-T INTERVAL: 410 MS
EKG QRS DURATION: 96 MS
EKG QTC CALCULATION (BAZETT): 442 MS
EKG R AXIS: -6 DEGREES
EKG T AXIS: 29 DEGREES
EKG VENTRICULAR RATE: 70 BPM
EOSINOPHILS RELATIVE PERCENT: 0 % (ref 1–4)
GFR AFRICAN AMERICAN: >60 ML/MIN
GFR NON-AFRICAN AMERICAN: >60 ML/MIN
GFR SERPL CREATININE-BSD FRML MDRD: ABNORMAL ML/MIN/{1.73_M2}
GFR SERPL CREATININE-BSD FRML MDRD: ABNORMAL ML/MIN/{1.73_M2}
GLUCOSE BLD-MCNC: 102 MG/DL (ref 70–99)
HCT VFR BLD CALC: 40.6 % (ref 36.3–47.1)
HEMOGLOBIN: 12.8 G/DL (ref 11.9–15.1)
IMMATURE GRANULOCYTES: 1 %
LYMPHOCYTES # BLD: 25 % (ref 24–43)
MCH RBC QN AUTO: 27.9 PG (ref 25.2–33.5)
MCHC RBC AUTO-ENTMCNC: 31.5 G/DL (ref 25.2–33.5)
MCV RBC AUTO: 88.6 FL (ref 82.6–102.9)
MONOCYTES # BLD: 11 % (ref 3–12)
NRBC AUTOMATED: 0 PER 100 WBC
PDW BLD-RTO: 14.5 % (ref 11.8–14.4)
PLATELET # BLD: 188 K/UL (ref 138–453)
PLATELET ESTIMATE: ABNORMAL
PMV BLD AUTO: 9.6 FL (ref 8.1–13.5)
POTASSIUM SERPL-SCNC: 3.5 MMOL/L (ref 3.7–5.3)
PRO-BNP: 91 PG/ML
RBC # BLD: 4.58 M/UL (ref 3.95–5.11)
RBC # BLD: ABNORMAL 10*6/UL
REASON FOR REJECTION: NORMAL
SEG NEUTROPHILS: 63 % (ref 36–65)
SEGMENTED NEUTROPHILS ABSOLUTE COUNT: 4.76 K/UL (ref 1.5–8.1)
SODIUM BLD-SCNC: 140 MMOL/L (ref 135–144)
TROPONIN INTERP: NORMAL
TROPONIN T: NORMAL NG/ML
TROPONIN, HIGH SENSITIVITY: <6 NG/L (ref 0–14)
WBC # BLD: 7.6 K/UL (ref 3.5–11.3)
WBC # BLD: ABNORMAL 10*3/UL
ZZ NTE CLEAN UP: ORDERED TEST: NORMAL
ZZ NTE WITH NAME CLEAN UP: SPECIMEN SOURCE: NORMAL

## 2021-01-13 PROCEDURE — 84484 ASSAY OF TROPONIN QUANT: CPT

## 2021-01-13 PROCEDURE — 80048 BASIC METABOLIC PNL TOTAL CA: CPT

## 2021-01-13 PROCEDURE — 6360000002 HC RX W HCPCS: Performed by: EMERGENCY MEDICINE

## 2021-01-13 PROCEDURE — 96374 THER/PROPH/DIAG INJ IV PUSH: CPT

## 2021-01-13 PROCEDURE — 83880 ASSAY OF NATRIURETIC PEPTIDE: CPT

## 2021-01-13 PROCEDURE — 85025 COMPLETE CBC W/AUTO DIFF WBC: CPT

## 2021-01-13 PROCEDURE — 2580000003 HC RX 258: Performed by: EMERGENCY MEDICINE

## 2021-01-13 PROCEDURE — 93005 ELECTROCARDIOGRAM TRACING: CPT | Performed by: EMERGENCY MEDICINE

## 2021-01-13 PROCEDURE — 71045 X-RAY EXAM CHEST 1 VIEW: CPT

## 2021-01-13 PROCEDURE — 99284 EMERGENCY DEPT VISIT MOD MDM: CPT

## 2021-01-13 RX ORDER — 0.9 % SODIUM CHLORIDE 0.9 %
1000 INTRAVENOUS SOLUTION INTRAVENOUS ONCE
Status: COMPLETED | OUTPATIENT
Start: 2021-01-13 | End: 2021-01-13

## 2021-01-13 RX ORDER — ONDANSETRON 2 MG/ML
4 INJECTION INTRAMUSCULAR; INTRAVENOUS ONCE
Status: COMPLETED | OUTPATIENT
Start: 2021-01-13 | End: 2021-01-13

## 2021-01-13 RX ADMIN — SODIUM CHLORIDE 1000 ML: 9 INJECTION, SOLUTION INTRAVENOUS at 08:23

## 2021-01-13 RX ADMIN — ONDANSETRON 4 MG: 2 INJECTION INTRAMUSCULAR; INTRAVENOUS at 08:23

## 2021-01-13 NOTE — ED PROVIDER NOTES
[facility] ED  [address]  [place] [address]  Phone: [address]      Pt Name: Austin Anderson  MRN: [id number]  Ericgfhank 1950  Date of evaluation: 1/13/2021    CHIEF COMPLAINT       Chief Complaint   Patient presents with    Other     presents for c/o nausea and weakness this AM       HISTORY OF PRESENT ILLNESS    Austin Anderson is a 72 y.o. female who presents to the emergency department with nausea and fatigue and generalized weakness that started this morning. She was in the bathroom when she became diaphoretic and warm and became very weak and had to sit down on the floor the bathroom. It took her a while to regain her strength. She felt like she was going to pass out. She denies any chest pain or palpitations she has had some chest soreness with cough and diagnosed with Covid this past weekend. She denies any significant shortness of breath her home pulse ox was in the 90s. She feels a little bit better now. She was seen here on Sunday when she was diagnosed. REVIEW OF SYSTEMS       Constitutional: Positive fevers and chills  HEENT: No sore throat, rhinorrhea, or earache   Eyes: No blurry vision or double vision no drainage   Cardiovascular: No chest pain or tachycardia chest soreness with cough   Respiratory: No wheezing or shortness of breath positive cough   Gastrointestinal: Positive nausea no vomiting or diarrhea no constipation no abdominal pain.   : No hematuria or dysuria   Musculoskeletal: No swelling or pain   Skin: No rash   Neurological: No focal neurologic complaints, paresthesias, weakness, or headache     PAST MEDICAL HISTORY has a past medical history of Allergic rhinitis, Anxiety, Bursitis, Bursitis of hip, right, Carpal tunnel syndrome, bilateral, GERD (gastroesophageal reflux disease), Hyperlipidemia, Hypertension, Impaired fasting glucose, Labyrinthitis, Melanoma in situ ([facility] [address] Melanoma in situ of nose ([facility] [address] Osteoarthritis, and Palpitations. SURGICAL HISTORY      has a past surgical history that includes Tonsillectomy (1974); Tubal ligation (07/03/1983); Heel spur surgery (Bilateral); Breast surgery (Right); Breast lumpectomy (Right, 2003); Heel spur surgery (Right, 2003); Tubal ligation (1983); Colonoscopy (12/2008); Colonoscopy (1998); Colonoscopy (2003); Cataract removal with implant (Right, 07/29/2017); Cataract removal with implant (Left, 08/05/2017); Soft Tissue Biopsy (Left, 12/21/2017); Colonoscopy (N/A, 2/14/2019); Upper gastrointestinal endoscopy (N/A, 2/14/2019); hysteroscopy (08/07/2019); and Dilation and curettage of uterus (N/A, 8/7/2019). CURRENT MEDICATIONS       Previous Medications    ALBUTEROL SULFATE HFA (VENTOLIN HFA) 108 (90 BASE) MCG/ACT INHALER    Inhale 2 puffs into the lungs every 4 hours as needed for Wheezing    ASCORBIC ACID (VITAMIN C) 500 MG TABLET    Take 500 mg by mouth daily. ATENOLOL (TENORMIN) 25 MG TABLET    take 1 tablet by mouth once daily    AZITHROMYCIN (ZITHROMAX Z-LUIS ALBERTO) 250 MG TABLET    Take as directed    BLOOD GLUCOSE TEST STRIPS (ONE TOUCH TEST STRIPS) STRIP    Tests 1 X daily. DX E11.9    CETIRIZINE (ZYRTEC ALLERGY) 10 MG TABLET    Take 1 tablet by mouth daily    MELOXICAM (MOBIC) 7.5 MG TABLET    TAKE 1 TABLET ONCE A DAY    MONTELUKAST (SINGULAIR) 10 MG TABLET    Take 1 tablet by mouth daily    NYSTATIN (MYCOSTATIN) 692443 UNIT/GM POWDER    Apply 2 times daily as needed for itching or irritation.     OMEPRAZOLE (PRILOSEC) 20 MG DELAYED RELEASE CAPSULE    Take 1 capsule by mouth 2 times daily    ONE TOUCH LANCETS MISC    Tests 1 x daily DX. E11.9 PREDNISONE (DELTASONE) 20 MG TABLET    1 bid for 5 days    PSEUDOEPHEDRINE (SUDAFED) 30 MG TABLET    Take 30 mg by mouth every 4 hours as needed. THERAPEUTIC MULTIVITAMIN-MINERALS (THERAGRAN-M) TABLET    Take 1 tablet by mouth daily. VITAMIN D PO    Take 2,000 Units by mouth daily       ALLERGIES     is allergic to aspirin; ibuprofen; sulfamethoxazole-trimethoprim; and penicillins. FAMILY HISTORY     She indicated that her mother is . She indicated that her father is . She indicated that both of her sisters are alive. She indicated that her brother is alive. She indicated that her son is alive. family history includes Breast Cancer in her sister; Cancer in her sister and sister; Other in her mother and son; Other (age of onset: 79) in her brother; Prostate Cancer in her father. SOCIAL HISTORY      reports that she is a non-smoker but has been exposed to tobacco smoke. She has never used smokeless tobacco. She reports current alcohol use. She reports that she does not use drugs. PHYSICAL EXAM       ED Triage Vitals [21 0745]   BP Temp Temp Source Pulse Resp SpO2 Height Weight   (!) 166/79 96.6 °F (35.9 °C) Tympanic 71 16 94 % 5' 7\" (1.702 m) 220 lb (99.8 kg)       Constitutional: Alert, oriented x3, nontoxic, answering questions appropriately, acting properly for age, in no acute distress   HEENT: Extraocular muscles intact, mucus membranes dry  Neck: Trachea midline   Cardiovascular: Regular rhythm and rate no S3, S4, or murmurs   Respiratory: Diminished to auscultation bilaterally no wheezes, rhonchi, rales, no respiratory distress no tachypnea no retractions no hypoxia  Gastrointestinal: Soft, nontender, nondistended, positive bowel sounds. No rebound, rigidity, or guarding.    Musculoskeletal: No extremity pain or swelling   Neurologic: Moving all 4 extremities without difficulty there are no gross focal neurologic deficits   Skin: Warm and dry DIFFERENTIAL DIAGNOSIS/ MDM:     Covid pneumonia. Generalized weakness. IV fluids and labs. DIAGNOSTIC RESULTS     EKG: All EKG's are interpreted by the Emergency Department Physician who either signs or Co-signs this chart in the absence of a cardiologist.    812 sinus rhythm rate 70  QRS 96  no acute ST or T wave changes. Not indicated unless otherwise documented above    LABS:  Results for orders placed or performed during the hospital encounter of 01/13/21   CBC Auto Differential   Result Value Ref Range    WBC 7.6 3.5 - 11.3 k/uL    RBC 4.58 3.95 - 5.11 m/uL    Hemoglobin 12.8 11.9 - 15.1 g/dL    Hematocrit 40.6 36.3 - 47.1 %    MCV 88.6 82.6 - 102.9 fL    MCH 27.9 25.2 - 33.5 pg    MCHC 31.5 25.2 - 33.5 g/dL    RDW 14.5 (H) 11.8 - 14.4 %    Platelets 853 780 - 101 k/uL    MPV 9.6 8.1 - 13.5 fL    NRBC Automated 0.0 0.0 per 100 WBC    Differential Type NOT REPORTED     Seg Neutrophils 63 36 - 65 %    Lymphocytes 25 24 - 43 %    Monocytes 11 3 - 12 %    Eosinophils % 0 (L) 1 - 4 %    Basophils 0 0 - 2 %    Immature Granulocytes 1 (H) 0 %    Segs Absolute 4.76 1.50 - 8.10 k/uL    Absolute Lymph # 1.87 1.10 - 3.70 k/uL    Absolute Mono # 0.87 0.10 - 1.20 k/uL    Absolute Eos # <0.03 0.00 - 0.44 k/uL    Basophils Absolute 0.03 0.00 - 0.20 k/uL    Absolute Immature Granulocyte 0.05 0.00 - 0.30 k/uL    WBC Morphology NOT REPORTED     RBC Morphology ANISOCYTOSIS PRESENT     Platelet Estimate NOT REPORTED    SPECIMEN REJECTION   Result Value Ref Range    Specimen Source ER DRAW     Ordered Test BMP,BNP,TROPI     Reason for Rejection Unable to perform testing: Specimen hemolyzed.      - NOT REPORTED    Basic Metabolic Panel   Result Value Ref Range    Glucose 102 (H) 70 - 99 mg/dL    BUN 14 8 - 23 mg/dL    CREATININE 0.69 0.50 - 0.90 mg/dL    Bun/Cre Ratio 20 9 - 20    Calcium 8.6 8.6 - 10.4 mg/dL    Sodium 140 135 - 144 mmol/L    Potassium 3.5 (L) 3.7 - 5.3 mmol/L    Chloride 104 98 - 107 mmol/L CO2 27 20 - 31 mmol/L    Anion Gap 9 9 - 17 mmol/L    GFR Non-African American >60 >60 mL/min    GFR African American >60 >60 mL/min    GFR Comment          GFR Staging NOT REPORTED    Brain Natriuretic Peptide   Result Value Ref Range    Pro-BNP 91 <300 pg/mL    BNP Interpretation Pro-BNP Reference Range:    Troponin   Result Value Ref Range    Troponin, High Sensitivity <6 0 - 14 ng/L    Troponin T NOT REPORTED <0.03 ng/mL    Troponin Interp NOT REPORTED    EKG 12 Lead   Result Value Ref Range    Ventricular Rate 70 BPM    Atrial Rate 70 BPM    P-R Interval 154 ms    QRS Duration 96 ms    Q-T Interval 410 ms    QTc Calculation (Bazett) 442 ms    P Axis 36 degrees    R Axis -6 degrees    T Axis 29 degrees       Not indicated unless otherwise documented above    RADIOLOGY:   I reviewed the radiologist interpretations:    XR CHEST PORTABLE   Final Result   Very faint opacity in the left base which is most likely just atelectasis or   vascular shadow although a very early infiltrate cannot be completely   excluded. Follow-up chest x-ray would be suggested if symptomatology   continues.              Not indicated unless otherwise documented above    EMERGENCY DEPARTMENT COURSE:     The patient was given the following medications:  Orders Placed This Encounter   Medications    0.9 % sodium chloride bolus    ondansetron (ZOFRAN) injection 4 mg        Vitals:   -------------------------  BP (!) 166/79   Pulse 71   Temp 96.6 °F (35.9 °C) (Tympanic)   Resp 16   Ht 5' 7\" (1.702 m)   Wt 220 lb (99.8 kg)   SpO2 94%   BMI 34.46 kg/m² 9:25 AM blood work unremarkable normal troponin for white blood cell count normal.  Normal kidney function and electrolytes. Chest x-ray shows small area of atelectasis versus infiltrate. Had a CT the other day which showed groundglass appearance. Suspect Covid pneumonia. Supportive care. Oxygen monitor at home which she does have watch for oxygen less than 88%. Follow-up with family physician 1 quarantine over increase fluids as tolerated and return if worsening symptoms or other concerns. I have reviewed the disposition diagnosis with the patient and or their family/guardian. I have answered their questions and given discharge instructions. They voiced understanding of these instructions and did not have any furtherquestions or complaints. CRITICAL CARE:    None    CONSULTS:    None    PROCEDURES:    None      OARRS Report if indicated             FINAL IMPRESSION      1.  Pneumonia due to COVID-19 virus          DISPOSITION/PLAN   DISPOSITION  01/13/2021 09:43:00 AM        CONDITION ON DISPOSITION: STABLE       PATIENT REFERRED TO:  Vanita Chappell DO  [address] [place] [id number]    Schedule an appointment as soon as possible for a visit in 3 days        DISCHARGE MEDICATIONS:  New Prescriptions    No medications on file       (Please note that portions of thisnote were completed with a voice recognition program.  Efforts were made to edit the dictations but occasionally words are mis-transcribed.)    [facility],, DO  Attending Emergency Physician       [facility], DO  01/13/21 915 [address], DO  01/13/21 [id number]

## 2021-01-14 ENCOUNTER — CARE COORDINATION (OUTPATIENT)
Dept: CARE COORDINATION | Age: 71
End: 2021-01-14

## 2021-01-14 NOTE — CARE COORDINATION
questions related to their healthcare. Author provided contact information for future reference. Patient/family/caregiver given information for [employer] and agrees to enroll- no  Patient's preferred e-mail: n/a   Patient's preferred phone number: n/a  Based on Loop alert triggers, patient will be contacted by nurse care manager for worsening symptoms. Pt states she  went to ER a few days prior to last ER visit, which was on 01/10/2021, and was told to use O2, pt is asking how long should she continue using oxygen at home. writer notified pt that a RN will call her back to discuss oxygen use,pt declined the call from nurse and declined LOOP. Pt will contact her PCP to follow up and get clarification about O2 use at home.

## 2021-01-15 ENCOUNTER — OFFICE VISIT (OUTPATIENT)
Dept: PRIMARY CARE CLINIC | Age: 71
End: 2021-01-15
Payer: MEDICARE

## 2021-01-15 ENCOUNTER — TELEPHONE (OUTPATIENT)
Dept: FAMILY MEDICINE CLINIC | Age: 71
End: 2021-01-15

## 2021-01-15 VITALS
HEIGHT: 67 IN | SYSTOLIC BLOOD PRESSURE: 138 MMHG | OXYGEN SATURATION: 95 % | WEIGHT: 220 LBS | DIASTOLIC BLOOD PRESSURE: 88 MMHG | HEART RATE: 72 BPM | BODY MASS INDEX: 34.53 KG/M2 | RESPIRATION RATE: 20 BRPM | TEMPERATURE: 97.8 F

## 2021-01-15 DIAGNOSIS — U07.1 COVID-19: ICD-10-CM

## 2021-01-15 DIAGNOSIS — B37.0 ORAL THRUSH: Primary | ICD-10-CM

## 2021-01-15 PROCEDURE — 1123F ACP DISCUSS/DSCN MKR DOCD: CPT | Performed by: FAMILY MEDICINE

## 2021-01-15 PROCEDURE — G8400 PT W/DXA NO RESULTS DOC: HCPCS | Performed by: FAMILY MEDICINE

## 2021-01-15 PROCEDURE — G8427 DOCREV CUR MEDS BY ELIG CLIN: HCPCS | Performed by: FAMILY MEDICINE

## 2021-01-15 PROCEDURE — 4040F PNEUMOC VAC/ADMIN/RCVD: CPT | Performed by: FAMILY MEDICINE

## 2021-01-15 PROCEDURE — 99213 OFFICE O/P EST LOW 20 MIN: CPT | Performed by: FAMILY MEDICINE

## 2021-01-15 PROCEDURE — G8484 FLU IMMUNIZE NO ADMIN: HCPCS | Performed by: FAMILY MEDICINE

## 2021-01-15 PROCEDURE — 3017F COLORECTAL CA SCREEN DOC REV: CPT | Performed by: FAMILY MEDICINE

## 2021-01-15 PROCEDURE — G8417 CALC BMI ABV UP PARAM F/U: HCPCS | Performed by: FAMILY MEDICINE

## 2021-01-15 PROCEDURE — 1090F PRES/ABSN URINE INCON ASSESS: CPT | Performed by: FAMILY MEDICINE

## 2021-01-15 PROCEDURE — 99212 OFFICE O/P EST SF 10 MIN: CPT

## 2021-01-15 PROCEDURE — 1036F TOBACCO NON-USER: CPT | Performed by: FAMILY MEDICINE

## 2021-01-15 ASSESSMENT — ENCOUNTER SYMPTOMS
ABDOMINAL PAIN: 0
SINUS PRESSURE: 1
EYE REDNESS: 0
NAUSEA: 0
SORE THROAT: 0
SHORTNESS OF BREATH: 0
CONSTIPATION: 0
SINUS PAIN: 1
WHEEZING: 0
RHINORRHEA: 1
COUGH: 0
VOMITING: 0
EYE DISCHARGE: 0
TROUBLE SWALLOWING: 0
DIARRHEA: 0

## 2021-01-15 ASSESSMENT — PATIENT HEALTH QUESTIONNAIRE - PHQ9
SUM OF ALL RESPONSES TO PHQ QUESTIONS 1-9: 1
2. FEELING DOWN, DEPRESSED OR HOPELESS: 1
SUM OF ALL RESPONSES TO PHQ QUESTIONS 1-9: 1

## 2021-01-15 NOTE — PROGRESS NOTES
1/15/2021     Rafa Hernandez (:  1950) is a 72 y.o. female, here for evaluation of the following medical concerns:    HPI  Patient recently just got over COVID-19 respiratory infection. Had been quite acutely ill during this time and did ultimately end up taking oral steroid. She was doing better in the last 2 days but then last night noted that her tongue was thick and swollen and she did have bleeding on the left side of her tongue. Did have cottage cheese white discharge in the center crack of the tongue. Has had oral thrush before so she was concerned about oral thrush again. Did use salt water rinses today which did seem to help. As far as her COVID-19 infection she seems to be doing better as far as her overall symptoms. Started with infectious symptoms on  so she is over the acute phase and now just feels very rundown and tired. Has had a lot of sinus drainage and congestion but not having significant shortness of breath or cough. Slowly improving. Patient otherwise has no other acute issues at this time. Did review patient's med list, allergies, social history, fam history, pmhx and pshx today as noted in the record. Review of Systems   Constitutional: Positive for fatigue. Negative for chills and fever. HENT: Positive for congestion, mouth sores, postnasal drip, rhinorrhea, sinus pressure and sinus pain. Negative for ear pain, sore throat and trouble swallowing. Eyes: Negative for discharge and redness. Respiratory: Negative for cough, shortness of breath and wheezing. Cardiovascular: Negative for chest pain. Gastrointestinal: Negative for abdominal pain, constipation, diarrhea, nausea and vomiting. Genitourinary: Negative for dysuria, flank pain, frequency and urgency. Musculoskeletal: Negative for arthralgias, myalgias and neck pain. Skin: Negative for rash and wound. Allergic/Immunologic: Negative for environmental allergies. Neurological: Positive for weakness. Negative for dizziness, light-headedness and headaches. Hematological: Negative for adenopathy. Psychiatric/Behavioral: Negative. Prior to Visit Medications    Medication Sig Taking? Authorizing Provider   albuterol sulfate HFA (VENTOLIN HFA) 108 (90 Base) MCG/ACT inhaler Inhale 2 puffs into the lungs every 4 hours as needed for Wheezing Yes Vadim Guess, DO   VITAMIN D PO Take 2,000 Units by mouth daily Yes Historical Provider, MD   meloxicam (MOBIC) 7.5 MG tablet TAKE 1 TABLET ONCE A DAY Yes Fabby Lima, DO   montelukast (SINGULAIR) 10 MG tablet Take 1 tablet by mouth daily Yes Vadim Guess, DO   cetirizine (ZYRTEC ALLERGY) 10 MG tablet Take 1 tablet by mouth daily Yes Vadim Guess, DO   nystatin (MYCOSTATIN) 135016 UNIT/GM powder Apply 2 times daily as needed for itching or irritation. Yes Vadim Guess, DO   atenolol (TENORMIN) 25 MG tablet take 1 tablet by mouth once daily Yes Vadim Guess, DO   omeprazole (PRILOSEC) 20 MG delayed release capsule Take 1 capsule by mouth 2 times daily Yes Vadim Guess, DO   blood glucose test strips (ONE TOUCH TEST STRIPS) strip Tests 1 X daily. DX E11.9 Yes Vadim Guess, DO   ONE TOUCH LANCETS MISC Tests 1 x daily DX. E11.9 Yes Vadim Guess, DO   therapeutic multivitamin-minerals [facility]) tablet Take 1 tablet by mouth daily. Yes Historical Provider, MD   Ascorbic Acid (VITAMIN C) 500 MG tablet Take 500 mg by mouth daily. Yes Historical Provider, MD   pseudoephedrine (SUDAFED) 30 MG tablet Take 30 mg by mouth every 4 hours as needed. Historical Provider, MD        Social History     Tobacco Use    Smoking status: Passive Smoke Exposure - Never Smoker    Smokeless tobacco: Never Used    Tobacco comment: very remotely would smoke a few cigarettes on weekends/college days   Substance Use Topics    Alcohol use:  Yes     Alcohol/week: 0.0 standard drinks Comment: occasional use        Vitals:    01/15/21 1127   BP: 138/88   Site: Left Upper Arm   Position: Sitting   Cuff Size: Large Adult   Pulse: 72   Resp: 20   Temp: 97.8 °F (36.6 °C)   TempSrc: Tympanic   SpO2: 95%   Weight: 220 lb (99.8 kg)   Height: 5' 7\" (1.702 m)     Estimated body mass index is 34.46 kg/m² as calculated from the following:    Height as of this encounter: 5' 7\" (1.702 m). Weight as of this encounter: 220 lb (99.8 kg). Physical Exam  Vitals signs and nursing note reviewed. Constitutional:       General: She is not in acute distress. Appearance: Normal appearance. She is well-developed. She is not diaphoretic. HENT:      Head: Normocephalic and atraumatic. Right Ear: External ear normal.      Left Ear: External ear normal.      Ears:      Comments: TMs dull with fluid behind the TM     Nose: Congestion and rhinorrhea present. Mouth/Throat:      Pharynx: No posterior oropharyngeal erythema. Comments: Slight white exudate noted to tongue. Tongue is normal in size. No exudate noted to the oral mucosa  Eyes:      General: No scleral icterus. Right eye: No discharge. Left eye: No discharge. Conjunctiva/sclera: Conjunctivae normal.      Pupils: Pupils are equal, round, and reactive to light. Neck:      Musculoskeletal: Normal range of motion and neck supple. Thyroid: No thyromegaly. Cardiovascular:      Rate and Rhythm: Normal rate and regular rhythm. Heart sounds: Normal heart sounds. Pulmonary:      Effort: Pulmonary effort is normal. No respiratory distress. Breath sounds: Normal breath sounds. No wheezing. Lymphadenopathy:      Cervical: No cervical adenopathy. Skin:     General: Skin is warm and dry. Findings: No rash. Neurological:      Mental Status: She is alert and oriented to person, place, and time. Psychiatric:         Behavior: Behavior normal.         Thought Content:  Thought content normal. Judgment: Judgment normal.         ASSESSMENT/PLAN:    Encounter Diagnoses   Name Primary?  Oral thrush Yes    COVID-19      Orders Placed This Encounter   Medications    nystatin (MYCOSTATIN) 799804 UNIT/ML suspension     Sig: Swish and swallow 5 mL qid     Dispense:  140 mL     Refill:  1     Did order the nystatin suspension as noted. Patient is to continue with symptomatic treatment of her residual respiratory symptoms. Can use Sudafed to dry up the sinus congestion drainage and make sure she is getting plenty of fluids and getting adequate rest.    Return  if no improvement in symptoms or if any further symptoms arise. (Please note that portions of this note were completed with a voice recognition program. Efforts were made to edit the dictations but occasionally words are mis-transcribed.)      No follow-ups on file. An electronic signature was used to authenticate this note.     --Janice Shore, DO on 1/15/2021 at 11:48 AM

## 2021-01-26 ENCOUNTER — TELEPHONE (OUTPATIENT)
Dept: FAMILY MEDICINE CLINIC | Age: 71
End: 2021-01-26

## 2021-01-26 NOTE — TELEPHONE ENCOUNTER
Pt had covid pneumonia 1/10/21. Pt says she still is not feeling better. Pt was recently treated for oral thrush and is almost finished with her second bottle of the meds. Pt now has sinus headache, sore throat white with red streaks and possibly pus. (Pt said she had tonsilectomy.)  Pt is feeling weak today.   Pt says she is feeling discouraged and depressed    Please call and advise

## 2021-01-28 ENCOUNTER — OFFICE VISIT (OUTPATIENT)
Dept: FAMILY MEDICINE CLINIC | Age: 71
End: 2021-01-28
Payer: MEDICARE

## 2021-01-28 ENCOUNTER — CARE COORDINATION (OUTPATIENT)
Dept: CARE COORDINATION | Age: 71
End: 2021-01-28

## 2021-01-28 VITALS
OXYGEN SATURATION: 96 % | WEIGHT: 221 LBS | HEIGHT: 67 IN | BODY MASS INDEX: 34.69 KG/M2 | RESPIRATION RATE: 16 BRPM | SYSTOLIC BLOOD PRESSURE: 138 MMHG | DIASTOLIC BLOOD PRESSURE: 68 MMHG | HEART RATE: 68 BPM

## 2021-01-28 DIAGNOSIS — G93.31 POST VIRAL SYNDROME: Primary | ICD-10-CM

## 2021-01-28 PROCEDURE — 99214 OFFICE O/P EST MOD 30 MIN: CPT

## 2021-01-28 PROCEDURE — G8417 CALC BMI ABV UP PARAM F/U: HCPCS | Performed by: FAMILY MEDICINE

## 2021-01-28 PROCEDURE — 1036F TOBACCO NON-USER: CPT | Performed by: FAMILY MEDICINE

## 2021-01-28 PROCEDURE — 3017F COLORECTAL CA SCREEN DOC REV: CPT | Performed by: FAMILY MEDICINE

## 2021-01-28 PROCEDURE — 1123F ACP DISCUSS/DSCN MKR DOCD: CPT | Performed by: FAMILY MEDICINE

## 2021-01-28 PROCEDURE — G8427 DOCREV CUR MEDS BY ELIG CLIN: HCPCS | Performed by: FAMILY MEDICINE

## 2021-01-28 PROCEDURE — 99213 OFFICE O/P EST LOW 20 MIN: CPT | Performed by: FAMILY MEDICINE

## 2021-01-28 PROCEDURE — 1090F PRES/ABSN URINE INCON ASSESS: CPT | Performed by: FAMILY MEDICINE

## 2021-01-28 PROCEDURE — 4040F PNEUMOC VAC/ADMIN/RCVD: CPT | Performed by: FAMILY MEDICINE

## 2021-01-28 PROCEDURE — G8400 PT W/DXA NO RESULTS DOC: HCPCS | Performed by: FAMILY MEDICINE

## 2021-01-28 PROCEDURE — G8484 FLU IMMUNIZE NO ADMIN: HCPCS | Performed by: FAMILY MEDICINE

## 2021-01-28 ASSESSMENT — ENCOUNTER SYMPTOMS
SHORTNESS OF BREATH: 0
SORE THROAT: 1
WHEEZING: 0
VOMITING: 0
DIARRHEA: 0
SINUS PAIN: 0
CONSTIPATION: 0
SINUS PRESSURE: 0
RHINORRHEA: 1
ABDOMINAL PAIN: 0
COUGH: 0
EYE DISCHARGE: 0
EYE REDNESS: 0
TROUBLE SWALLOWING: 0
NAUSEA: 0

## 2021-01-28 ASSESSMENT — PATIENT HEALTH QUESTIONNAIRE - PHQ9
SUM OF ALL RESPONSES TO PHQ9 QUESTIONS 1 & 2: 1
SUM OF ALL RESPONSES TO PHQ QUESTIONS 1-9: 1
1. LITTLE INTEREST OR PLEASURE IN DOING THINGS: 0
SUM OF ALL RESPONSES TO PHQ QUESTIONS 1-9: 1

## 2021-01-28 NOTE — PROGRESS NOTES
 Smoking status: Passive Smoke Exposure - Never Smoker    Smokeless tobacco: Never Used    Tobacco comment: very remotely would smoke a few cigarettes on weekends/college days   Substance Use Topics    Alcohol use: Yes     Alcohol/week: 0.0 standard drinks     Comment: occasional use        There were no vitals filed for this visit. Estimated body mass index is 34.46 kg/m² as calculated from the following:    Height as of 1/15/21: 5' 7\" (1.702 m). Weight as of 1/15/21: 220 lb (99.8 kg). Physical Exam  Vitals signs and nursing note reviewed. Constitutional:       General: She is not in acute distress. Appearance: Normal appearance. She is well-developed. She is not diaphoretic. HENT:      Head: Normocephalic and atraumatic. Right Ear: External ear normal.      Left Ear: External ear normal.      Ears:      Comments: TMs dull with fluid behind the TM     Nose: Congestion and rhinorrhea present. Mouth/Throat:      Pharynx: No posterior oropharyngeal erythema. Comments: Post nasal drainage noted  Eyes:      General: No scleral icterus. Right eye: No discharge. Left eye: No discharge. Conjunctiva/sclera: Conjunctivae normal.      Pupils: Pupils are equal, round, and reactive to light. Neck:      Musculoskeletal: Normal range of motion and neck supple. Thyroid: No thyromegaly. Cardiovascular:      Rate and Rhythm: Normal rate and regular rhythm. Heart sounds: Normal heart sounds. Pulmonary:      Effort: Pulmonary effort is normal. No respiratory distress. Breath sounds: Normal breath sounds. No wheezing. Lymphadenopathy:      Cervical: No cervical adenopathy. Skin:     General: Skin is warm and dry. Findings: No rash. Neurological:      Mental Status: She is alert and oriented to person, place, and time. Psychiatric:         Behavior: Behavior normal.         Thought Content:  Thought content normal.         Judgment: Judgment normal. ASSESSMENT/PLAN:  Encounter Diagnosis   Name Primary?  Post viral syndrome Yes     Reassurance is given. I do not see anything on physical exam of concern that makes me concerned about a possible underlying bacterial infection. Suspect this is all just more post viral in nature. Would just continue with salt water gargles to try to help with the irritation to the mouth. Did advise her to continue to eat healthy and try to boost her immune response. Hopefully with time this will improve. She can do over-the-counter Tylenol or any type of decongestant mucolytic medication as needed for symptom relief. Should maintain adequate fluid hydration. Return  if no improvement in symptoms or if any further symptoms arise. (Please note that portions of this note were completed with a voice recognition program. Efforts were made to edit the dictations but occasionally words are mis-transcribed.)        No follow-ups on file. An electronic signature was used to authenticate this note.     --Jae Graham, DO on 1/28/2021 at 7:18 AM

## 2021-01-28 NOTE — CARE COORDINATION
attempt to reach patient. There was no answer. A message was left to have patient call back. [phone]. Pt has an appt with PCP today.

## 2021-01-29 ENCOUNTER — PATIENT MESSAGE (OUTPATIENT)
Dept: FAMILY MEDICINE CLINIC | Age: 71
End: 2021-01-29

## 2021-01-29 DIAGNOSIS — J01.40 ACUTE NON-RECURRENT PANSINUSITIS: Primary | ICD-10-CM

## 2021-01-29 RX ORDER — AZITHROMYCIN 250 MG/1
TABLET, FILM COATED ORAL
Qty: 1 PACKET | Refills: 1 | Status: SHIPPED | OUTPATIENT
Start: 2021-01-29 | End: 2021-03-03

## 2021-01-29 NOTE — TELEPHONE ENCOUNTER
From: Vivienne Blank  To: Keon Maya DO  Sent: 1/29/2021 7:33 AM EST  Subject: Non-Urgent Medical Question    Hi, I am so sorry for being such a pest. I know I just saw you yesterday but my sinus pain that started yesterday at five am yesterday has not let up at all. This feels different from before. My sinus had been feeling better. Could I have a sinus infection on top of the virus? Tylonol and sudefed are not giving me any relief. I only got four hours of sleep last night. Could I please try a zpack to see if that helps?     Vivienne Blank

## 2021-02-02 ENCOUNTER — PATIENT MESSAGE (OUTPATIENT)
Dept: FAMILY MEDICINE CLINIC | Age: 71
End: 2021-02-02

## 2021-02-02 RX ORDER — FLUCONAZOLE 100 MG/1
100 TABLET ORAL DAILY
Qty: 7 TABLET | Refills: 0 | Status: SHIPPED | OUTPATIENT
Start: 2021-02-02 | End: 2021-02-09

## 2021-02-02 NOTE — TELEPHONE ENCOUNTER
From: Kailyn Lyon  To: Evita Tamayo DO  Sent: 2/2/2021 8:45 AM EST  Subject: Visit Follow-Up Question    Hi, me again. I have been having pain in my chest so long I thought it was my lungs, but now I am sure it is my esophagus. For several weeks I have been having trouble with taking too big of a swallow, very painful. But now it hurts all the time from my throat all the way to my breastbone. I have a feeling the yeast infection in my mouth went down my esophagus. MY mouth is still a little sore also. Dellar Filter has helped sinus but not sure if I should refill.  Help! ):  [facility]

## 2021-02-02 NOTE — TELEPHONE ENCOUNTER
From: Rhonda Montero  To: Tiera Dela Cruz DO  Sent: 2/2/2021 10:17 AM EST  Subject: Visit Follow-Up Question    I did notice improvement with the nystatin my mouth still burns a little but it doesn't have a thick coating of white on my tongue. I think would like to try the oral yeast treatment. If you think I need additional coating treatment that would be fine. I think I had a slight fever again last night. My chest was really hurting. This started three weeks ago when the mouth started. I was starting to feel better till this started with the mouth and throat but I noticed the hard swallowing three at least three weeks ago. Should I get a refill on the zpack or just wait with that since I know it can cause a yeast infection?      ----- Message -----   From:Fabby Juarez DO   Sent:2/2/2021 9:56 AM EST   To:Huyen Keane   Subject:RE: Visit Follow-Up Question    My other suspicion may be that you have esophageal irritation from all of the different medications you have been taking for your symptoms. Did it improve at all with the nystatin solution? If it didn't, my suspicion would be more of an esophagitis and I would be more inclined to give you something to help coat the esophagus and upper GI tract like carafate. If you did notice improvement with the nystatin solution, then perhaps we give you an oral yeast treatment to clear any remaining yeast.    Dr. Julienne Finch      ----- Message -----   From: Rhonda Montero   Sent:2/2/2021 8:45 AM EST   To:Fabby Finch DO   Subject:Visit Follow-Up Question    Hi, me again. I have been having pain in my chest so long I thought it was my lungs, but now I am sure it is my esophagus. For several weeks I have been having trouble with taking too big of a swallow, very painful. But now it hurts all the time from my throat all the way to my breastbone. I have a feeling the yeast infection in my mouth went down my esophagus. MY mouth is still a little sore also.  Shauna Herman has helped sinus but not sure if I should refill.  Help! ):  Melissa Ledezma

## 2021-02-11 ENCOUNTER — PATIENT MESSAGE (OUTPATIENT)
Dept: FAMILY MEDICINE CLINIC | Age: 71
End: 2021-02-11

## 2021-02-12 NOTE — TELEPHONE ENCOUNTER
From: Lorraine Flores  To: Janice Shore DO  Sent: 2/11/2021 1:58 PM EST  Subject: Non-Urgent Medical Question    Hi, I finished the medication on Monday. My chest pain is mostly gone but a few days after I started the medicine I started to cough up small pieces of mucus and my breathing is wheezing and whistling so bad its keeping me awake at night, sounds like a cat meowing. I thought it would get better but it hasn't. Is this normal? I have an appointment to get the vaccine next Friday the 19th. Is it safe for me to get it?   Lorraine Flores

## 2021-02-19 ENCOUNTER — IMMUNIZATION (OUTPATIENT)
Dept: LAB | Age: 71
End: 2021-02-19
Payer: MEDICARE

## 2021-02-19 PROCEDURE — 91301 COVID-19, MODERNA VACCINE 100MCG/0.5ML DOSE: CPT

## 2021-03-01 ENCOUNTER — PATIENT MESSAGE (OUTPATIENT)
Dept: FAMILY MEDICINE CLINIC | Age: 71
End: 2021-03-01

## 2021-03-01 RX ORDER — OMEPRAZOLE 20 MG/1
CAPSULE, DELAYED RELEASE ORAL
Qty: 180 CAPSULE | Refills: 1 | Status: SHIPPED | OUTPATIENT
Start: 2021-03-01 | End: 2022-01-12 | Stop reason: SDUPTHER

## 2021-03-01 NOTE — TELEPHONE ENCOUNTER
Miley Montenegro called requesting a refill of the below medication which has been pended for you:     Requested Prescriptions     Pending Prescriptions Disp Refills    omeprazole (PRILOSEC) 20 MG delayed release capsule [Pharmacy Med Name: OMEPRAZOLE DR 20 MG CAPSULE] 180 capsule 1     Sig: take 1 capsule by mouth twice a day       Last Appointment Date: 1/28/2021  Next Appointment Date: 6/1/2021    Allergies   Allergen Reactions    Aspirin Other (See Comments)     Abdominal pain    Ibuprofen Other (See Comments)     Abdominal pain    Penicillin G Other (See Comments)    Sulfamethoxazole-Trimethoprim Nausea Only and Other (See Comments)    Penicillins Rash

## 2021-03-03 ENCOUNTER — OFFICE VISIT (OUTPATIENT)
Dept: FAMILY MEDICINE CLINIC | Age: 71
End: 2021-03-03
Payer: MEDICARE

## 2021-03-03 VITALS
WEIGHT: 219 LBS | OXYGEN SATURATION: 96 % | HEART RATE: 72 BPM | BODY MASS INDEX: 34.37 KG/M2 | DIASTOLIC BLOOD PRESSURE: 80 MMHG | SYSTOLIC BLOOD PRESSURE: 136 MMHG | RESPIRATION RATE: 18 BRPM | HEIGHT: 67 IN

## 2021-03-03 DIAGNOSIS — F32.9 REACTIVE DEPRESSION: ICD-10-CM

## 2021-03-03 DIAGNOSIS — G93.31 POST VIRAL SYNDROME: Primary | ICD-10-CM

## 2021-03-03 DIAGNOSIS — R05.9 COUGH: ICD-10-CM

## 2021-03-03 PROCEDURE — G8484 FLU IMMUNIZE NO ADMIN: HCPCS | Performed by: FAMILY MEDICINE

## 2021-03-03 PROCEDURE — 99214 OFFICE O/P EST MOD 30 MIN: CPT | Performed by: FAMILY MEDICINE

## 2021-03-03 PROCEDURE — G8427 DOCREV CUR MEDS BY ELIG CLIN: HCPCS | Performed by: FAMILY MEDICINE

## 2021-03-03 PROCEDURE — 3017F COLORECTAL CA SCREEN DOC REV: CPT | Performed by: FAMILY MEDICINE

## 2021-03-03 PROCEDURE — G8417 CALC BMI ABV UP PARAM F/U: HCPCS | Performed by: FAMILY MEDICINE

## 2021-03-03 PROCEDURE — 99211 OFF/OP EST MAY X REQ PHY/QHP: CPT

## 2021-03-03 PROCEDURE — 1123F ACP DISCUSS/DSCN MKR DOCD: CPT | Performed by: FAMILY MEDICINE

## 2021-03-03 PROCEDURE — 1090F PRES/ABSN URINE INCON ASSESS: CPT | Performed by: FAMILY MEDICINE

## 2021-03-03 PROCEDURE — 4040F PNEUMOC VAC/ADMIN/RCVD: CPT | Performed by: FAMILY MEDICINE

## 2021-03-03 PROCEDURE — 1036F TOBACCO NON-USER: CPT | Performed by: FAMILY MEDICINE

## 2021-03-03 PROCEDURE — G8400 PT W/DXA NO RESULTS DOC: HCPCS | Performed by: FAMILY MEDICINE

## 2021-03-03 RX ORDER — PREDNISONE 20 MG/1
TABLET ORAL
Qty: 15 TABLET | Refills: 0 | Status: SHIPPED | OUTPATIENT
Start: 2021-03-03 | End: 2021-06-07

## 2021-03-03 RX ORDER — ALBUTEROL SULFATE 90 UG/1
2 AEROSOL, METERED RESPIRATORY (INHALATION) EVERY 4 HOURS PRN
Qty: 1 INHALER | Refills: 1 | Status: SHIPPED | OUTPATIENT
Start: 2021-03-03

## 2021-03-03 ASSESSMENT — ENCOUNTER SYMPTOMS
DIARRHEA: 0
TROUBLE SWALLOWING: 0
RHINORRHEA: 0
WHEEZING: 0
COUGH: 1
ABDOMINAL PAIN: 0
EYE REDNESS: 0
SORE THROAT: 0
VOMITING: 0
EYE DISCHARGE: 0
SHORTNESS OF BREATH: 1
NAUSEA: 0
SINUS PRESSURE: 0
CONSTIPATION: 0

## 2021-03-03 NOTE — PROGRESS NOTES
3/3/2021     Clarisse Camara (:  1950) is a 72 y.o. female, here for evaluation of the following medical concerns:    HPI  Patient comes in today with concerns regarding continued symptoms post Covid. Patient did have Covid early in January and subsequently has just had ongoing continued symptoms since that time. She is over the acute infectious part of Covid but she just has symptoms of continued croupy type cough, chest congestion, irritation to the oral mucosa, generalized fatigue, neck and shoulder pain. All of the symptoms wax and wane. Some days will be better than others. It is getting very frustrating for patient as she is just feeling like it is never going to get better. Her  recently developed pneumonia after getting his first Covid vaccine as he was laying around for about a week after getting it and ended up getting pneumonia. This is frustrating as well. She states that he ultimately got thrush and she felt like she was getting thrush again as she was getting irritation to the tip of the tongue and some white discoloration. We placed her on nystatin solution which she was using and yesterday it caused her to have significant GI symptoms and irritation to the rectal area so she had stopped this medication. Does use her albuterol inhaler intermittently when she has the cough which does help. Has been using Robitussin-DM which she is not sure if it is really helping with her cough symptoms. Patient just does not know what to do to get better. She has been taking a lot of supplemental vitamins try to help boost her immunity. Has not had any persistent fevers. No other acute issues at this time. Did review patient's med list, allergies, social history, fam history, pmhx and pshx today as noted in the record. Preventative measures are reviewed today. See health maintenance section for complete preventative plan of care.       Review of Systems   Constitutional: Positive for fatigue. Negative for chills and fever. HENT: Positive for congestion and mouth sores. Negative for ear pain, postnasal drip, rhinorrhea, sinus pressure, sore throat and trouble swallowing. Eyes: Negative for discharge and redness. Respiratory: Positive for cough and shortness of breath. Negative for wheezing. Cardiovascular: Negative for chest pain. Gastrointestinal: Negative for abdominal pain, constipation, diarrhea, nausea and vomiting. Genitourinary: Negative for dysuria, flank pain, frequency and urgency. Musculoskeletal: Negative for arthralgias, myalgias and neck pain. Skin: Negative for rash and wound. Allergic/Immunologic: Negative for environmental allergies. Neurological: Negative for dizziness, weakness, light-headedness and headaches. Hematological: Negative for adenopathy. Psychiatric/Behavioral: Negative. Prior to Visit Medications    Medication Sig Taking? Authorizing Provider   omeprazole (PRILOSEC) 20 MG delayed release capsule take 1 capsule by mouth twice a day  Fabby Lima DO   nystatin (MYCOSTATIN) 006213 UNIT/ML suspension Swish and swallow 5 mL qid  Fabby Lima DO   azithromycin (ZITHROMAX Z-LUIS ALBERTO) 250 MG tablet Take as directed  Roselyn Cavazos DO   albuterol sulfate HFA (VENTOLIN HFA) 108 (90 Base) MCG/ACT inhaler Inhale 2 puffs into the lungs every 4 hours as needed for Wheezing  Fabby Lima DO   VITAMIN D PO Take 2,000 Units by mouth daily  Historical Provider, MD   meloxicam (MOBIC) 7.5 MG tablet TAKE 1 TABLET ONCE A DAY  Fabby Lima DO   montelukast (SINGULAIR) 10 MG tablet Take 1 tablet by mouth daily  Fabby Lima DO   cetirizine (ZYRTEC ALLERGY) 10 MG tablet Take 1 tablet by mouth daily  Fabby Lima DO   nystatin (MYCOSTATIN) 266489 UNIT/GM powder Apply 2 times daily as needed for itching or irritation.   Roselyn Cavazos DO   atenolol (TENORMIN) 25 MG tablet take 1 tablet by mouth once daily  Harrison MERINO Janie, DO   blood glucose test strips (ONE TOUCH TEST STRIPS) strip Tests 1 X daily. DX E11.9  Miroslavaalbert Hamilton, DO   ONE TOUCH LANCETS MISC Tests 1 x daily DX. E11.9  Miroslava Hamilton, DO   pseudoephedrine (SUDAFED) 30 MG tablet Take 30 mg by mouth every 4 hours as needed. Historical Provider, MD   therapeutic multivitamin-minerals (THERAGRAN-M) tablet Take 1 tablet by mouth daily. Historical Provider, MD   Ascorbic Acid (VITAMIN C) 500 MG tablet Take 500 mg by mouth daily. Historical Provider, MD        Social History     Tobacco Use    Smoking status: Passive Smoke Exposure - Never Smoker    Smokeless tobacco: Never Used    Tobacco comment: very remotely would smoke a few cigarettes on weekends/college days   Substance Use Topics    Alcohol use: Yes     Frequency: Monthly or less     Drinks per session: 1 or 2     Binge frequency: Never     Comment: occasional use        There were no vitals filed for this visit. Estimated body mass index is 34.61 kg/m² as calculated from the following:    Height as of 1/28/21: 5' 7\" (1.702 m). Weight as of 1/28/21: 221 lb (100.2 kg). Physical Exam  Vitals signs and nursing note reviewed. Constitutional:       General: She is not in acute distress. Appearance: Normal appearance. She is well-developed. She is not diaphoretic. HENT:      Head: Normocephalic and atraumatic. Right Ear: External ear normal.      Left Ear: External ear normal.      Ears:      Comments: TMs dull with fluid behind the TM     Nose: Congestion and rhinorrhea present. Mouth/Throat:      Pharynx: No posterior oropharyngeal erythema. Comments: Post nasal drainage noted. Tongue and oral mucosa without significant exudate noted today. Does not appear consistent with thrush  Eyes:      General: No scleral icterus. Right eye: No discharge. Left eye: No discharge.       Conjunctiva/sclera: Conjunctivae normal.      Pupils: Pupils are equal, round, and for the last couple of months duration. Did talk about adding medical therapy to help with depression symptoms but she had declined at this time. Patient is to return to my office as scheduled for her routine medical follow-up visit or sooner if any further problems or symptoms arise. (Please note that portions of this note were completed with a voice recognition program. Efforts were made to edit the dictations but occasionally words are mis-transcribed.)        No follow-ups on file. An electronic signature was used to authenticate this note.     --Tristin Gurrola,  on 3/3/2021 at 7:31 AM

## 2021-03-14 ENCOUNTER — PATIENT MESSAGE (OUTPATIENT)
Dept: FAMILY MEDICINE CLINIC | Age: 71
End: 2021-03-14

## 2021-03-15 RX ORDER — SCOLOPAMINE TRANSDERMAL SYSTEM 1 MG/1
1 PATCH, EXTENDED RELEASE TRANSDERMAL
Qty: 2 PATCH | Refills: 0 | Status: SHIPPED | OUTPATIENT
Start: 2021-03-15 | End: 2021-06-07

## 2021-03-15 NOTE — TELEPHONE ENCOUNTER
From: Pia Norris  To: Vadim Guess, DO  Sent: 3/14/2021 12:49 PM EDT  Subject: Non-Urgent Medical Question    Hi I am traveling to [place] on the 20th. Last year I got very dizzy driving down. Returning I asked for scopolamine patch and that worked so much better. I do all the driving so I wonder if I could get two one for going down and one for coming back April 30th.      Thanks, Elliott

## 2021-03-17 ENCOUNTER — IMMUNIZATION (OUTPATIENT)
Dept: LAB | Age: 71
End: 2021-03-17
Payer: MEDICARE

## 2021-03-17 PROCEDURE — 91301 COVID-19, MODERNA VACCINE 100MCG/0.5ML DOSE: CPT

## 2021-04-23 RX ORDER — ATENOLOL 25 MG/1
25 TABLET ORAL DAILY
Qty: 90 TABLET | Refills: 1 | Status: SHIPPED | OUTPATIENT
Start: 2021-04-23 | End: 2021-10-12

## 2021-05-05 ENCOUNTER — HOSPITAL ENCOUNTER (OUTPATIENT)
Dept: GENERAL RADIOLOGY | Age: 71
Discharge: HOME | End: 2021-05-07
Payer: MEDICARE

## 2021-05-05 ENCOUNTER — OFFICE VISIT (OUTPATIENT)
Dept: PRIMARY CARE CLINIC | Age: 71
End: 2021-05-05
Payer: MEDICARE

## 2021-05-05 VITALS
OXYGEN SATURATION: 96 % | HEIGHT: 67 IN | SYSTOLIC BLOOD PRESSURE: 129 MMHG | BODY MASS INDEX: 35 KG/M2 | DIASTOLIC BLOOD PRESSURE: 87 MMHG | TEMPERATURE: 97.3 F | WEIGHT: 223 LBS | RESPIRATION RATE: 16 BRPM | HEART RATE: 67 BPM

## 2021-05-05 DIAGNOSIS — M79.641 RIGHT HAND PAIN: ICD-10-CM

## 2021-05-05 DIAGNOSIS — R10.11 RIGHT UPPER QUADRANT PAIN: ICD-10-CM

## 2021-05-05 DIAGNOSIS — M65.331 TRIGGER FINGER, RIGHT MIDDLE FINGER: Primary | ICD-10-CM

## 2021-05-05 PROCEDURE — 1090F PRES/ABSN URINE INCON ASSESS: CPT | Performed by: FAMILY MEDICINE

## 2021-05-05 PROCEDURE — 4040F PNEUMOC VAC/ADMIN/RCVD: CPT | Performed by: FAMILY MEDICINE

## 2021-05-05 PROCEDURE — 1123F ACP DISCUSS/DSCN MKR DOCD: CPT | Performed by: FAMILY MEDICINE

## 2021-05-05 PROCEDURE — G8400 PT W/DXA NO RESULTS DOC: HCPCS | Performed by: FAMILY MEDICINE

## 2021-05-05 PROCEDURE — G8427 DOCREV CUR MEDS BY ELIG CLIN: HCPCS | Performed by: FAMILY MEDICINE

## 2021-05-05 PROCEDURE — 99214 OFFICE O/P EST MOD 30 MIN: CPT | Performed by: FAMILY MEDICINE

## 2021-05-05 PROCEDURE — 73130 X-RAY EXAM OF HAND: CPT

## 2021-05-05 PROCEDURE — 3017F COLORECTAL CA SCREEN DOC REV: CPT | Performed by: FAMILY MEDICINE

## 2021-05-05 PROCEDURE — G8417 CALC BMI ABV UP PARAM F/U: HCPCS | Performed by: FAMILY MEDICINE

## 2021-05-05 PROCEDURE — 1036F TOBACCO NON-USER: CPT | Performed by: FAMILY MEDICINE

## 2021-05-05 PROCEDURE — 99214 OFFICE O/P EST MOD 30 MIN: CPT

## 2021-05-05 ASSESSMENT — ENCOUNTER SYMPTOMS
DIARRHEA: 1
NAUSEA: 1
FLATUS: 1
ABDOMINAL PAIN: 1
CONSTIPATION: 0
VOMITING: 0

## 2021-05-05 NOTE — PROGRESS NOTES
2021     Fe Garcia (:  1950) is a 80 y.o. female, here for evaluation of the following medical concerns:    Hand Pain   The incident occurred more than 1 week ago (about a month ago while patient was in [place] [address] she fell into a glass door and \"jammed\" her right middle finger. Still has pain and swelling to the area). The injury mechanism was a fall. The pain is present in the right fingers. The quality of the pain is described as aching. The pain is mild. The pain has been constant since the incident. Pertinent negatives include no numbness or tingling. Associated symptoms comments: Does have pain down into the palm of the middle finger at the MCP joint. Sometimes finger will get stuck in flexed position. .   Abdominal Pain  This is a new problem. The current episode started 1 to 4 weeks ago (states that she also has had epigastric and upper abdominal discomfort and fullness since being in [place] [address]. States that she ate at a particular restaurant at the end of March and has had symptoms since. ). The onset quality is sudden. The problem occurs constantly. The problem has been waxing and waning. The pain is located in the LUQ, RUQ and epigastric region. Associated symptoms include arthralgias, diarrhea, flatus and nausea. Pertinent negatives include no anorexia, constipation, fever, myalgias or vomiting. Associated symptoms comments: Patient notes that she ate out while in [place] [address] for every meal except 4 meals the whole six weeks she was there. She states that she noticed with eating poorly the abdominal discomfort had been more flared. Just got home a couple of days ago and is eating at home now and states that her abdomen is somewhat better. Has felt occasional fever and chills. Some nausea and feels bloated. Increased bowel gas and some diarrhea. Exacerbated by: eating higher fat foods. She has tried nothing for the symptoms.      Did review patient's med list, allergies, social history,pmhx and pshx today as noted in the record. Review of Systems   Constitutional: Negative for chills, fatigue and fever. Gastrointestinal: Positive for abdominal pain, diarrhea, flatus and nausea. Negative for anorexia, constipation and vomiting. Musculoskeletal: Positive for arthralgias and joint swelling. Negative for gait problem and myalgias. Skin: Negative. Neurological: Negative for tingling and numbness. Prior to Visit Medications    Medication Sig Taking? Authorizing Provider   atenolol (TENORMIN) 25 MG tablet Take 1 tablet by mouth daily Yes Giacomo Courtney DO   scopolamine (TRANSDERM-SCOP) transdermal patch Place 1 patch onto the skin every 72 hours Yes Giacomo Courtney DO   predniSONE (DELTASONE) 20 MG tablet 1 bid for 5 days, 1 qd for 5 days Yes Fabby Lima DO   albuterol sulfate HFA (VENTOLIN HFA) 108 (90 Base) MCG/ACT inhaler Inhale 2 puffs into the lungs every 4 hours as needed for Wheezing Yes Giacomo Courtney DO   omeprazole (PRILOSEC) 20 MG delayed release capsule take 1 capsule by mouth twice a day Yes Fabby Lima DO   nystatin (MYCOSTATIN) 134767 UNIT/ML suspension Swish and swallow 5 mL qid Yes Fabby Lima DO   VITAMIN D PO Take 2,000 Units by mouth daily Yes Historical Provider, MD   meloxicam (MOBIC) 7.5 MG tablet TAKE 1 TABLET ONCE A DAY Yes Fabby Lima DO   montelukast (SINGULAIR) 10 MG tablet Take 1 tablet by mouth daily Yes Giacomo Courtney DO   cetirizine (ZYRTEC ALLERGY) 10 MG tablet Take 1 tablet by mouth daily Yes Giacomo Coutrney DO   nystatin (MYCOSTATIN) 390210 UNIT/GM powder Apply 2 times daily as needed for itching or irritation. Yes Giacomo Courtney DO   blood glucose test strips (ONE TOUCH TEST STRIPS) strip Tests 1 X daily. DX E11.9 Yes Giacomo Courtney DO   ONE TOUCH LANCETS MISC Tests 1 x daily DX. E11.9 Yes Giacomo Courtney DO   pseudoephedrine (SUDAFED) 30 MG tablet Take 30 mg by mouth every 4 hours as needed.  Yes Historical Provider, MD   therapeutic multivitamin-minerals (THERAGRAN-M) tablet Take 1 tablet by mouth daily. Yes Historical Provider, MD   Ascorbic Acid (VITAMIN C) 500 MG tablet Take 500 mg by mouth daily. Yes Historical Provider, MD        Social History     Tobacco Use    Smoking status: Passive Smoke Exposure - Never Smoker    Smokeless tobacco: Never Used    Tobacco comment: very remotely would smoke a few cigarettes on weekends/college days   Substance Use Topics    Alcohol use: Yes     Frequency: Monthly or less     Drinks per session: 1 or 2     Binge frequency: Never     Comment: occasional use        Vitals:    05/05/21 1431 05/05/21 1433   BP: (!) 129/90 129/87   Site: Left Upper Arm Left Upper Arm   Position: Sitting Sitting   Cuff Size: Large Adult Medium Adult   Pulse: 67    Resp: 16    Temp: 97.3 °F (36.3 °C)    TempSrc: Temporal    SpO2: 96%    Weight: 223 lb (101.2 kg)    Height: 5' 7\" (1.702 m)      Estimated body mass index is 34.93 kg/m² as calculated from the following:    Height as of this encounter: 5' 7\" (1.702 m). Weight as of this encounter: 223 lb (101.2 kg). Physical Exam  Vitals signs and nursing note reviewed. Constitutional:       General: She is not in acute distress. Appearance: She is well-developed. She is not diaphoretic. HENT:      Head: Normocephalic and atraumatic. Eyes:      Conjunctiva/sclera: Conjunctivae normal.   Neck:      Musculoskeletal: Normal range of motion. Pulmonary:      Effort: Pulmonary effort is normal.   Abdominal:      General: Abdomen is flat. Bowel sounds are normal. There is no distension. Palpations: Abdomen is soft. Tenderness: There is abdominal tenderness. Comments: Epigastric and right upper quadrant tenderness noted with palpation   Musculoskeletal:         General: Swelling and tenderness present. Comments: Pain with palpation to the right middle finger over the proximal interphalangeal joint.   There is pain with palpation to the flexor tendon of the digit just proximal to the MCP joint and has small palpable nodular swelling noted to the area. Slight decreased mobility with flexion of the digit. Skin:     General: Skin is warm and dry. Coloration: Skin is not pale. Findings: No erythema or rash. Neurological:      Mental Status: She is alert and oriented to person, place, and time. Psychiatric:         Behavior: Behavior normal.         Thought Content: Thought content normal.         Judgment: Judgment normal.         ASSESSMENT/PLAN:    Encounter Diagnoses   Name Primary?  Trigger finger, right middle finger Yes    Right hand pain     Right upper quadrant pain      No orders of the defined types were placed in this encounter. Orders Placed This Encounter   Procedures    XR HAND RIGHT (MIN 3 VIEWS)     Standing Status:   Future     Number of Occurrences:   1     Standing Expiration Date:   5/5/2022     Order Specific Question:   Reason for exam:     Answer:   hand pain after injury a month ago. 3rd digit, jammed it against a glass door. Pain at base of finger.  US GALLBLADDER RUQ     Standing Status:   Future     Standing Expiration Date:   5/5/2022     Order Specific Question:   Reason for exam:     Answer:   right upper quadrant pain     I did personally review her xrays with her today. There is no acute abnormality noted to the 3rd digit. Likely trigger finger aggravated by injury. Would recommend referral to ortho for surgical repair if functional impairment. Patient will monitor and let me know if she decides to pursue further treatment. Can use tylenol if needed for pain. Will check ultrasound of the gallbladder. Previous gallbladder ultrasound from 2013 did show some thickening of the gallbladder wall. Concern regarding possible infection with recent symptoms and intermittent low grade fevers. Would follow a low fat diet in interim.     Return  if no improvement

## 2021-05-06 ENCOUNTER — HOSPITAL ENCOUNTER (OUTPATIENT)
Dept: ULTRASOUND IMAGING | Age: 71
Discharge: HOME | End: 2021-05-08
Payer: MEDICARE

## 2021-05-06 DIAGNOSIS — R10.11 RIGHT UPPER QUADRANT PAIN: ICD-10-CM

## 2021-05-06 PROCEDURE — 76705 ECHO EXAM OF ABDOMEN: CPT

## 2021-05-24 ENCOUNTER — HOSPITAL ENCOUNTER (OUTPATIENT)
Dept: MAMMOGRAPHY | Age: 71
Discharge: HOME | End: 2021-05-26
Payer: MEDICARE

## 2021-05-24 DIAGNOSIS — Z12.31 ENCOUNTER FOR SCREENING MAMMOGRAM FOR MALIGNANT NEOPLASM OF BREAST: ICD-10-CM

## 2021-05-24 PROCEDURE — 77063 BREAST TOMOSYNTHESIS BI: CPT

## 2021-06-04 ENCOUNTER — HOSPITAL ENCOUNTER (OUTPATIENT)
Dept: LAB | Age: 71
Discharge: HOME | End: 2021-06-04
Payer: MEDICARE

## 2021-06-04 DIAGNOSIS — E11.42 CONTROLLED TYPE 2 DIABETES MELLITUS WITH DIABETIC POLYNEUROPATHY, WITHOUT LONG-TERM CURRENT USE OF INSULIN (HCC): ICD-10-CM

## 2021-06-04 DIAGNOSIS — E78.2 MIXED HYPERLIPIDEMIA: ICD-10-CM

## 2021-06-04 LAB
ABSOLUTE EOS #: 0.28 K/UL (ref 0–0.44)
ABSOLUTE IMMATURE GRANULOCYTE: 0.03 K/UL (ref 0–0.3)
ABSOLUTE LYMPH #: 2.39 K/UL (ref 1.1–3.7)
ABSOLUTE MONO #: 0.67 K/UL (ref 0.1–1.2)
ALBUMIN SERPL-MCNC: 4.1 G/DL (ref 3.5–5.2)
ALBUMIN/GLOBULIN RATIO: 1.3 (ref 1–2.5)
ALP BLD-CCNC: 111 U/L (ref 35–104)
ALT SERPL-CCNC: 13 U/L (ref 5–33)
ANION GAP SERPL CALCULATED.3IONS-SCNC: 7 MMOL/L (ref 9–17)
AST SERPL-CCNC: 20 U/L
BASOPHILS # BLD: 1 % (ref 0–2)
BASOPHILS ABSOLUTE: 0.04 K/UL (ref 0–0.2)
BILIRUB SERPL-MCNC: 0.26 MG/DL (ref 0.3–1.2)
BUN BLDV-MCNC: 13 MG/DL (ref 8–23)
BUN/CREAT BLD: 18 (ref 9–20)
CALCIUM SERPL-MCNC: 9.6 MG/DL (ref 8.6–10.4)
CHLORIDE BLD-SCNC: 103 MMOL/L (ref 98–107)
CHOLESTEROL/HDL RATIO: 4.7
CHOLESTEROL: 168 MG/DL
CO2: 30 MMOL/L (ref 20–31)
CREAT SERPL-MCNC: 0.71 MG/DL (ref 0.5–0.9)
CREATININE URINE: 68 MG/DL (ref 28–217)
DIFFERENTIAL TYPE: NORMAL
EOSINOPHILS RELATIVE PERCENT: 3 % (ref 1–4)
ESTIMATED AVERAGE GLUCOSE: 160 MG/DL
GFR AFRICAN AMERICAN: >60 ML/MIN
GFR NON-AFRICAN AMERICAN: >60 ML/MIN
GFR SERPL CREATININE-BSD FRML MDRD: ABNORMAL ML/MIN/{1.73_M2}
GFR SERPL CREATININE-BSD FRML MDRD: ABNORMAL ML/MIN/{1.73_M2}
GLUCOSE BLD-MCNC: 132 MG/DL (ref 70–99)
HBA1C MFR BLD: 7.2 % (ref 4–6)
HCT VFR BLD CALC: 41 % (ref 36.3–47.1)
HDLC SERPL-MCNC: 36 MG/DL
HEMOGLOBIN: 13 G/DL (ref 11.9–15.1)
IMMATURE GRANULOCYTES: 0 %
LDL CHOLESTEROL: 91 MG/DL (ref 0–130)
LYMPHOCYTES # BLD: 27 % (ref 24–43)
MCH RBC QN AUTO: 28.6 PG (ref 25.2–33.5)
MCHC RBC AUTO-ENTMCNC: 31.7 G/DL (ref 25.2–33.5)
MCV RBC AUTO: 90.1 FL (ref 82.6–102.9)
MICROALBUMIN/CREAT 24H UR: <12 MG/L
MICROALBUMIN/CREAT UR-RTO: NORMAL MCG/MG CREAT
MONOCYTES # BLD: 8 % (ref 3–12)
NRBC AUTOMATED: 0 PER 100 WBC
PDW BLD-RTO: 13.9 % (ref 11.8–14.4)
PLATELET # BLD: 232 K/UL (ref 138–453)
PLATELET ESTIMATE: NORMAL
PMV BLD AUTO: 9.8 FL (ref 8.1–13.5)
POTASSIUM SERPL-SCNC: 4 MMOL/L (ref 3.7–5.3)
RBC # BLD: 4.55 M/UL (ref 3.95–5.11)
RBC # BLD: NORMAL 10*6/UL
SEG NEUTROPHILS: 61 % (ref 36–65)
SEGMENTED NEUTROPHILS ABSOLUTE COUNT: 5.34 K/UL (ref 1.5–8.1)
SODIUM BLD-SCNC: 140 MMOL/L (ref 135–144)
TOTAL PROTEIN: 7.2 G/DL (ref 6.4–8.3)
TRIGL SERPL-MCNC: 203 MG/DL
VLDLC SERPL CALC-MCNC: ABNORMAL MG/DL (ref 1–30)
WBC # BLD: 8.8 K/UL (ref 3.5–11.3)
WBC # BLD: NORMAL 10*3/UL

## 2021-06-04 PROCEDURE — 36415 COLL VENOUS BLD VENIPUNCTURE: CPT

## 2021-06-04 PROCEDURE — 85025 COMPLETE CBC W/AUTO DIFF WBC: CPT

## 2021-06-04 PROCEDURE — 82043 UR ALBUMIN QUANTITATIVE: CPT

## 2021-06-04 PROCEDURE — 80053 COMPREHEN METABOLIC PANEL: CPT

## 2021-06-04 PROCEDURE — 82570 ASSAY OF URINE CREATININE: CPT

## 2021-06-04 PROCEDURE — 80061 LIPID PANEL: CPT

## 2021-06-04 PROCEDURE — 83036 HEMOGLOBIN GLYCOSYLATED A1C: CPT

## 2021-06-07 ENCOUNTER — OFFICE VISIT (OUTPATIENT)
Dept: FAMILY MEDICINE CLINIC | Age: 71
End: 2021-06-07
Payer: MEDICARE

## 2021-06-07 VITALS
TEMPERATURE: 97.4 F | OXYGEN SATURATION: 94 % | SYSTOLIC BLOOD PRESSURE: 120 MMHG | RESPIRATION RATE: 18 BRPM | HEIGHT: 67 IN | HEART RATE: 64 BPM | DIASTOLIC BLOOD PRESSURE: 84 MMHG | WEIGHT: 220 LBS | BODY MASS INDEX: 34.53 KG/M2

## 2021-06-07 DIAGNOSIS — I10 ESSENTIAL HYPERTENSION: ICD-10-CM

## 2021-06-07 DIAGNOSIS — E11.42 TYPE 2 DIABETES MELLITUS WITH DIABETIC POLYNEUROPATHY, WITHOUT LONG-TERM CURRENT USE OF INSULIN (HCC): Primary | ICD-10-CM

## 2021-06-07 DIAGNOSIS — M75.42 SHOULDER IMPINGEMENT SYNDROME, LEFT: ICD-10-CM

## 2021-06-07 DIAGNOSIS — E78.2 MIXED HYPERLIPIDEMIA: ICD-10-CM

## 2021-06-07 DIAGNOSIS — M65.331 TRIGGER FINGER, RIGHT MIDDLE FINGER: ICD-10-CM

## 2021-06-07 DIAGNOSIS — G63 POLYNEUROPATHY ASSOCIATED WITH UNDERLYING DISEASE (HCC): ICD-10-CM

## 2021-06-07 DIAGNOSIS — K21.9 GASTROESOPHAGEAL REFLUX DISEASE WITHOUT ESOPHAGITIS: ICD-10-CM

## 2021-06-07 PROCEDURE — 1123F ACP DISCUSS/DSCN MKR DOCD: CPT | Performed by: FAMILY MEDICINE

## 2021-06-07 PROCEDURE — 1036F TOBACCO NON-USER: CPT | Performed by: FAMILY MEDICINE

## 2021-06-07 PROCEDURE — 1090F PRES/ABSN URINE INCON ASSESS: CPT | Performed by: FAMILY MEDICINE

## 2021-06-07 PROCEDURE — 2022F DILAT RTA XM EVC RTNOPTHY: CPT | Performed by: FAMILY MEDICINE

## 2021-06-07 PROCEDURE — 4040F PNEUMOC VAC/ADMIN/RCVD: CPT | Performed by: FAMILY MEDICINE

## 2021-06-07 PROCEDURE — 20610 DRAIN/INJ JOINT/BURSA W/O US: CPT | Performed by: FAMILY MEDICINE

## 2021-06-07 PROCEDURE — G8400 PT W/DXA NO RESULTS DOC: HCPCS | Performed by: FAMILY MEDICINE

## 2021-06-07 PROCEDURE — 3017F COLORECTAL CA SCREEN DOC REV: CPT | Performed by: FAMILY MEDICINE

## 2021-06-07 PROCEDURE — G8427 DOCREV CUR MEDS BY ELIG CLIN: HCPCS | Performed by: FAMILY MEDICINE

## 2021-06-07 PROCEDURE — 99212 OFFICE O/P EST SF 10 MIN: CPT | Performed by: FAMILY MEDICINE

## 2021-06-07 PROCEDURE — 3051F HG A1C>EQUAL 7.0%<8.0%: CPT | Performed by: FAMILY MEDICINE

## 2021-06-07 PROCEDURE — 99214 OFFICE O/P EST MOD 30 MIN: CPT | Performed by: FAMILY MEDICINE

## 2021-06-07 PROCEDURE — G8417 CALC BMI ABV UP PARAM F/U: HCPCS | Performed by: FAMILY MEDICINE

## 2021-06-07 RX ORDER — TRIAMCINOLONE ACETONIDE 40 MG/ML
40 INJECTION, SUSPENSION INTRA-ARTICULAR; INTRAMUSCULAR ONCE
Status: COMPLETED | OUTPATIENT
Start: 2021-06-07 | End: 2021-06-07

## 2021-06-07 RX ORDER — MELOXICAM 7.5 MG/1
TABLET ORAL
Qty: 90 TABLET | Refills: 1 | Status: SHIPPED | OUTPATIENT
Start: 2021-06-07 | End: 2022-01-12 | Stop reason: SDUPTHER

## 2021-06-07 RX ADMIN — TRIAMCINOLONE ACETONIDE 40 MG: 40 INJECTION, SUSPENSION INTRA-ARTICULAR; INTRAMUSCULAR at 09:25

## 2021-06-07 SDOH — ECONOMIC STABILITY: TRANSPORTATION INSECURITY
IN THE PAST 12 MONTHS, HAS LACK OF TRANSPORTATION KEPT YOU FROM MEETINGS, WORK, OR FROM GETTING THINGS NEEDED FOR DAILY LIVING?: NO

## 2021-06-07 SDOH — ECONOMIC STABILITY: TRANSPORTATION INSECURITY
IN THE PAST 12 MONTHS, HAS THE LACK OF TRANSPORTATION KEPT YOU FROM MEDICAL APPOINTMENTS OR FROM GETTING MEDICATIONS?: NO

## 2021-06-07 SDOH — ECONOMIC STABILITY: FOOD INSECURITY: WITHIN THE PAST 12 MONTHS, THE FOOD YOU BOUGHT JUST DIDN'T LAST AND YOU DIDN'T HAVE MONEY TO GET MORE.: NEVER TRUE

## 2021-06-07 SDOH — ECONOMIC STABILITY: FOOD INSECURITY: WITHIN THE PAST 12 MONTHS, YOU WORRIED THAT YOUR FOOD WOULD RUN OUT BEFORE YOU GOT MONEY TO BUY MORE.: NEVER TRUE

## 2021-06-07 ASSESSMENT — PATIENT HEALTH QUESTIONNAIRE - PHQ9
SUM OF ALL RESPONSES TO PHQ9 QUESTIONS 1 & 2: 0
SUM OF ALL RESPONSES TO PHQ QUESTIONS 1-9: 0
SUM OF ALL RESPONSES TO PHQ QUESTIONS 1-9: 0
2. FEELING DOWN, DEPRESSED OR HOPELESS: 0
SUM OF ALL RESPONSES TO PHQ QUESTIONS 1-9: 0
1. LITTLE INTEREST OR PLEASURE IN DOING THINGS: 0

## 2021-06-07 ASSESSMENT — SOCIAL DETERMINANTS OF HEALTH (SDOH): HOW HARD IS IT FOR YOU TO PAY FOR THE VERY BASICS LIKE FOOD, HOUSING, MEDICAL CARE, AND HEATING?: NOT HARD AT ALL

## 2021-06-07 ASSESSMENT — ENCOUNTER SYMPTOMS
TROUBLE SWALLOWING: 0
ABDOMINAL PAIN: 0
WHEEZING: 0
SHORTNESS OF BREATH: 0
SORE THROAT: 0
RHINORRHEA: 0
EYE DISCHARGE: 0
COUGH: 0
SINUS PRESSURE: 0
NAUSEA: 0
EYE REDNESS: 0
CONSTIPATION: 0
DIARRHEA: 0
VOMITING: 0

## 2021-06-07 NOTE — PATIENT INSTRUCTIONS
Hospital Outpatient Visit on 06/04/2021   Component Date Value Ref Range Status    Cholesterol 06/04/2021 168  <200 mg/dL Final    Comment:    Cholesterol Guidelines:      <200  Desirable   200-240  Borderline      >240  Undesirable         HDL 06/04/2021 36* >40 mg/dL Final    Comment:    HDL Guidelines:    <40     Undesirable   40-59    Borderline    >59     Desirable         LDL Cholesterol 06/04/2021 91  0 - 130 mg/dL Final    Comment:    LDL Guidelines:     <100    Desirable   100-129   Near to/above Desirable   130-159   Borderline      >159   Undesirable     Direct (measured) LDL and calculated LDL are not interchangeable tests.  Chol/HDL Ratio 06/04/2021 4.7  <5 Final            Triglycerides 06/04/2021 203* <150 mg/dL Final    Comment:    Triglyceride Guidelines:     <150   Desirable   150-199  Borderline   200-499  High     >499   Very high   Based on AHA Guidelines for fasting triglyceride, October 2012.  VLDL 06/04/2021 NOT REPORTED* 1 - 30 mg/dL Final    Hemoglobin A1C 06/04/2021 7.2* 4.0 - 6.0 % Final    Estimated Avg Glucose 06/04/2021 160  mg/dL Final    Comment: The ADA and AACC recommend providing the estimated average glucose result to permit better   patient understanding of their HBA1c result.       Glucose 06/04/2021 132* 70 - 99 mg/dL Final    BUN 06/04/2021 13  8 - 23 mg/dL Final    CREATININE 06/04/2021 0.71  0.50 - 0.90 mg/dL Final    Bun/Cre Ratio 06/04/2021 18  9 - 20 Final    Calcium 06/04/2021 9.6  8.6 - 10.4 mg/dL Final    Sodium 06/04/2021 140  135 - 144 mmol/L Final    Potassium 06/04/2021 4.0  3.7 - 5.3 mmol/L Final    Chloride 06/04/2021 103  98 - 107 mmol/L Final    CO2 06/04/2021 30  20 - 31 mmol/L Final    Anion Gap 06/04/2021 7* 9 - 17 mmol/L Final    Alkaline Phosphatase 06/04/2021 111* 35 - 104 U/L Final    ALT 06/04/2021 13  5 - 33 U/L Final    AST 06/04/2021 20  <32 U/L Final    Total Bilirubin 06/04/2021 0.26* 0.3 - 1.2 mg/dL Final    Total Protein 06/04/2021 7.2  6.4 - 8.3 g/dL Final    Albumin 06/04/2021 4.1  3.5 - 5.2 g/dL Final    Albumin/Globulin Ratio 06/04/2021 1.3  1.0 - 2.5 Final    GFR Non- 06/04/2021 >60  >60 mL/min Final    GFR  06/04/2021 >60  >60 mL/min Final    GFR Comment 06/04/2021        Final    Comment: Average GFR for 79or more years old:   76 mL/min/1.73sq m  Chronic Kidney Disease:   <60 mL/min/1.73sq m  Kidney failure:   <15 mL/min/1.73sq m              eGFR calculated using average adult body mass.  Additional eGFR calculator available at:        [email or web address]            GFR Staging 06/04/2021 NOT REPORTED   Final    WBC 06/04/2021 8.8  3.5 - 11.3 k/uL Final    RBC 06/04/2021 4.55  3.95 - 5.11 m/uL Final    Hemoglobin 06/04/2021 13.0  11.9 - 15.1 g/dL Final    Hematocrit 06/04/2021 41.0  36.3 - 47.1 % Final    MCV 06/04/2021 90.1  82.6 - 102.9 fL Final    MCH 06/04/2021 28.6  25.2 - 33.5 pg Final    MCHC 06/04/2021 31.7  25.2 - 33.5 g/dL Final    RDW 06/04/2021 13.9  11.8 - 14.4 % Final    Platelets 22/15/0441 232  138 - 453 k/uL Final    MPV 06/04/2021 9.8  8.1 - 13.5 fL Final    NRBC Automated 06/04/2021 0.0  0.0 per 100 WBC Final    Differential Type 06/04/2021 NOT REPORTED   Final    Seg Neutrophils 06/04/2021 61  36 - 65 % Final    Lymphocytes 06/04/2021 27  24 - 43 % Final    Monocytes 06/04/2021 8  3 - 12 % Final    Eosinophils % 06/04/2021 3  1 - 4 % Final    Basophils 06/04/2021 1  0 - 2 % Final    Immature Granulocytes 06/04/2021 0  0 % Final    Segs Absolute 06/04/2021 5.34  1.50 - 8.10 k/uL Final    Absolute Lymph # 06/04/2021 2.39  1.10 - 3.70 k/uL Final    Absolute Mono # 06/04/2021 0.67  0.10 - 1.20 k/uL Final    Absolute Eos # 06/04/2021 0.28  0.00 - 0.44 k/uL Final    Basophils Absolute 06/04/2021 0.04  0.00 - 0.20 k/uL Final    Absolute Immature Granulocyte 06/04/2021 0.03  0.00 - 0.30 k/uL Final    WBC Morphology 06/04/2021 NOT REPORTED   Final    RBC Morphology 06/04/2021 NOT REPORTED   Final    Platelet Estimate 55/67/5547 NOT REPORTED   Final    Microalb, Ur 06/04/2021 <12  <21 mg/L Final    Creatinine, Ur 06/04/2021 68.0  28.0 - 217.0 mg/dL Final    Microalb/Crt.  Ratio 06/04/2021 CANNOT BE CALCULATED  <25 mcg/mg creat Final

## 2021-06-07 NOTE — PROGRESS NOTES
2021     Rafa Arce (:  1950) is a 80 y.o. female, here for evaluation of the following medical concerns:    HPI  Patient comes in today for follow up of chronic health issues Patient does have persistent numbness in the left upper extremity. States that this has been a problem for quite some time. She does lay on her sides but tries to alternate sides as she is a side sleeper. She does note that sometimes she cannot sleep as it is bothering her particular when she has been driving for prolonged period of time. Typically if she lets the arm hanging off of the bed it will help with sensation to the arm and the pain does seem to improve. Is no any weakness in the extremity. Did discuss with the sounds to be more of a shoulder impingement perhaps doing an injection into the shoulder may provide benefit. She does have an appointment to see the orthopedist in July related to pain and discomfort to the right little finger. She has some increased nodularity and pain to the flexor surface of the left middle finger at the MCP joint. With regards to her diabetes her blood sugars are more elevated than last check. Did discuss with her low-carb/low sugar diet and routine exercise to keep this optimally controlled. Has a known history of polyneuropathy which is stable at this time without significant issues. Does have a known history of hypertension and her blood pressure is stable and controlled on her current medical therapy. Patient has a known history of hyperlipidemia and her cholesterol levels show high triglycerides. Again dietary efforts are discussed in order to get this under better control. Has a known history of GERD which is stable controlled on her current medical therapy. Patient otherwise has no other acute medical concerns at this time. .  Patient's recent lab reports are as follows:    Results for orders placed or performed during the hospital encounter of 21   Lipid Panel   Result Value Ref Range    Cholesterol 168 <200 mg/dL    HDL 36 (L) >40 mg/dL    LDL Cholesterol 91 0 - 130 mg/dL    Chol/HDL Ratio 4.7 <5    Triglycerides 203 (H) <150 mg/dL    VLDL NOT REPORTED (H) 1 - 30 mg/dL   Hemoglobin A1C   Result Value Ref Range    Hemoglobin A1C 7.2 (H) 4.0 - 6.0 %    Estimated Avg Glucose 160 mg/dL   Comprehensive Metabolic Panel   Result Value Ref Range    Glucose 132 (H) 70 - 99 mg/dL    BUN 13 8 - 23 mg/dL    CREATININE 0.71 0.50 - 0.90 mg/dL    Bun/Cre Ratio 18 9 - 20    Calcium 9.6 8.6 - 10.4 mg/dL    Sodium 140 135 - 144 mmol/L    Potassium 4.0 3.7 - 5.3 mmol/L    Chloride 103 98 - 107 mmol/L    CO2 30 20 - 31 mmol/L    Anion Gap 7 (L) 9 - 17 mmol/L    Alkaline Phosphatase 111 (H) 35 - 104 U/L    ALT 13 5 - 33 U/L    AST 20 <32 U/L    Total Bilirubin 0.26 (L) 0.3 - 1.2 mg/dL    Total Protein 7.2 6.4 - 8.3 g/dL    Albumin 4.1 3.5 - 5.2 g/dL    Albumin/Globulin Ratio 1.3 1.0 - 2.5    GFR Non-African American >60 >60 mL/min    GFR African American >60 >60 mL/min    GFR Comment          GFR Staging NOT REPORTED    CBC Auto Differential   Result Value Ref Range    WBC 8.8 3.5 - 11.3 k/uL    RBC 4.55 3.95 - 5.11 m/uL    Hemoglobin 13.0 11.9 - 15.1 g/dL    Hematocrit 41.0 36.3 - 47.1 %    MCV 90.1 82.6 - 102.9 fL    MCH 28.6 25.2 - 33.5 pg    MCHC 31.7 25.2 - 33.5 g/dL    RDW 13.9 11.8 - 14.4 %    Platelets 074 234 - 671 k/uL    MPV 9.8 8.1 - 13.5 fL    NRBC Automated 0.0 0.0 per 100 WBC    Differential Type NOT REPORTED     Seg Neutrophils 61 36 - 65 %    Lymphocytes 27 24 - 43 %    Monocytes 8 3 - 12 %    Eosinophils % 3 1 - 4 %    Basophils 1 0 - 2 %    Immature Granulocytes 0 0 %    Segs Absolute 5.34 1.50 - 8.10 k/uL    Absolute Lymph # 2.39 1.10 - 3.70 k/uL    Absolute Mono # 0.67 0.10 - 1.20 k/uL    Absolute Eos # 0.28 0.00 - 0.44 k/uL    Basophils Absolute 0.04 0.00 - 0.20 k/uL    Absolute Immature Granulocyte 0.03 0.00 - 0.30 k/uL    WBC Morphology NOT REPORTED     RBC Morphology NOT REPORTED     Platelet Estimate NOT REPORTED    Microalbumin, Ur   Result Value Ref Range    Microalb, Ur <12 <21 mg/L    Creatinine, Ur 68.0 28.0 - 217.0 mg/dL    Microalb/Crt. Ratio CANNOT BE CALCULATED <25 mcg/mg creat      Other review of systems are as noted below. Preventative measures are reviewed today. See health maintenance section for complete preventative plan of care. Did review patient's med list, allergies, social history, fam history, pmhx and pshx today as noted in the record. Review of Systems   Constitutional: Negative for chills, fatigue and fever. HENT: Negative for congestion, ear pain, postnasal drip, rhinorrhea, sinus pressure, sore throat and trouble swallowing. Eyes: Negative for discharge and redness. Respiratory: Negative for cough, shortness of breath and wheezing. Cardiovascular: Negative for chest pain. Gastrointestinal: Negative for abdominal pain, constipation, diarrhea, nausea and vomiting. Genitourinary: Negative for dysuria, flank pain, frequency and urgency. Musculoskeletal: Positive for arthralgias and myalgias. Negative for neck pain. Skin: Negative for rash and wound. Allergic/Immunologic: Negative for environmental allergies. Neurological: Positive for numbness. Negative for dizziness, weakness, light-headedness and headaches. Hematological: Negative for adenopathy. Psychiatric/Behavioral: Negative. Prior to Visit Medications    Medication Sig Taking?  Authorizing Provider   atenolol (TENORMIN) 25 MG tablet Take 1 tablet by mouth daily  Fabby Lund,    scopolamine (TRANSDERM-SCOP) transdermal patch Place 1 patch onto the skin every 72 hours  Fabby Lima DO   predniSONE (DELTASONE) 20 MG tablet 1 bid for 5 days, 1 qd for 5 days  Fabby Lima DO   albuterol sulfate HFA (VENTOLIN HFA) 108 (90 Base) MCG/ACT inhaler Inhale 2 puffs into the lungs every 4 hours as needed for Wheezing  Kamilah Batch, DO   omeprazole (PRILOSEC) 20 MG delayed release capsule take 1 capsule by mouth twice a day  Fabby Lima DO   nystatin (MYCOSTATIN) 395645 UNIT/ML suspension Swish and swallow 5 mL qid  Fabby Lima, DO   VITAMIN D PO Take 2,000 Units by mouth daily  Historical Provider, MD   meloxicam (MOBIC) 7.5 MG tablet TAKE 1 TABLET ONCE A DAY  Fabby Lima, DO   montelukast (SINGULAIR) 10 MG tablet Take 1 tablet by mouth daily  Fabbyfatuma Lima, DO   cetirizine (ZYRTEC ALLERGY) 10 MG tablet Take 1 tablet by mouth daily  Fabbyfatuma Lima, DO   nystatin (MYCOSTATIN) 087330 UNIT/GM powder Apply 2 times daily as needed for itching or irritation. Melvina Oh, DO   blood glucose test strips (ONE TOUCH TEST STRIPS) strip Tests 1 X daily. DX E11.9  Melvina Oh, DO   ONE TOUCH LANCETS MISC Tests 1 x daily DX. E11.9  Melvina Oh, DO   pseudoephedrine (SUDAFED) 30 MG tablet Take 30 mg by mouth every 4 hours as needed. Historical Provider, MD   therapeutic multivitamin-minerals (THERAGRAN-M) tablet Take 1 tablet by mouth daily. Historical Provider, MD   Ascorbic Acid (VITAMIN C) 500 MG tablet Take 500 mg by mouth daily. Historical Provider, MD        Social History     Tobacco Use    Smoking status: Passive Smoke Exposure - Never Smoker    Smokeless tobacco: Never Used    Tobacco comment: very remotely would smoke a few cigarettes on weekends/college days   Substance Use Topics    Alcohol use: Yes     Comment: occasional use        There were no vitals filed for this visit. Estimated body mass index is 34.93 kg/m² as calculated from the following:    Height as of 5/5/21: 5' 7\" (1.702 m). Weight as of 5/5/21: 223 lb (101.2 kg). Physical Exam  Vitals and nursing note reviewed. Constitutional:       General: She is not in acute distress. Appearance: Normal appearance. She is well-developed. She is not diaphoretic. HENT:      Head: Normocephalic and atraumatic.       Right Ear: Tympanic membrane, ear canal and external ear normal.      Left Ear: Tympanic membrane, ear canal and external ear normal.      Nose: Nose normal.      Mouth/Throat:      Mouth: Mucous membranes are moist.      Pharynx: Oropharynx is clear. No oropharyngeal exudate. Eyes:      General:         Right eye: No discharge. Left eye: No discharge. Conjunctiva/sclera: Conjunctivae normal.      Pupils: Pupils are equal, round, and reactive to light. Neck:      Thyroid: No thyromegaly. Cardiovascular:      Rate and Rhythm: Normal rate and regular rhythm. Heart sounds: Normal heart sounds. Pulmonary:      Effort: Pulmonary effort is normal.      Breath sounds: Normal breath sounds. No wheezing or rales. Abdominal:      General: Bowel sounds are normal. There is no distension. Palpations: Abdomen is soft. Tenderness: There is no abdominal tenderness. Musculoskeletal:         General: Tenderness present. Cervical back: Normal range of motion and neck supple. Comments: Patient had a diabetic foot exam today. No open areas or ulcerations noted. Fine filament testing to the entire dorsal and plantar aspect of the foot reveals good sensation in all areas. No cyanosis. +2/4 pedal pulses, symmetric bilaterally. Patient does have pain with palpation to the posterior aspect of the left shoulder. Able to move the extremity in a normal range of motion without significant difficulty. Has palpable nodule to the flexor tendon of the right middle finger at the MCP joint with tenderness. Lymphadenopathy:      Cervical: No cervical adenopathy. Skin:     General: Skin is warm and dry. Findings: No rash. Neurological:      Mental Status: She is alert and oriented to person, place, and time. Psychiatric:         Behavior: Behavior normal.         Thought Content:  Thought content normal.         Judgment: Judgment normal.           ASSESSMENT/PLAN:  Encounter Diagnoses   Name Primary?  Type 2 diabetes mellitus with diabetic polyneuropathy, without long-term current use of insulin (HCC) Yes    Polyneuropathy associated with underlying disease ([address]     Essential hypertension     Mixed hyperlipidemia     Gastroesophageal reflux disease without esophagitis     Shoulder impingement syndrome, left     Trigger finger, right middle finger      Orders Placed This Encounter   Medications    meloxicam (MOBIC) 7.5 MG tablet     Sig: TAKE 1 TABLET ONCE A DAY     Dispense:  90 tablet     Refill:  1    triamcinolone acetonide (KENALOG-40) injection 40 mg     Orders Placed This Encounter   Procedures    CBC Auto Differential     Standing Status:   Future     Standing Expiration Date:   6/7/2022    Comprehensive Metabolic Panel     Standing Status:   Future     Standing Expiration Date:   6/7/2022    Hemoglobin A1C     Standing Status:   Future     Standing Expiration Date:   6/7/2022    Lipid Panel     Standing Status:   Future     Standing Expiration Date:   6/7/2022     Order Specific Question:   Is Patient Fasting?/# of Hours     Answer:   12 hours    HM DIABETES FOOT EXAM    W[facility] ARTHROCENTESIS ASPIR&/INJ MAJOR JT/BURSA W/O US     Procedure Note    PREOP DIAGNOSIS:  [] Right [x]  Left    [] Bilateral Shoulder Pain    POSTOP DIAGNOSIS:  [] Right [x]  Left    [] Bilateral Shoulder Pain    OPERATION:  [] Right [x]  Left    [] Bilateral INTRA-ARTICULAR Glenohumeral Injection    PROCEDURE:  After sterile prep, a posterior approach was used. 40 mg of Kenalog and 2 cc of 1% Xylocaine injected intra-articularly without incident. Pre- and post neurovascular status verified. No complications noted. Informed consent and time out was completed prior to procedure    Patient is to really work on dietary efforts and increasing exercise in order to get her blood sugars under better control. She would like to try this before adding additional medical therapy to her regimen.     Patient is to follow-up with orthopedics regarding the trigger finger to her right hand. Patient is to continue on the rest of her current medical therapy. No additional changes are made at this time. If the injection did not work or provide her with relief to the left shoulder then would suggest chiropractic treatment or consider massage treatment. Can also discuss this with the orthopedist as well. Patient is to return to my office in 6 months duration or sooner if any further problems or symptoms arise. (Please note that portions of this note were completed with a voice recognition program. Efforts were made to edit the dictations but occasionally words are mis-transcribed.)        No follow-ups on file. An electronic signature was used to authenticate this note.     --Gene Faith DO on 6/7/2021 at 7:16 AM

## 2021-07-24 DIAGNOSIS — E11.9 DIABETES MELLITUS TYPE II, CONTROLLED (HCC): ICD-10-CM

## 2021-07-26 ENCOUNTER — TELEPHONE (OUTPATIENT)
Dept: FAMILY MEDICINE CLINIC | Age: 71
End: 2021-07-26

## 2021-07-26 NOTE — TELEPHONE ENCOUNTER
Sadiq White called requesting a refill of the below medication which has been pended for you:     Requested Prescriptions     Pending Prescriptions Disp Refills    blood glucose test strips (ONE TOUCH TEST STRIPS) strip 100 each 3     Sig: Tests 1 X daily.   DX E11.9    ONE TOUCH LANCETS MISC 100 each 3     Sig: Tests 1 x daily DX. E11.9       Last Appointment Date: 6/7/2021  Next Appointment Date: 12/9/2021    Allergies   Allergen Reactions    Aspirin Other (See Comments)     Abdominal pain    Ibuprofen Other (See Comments)     Abdominal pain    Penicillin G Other (See Comments)    Sulfamethoxazole-Trimethoprim Nausea Only and Other (See Comments)    Penicillins Rash

## 2021-07-27 ENCOUNTER — OFFICE VISIT (OUTPATIENT)
Dept: PRIMARY CARE CLINIC | Age: 71
End: 2021-07-27
Payer: MEDICARE

## 2021-07-27 ENCOUNTER — HOSPITAL ENCOUNTER (OUTPATIENT)
Age: 71
Setting detail: SPECIMEN
Discharge: HOME | End: 2021-07-27
Payer: MEDICARE

## 2021-07-27 VITALS
HEART RATE: 63 BPM | RESPIRATION RATE: 16 BRPM | OXYGEN SATURATION: 94 % | HEIGHT: 67 IN | DIASTOLIC BLOOD PRESSURE: 83 MMHG | TEMPERATURE: 97.3 F | BODY MASS INDEX: 34.53 KG/M2 | WEIGHT: 220 LBS | SYSTOLIC BLOOD PRESSURE: 122 MMHG

## 2021-07-27 DIAGNOSIS — J02.9 PHARYNGITIS, UNSPECIFIED ETIOLOGY: ICD-10-CM

## 2021-07-27 DIAGNOSIS — J06.9 VIRAL URI: Primary | ICD-10-CM

## 2021-07-27 DIAGNOSIS — Z20.822 PERSON UNDER INVESTIGATION FOR COVID-19: ICD-10-CM

## 2021-07-27 DIAGNOSIS — R11.0 NAUSEA: ICD-10-CM

## 2021-07-27 DIAGNOSIS — R09.81 CONGESTION OF NASAL SINUS: ICD-10-CM

## 2021-07-27 PROCEDURE — G8427 DOCREV CUR MEDS BY ELIG CLIN: HCPCS | Performed by: NURSE PRACTITIONER

## 2021-07-27 PROCEDURE — 99213 OFFICE O/P EST LOW 20 MIN: CPT | Performed by: NURSE PRACTITIONER

## 2021-07-27 PROCEDURE — 1036F TOBACCO NON-USER: CPT | Performed by: NURSE PRACTITIONER

## 2021-07-27 PROCEDURE — 3017F COLORECTAL CA SCREEN DOC REV: CPT | Performed by: NURSE PRACTITIONER

## 2021-07-27 PROCEDURE — 1123F ACP DISCUSS/DSCN MKR DOCD: CPT | Performed by: NURSE PRACTITIONER

## 2021-07-27 PROCEDURE — U0005 INFEC AGEN DETEC AMPLI PROBE: HCPCS

## 2021-07-27 PROCEDURE — 1090F PRES/ABSN URINE INCON ASSESS: CPT | Performed by: NURSE PRACTITIONER

## 2021-07-27 PROCEDURE — G8417 CALC BMI ABV UP PARAM F/U: HCPCS | Performed by: NURSE PRACTITIONER

## 2021-07-27 PROCEDURE — G8400 PT W/DXA NO RESULTS DOC: HCPCS | Performed by: NURSE PRACTITIONER

## 2021-07-27 PROCEDURE — 4040F PNEUMOC VAC/ADMIN/RCVD: CPT | Performed by: NURSE PRACTITIONER

## 2021-07-27 PROCEDURE — 99212 OFFICE O/P EST SF 10 MIN: CPT | Performed by: NURSE PRACTITIONER

## 2021-07-27 PROCEDURE — U0003 INFECTIOUS AGENT DETECTION BY NUCLEIC ACID (DNA OR RNA); SEVERE ACUTE RESPIRATORY SYNDROME CORONAVIRUS 2 (SARS-COV-2) (CORONAVIRUS DISEASE [COVID-19]), AMPLIFIED PROBE TECHNIQUE, MAKING USE OF HIGH THROUGHPUT TECHNOLOGIES AS DESCRIBED BY CMS-2020-01-R: HCPCS

## 2021-07-27 ASSESSMENT — ENCOUNTER SYMPTOMS
VOMITING: 0
CONSTIPATION: 0
SORE THROAT: 1
NAUSEA: 1
WHEEZING: 0
COUGH: 0
DIARRHEA: 0
SINUS PRESSURE: 1

## 2021-07-27 ASSESSMENT — PATIENT HEALTH QUESTIONNAIRE - PHQ9
SUM OF ALL RESPONSES TO PHQ QUESTIONS 1-9: 0
2. FEELING DOWN, DEPRESSED OR HOPELESS: 0
1. LITTLE INTEREST OR PLEASURE IN DOING THINGS: 0
SUM OF ALL RESPONSES TO PHQ9 QUESTIONS 1 & 2: 0
SUM OF ALL RESPONSES TO PHQ QUESTIONS 1-9: 0
SUM OF ALL RESPONSES TO PHQ QUESTIONS 1-9: 0

## 2021-07-27 NOTE — PROGRESS NOTES
[facility] [id number]  Dept: [phone]  Dept Fax: [phone]  Loc: [phone]        CHIEF COMPLAINT       Chief Complaint   Patient presents with    Pharyngitis     Sinus HA, Ear Pain, Abd Pain       Nurses Notes reviewed and I agree except as noted in the HPI. HISTORY OF PRESENT ILLNESS   Shaista Campos is a 80 y.o. female who presents to [facility] today (7/27/2021) for evaluation of:   Pharyngitis  This is a new problem. The current episode started in the past 7 days (7/25/21). The problem occurs constantly. The problem has been gradually worsening. Associated symptoms include congestion, nausea (intermittent) and a sore throat. Pertinent negatives include no chills, coughing, fever or vomiting. Treatments tried: sudafed yesterday. The treatment provided mild relief. Covid pneumonia in January; received Moderna covid vaccine in March. Granddaughter was with pt over weekend, came down with similar symptoms Sunday. Granddaughter was tested today for strep and covid which were both negative. Pt concerned may have covid again and would like testing today. REVIEW OF SYSTEMS     Review of Systems   Constitutional: Negative for chills and fever. HENT: Positive for congestion, ear pain, sinus pressure and sore throat. Respiratory: Negative for cough and wheezing. Gastrointestinal: Positive for nausea (intermittent). Negative for constipation, diarrhea and vomiting.        PAST MEDICAL HISTORY         Diagnosis Date    Allergic rhinitis     Anxiety     due to situational stress    Bursitis     Bursitis of hip, right     recurrent    Carpal tunnel syndrome, bilateral     GERD (gastroesophageal reflux disease)     Hyperlipidemia     Hypertension     Impaired fasting glucose     Labyrinthitis     causing continuted vertigo    [facility] in [facility]     to bridge of nose; Dr Devon Hurst in [address] in situ of nose [facility]) 10/05/2017    biopsy confirmed on 10/05/2017 by Dr [facility]yllis [facility]  Osteoarthritis     Palpitations        SURGICAL HISTORY     Patient  has a past surgical history that includes Tonsillectomy (1974); Tubal ligation (07/03/1983); Heel spur surgery (Bilateral); Heel spur surgery (Right, 2003); Tubal ligation (1983); Colonoscopy (12/2008); Colonoscopy (1998); Colonoscopy (2003); Cataract removal with implant (Right, 07/29/2017); Cataract removal with implant (Left, 08/05/2017); Soft Tissue Biopsy (Left, 12/21/2017); Colonoscopy (N/A, 2/14/2019); Upper gastrointestinal endoscopy (N/A, 2/14/2019); hysteroscopy (08/07/2019); Dilation and curettage of uterus (N/A, 8/7/2019); and Breast biopsy (Right, 2003). CURRENT MEDICATIONS       Outpatient Medications Prior to Visit   Medication Sig Dispense Refill    blood glucose test strips (ONE TOUCH TEST STRIPS) strip Tests 1 X daily. DX E11.9 100 each 0    ONE TOUCH LANCETS MISC Tests 1 x daily DX. E11.9 100 each 0    meloxicam (MOBIC) 7.5 MG tablet TAKE 1 TABLET ONCE A DAY 90 tablet 1    atenolol (TENORMIN) 25 MG tablet Take 1 tablet by mouth daily 90 tablet 1    albuterol sulfate HFA (VENTOLIN HFA) 108 (90 Base) MCG/ACT inhaler Inhale 2 puffs into the lungs every 4 hours as needed for Wheezing 1 Inhaler 1    omeprazole (PRILOSEC) 20 MG delayed release capsule take 1 capsule by mouth twice a day 180 capsule 1    VITAMIN D PO Take 2,000 Units by mouth daily      montelukast (SINGULAIR) 10 MG tablet Take 1 tablet by mouth daily 90 tablet 3    cetirizine (ZYRTEC ALLERGY) 10 MG tablet Take 1 tablet by mouth daily 90 tablet 3    nystatin (MYCOSTATIN) 549107 UNIT/GM powder Apply 2 times daily as needed for itching or irritation. 60 g 5    pseudoephedrine (SUDAFED) 30 MG tablet Take 30 mg by mouth every 4 hours as needed.       therapeutic humidifier at bedtime to moisturize air. Use nasal saline rinse as needed for congestion. --Continue allergy medications as prescribed including using flonase nasal spray. --Tylenol for fever/body aches/headache. Warm/cold packs to forehead and back of neck can help with headache pain. Warm baths/showers can sooth body aches also. Practice good hand hygiene and cover your cough and sneeze to prevent passing virus on. If symptoms worsen or fail to improve, please return to clinic. If you develop severe worsening of symptoms such as chest pain or difficulty breathing, please go to ER. Patient Education        Viral Respiratory Infection: Care Instructions  Your Care Instructions     Viruses are very small organisms. They grow in number after they enter your body. There are many types that cause different illnesses, such as colds and the mumps. The symptoms of a viral respiratory infection often start quickly. They include a fever, sore throat, and runny nose. You may also just not feel well. Or you may not want to eat much. Most viral respiratory infections are not serious. They usually get better with time and self-care. Antibiotics are not used to treat a viral infection. That's because antibiotics will not help cure a viral illness. In some cases, antiviral medicine can help your body fight a serious viral infection. Follow-up care is a key part of your treatment and safety. Be sure to make and go to all appointments, and call your doctor if you are having problems. It's also a good idea to know your test results and keep a list of the medicines you take. How can you care for yourself at home? · Rest as much as possible until you feel better. · Be safe with medicines. Take your medicine exactly as prescribed. Call your doctor if you think you are having a problem with your medicine. You will get more details on the specific medicine your doctor prescribes.   · Take an over-the-counter pain medicine, such as acetaminophen (Tylenol), ibuprofen (Advil, Motrin), or naproxen (Aleve), as needed for pain and fever. Read and follow all instructions on the label. Do not give aspirin to anyone younger than 20. It has been linked to Reye syndrome, a serious illness. · Drink plenty of fluids. Hot fluids, such as tea or soup, may help relieve congestion in your nose and throat. If you have kidney, heart, or liver disease and have to limit fluids, talk with your doctor before you increase the amount of fluids you drink. · Try to clear mucus from your lungs by breathing deeply and coughing. · Gargle with warm salt water once an hour. This can help reduce swelling and throat pain. Use 1 teaspoon of salt mixed in 1 cup of warm water. · Do not smoke or allow others to smoke around you. If you need help quitting, talk to your doctor about stop-smoking programs and medicines. These can increase your chances of quitting for good. To avoid spreading the virus  · Cough or sneeze into a tissue. Then throw the tissue away. · If you don't have a tissue, use your hand to cover your cough or sneeze. Then clean your hand. You can also cough into your sleeve. · Wash your hands often. Use soap and warm water. Wash for 15 to 20 seconds each time. · If you don't have soap and water near you, you can clean your hands with alcohol wipes or gel. When should you call for help? Call your doctor now or seek immediate medical care if:    · You have a new or higher fever.     · Your fever lasts more than 48 hours.     · You have trouble breathing.     · You have a fever with a stiff neck or a severe headache.     · You are sensitive to light.     · You feel very sleepy or confused. Watch closely for changes in your health, and be sure to contact your doctor if:    · You do not get better as expected. Where can you learn more? Go to https://ernestine.Zero Locus. org and sign in to your [employer] account.  Enter R887 in the Search Health Information box to learn more about \"Viral Respiratory Infection: Care Instructions. \"     If you do not have an account, please click on the \"Sign Up Now\" link. Current as of: October 26, 2020               Content Version: 12.9  © 2006-2021 [employer]. Care instructions adapted under license by [facility] ([facility]). If you have questions about a medical condition or this instruction, always ask your healthcare professional. [address] any warranty or liability for your use of this information. Patient Education        Saline Nasal Washes: Care Instructions  Your Care Instructions     Saline nasal washes help keep the nasal passages open by washing out thick or dried mucus. This simple remedy can help relieve symptoms of allergies, sinusitis, and colds. It also can make the nose feel more comfortable by keeping the mucous membranes moist. You may notice a little burning sensation in your nose the first few times you use the solution, but this usually gets better in a few days. Follow-up care is a key part of your treatment and safety. Be sure to make and go to all appointments, and call your doctor if you are having problems. It's also a good idea to know your test results and keep a list of the medicines you take. How can you care for yourself at home? · You can buy premixed saline solution in a squeeze bottle or other sinus rinse products at a drugstore. Read and follow the instructions on the label. · You also can make your own saline solution by adding 1 teaspoon of salt and 1 teaspoon of baking soda to 2 cups of distilled water. · If you use a homemade solution, pour a small amount into a clean bowl. Using a rubber bulb syringe, squeeze the syringe and place the tip in the salt water. Pull a small amount of the salt water into the syringe by relaxing your hand. · Sit down with your head tilted slightly back. Do not lie down.  Put the tip of the bulb syringe or the squeeze bottle a little way into one of your nostrils. Gently drip or squirt a few drops into the nostril. Repeat with the other nostril. Some sneezing and gagging are normal at first.  · Gently blow your nose. · Wipe the syringe or bottle tip clean after each use. · Repeat this 2 or 3 times a day. · Use nasal washes gently if you have nosebleeds often. When should you call for help? Watch closely for changes in your health, and be sure to contact your doctor if:    · You often get nosebleeds.     · You have problems doing the nasal washes. Where can you learn more? Go to https://Big Live.MYOS. org and sign in to your [employer] account. Enter [id number] [id number] in the [employer]ing box to learn more about \"Saline Nasal Washes: Care Instructions. \"     If you do not have an account, please click on the \"Sign Up Now\" link. Current as of: December 2, 2020               Content Version: 12.9  © 9841-0474 [employer]. Care instructions adapted under license by [facility] ([facility]). If you have questions about a medical condition or this instruction, always ask your healthcare professional. [place] [address] any warranty or liability for your use of this information. The use, risks, benefits, and potential side effects of prescribed and/or recommended medications were discussed. All questions were answered and the patient/caregiver voiced understanding. Orders Placed This Encounter   Procedures    COVID-19     Standing Status:   Future     Number of Occurrences:   1     Standing Expiration Date:   8/27/2021     Scheduling Instructions:      1) Due to current limited availability of the COVID-19 test, tests will be prioritized based on responses to questions above. Testing may be delayed due to volume.             2) Print and instruct patient to adhere to [facility] home isolation program. (Link Above)              3) Set up or refer patient for a multivitamin-minerals (THERAGRAN-M) tablet Take 1 tablet by mouth daily.  Ascorbic Acid (VITAMIN C) 500 MG tablet Take 500 mg by mouth daily. No facility-administered encounter medications on file as of 7/27/2021. Return if symptoms worsen or fail to improve.                 Electronically signed by JUDE Pinzon CNP on 7/27/2021 at 4:33 PM

## 2021-07-27 NOTE — PATIENT INSTRUCTIONS
Will notify you of covid test result as soon as available. You should isolate at home in an area away from family. If you must be around family members, please wear a mask. Quarantine at home until result is available. This means do not go to work/school, attend family gatherings, or invite others to your home until you know your test results. Symptoms appear viral; no antibiotic warranted at this time. The following are measures you can try at home to help provide symptom relief:    --Increase your water intake to help thin secretions and maintain hydration. --May try mucinex (guaifenesin) to help with congestion and robitussin (dextromethorphan) for persistent cough. Use cool mist humidifier at bedtime to moisturize air. Use nasal saline rinse as needed for congestion. --Continue allergy medications as prescribed including using flonase nasal spray. --Tylenol for fever/body aches/headache. Warm/cold packs to forehead and back of neck can help with headache pain. Warm baths/showers can sooth body aches also. Practice good hand hygiene and cover your cough and sneeze to prevent passing virus on. If symptoms worsen or fail to improve, please return to clinic. If you develop severe worsening of symptoms such as chest pain or difficulty breathing, please go to ER. Patient Education        Viral Respiratory Infection: Care Instructions  Your Care Instructions     Viruses are very small organisms. They grow in number after they enter your body. There are many types that cause different illnesses, such as colds and the mumps. The symptoms of a viral respiratory infection often start quickly. They include a fever, sore throat, and runny nose. You may also just not feel well. Or you may not want to eat much. Most viral respiratory infections are not serious. They usually get better with time and self-care. Antibiotics are not used to treat a viral infection.  That's because antibiotics will not help cure a viral illness. In some cases, antiviral medicine can help your body fight a serious viral infection. Follow-up care is a key part of your treatment and safety. Be sure to make and go to all appointments, and call your doctor if you are having problems. It's also a good idea to know your test results and keep a list of the medicines you take. How can you care for yourself at home? · Rest as much as possible until you feel better. · Be safe with medicines. Take your medicine exactly as prescribed. Call your doctor if you think you are having a problem with your medicine. You will get more details on the specific medicine your doctor prescribes. · Take an over-the-counter pain medicine, such as acetaminophen (Tylenol), ibuprofen (Advil, Motrin), or naproxen (Aleve), as needed for pain and fever. Read and follow all instructions on the label. Do not give aspirin to anyone younger than 20. It has been linked to Reye syndrome, a serious illness. · Drink plenty of fluids. Hot fluids, such as tea or soup, may help relieve congestion in your nose and throat. If you have kidney, heart, or liver disease and have to limit fluids, talk with your doctor before you increase the amount of fluids you drink. · Try to clear mucus from your lungs by breathing deeply and coughing. · Gargle with warm salt water once an hour. This can help reduce swelling and throat pain. Use 1 teaspoon of salt mixed in 1 cup of warm water. · Do not smoke or allow others to smoke around you. If you need help quitting, talk to your doctor about stop-smoking programs and medicines. These can increase your chances of quitting for good. To avoid spreading the virus  · Cough or sneeze into a tissue. Then throw the tissue away. · If you don't have a tissue, use your hand to cover your cough or sneeze. Then clean your hand. You can also cough into your sleeve. · Wash your hands often. Use soap and warm water.  Wash for 15 to 20 seconds each time. · If you don't have soap and water near you, you can clean your hands with alcohol wipes or gel. When should you call for help? Call your doctor now or seek immediate medical care if:    · You have a new or higher fever.     · Your fever lasts more than 48 hours.     · You have trouble breathing.     · You have a fever with a stiff neck or a severe headache.     · You are sensitive to light.     · You feel very sleepy or confused. Watch closely for changes in your health, and be sure to contact your doctor if:    · You do not get better as expected. Where can you learn more? Go to https://GogobeanspeQuinturaeweb.Webtrekk. org and sign in to your [employer] account. Enter G161 in the [employer] box to learn more about \"Viral Respiratory Infection: Care Instructions. \"     If you do not have an account, please click on the \"Sign Up Now\" link. Current as of: October 26, 2020               Content Version: 12.9  © 2006-2021 [employer]. Care instructions adapted under license by [facility] ([facility]). If you have questions about a medical condition or this instruction, always ask your healthcare professional. [place] [address] any warranty or liability for your use of this information. Patient Education        Saline Nasal Washes: Care Instructions  Your Care Instructions     Saline nasal washes help keep the nasal passages open by washing out thick or dried mucus. This simple remedy can help relieve symptoms of allergies, sinusitis, and colds. It also can make the nose feel more comfortable by keeping the mucous membranes moist. You may notice a little burning sensation in your nose the first few times you use the solution, but this usually gets better in a few days. Follow-up care is a key part of your treatment and safety. Be sure to make and go to all appointments, and call your doctor if you are having problems.  It's also a good idea to know your test results and keep a list of the medicines you take. How can you care for yourself at home? · You can buy premixed saline solution in a squeeze bottle or other sinus rinse products at a drugstore. Read and follow the instructions on the label. · You also can make your own saline solution by adding 1 teaspoon of salt and 1 teaspoon of baking soda to 2 cups of distilled water. · If you use a homemade solution, pour a small amount into a clean bowl. Using a rubber bulb syringe, squeeze the syringe and place the tip in the salt water. Pull a small amount of the salt water into the syringe by relaxing your hand. · Sit down with your head tilted slightly back. Do not lie down. Put the tip of the bulb syringe or the squeeze bottle a little way into one of your nostrils. Gently drip or squirt a few drops into the nostril. Repeat with the other nostril. Some sneezing and gagging are normal at first.  · Gently blow your nose. · Wipe the syringe or bottle tip clean after each use. · Repeat this 2 or 3 times a day. · Use nasal washes gently if you have nosebleeds often. When should you call for help? Watch closely for changes in your health, and be sure to contact your doctor if:    · You often get nosebleeds.     · You have problems doing the nasal washes. Where can you learn more? Go to https://KeelrpePanorama Education.[employer]. org and sign in to your [employer] account. Enter [id number] [id number] [phone] in the [facility] box to learn more about \"Saline Nasal Washes: Care Instructions. \"     If you do not have an account, please click on the \"Sign Up Now\" link. Current as of: December 2, 2020               Content Version: 12.9  © 2006-2021 Healthwise, [employer]. Care instructions adapted under license by [facility] ([facility]).  If you have questions about a medical condition or this instruction, always ask your healthcare professional. Edward Spangler any warranty or liability for your use of this information.

## 2021-07-28 ENCOUNTER — TELEPHONE (OUTPATIENT)
Dept: PRIMARY CARE CLINIC | Age: 71
End: 2021-07-28

## 2021-07-28 LAB
SARS-COV-2: NORMAL
SARS-COV-2: NOT DETECTED
SOURCE: NORMAL

## 2021-09-28 ENCOUNTER — OFFICE VISIT (OUTPATIENT)
Dept: PRIMARY CARE CLINIC | Age: 71
End: 2021-09-28
Payer: MEDICARE

## 2021-09-28 VITALS
SYSTOLIC BLOOD PRESSURE: 134 MMHG | TEMPERATURE: 98.5 F | DIASTOLIC BLOOD PRESSURE: 80 MMHG | HEIGHT: 67 IN | WEIGHT: 222 LBS | BODY MASS INDEX: 34.84 KG/M2 | OXYGEN SATURATION: 97 % | HEART RATE: 75 BPM

## 2021-09-28 DIAGNOSIS — R53.83 FATIGUE, UNSPECIFIED TYPE: ICD-10-CM

## 2021-09-28 DIAGNOSIS — R51.9 ACUTE NONINTRACTABLE HEADACHE, UNSPECIFIED HEADACHE TYPE: Primary | ICD-10-CM

## 2021-09-28 PROCEDURE — 1123F ACP DISCUSS/DSCN MKR DOCD: CPT | Performed by: NURSE PRACTITIONER

## 2021-09-28 PROCEDURE — 1036F TOBACCO NON-USER: CPT | Performed by: NURSE PRACTITIONER

## 2021-09-28 PROCEDURE — G8427 DOCREV CUR MEDS BY ELIG CLIN: HCPCS | Performed by: NURSE PRACTITIONER

## 2021-09-28 PROCEDURE — 99213 OFFICE O/P EST LOW 20 MIN: CPT

## 2021-09-28 PROCEDURE — 3017F COLORECTAL CA SCREEN DOC REV: CPT | Performed by: NURSE PRACTITIONER

## 2021-09-28 PROCEDURE — G8417 CALC BMI ABV UP PARAM F/U: HCPCS | Performed by: NURSE PRACTITIONER

## 2021-09-28 PROCEDURE — 99213 OFFICE O/P EST LOW 20 MIN: CPT | Performed by: NURSE PRACTITIONER

## 2021-09-28 PROCEDURE — 1090F PRES/ABSN URINE INCON ASSESS: CPT | Performed by: NURSE PRACTITIONER

## 2021-09-28 PROCEDURE — G8400 PT W/DXA NO RESULTS DOC: HCPCS | Performed by: NURSE PRACTITIONER

## 2021-09-28 PROCEDURE — 4040F PNEUMOC VAC/ADMIN/RCVD: CPT | Performed by: NURSE PRACTITIONER

## 2021-09-28 ASSESSMENT — PATIENT HEALTH QUESTIONNAIRE - PHQ9
SUM OF ALL RESPONSES TO PHQ QUESTIONS 1-9: 0
1. LITTLE INTEREST OR PLEASURE IN DOING THINGS: 0
2. FEELING DOWN, DEPRESSED OR HOPELESS: 0
SUM OF ALL RESPONSES TO PHQ QUESTIONS 1-9: 0
SUM OF ALL RESPONSES TO PHQ QUESTIONS 1-9: 0
SUM OF ALL RESPONSES TO PHQ9 QUESTIONS 1 & 2: 0

## 2021-09-28 ASSESSMENT — ENCOUNTER SYMPTOMS
BLURRED VISION: 0
RHINORRHEA: 0
RESPIRATORY NEGATIVE: 1
SORE THROAT: 0
COUGH: 0
PHOTOPHOBIA: 0
VOMITING: 0

## 2021-09-28 NOTE — PATIENT INSTRUCTIONS

## 2021-09-28 NOTE — PROGRESS NOTES
[facility]             [address], [address]                        Telephone [phone]             Fax [phone]     Christen Ramirez  1950  YAD:[id number]   Date of visit:  9/28/2021    Subjective: Christen Ramirez is a 80 y.o.  female who presents to [facility] today (9/28/2021) for evaluation of:    Chief Complaint   Patient presents with    Headache     sx include fever. body aches,fatique. pt had covid pneumonia last jan and since has been fully vaccinated. Yesterday pt did get the flu vaccination. Headache   This is a new problem. The current episode started today (but she had body aches and slight fatigue a few hours after flu vaccine yesterday). The problem occurs constantly. The problem has been waxing and waning. The pain is located in the frontal region. The pain does not radiate. The pain quality is similar to prior headaches. The quality of the pain is described as aching and dull. The pain is at a severity of 3/10. Associated symptoms include a fever (low grade today) and muscle aches (began yesterday). Pertinent negatives include no blurred vision, coughing, numbness, phonophobia, photophobia, rhinorrhea, sore throat or vomiting. Associated symptoms comments: She received flu vaccine yesterday afternoon. fatigue. Nothing aggravates the symptoms. She has tried acetaminophen (cool compress) for the symptoms. The treatment provided moderate relief. Exposure on 09/26/21 to family member who tested positive for Covid-19 today.     She has the following problem list:  Patient Active Problem List   Diagnosis    Hyperlipidemia    HTN (hypertension)    Osteoarthritis    Allergic rhinitis    GERD (gastroesophageal reflux disease)    Anxiety    Diabetes mellitus type II, controlled ([facility]    Endometrial polyp    Postmenopausal bleeding    Polyneuropathy associated with underlying disease ([facility]        Current medications are:  Current Outpatient Medications   Medication Sig Dispense Refill    meloxicam (MOBIC) 7.5 MG tablet TAKE 1 TABLET ONCE A DAY 90 tablet 1    atenolol (TENORMIN) 25 MG tablet Take 1 tablet by mouth daily 90 tablet 1    omeprazole (PRILOSEC) 20 MG delayed release capsule take 1 capsule by mouth twice a day 180 capsule 1    VITAMIN D PO Take 2,000 Units by mouth daily      montelukast (SINGULAIR) 10 MG tablet Take 1 tablet by mouth daily 90 tablet 3    cetirizine (ZYRTEC ALLERGY) 10 MG tablet Take 1 tablet by mouth daily 90 tablet 3    pseudoephedrine (SUDAFED) 30 MG tablet Take 30 mg by mouth every 4 hours as needed.  therapeutic multivitamin-minerals (THERAGRAN-M) tablet Take 1 tablet by mouth daily.  Ascorbic Acid (VITAMIN C) 500 MG tablet Take 500 mg by mouth daily.  blood glucose test strips (ONE TOUCH TEST STRIPS) strip Tests 1 X daily. DX E11.9 100 each 0    ONE TOUCH LANCETS MISC Tests 1 x daily DX. E11.9 100 each 0    albuterol sulfate HFA (VENTOLIN HFA) 108 (90 Base) MCG/ACT inhaler Inhale 2 puffs into the lungs every 4 hours as needed for Wheezing (Patient not taking: Reported on 9/28/2021) 1 Inhaler 1    nystatin (MYCOSTATIN) 850654 UNIT/GM powder Apply 2 times daily as needed for itching or irritation. (Patient not taking: Reported on 9/28/2021) 60 g 5     No current facility-administered medications for this visit. She is allergic to aspirin, ibuprofen, penicillin g, sulfamethoxazole-trimethoprim, and penicillins. .    She  reports that she is a non-smoker but has been exposed to tobacco smoke.  She has never used smokeless tobacco.      Objective:    Vitals:    09/28/21 1859   BP: 134/80   Site: Right Upper Arm   Position: Sitting   Cuff Size: Medium Adult   Pulse: 75   Temp: 98.5 °F (36.9 °C)   TempSrc: Temporal   SpO2: 97%   Weight: 222 lb (100.7 kg)   Height: 5' 7\" (1.702 m)     Body mass index is 34.77 kg/m². Review of Systems   Constitutional: Positive for fatigue and fever (low grade today). Negative for appetite change. HENT: Negative. Negative for rhinorrhea and sore throat. Eyes: Negative for blurred vision and photophobia. Respiratory: Negative. Negative for cough. Cardiovascular: Negative. Gastrointestinal: Negative for vomiting. Neurological: Positive for headaches. Negative for numbness. Physical Exam  Vitals and nursing note reviewed. Constitutional:       Appearance: She is well-developed. HENT:      Head: Normocephalic. Jaw: There is normal jaw occlusion. Right Ear: Tympanic membrane, ear canal and external ear normal.      Left Ear: Tympanic membrane, ear canal and external ear normal.      Nose: Nose normal.      Mouth/Throat:      Lips: Pink. Mouth: Mucous membranes are moist.      Pharynx: Oropharynx is clear. Uvula midline. Eyes:      Pupils: Pupils are equal, round, and reactive to light. Cardiovascular:      Rate and Rhythm: Normal rate and regular rhythm. Heart sounds: Normal heart sounds. Pulmonary:      Effort: Pulmonary effort is normal.      Breath sounds: Normal breath sounds and air entry. Musculoskeletal:      Cervical back: Normal range of motion and neck supple. Lymphadenopathy:      Cervical: No cervical adenopathy. Skin:     General: Skin is warm and dry. Neurological:      General: No focal deficit present. Mental Status: She is alert and oriented to person, place, and time. Psychiatric:         Behavior: Behavior normal.         Thought Content: Thought content normal.       Assessment and Plan:    No results found for this visit on 09/28/21. Diagnosis Orders   1. Acute nonintractable headache, unspecified headache type     2. Fatigue, unspecified type       We discussed symptoms of Covid-19 and testing.  I recommended that she should wait for 1-2 days to test for Covid-19 since symptoms began yesterday. Patient decided to wait and she will look for a rapid Covid-19 test. I recommended for patient to quarantine. Patient verbalized understanding. Take Tylenol as needed for pain/fever. Follow up with PCP if symptoms persist or worsen. The use, risks, benefits, and side effects of prescribed or recommended medications were discussed. All questions were answered and the patient/caregiver voiced understanding. No orders of the defined types were placed in this encounter.         Electronically signed by JDUE Shelby CNP on 9/28/21 at 7:08 PM EDT

## 2021-10-12 RX ORDER — ATENOLOL 25 MG/1
TABLET ORAL
Qty: 90 TABLET | Refills: 0 | Status: SHIPPED | OUTPATIENT
Start: 2021-10-12 | End: 2022-01-12 | Stop reason: SDUPTHER

## 2021-10-12 NOTE — TELEPHONE ENCOUNTER
[facility] called requesting a refill of the below medication which has been pended for you:     Requested Prescriptions     Pending Prescriptions Disp Refills    atenolol (TENORMIN) 25 MG tablet [Pharmacy Med Name: ATENOLOL 25 MG TABLET] 90 tablet 1     Sig: take 1 tablet by mouth once daily       Last Appointment Date: 6/7/2021  Next Appointment Date: 12/9/2021    Allergies   Allergen Reactions    Aspirin Other (See Comments)     Abdominal pain    Ibuprofen Other (See Comments)     Abdominal pain    Penicillin G Other (See Comments)    Sulfamethoxazole-Trimethoprim Nausea Only and Other (See Comments)    Penicillins Rash

## 2021-11-29 RX ORDER — CETIRIZINE HYDROCHLORIDE 10 MG/1
10 TABLET ORAL DAILY
Qty: 30 TABLET | Refills: 0 | Status: SHIPPED | OUTPATIENT
Start: 2021-11-29 | End: 2021-12-20 | Stop reason: SDUPTHER

## 2021-11-29 RX ORDER — MONTELUKAST SODIUM 10 MG/1
10 TABLET ORAL DAILY
Qty: 30 TABLET | Refills: 0 | Status: SHIPPED | OUTPATIENT
Start: 2021-11-29 | End: 2021-12-20 | Stop reason: SDUPTHER

## 2021-12-07 ENCOUNTER — HOSPITAL ENCOUNTER (OUTPATIENT)
Dept: LAB | Age: 71
Discharge: HOME | End: 2021-12-07
Payer: MEDICARE

## 2021-12-07 DIAGNOSIS — E11.42 TYPE 2 DIABETES MELLITUS WITH DIABETIC POLYNEUROPATHY, WITHOUT LONG-TERM CURRENT USE OF INSULIN (HCC): ICD-10-CM

## 2021-12-07 DIAGNOSIS — E78.2 MIXED HYPERLIPIDEMIA: ICD-10-CM

## 2021-12-07 DIAGNOSIS — I10 ESSENTIAL HYPERTENSION: ICD-10-CM

## 2021-12-07 LAB
ABSOLUTE EOS #: 0.18 K/UL (ref 0–0.44)
ABSOLUTE IMMATURE GRANULOCYTE: 0.03 K/UL (ref 0–0.3)
ABSOLUTE LYMPH #: 2.52 K/UL (ref 1.1–3.7)
ABSOLUTE MONO #: 0.68 K/UL (ref 0.1–1.2)
ALBUMIN SERPL-MCNC: 4.3 G/DL (ref 3.5–5.2)
ALBUMIN/GLOBULIN RATIO: 1.4 (ref 1–2.5)
ALP BLD-CCNC: 102 U/L (ref 35–104)
ALT SERPL-CCNC: 16 U/L (ref 5–33)
ANION GAP SERPL CALCULATED.3IONS-SCNC: 9 MMOL/L (ref 9–17)
AST SERPL-CCNC: 19 U/L
BASOPHILS # BLD: 1 % (ref 0–2)
BASOPHILS ABSOLUTE: 0.05 K/UL (ref 0–0.2)
BILIRUB SERPL-MCNC: 0.25 MG/DL (ref 0.3–1.2)
BUN BLDV-MCNC: 14 MG/DL (ref 8–23)
BUN/CREAT BLD: 19 (ref 9–20)
CALCIUM SERPL-MCNC: 9.7 MG/DL (ref 8.6–10.4)
CHLORIDE BLD-SCNC: 103 MMOL/L (ref 98–107)
CHOLESTEROL/HDL RATIO: 4.6
CHOLESTEROL: 189 MG/DL
CO2: 29 MMOL/L (ref 20–31)
CREAT SERPL-MCNC: 0.75 MG/DL (ref 0.5–0.9)
DIFFERENTIAL TYPE: NORMAL
EOSINOPHILS RELATIVE PERCENT: 2 % (ref 1–4)
ESTIMATED AVERAGE GLUCOSE: 157 MG/DL
GFR AFRICAN AMERICAN: >60 ML/MIN
GFR NON-AFRICAN AMERICAN: >60 ML/MIN
GFR SERPL CREATININE-BSD FRML MDRD: ABNORMAL ML/MIN/{1.73_M2}
GFR SERPL CREATININE-BSD FRML MDRD: ABNORMAL ML/MIN/{1.73_M2}
GLUCOSE BLD-MCNC: 127 MG/DL (ref 70–99)
HBA1C MFR BLD: 7.1 % (ref 4–6)
HCT VFR BLD CALC: 41.8 % (ref 36.3–47.1)
HDLC SERPL-MCNC: 41 MG/DL
HEMOGLOBIN: 13.2 G/DL (ref 11.9–15.1)
IMMATURE GRANULOCYTES: 0 %
LDL CHOLESTEROL: 115 MG/DL (ref 0–130)
LYMPHOCYTES # BLD: 28 % (ref 24–43)
MCH RBC QN AUTO: 28.1 PG (ref 25.2–33.5)
MCHC RBC AUTO-ENTMCNC: 31.6 G/DL (ref 25.2–33.5)
MCV RBC AUTO: 89.1 FL (ref 82.6–102.9)
MONOCYTES # BLD: 7 % (ref 3–12)
NRBC AUTOMATED: 0 PER 100 WBC
PDW BLD-RTO: 14.3 % (ref 11.8–14.4)
PLATELET # BLD: 249 K/UL (ref 138–453)
PLATELET ESTIMATE: NORMAL
PMV BLD AUTO: 9.4 FL (ref 8.1–13.5)
POTASSIUM SERPL-SCNC: 4.2 MMOL/L (ref 3.7–5.3)
RBC # BLD: 4.69 M/UL (ref 3.95–5.11)
RBC # BLD: NORMAL 10*6/UL
SEG NEUTROPHILS: 62 % (ref 36–65)
SEGMENTED NEUTROPHILS ABSOLUTE COUNT: 5.7 K/UL (ref 1.5–8.1)
SODIUM BLD-SCNC: 141 MMOL/L (ref 135–144)
TOTAL PROTEIN: 7.4 G/DL (ref 6.4–8.3)
TRIGL SERPL-MCNC: 167 MG/DL
VLDLC SERPL CALC-MCNC: ABNORMAL MG/DL (ref 1–30)
WBC # BLD: 9.2 K/UL (ref 3.5–11.3)
WBC # BLD: NORMAL 10*3/UL

## 2021-12-07 PROCEDURE — 80061 LIPID PANEL: CPT

## 2021-12-07 PROCEDURE — 85025 COMPLETE CBC W/AUTO DIFF WBC: CPT

## 2021-12-07 PROCEDURE — 83036 HEMOGLOBIN GLYCOSYLATED A1C: CPT

## 2021-12-07 PROCEDURE — 36415 COLL VENOUS BLD VENIPUNCTURE: CPT

## 2021-12-07 PROCEDURE — 80053 COMPREHEN METABOLIC PANEL: CPT

## 2021-12-09 ENCOUNTER — OFFICE VISIT (OUTPATIENT)
Dept: FAMILY MEDICINE CLINIC | Age: 71
End: 2021-12-09
Payer: MEDICARE

## 2021-12-09 VITALS
OXYGEN SATURATION: 96 % | WEIGHT: 220 LBS | BODY MASS INDEX: 34.53 KG/M2 | HEART RATE: 63 BPM | SYSTOLIC BLOOD PRESSURE: 126 MMHG | RESPIRATION RATE: 16 BRPM | DIASTOLIC BLOOD PRESSURE: 86 MMHG | HEIGHT: 67 IN

## 2021-12-09 DIAGNOSIS — E11.42 TYPE 2 DIABETES MELLITUS WITH DIABETIC POLYNEUROPATHY, WITHOUT LONG-TERM CURRENT USE OF INSULIN (HCC): Primary | ICD-10-CM

## 2021-12-09 DIAGNOSIS — I10 ESSENTIAL HYPERTENSION: ICD-10-CM

## 2021-12-09 DIAGNOSIS — E78.2 MIXED HYPERLIPIDEMIA: ICD-10-CM

## 2021-12-09 DIAGNOSIS — K21.9 GASTROESOPHAGEAL REFLUX DISEASE WITHOUT ESOPHAGITIS: ICD-10-CM

## 2021-12-09 PROCEDURE — G8427 DOCREV CUR MEDS BY ELIG CLIN: HCPCS | Performed by: FAMILY MEDICINE

## 2021-12-09 PROCEDURE — G8400 PT W/DXA NO RESULTS DOC: HCPCS | Performed by: FAMILY MEDICINE

## 2021-12-09 PROCEDURE — 2022F DILAT RTA XM EVC RTNOPTHY: CPT | Performed by: FAMILY MEDICINE

## 2021-12-09 PROCEDURE — 1036F TOBACCO NON-USER: CPT | Performed by: FAMILY MEDICINE

## 2021-12-09 PROCEDURE — 3017F COLORECTAL CA SCREEN DOC REV: CPT | Performed by: FAMILY MEDICINE

## 2021-12-09 PROCEDURE — 4040F PNEUMOC VAC/ADMIN/RCVD: CPT | Performed by: FAMILY MEDICINE

## 2021-12-09 PROCEDURE — 99214 OFFICE O/P EST MOD 30 MIN: CPT | Performed by: FAMILY MEDICINE

## 2021-12-09 PROCEDURE — 1123F ACP DISCUSS/DSCN MKR DOCD: CPT | Performed by: FAMILY MEDICINE

## 2021-12-09 PROCEDURE — G8484 FLU IMMUNIZE NO ADMIN: HCPCS | Performed by: FAMILY MEDICINE

## 2021-12-09 PROCEDURE — 3051F HG A1C>EQUAL 7.0%<8.0%: CPT | Performed by: FAMILY MEDICINE

## 2021-12-09 PROCEDURE — 99212 OFFICE O/P EST SF 10 MIN: CPT

## 2021-12-09 PROCEDURE — 1090F PRES/ABSN URINE INCON ASSESS: CPT | Performed by: FAMILY MEDICINE

## 2021-12-09 PROCEDURE — G8417 CALC BMI ABV UP PARAM F/U: HCPCS | Performed by: FAMILY MEDICINE

## 2021-12-09 RX ORDER — ATORVASTATIN CALCIUM 20 MG/1
20 TABLET, FILM COATED ORAL DAILY
Qty: 90 TABLET | Refills: 1 | Status: SHIPPED | OUTPATIENT
Start: 2021-12-09

## 2021-12-09 RX ORDER — GLIMEPIRIDE 1 MG/1
1 TABLET ORAL EVERY MORNING
Qty: 90 TABLET | Refills: 1 | Status: SHIPPED | OUTPATIENT
Start: 2021-12-09 | End: 2022-01-07 | Stop reason: ALTCHOICE

## 2021-12-09 ASSESSMENT — PATIENT HEALTH QUESTIONNAIRE - PHQ9
2. FEELING DOWN, DEPRESSED OR HOPELESS: 1
SUM OF ALL RESPONSES TO PHQ QUESTIONS 1-9: 1
SUM OF ALL RESPONSES TO PHQ QUESTIONS 1-9: 1
1. LITTLE INTEREST OR PLEASURE IN DOING THINGS: 0
SUM OF ALL RESPONSES TO PHQ9 QUESTIONS 1 & 2: 1
SUM OF ALL RESPONSES TO PHQ QUESTIONS 1-9: 1

## 2021-12-09 ASSESSMENT — ENCOUNTER SYMPTOMS
NAUSEA: 0
DIARRHEA: 0
CONSTIPATION: 0
EYE REDNESS: 0
COUGH: 0
WHEEZING: 0
SINUS PRESSURE: 0
SORE THROAT: 0
VOMITING: 0
ABDOMINAL PAIN: 0
TROUBLE SWALLOWING: 0
EYE DISCHARGE: 0
RHINORRHEA: 0
SHORTNESS OF BREATH: 0

## 2021-12-09 NOTE — PROGRESS NOTES
2021     Merlin Arnold (:  1950) is a 80 y.o. female, here for evaluation of the following medical concerns:    HPI  Patient comes in today for follow up of chronic health issues Patient has been having some increased issues with healing process due to the fact that she feels uncomfortable going out and being around other people that her fear of getting Covid again. We did talk about potentially trying antidepressant antianxiety medication but she had declined at this time. She does have a known history of diabetes mellitus type 2 which is suboptimally controlled. She has really work on diet to try to get this controlled and has not gotten it under control so at this point would plan on adding further medical intervention and she is agreeable with this plan. Does have a known history of hypertension her blood pressure currently is stable and controlled on her current medical therapy. Has known hyperlipidemia and her cholesterol levels are mildly elevated from ideal range. We did talk about lipid-lowering medication for primary prevention and help with cholesterol control and ultimately she is agreeable with trying this medical therapy. Has a known history of GERD which is stable controlled on her current medical therapy. Patient otherwise has no other acute medical concerns. .  Patient's recent lab reports are as follows:    Results for orders placed or performed during the hospital encounter of 21   Lipid Panel   Result Value Ref Range    Cholesterol 189 <200 mg/dL    HDL 41 >40 mg/dL    LDL Cholesterol 115 0 - 130 mg/dL    Chol/HDL Ratio 4.6 <5    Triglycerides 167 (H) <150 mg/dL    VLDL NOT REPORTED (H) 1 - 30 mg/dL   Hemoglobin A1C   Result Value Ref Range    Hemoglobin A1C 7.1 (H) 4.0 - 6.0 %    Estimated Avg Glucose 157 mg/dL   Comprehensive Metabolic Panel   Result Value Ref Range    Glucose 127 (H) 70 - 99 mg/dL    BUN 14 8 - 23 mg/dL    CREATININE 0.75 0.50 - 0.90 mg/dL Bun/Cre Ratio 19 9 - 20    Calcium 9.7 8.6 - 10.4 mg/dL    Sodium 141 135 - 144 mmol/L    Potassium 4.2 3.7 - 5.3 mmol/L    Chloride 103 98 - 107 mmol/L    CO2 29 20 - 31 mmol/L    Anion Gap 9 9 - 17 mmol/L    Alkaline Phosphatase 102 35 - 104 U/L    ALT 16 5 - 33 U/L    AST 19 <32 U/L    Total Bilirubin 0.25 (L) 0.3 - 1.2 mg/dL    Total Protein 7.4 6.4 - 8.3 g/dL    Albumin 4.3 3.5 - 5.2 g/dL    Albumin/Globulin Ratio 1.4 1.0 - 2.5    GFR Non-African American >60 >60 mL/min    GFR African American >60 >60 mL/min    GFR Comment          GFR Staging NOT REPORTED    CBC Auto Differential   Result Value Ref Range    WBC 9.2 3.5 - 11.3 k/uL    RBC 4.69 3.95 - 5.11 m/uL    Hemoglobin 13.2 11.9 - 15.1 g/dL    Hematocrit 41.8 36.3 - 47.1 %    MCV 89.1 82.6 - 102.9 fL    MCH 28.1 25.2 - 33.5 pg    MCHC 31.6 25.2 - 33.5 g/dL    RDW 14.3 11.8 - 14.4 %    Platelets 576 123 - 772 k/uL    MPV 9.4 8.1 - 13.5 fL    NRBC Automated 0.0 0.0 per 100 WBC    Differential Type NOT REPORTED     Seg Neutrophils 62 36 - 65 %    Lymphocytes 28 24 - 43 %    Monocytes 7 3 - 12 %    Eosinophils % 2 1 - 4 %    Basophils 1 0 - 2 %    Immature Granulocytes 0 0 %    Segs Absolute 5.70 1.50 - 8.10 k/uL    Absolute Lymph # 2.52 1.10 - 3.70 k/uL    Absolute Mono # 0.68 0.10 - 1.20 k/uL    Absolute Eos # 0.18 0.00 - 0.44 k/uL    Basophils Absolute 0.05 0.00 - 0.20 k/uL    Absolute Immature Granulocyte 0.03 0.00 - 0.30 k/uL    WBC Morphology NOT REPORTED     RBC Morphology NOT REPORTED     Platelet Estimate NOT REPORTED       Other review of systems are as noted below. Preventative measures are reviewed today. See health maintenance section for complete preventative plan of care. Did review patient's med list, allergies, social history, fam history, pmhx and pshx today as noted in the record. Review of Systems   Constitutional: Negative for chills, fatigue and fever.    HENT: Negative for congestion, ear pain, postnasal drip, rhinorrhea, sinus pressure, sore throat and trouble swallowing. Eyes: Negative for discharge and redness. Respiratory: Negative for cough, shortness of breath and wheezing. Cardiovascular: Negative for chest pain. Gastrointestinal: Negative for abdominal pain, constipation, diarrhea, nausea and vomiting. Genitourinary: Negative for dysuria, flank pain, frequency and urgency. Musculoskeletal: Negative for arthralgias, myalgias and neck pain. Skin: Negative for rash and wound. Allergic/Immunologic: Negative for environmental allergies. Neurological: Negative for dizziness, weakness, light-headedness and headaches. Hematological: Negative for adenopathy. Psychiatric/Behavioral: Negative. Prior to Visit Medications    Medication Sig Taking? Authorizing Provider   montelukast (SINGULAIR) 10 MG tablet Take 1 tablet by mouth daily  Fabby Lima DO   cetirizine (ZYRTEC ALLERGY) 10 MG tablet Take 1 tablet by mouth daily  Fabby Lima DO   atenolol (TENORMIN) 25 MG tablet take 1 tablet by mouth once daily  Laura Simpson DO   blood glucose test strips (ONE TOUCH TEST STRIPS) strip Tests 1 X daily. DX E11.9  JEM Virk   ONE TOUCH LANCETS MISC Tests 1 x daily DX. E11.9  Abby Virk   meloxicam (MOBIC) 7.5 MG tablet TAKE 1 TABLET ONCE A DAY  Fabby Lima DO   albuterol sulfate HFA (VENTOLIN HFA) 108 (90 Base) MCG/ACT inhaler Inhale 2 puffs into the lungs every 4 hours as needed for Wheezing  Patient not taking: Reported on 9/28/2021  Laura Simpson DO   omeprazole (PRILOSEC) 20 MG delayed release capsule take 1 capsule by mouth twice a day  Fabby Lima DO   VITAMIN D PO Take 2,000 Units by mouth daily  Historical Provider, MD   nystatin (MYCOSTATIN) 236116 UNIT/GM powder Apply 2 times daily as needed for itching or irritation.   Patient not taking: Reported on 9/28/2021  Laura Simpson DO   pseudoephedrine (SUDAFED) 30 MG tablet Take 30 mg by mouth every 4 hours as needed. Historical Provider, MD   therapeutic multivitamin-minerals (THERAGRAN-M) tablet Take 1 tablet by mouth daily. Historical Provider, MD   Ascorbic Acid (VITAMIN C) 500 MG tablet Take 500 mg by mouth daily. Historical Provider, MD        Social History     Tobacco Use    Smoking status: Passive Smoke Exposure - Never Smoker    Smokeless tobacco: Never Used    Tobacco comment: very remotely would smoke a few cigarettes on weekends/college days   Substance Use Topics    Alcohol use: Yes     Comment: occasional use        There were no vitals filed for this visit. Estimated body mass index is 34.77 kg/m² as calculated from the following:    Height as of 9/28/21: 5' 7\" (1.702 m). Weight as of 9/28/21: 222 lb (100.7 kg). Physical Exam  Vitals and nursing note reviewed. Constitutional:       General: She is not in acute distress. Appearance: Normal appearance. She is well-developed. She is not diaphoretic. HENT:      Head: Normocephalic and atraumatic. Right Ear: Tympanic membrane, ear canal and external ear normal.      Left Ear: Tympanic membrane, ear canal and external ear normal.      Nose: Nose normal.      Mouth/Throat:      Mouth: Mucous membranes are moist.      Pharynx: Oropharynx is clear. No oropharyngeal exudate. Eyes:      General:         Right eye: No discharge. Left eye: No discharge. Conjunctiva/sclera: Conjunctivae normal.      Pupils: Pupils are equal, round, and reactive to light. Neck:      Thyroid: No thyromegaly. Cardiovascular:      Rate and Rhythm: Normal rate and regular rhythm. Heart sounds: Normal heart sounds. Pulmonary:      Effort: Pulmonary effort is normal.      Breath sounds: Normal breath sounds. No wheezing or rales. Abdominal:      General: Bowel sounds are normal. There is no distension. Palpations: Abdomen is soft. Tenderness: There is no abdominal tenderness.    Musculoskeletal:      Cervical back: Normal range of motion and neck supple. Lymphadenopathy:      Cervical: No cervical adenopathy. Skin:     General: Skin is warm and dry. Findings: No rash. Neurological:      Mental Status: She is alert and oriented to person, place, and time. Psychiatric:         Behavior: Behavior normal.         Thought Content: Thought content normal.         Judgment: Judgment normal.           ASSESSMENT/PLAN:    Encounter Diagnoses   Name Primary?  Type 2 diabetes mellitus with diabetic polyneuropathy, without long-term current use of insulin (HCC) Yes    Essential hypertension     Mixed hyperlipidemia     Gastroesophageal reflux disease without esophagitis      Orders Placed This Encounter   Medications    glimepiride (AMARYL) 1 MG tablet     Sig: Take 1 tablet by mouth every morning     Dispense:  90 tablet     Refill:  1    atorvastatin (LIPITOR) 20 MG tablet     Sig: Take 1 tablet by mouth daily     Dispense:  90 tablet     Refill:  1     Orders Placed This Encounter   Procedures    CBC Auto Differential     Standing Status:   Future     Standing Expiration Date:   12/9/2022    Comprehensive Metabolic Panel     Standing Status:   Future     Standing Expiration Date:   12/9/2022    Hemoglobin A1C     Standing Status:   Future     Standing Expiration Date:   12/9/2022    Lipid Panel     Standing Status:   Future     Standing Expiration Date:   12/9/2022     Order Specific Question:   Is Patient Fasting?/# of Hours     Answer:   12 hours     Patient is started on glimepiride to help with her blood sugar control. Is to continue to follow a low-carb/low sugar diet and increase exercise for optimal blood sugar control. Patient is started on Lipitor therapy. Side effects are discussed with this medication. Patient is to continue on the rest of her current medical therapy. No additional changes are made at this time.     Patient is to return to my office in 6 months duration or sooner if any further problems or symptoms arise. (Please note that portions of this note were completed with a voice recognition program. Efforts were made to edit the dictations but occasionally words are mis-transcribed.)      No follow-ups on file. An electronic signature was used to authenticate this note.     --Angelica Alexander, DO on 12/9/2021 at 7:15 AM

## 2021-12-09 NOTE — PATIENT INSTRUCTIONS
Hospital Outpatient Visit on 12/07/2021   Component Date Value Ref Range Status    Cholesterol 12/07/2021 189  <200 mg/dL Final    Comment:    Cholesterol Guidelines:      <200  Desirable   200-240  Borderline      >240  Undesirable         HDL 12/07/2021 41  >40 mg/dL Final    Comment:    HDL Guidelines:    <40     Undesirable   40-59    Borderline    >59     Desirable         LDL Cholesterol 12/07/2021 115  0 - 130 mg/dL Final    Comment:    LDL Guidelines:     <100    Desirable   100-129   Near to/above Desirable   130-159   Borderline      >159   Undesirable     Direct (measured) LDL and calculated LDL are not interchangeable tests.  Chol/HDL Ratio 12/07/2021 4.6  <5 Final            Triglycerides 12/07/2021 167* <150 mg/dL Final    Comment:    Triglyceride Guidelines:     <150   Desirable   150-199  Borderline   200-499  High     >499   Very high   Based on AHA Guidelines for fasting triglyceride, October 2012.  VLDL 12/07/2021 NOT REPORTED* 1 - 30 mg/dL Final    Hemoglobin A1C 12/07/2021 7.1* 4.0 - 6.0 % Final    Estimated Avg Glucose 12/07/2021 157  mg/dL Final    Comment: The ADA and AACC recommend providing the estimated average glucose result to permit better   patient understanding of their HBA1c result.       Glucose 12/07/2021 127* 70 - 99 mg/dL Final    BUN 12/07/2021 14  8 - 23 mg/dL Final    CREATININE 12/07/2021 0.75  0.50 - 0.90 mg/dL Final    Bun/Cre Ratio 12/07/2021 19  9 - 20 Final    Calcium 12/07/2021 9.7  8.6 - 10.4 mg/dL Final    Sodium 12/07/2021 141  135 - 144 mmol/L Final    Potassium 12/07/2021 4.2  3.7 - 5.3 mmol/L Final    Chloride 12/07/2021 103  98 - 107 mmol/L Final    CO2 12/07/2021 29  20 - 31 mmol/L Final    Anion Gap 12/07/2021 9  9 - 17 mmol/L Final    Alkaline Phosphatase 12/07/2021 102  35 - 104 U/L Final    ALT 12/07/2021 16  5 - 33 U/L Final    AST 12/07/2021 19  <32 U/L Final    Total Bilirubin 12/07/2021 0.25* 0.3 - 1.2 mg/dL Final    Total Protein 12/07/2021 7.4  6.4 - 8.3 g/dL Final    Albumin 12/07/2021 4.3  3.5 - 5.2 g/dL Final    Albumin/Globulin Ratio 12/07/2021 1.4  1.0 - 2.5 Final    GFR Non- 12/07/2021 >60  >60 mL/min Final    GFR  12/07/2021 >60  >60 mL/min Final    GFR Comment 12/07/2021        Final    Comment: Average GFR for 79or more years old:   76 mL/min/1.73sq m  Chronic Kidney Disease:   <60 mL/min/1.73sq m  Kidney failure:   <15 mL/min/1.73sq m        The equation has not been validated in patients older than 79, but an MDRD-derived eGFR may   still be a useful tool for providers caring for patients older than 70. GFR is a calculated value that has proven clinically to  be a more effective measure of   kidney function when reported with serum creatinine.             GFR Staging 12/07/2021 NOT REPORTED   Final    WBC 12/07/2021 9.2  3.5 - 11.3 k/uL Final    RBC 12/07/2021 4.69  3.95 - 5.11 m/uL Final    Hemoglobin 12/07/2021 13.2  11.9 - 15.1 g/dL Final    Hematocrit 12/07/2021 41.8  36.3 - 47.1 % Final    MCV 12/07/2021 89.1  82.6 - 102.9 fL Final    MCH 12/07/2021 28.1  25.2 - 33.5 pg Final    MCHC 12/07/2021 31.6  25.2 - 33.5 g/dL Final    RDW 12/07/2021 14.3  11.8 - 14.4 % Final    Platelets 54/97/8990 249  138 - 453 k/uL Final    MPV 12/07/2021 9.4  8.1 - 13.5 fL Final    NRBC Automated 12/07/2021 0.0  0.0 per 100 WBC Final    Differential Type 12/07/2021 NOT REPORTED   Final    Seg Neutrophils 12/07/2021 62  36 - 65 % Final    Lymphocytes 12/07/2021 28  24 - 43 % Final    Monocytes 12/07/2021 7  3 - 12 % Final    Eosinophils % 12/07/2021 2  1 - 4 % Final    Basophils 12/07/2021 1  0 - 2 % Final    Immature Granulocytes 12/07/2021 0  0 % Final    Segs Absolute 12/07/2021 5.70  1.50 - 8.10 k/uL Final    Absolute Lymph # 12/07/2021 2.52  1.10 - 3.70 k/uL Final    Absolute Mono # 12/07/2021 0.68  0.10 - 1.20 k/uL Final    Absolute Eos # 12/07/2021 0.18 0.00 - 0.44 k/uL Final    Basophils Absolute 12/07/2021 0.05  0.00 - 0.20 k/uL Final    Absolute Immature Granulocyte 12/07/2021 0.03  0.00 - 0.30 k/uL Final    WBC Morphology 12/07/2021 NOT REPORTED   Final    RBC Morphology 12/07/2021 NOT REPORTED   Final    Platelet Estimate 89/19/2771 NOT REPORTED   Final

## 2021-12-20 RX ORDER — CETIRIZINE HYDROCHLORIDE 10 MG/1
10 TABLET ORAL DAILY
Qty: 90 TABLET | Refills: 1 | Status: SHIPPED | OUTPATIENT
Start: 2021-12-20 | End: 2022-06-22 | Stop reason: SDUPTHER

## 2021-12-20 RX ORDER — MONTELUKAST SODIUM 10 MG/1
10 TABLET ORAL DAILY
Qty: 90 TABLET | Refills: 1 | Status: SHIPPED | OUTPATIENT
Start: 2021-12-20 | End: 2022-06-22 | Stop reason: SDUPTHER

## 2021-12-20 NOTE — TELEPHONE ENCOUNTER
[facility] called requesting a refill of the below medication which has been pended for you:     Requested Prescriptions     Pending Prescriptions Disp Refills    montelukast (SINGULAIR) 10 MG tablet 90 tablet 1     Sig: Take 1 tablet by mouth daily    cetirizine (ZYRTEC ALLERGY) 10 MG tablet 90 tablet 1     Sig: Take 1 tablet by mouth daily       Last Appointment Date: 12/9/2021  Next Appointment Date: 6/9/2022    Allergies   Allergen Reactions    Aspirin Other (See Comments)     Abdominal pain    Ibuprofen Other (See Comments)     Abdominal pain    Penicillin G Other (See Comments)    Sulfamethoxazole-Trimethoprim Nausea Only and Other (See Comments)    Penicillins Rash

## 2022-01-07 ENCOUNTER — PATIENT MESSAGE (OUTPATIENT)
Dept: FAMILY MEDICINE CLINIC | Age: 72
End: 2022-01-07

## 2022-01-07 RX ORDER — METFORMIN HYDROCHLORIDE 500 MG/1
500 TABLET, EXTENDED RELEASE ORAL 2 TIMES DAILY
Qty: 180 TABLET | Refills: 1 | Status: SHIPPED | OUTPATIENT
Start: 2022-01-07 | End: 2022-07-13 | Stop reason: SDUPTHER

## 2022-01-07 NOTE — TELEPHONE ENCOUNTER
From: Roslyn Wilson  To: Dr. Phyllis Espinoza: 1/7/2022 10:31 AM EST  Subject: Glimepiride    After researching both this one and Metformin I think I would like to try the Metformin first. We had to call ems many times when my mother first started to take medication for diabetes because she was too low and I guess this has been my biggest fear. I want to try to lose some weight but I know I need to take medication since I can't keep the numbers down. Could you please send a prescription for Metformin to [address] in [place].  Thanks, Roslyn Wilson

## 2022-01-12 RX ORDER — OMEPRAZOLE 20 MG/1
20 CAPSULE, DELAYED RELEASE ORAL 2 TIMES DAILY
Qty: 180 CAPSULE | Refills: 1 | Status: SHIPPED | OUTPATIENT
Start: 2022-01-12 | End: 2022-08-23 | Stop reason: SDUPTHER

## 2022-01-12 RX ORDER — ATENOLOL 25 MG/1
25 TABLET ORAL DAILY
Qty: 90 TABLET | Refills: 1 | Status: SHIPPED | OUTPATIENT
Start: 2022-01-12 | End: 2022-07-12 | Stop reason: SDUPTHER

## 2022-01-12 RX ORDER — MELOXICAM 7.5 MG/1
TABLET ORAL
Qty: 90 TABLET | Refills: 1 | Status: SHIPPED | OUTPATIENT
Start: 2022-01-12 | End: 2022-07-12 | Stop reason: SDUPTHER

## 2022-01-12 NOTE — TELEPHONE ENCOUNTER
[facility] called requesting a refill of the below medication which has been pended for you:     Requested Prescriptions     Pending Prescriptions Disp Refills    omeprazole (PRILOSEC) 20 MG delayed release capsule 180 capsule 1    meloxicam (MOBIC) 7.5 MG tablet 90 tablet 1     Sig: TAKE 1 TABLET ONCE A DAY    atenolol (TENORMIN) 25 MG tablet 90 tablet 0       Last Appointment Date: 12/9/2021  Next Appointment Date: 6/9/2022    Allergies   Allergen Reactions    Aspirin Other (See Comments)     Abdominal pain    Ibuprofen Other (See Comments)     Abdominal pain    Penicillin G Other (See Comments)    Sulfamethoxazole-Trimethoprim Nausea Only and Other (See Comments)    Penicillins Rash

## 2022-02-22 ENCOUNTER — PATIENT MESSAGE (OUTPATIENT)
Dept: FAMILY MEDICINE CLINIC | Age: 72
End: 2022-02-22

## 2022-02-22 DIAGNOSIS — E11.9 DIABETES MELLITUS TYPE II, CONTROLLED (HCC): ICD-10-CM

## 2022-02-22 RX ORDER — SCOLOPAMINE TRANSDERMAL SYSTEM 1 MG/1
1 PATCH, EXTENDED RELEASE TRANSDERMAL
Qty: 2 PATCH | Refills: 0 | Status: SHIPPED | OUTPATIENT
Start: 2022-02-22

## 2022-02-22 NOTE — TELEPHONE ENCOUNTER
From: Nelson Dominguez  To: Dr. Mclean Oak: 2/22/2022 2:58 PM EST  Subject: travel    We are leaving Saturday for [place]. I was hoping to get two of the patches for motion sickness again one for going down and one for coming home. We are now using [address] in [place].    Thank you, [facility]

## 2022-02-22 NOTE — TELEPHONE ENCOUNTER
Louis Ramesh called requesting a refill of the below medication which has been pended for you:     Requested Prescriptions     Pending Prescriptions Disp Refills    blood glucose test strips (ONE TOUCH TEST STRIPS) strip 100 each 0     Sig: Tests 1 X daily.   DX E11.9    ONE TOUCH LANCETS MISC 100 each 0     Sig: Tests 1 x daily DX. E11.9       Last Appointment Date: 12/9/2021  Next Appointment Date: 6/9/2022    Allergies   Allergen Reactions    Aspirin Other (See Comments)     Abdominal pain    Ibuprofen Other (See Comments)     Abdominal pain    Penicillin G Other (See Comments)    Sulfamethoxazole-Trimethoprim Nausea Only and Other (See Comments)    Penicillins Rash

## 2022-02-23 ENCOUNTER — PATIENT MESSAGE (OUTPATIENT)
Dept: FAMILY MEDICINE CLINIC | Age: 72
End: 2022-02-23

## 2022-02-24 NOTE — TELEPHONE ENCOUNTER
From: Adri Poe  To: Dr. Mortimer Debar: 2/23/2022 10:19 PM EST  Subject: metformin    You said to let you know if I had any problems with the metformin. My side effects have been mild nausea and heart palpitations. The last two days at about four in the afternoon I have become very woozy dizzy nauseous. Yesterday I ate supper and it eventually got better. Today was worse, I stated to get dizzy so I ate four lifesavers ,sometimes that helps, but it didn't. Checked my sugar after and it was 110. I also felt flushed, very dizzy , sweaty, and nauseous and was getting the palpitations. I forced myself to eat but it took several hours to feel better. I still have a headache and still feel a little woozy . I took my blood pressure and it was good. It is 10 O'clock I just took my sugar and it was 186. I ate a cheeseburger, potatoes, yogurt, and some saltines four hours ago.

## 2022-04-08 ENCOUNTER — PATIENT MESSAGE (OUTPATIENT)
Dept: FAMILY MEDICINE CLINIC | Age: 72
End: 2022-04-08

## 2022-04-08 NOTE — TELEPHONE ENCOUNTER
From: Shaista Campos  To: Dr. Berrios Evin: 4/8/2022 1:00 PM EDT  Subject: lump behind knee    Hi, I am back from [place]. While there I started to feel pain behind my right thigh. While massaging the area I felt a lump behind my right knee. It is not visible and is about the size of a large marble. The muscle pain is worse after I exercise. It is probably a cyst but as I have a history of melanoma I would feel better if it was checked out.   Shaista Campos

## 2022-04-15 ENCOUNTER — OFFICE VISIT (OUTPATIENT)
Dept: PRIMARY CARE CLINIC | Age: 72
End: 2022-04-15
Payer: MEDICARE

## 2022-04-15 VITALS
RESPIRATION RATE: 18 BRPM | WEIGHT: 216.5 LBS | SYSTOLIC BLOOD PRESSURE: 140 MMHG | HEART RATE: 64 BPM | BODY MASS INDEX: 33.98 KG/M2 | OXYGEN SATURATION: 96 % | DIASTOLIC BLOOD PRESSURE: 82 MMHG | HEIGHT: 67 IN | TEMPERATURE: 97.3 F

## 2022-04-15 DIAGNOSIS — R22.41 SUBCUTANEOUS NODULE OF RIGHT LOWER EXTREMITY: Primary | ICD-10-CM

## 2022-04-15 PROCEDURE — G8417 CALC BMI ABV UP PARAM F/U: HCPCS | Performed by: FAMILY MEDICINE

## 2022-04-15 PROCEDURE — 99212 OFFICE O/P EST SF 10 MIN: CPT | Performed by: FAMILY MEDICINE

## 2022-04-15 PROCEDURE — 3017F COLORECTAL CA SCREEN DOC REV: CPT | Performed by: FAMILY MEDICINE

## 2022-04-15 PROCEDURE — 99213 OFFICE O/P EST LOW 20 MIN: CPT | Performed by: FAMILY MEDICINE

## 2022-04-15 PROCEDURE — 1036F TOBACCO NON-USER: CPT | Performed by: FAMILY MEDICINE

## 2022-04-15 PROCEDURE — 4040F PNEUMOC VAC/ADMIN/RCVD: CPT | Performed by: FAMILY MEDICINE

## 2022-04-15 PROCEDURE — G8427 DOCREV CUR MEDS BY ELIG CLIN: HCPCS | Performed by: FAMILY MEDICINE

## 2022-04-15 PROCEDURE — G8400 PT W/DXA NO RESULTS DOC: HCPCS | Performed by: FAMILY MEDICINE

## 2022-04-15 PROCEDURE — 1123F ACP DISCUSS/DSCN MKR DOCD: CPT | Performed by: FAMILY MEDICINE

## 2022-04-15 PROCEDURE — 1090F PRES/ABSN URINE INCON ASSESS: CPT | Performed by: FAMILY MEDICINE

## 2022-04-15 ASSESSMENT — PATIENT HEALTH QUESTIONNAIRE - PHQ9
SUM OF ALL RESPONSES TO PHQ QUESTIONS 1-9: 0
SUM OF ALL RESPONSES TO PHQ QUESTIONS 1-9: 0
2. FEELING DOWN, DEPRESSED OR HOPELESS: 0
SUM OF ALL RESPONSES TO PHQ QUESTIONS 1-9: 0
1. LITTLE INTEREST OR PLEASURE IN DOING THINGS: 0
SUM OF ALL RESPONSES TO PHQ9 QUESTIONS 1 & 2: 0
SUM OF ALL RESPONSES TO PHQ QUESTIONS 1-9: 0

## 2022-04-15 NOTE — PROGRESS NOTES
4/15/2022     Annie Ragsdale (:  1950) is a 71 y.o. female, here for evaluation of the following medical concerns:    Other  This is a new problem. The current episode started more than 1 month ago (patient states that she was down in [place] for the winter she was doing more walking and started to notice a lump behind her right lower extremity. Now has noticed a similar lesion to the left lower extremity). The problem occurs constantly. The problem has been gradually worsening. Associated symptoms include myalgias. Pertinent negatives include no chills, fatigue or fever. Associated symptoms comments: Does note that the right lower extremity nodule swells and shrinks in size. Does state that it is painful and is limiting her mobility as it hurts with walking. Left lower extremity nodule is smaller in size and is not as painful with movement. . She has tried acetaminophen and NSAIDs for the symptoms. The treatment provided no relief. Did review patient's med list, allergies, social history,pmhx and pshx today as noted in the record. Review of Systems   Constitutional: Negative for chills, fatigue and fever. Musculoskeletal: Positive for gait problem and myalgias. Skin:        Nodules behind bilateral knees       Prior to Visit Medications    Medication Sig Taking? Authorizing Provider   blood glucose test strips (ONE TOUCH TEST STRIPS) strip Tests 1 X daily.   DX E11.9 Yes Gifty Ordaz DO   ONE TOUCH LANCETS MISC Tests 1 x daily DX. E11.9 Yes Gifty Ordaz DO   scopolamine (TRANSDERM-SCOP) transdermal patch Place 1 patch onto the skin every 72 hours Yes Gifty Ordaz DO   omeprazole (PRILOSEC) 20 MG delayed release capsule Take 1 capsule by mouth 2 times daily Yes Gifty Ordaz DO   meloxicam (MOBIC) 7.5 MG tablet TAKE 1 TABLET ONCE A DAY Yes Fabby Lima, DO   atenolol (TENORMIN) 25 MG tablet Take 1 tablet by mouth daily Yes Gifty Ordaz DO   metFORMIN (GLUCOPHAGE-XR) 500 MG extended release tablet Take 1 tablet by mouth 2 times daily Yes Fabby Lima,    montelukast (SINGULAIR) 10 MG tablet Take 1 tablet by mouth daily Yes Rodney Campos DO   cetirizine (ZYRTEC ALLERGY) 10 MG tablet Take 1 tablet by mouth daily Yes Rodney Campos DO   albuterol sulfate HFA (VENTOLIN HFA) 108 (90 Base) MCG/ACT inhaler Inhale 2 puffs into the lungs every 4 hours as needed for Wheezing Yes Rodney Campos DO   VITAMIN D PO Take 2,000 Units by mouth daily Yes Historical Provider, MD   nystatin (MYCOSTATIN) 357901 UNIT/GM powder Apply 2 times daily as needed for itching or irritation. Yes Rodney Campos DO   pseudoephedrine (SUDAFED) 30 MG tablet Take 30 mg by mouth every 4 hours as needed. Yes Historical Provider, MD   therapeutic multivitamin-minerals (THERAGRAN-M) tablet Take 1 tablet by mouth daily. Yes Historical Provider, MD   Ascorbic Acid (VITAMIN C) 500 MG tablet Take 500 mg by mouth daily. Yes Historical Provider, MD   atorvastatin (LIPITOR) 20 MG tablet Take 1 tablet by mouth daily  Patient not taking: Reported on 4/15/2022  Rodney Campos DO        Social History     Tobacco Use    Smoking status: Passive Smoke Exposure - Never Smoker    Smokeless tobacco: Never Used    Tobacco comment: very remotely would smoke a few cigarettes on weekends/college days   Substance Use Topics    Alcohol use: Yes     Comment: occasional use        Vitals:    04/15/22 1006   BP: (!) 140/82   Site: Left Upper Arm   Position: Sitting   Cuff Size: Medium Adult   Pulse: 64   Resp: 18   Temp: 97.3 °F (36.3 °C)   TempSrc: Tympanic   SpO2: 96%   Weight: 216 lb 8 oz (98.2 kg)   Height: 5' 7\" (1.702 m)     Estimated body mass index is 33.91 kg/m² as calculated from the following:    Height as of this encounter: 5' 7\" (1.702 m). Weight as of this encounter: 216 lb 8 oz (98.2 kg). Physical Exam  Vitals and nursing note reviewed.    Constitutional:       General: She is not in acute distress. Appearance: She is well-developed. She is not diaphoretic. HENT:      Head: Normocephalic and atraumatic. Eyes:      Conjunctiva/sclera: Conjunctivae normal.   Pulmonary:      Effort: Pulmonary effort is normal.   Musculoskeletal:         General: Tenderness present. Cervical back: Normal range of motion. Comments: Small palpable cystic nodule to the right lower leg, lateral aspect of the lower leg adjacent to the hamstring muscle. The lesion is soft and freely moveable and is about 1cm in diameter. Mildly tender with palpation. There is another less noticeable palpable nodule to the left lower extremity in a similar place to the right nodule. Lesions appear to be cystic. Skin:     General: Skin is warm and dry. Coloration: Skin is not pale. Findings: No erythema or rash. Neurological:      Mental Status: She is alert and oriented to person, place, and time. Psychiatric:         Behavior: Behavior normal.         Thought Content: Thought content normal.         Judgment: Judgment normal.         ASSESSMENT/PLAN:  Encounter Diagnosis   Name Primary?  Subcutaneous nodule of right lower extremity Yes     No orders of the defined types were placed in this encounter. Orders Placed This Encounter   Procedures   Libia Herron MD, Orthopedic Surgery, [place]     Referral Priority:   Routine     Referral Type:   Eval and Treat     Referral Reason:   Specialty Services Required     Referred to Provider:   Faith Zheng MD     Requested Specialty:   Orthopedic Surgery     Number of Visits Requested:   1     Reassurance is given to patient noting that the lesions are likely benign cysts. As the one on the right is causing her pain and limiting her ability to walk and exercise, will refer to orthopedics to see if this is something that can be removed. Patient is to use tylenol if needed for pain.     Return  if no improvement in symptoms or if any further symptoms arise. No follow-ups on file. An electronic signature was used to authenticate this note.     --Tomasa Kessler DO on 4/15/2022 at 5:45 PM

## 2022-04-26 ENCOUNTER — TELEMEDICINE (OUTPATIENT)
Dept: FAMILY MEDICINE CLINIC | Age: 72
End: 2022-04-26
Payer: MEDICARE

## 2022-04-26 DIAGNOSIS — Z00.00 MEDICARE ANNUAL WELLNESS VISIT, SUBSEQUENT: ICD-10-CM

## 2022-04-26 DIAGNOSIS — Z12.31 ENCOUNTER FOR SCREENING MAMMOGRAM FOR MALIGNANT NEOPLASM OF BREAST: Primary | ICD-10-CM

## 2022-04-26 PROCEDURE — G0439 PPPS, SUBSEQ VISIT: HCPCS | Performed by: FAMILY MEDICINE

## 2022-04-26 PROCEDURE — 1123F ACP DISCUSS/DSCN MKR DOCD: CPT | Performed by: FAMILY MEDICINE

## 2022-04-26 PROCEDURE — 4040F PNEUMOC VAC/ADMIN/RCVD: CPT | Performed by: FAMILY MEDICINE

## 2022-04-26 PROCEDURE — 3017F COLORECTAL CA SCREEN DOC REV: CPT | Performed by: FAMILY MEDICINE

## 2022-04-26 RX ORDER — SIMETHICONE 180 MG/1
CAPSULE, LIQUID FILLED ORAL 2 TIMES DAILY
COMMUNITY

## 2022-04-26 ASSESSMENT — PATIENT HEALTH QUESTIONNAIRE - PHQ9
4. FEELING TIRED OR HAVING LITTLE ENERGY: 0
2. FEELING DOWN, DEPRESSED OR HOPELESS: 0
5. POOR APPETITE OR OVEREATING: 0
SUM OF ALL RESPONSES TO PHQ QUESTIONS 1-9: 0
7. TROUBLE CONCENTRATING ON THINGS, SUCH AS READING THE NEWSPAPER OR WATCHING TELEVISION: 0
6. FEELING BAD ABOUT YOURSELF - OR THAT YOU ARE A FAILURE OR HAVE LET YOURSELF OR YOUR FAMILY DOWN: 0
SUM OF ALL RESPONSES TO PHQ QUESTIONS 1-9: 0
8. MOVING OR SPEAKING SO SLOWLY THAT OTHER PEOPLE COULD HAVE NOTICED. OR THE OPPOSITE, BEING SO FIGETY OR RESTLESS THAT YOU HAVE BEEN MOVING AROUND A LOT MORE THAN USUAL: 0
SUM OF ALL RESPONSES TO PHQ QUESTIONS 1-9: 0
9. THOUGHTS THAT YOU WOULD BE BETTER OFF DEAD, OR OF HURTING YOURSELF: 0
SUM OF ALL RESPONSES TO PHQ9 QUESTIONS 1 & 2: 0
3. TROUBLE FALLING OR STAYING ASLEEP: 0
1. LITTLE INTEREST OR PLEASURE IN DOING THINGS: 0
SUM OF ALL RESPONSES TO PHQ QUESTIONS 1-9: 0

## 2022-04-26 ASSESSMENT — LIFESTYLE VARIABLES
HOW OFTEN DO YOU HAVE A DRINK CONTAINING ALCOHOL: MONTHLY OR LESS
HOW MANY STANDARD DRINKS CONTAINING ALCOHOL DO YOU HAVE ON A TYPICAL DAY: 1 OR 2

## 2022-04-26 NOTE — PROGRESS NOTES
217lb  Patient-Reported Height: 5'7\"              Allergies   Allergen Reactions    Aspirin Other (See Comments)     Abdominal pain    Ibuprofen Other (See Comments)     Abdominal pain    Penicillin G Other (See Comments)    Sulfamethoxazole-Trimethoprim Nausea Only and Other (See Comments)    Penicillins Rash     Prior to Visit Medications    Medication Sig Taking? Authorizing Provider   Simethicone (GAS-X ULTRA STRENGTH) 180 MG CAPS Take by mouth in the morning and at bedtime Yes Historical Provider, MD   blood glucose test strips (ONE TOUCH TEST STRIPS) strip Tests 1 X daily. DX E11.9 Yes Rodney Campos DO   ONE TOUCH LANCETS MISC Tests 1 x daily DX. E11.9 Yes Rodney Campos DO   scopolamine (TRANSDERM-SCOP) transdermal patch Place 1 patch onto the skin every 72 hours Yes Rodney Campos DO   omeprazole (PRILOSEC) 20 MG delayed release capsule Take 1 capsule by mouth 2 times daily Yes Rodney Campos DO   meloxicam (MOBIC) 7.5 MG tablet TAKE 1 TABLET ONCE A DAY Yes Fabby Lima DO   atenolol (TENORMIN) 25 MG tablet Take 1 tablet by mouth daily Yes Rodney Campos DO   metFORMIN (GLUCOPHAGE-XR) 500 MG extended release tablet Take 1 tablet by mouth 2 times daily Yes Fabby Lima DO   montelukast (SINGULAIR) 10 MG tablet Take 1 tablet by mouth daily Yes Rodney Campos DO   cetirizine (ZYRTEC ALLERGY) 10 MG tablet Take 1 tablet by mouth daily Yes Rodney Campos DO   albuterol sulfate HFA (VENTOLIN HFA) 108 (90 Base) MCG/ACT inhaler Inhale 2 puffs into the lungs every 4 hours as needed for Wheezing Yes Rodney Campos DO   VITAMIN D PO Take 2,000 Units by mouth daily Yes Historical Provider, MD   nystatin (MYCOSTATIN) [id number] UNIT/GM powder Apply 2 times daily as needed for itching or irritation. Yes Rodney Campos DO   pseudoephedrine (SUDAFED) 30 MG tablet Take 30 mg by mouth every 4 hours as needed.  Yes Historical Provider, MD   therapeutic multivitamin-minerals [facility]) tablet Take 1 tablet by mouth daily. Yes Historical Provider, MD   Ascorbic Acid (VITAMIN C) 500 MG tablet Take 500 mg by mouth daily. Yes Historical Provider, MD   atorvastatin (LIPITOR) 20 MG tablet Take 1 tablet by mouth daily  Patient not taking: Reported on 4/15/2022  Danilo Hope DO       CareTeam (Including outside providers/suppliers regularly involved in providing care):   Patient Care Team:  Danilo Hope DO as PCP - General (Family Medicine)  Danilo Hope DO as PCP - [address] [place] [address] Dr Edouard Provider    Reviewed and updated this visit:  Tobacco  Allergies  Meds  Med Hx  Surg Hx  Soc Hx  Fam Hx         AWV completed via telephone encounter. Rosangela Du, was evaluated through a synchronous (real-time) audio-video encounter. The patient (or guardian if applicable) is aware that this is a billable service, which includes applicable co-pays. This Virtual Visit was conducted with patient's (and/or legal guardian's) consent. The visit was conducted pursuant to the emergency declaration under the [address] [place] [address], [address] [place] [address] waiver authority and the [employer] Act. Patient identification was verified, and a caregiver was present when appropriate. The patient was located at home in a state where the provider was licensed to provide care. García James RN, 4/26/2022, performed the documented evaluation under the direct supervision of the attending physician.

## 2022-04-26 NOTE — PATIENT INSTRUCTIONS
Personalized Preventive Plan for Annie Ragsdale - 4/26/2022  Medicare offers a range of preventive health benefits. Some of the tests and screenings are paid in full while other may be subject to a deductible, co-insurance, and/or copay. Some of these benefits include a comprehensive review of your medical history including lifestyle, illnesses that may run in your family, and various assessments and screenings as appropriate. After reviewing your medical record and screening and assessments performed today your provider may have ordered immunizations, labs, imaging, and/or referrals for you. A list of these orders (if applicable) as well as your Preventive Care list are included within your After Visit Summary for your review. Other Preventive Recommendations:    · A preventive eye exam performed by an eye specialist is recommended every 1-2 years to screen for glaucoma; cataracts, macular degeneration, and other eye disorders. · A preventive dental visit is recommended every 6 months. · Try to get at least 150 minutes of exercise per week or 10,000 steps per day on a pedometer . · Order or download the FREE \"Exercise & Physical Activity: Your Everyday Guide\" from [employer]. Call 2-376.784.8336 or search [employer] online. · You need 7131-3231 mg of calcium and 6882-3252 IU of vitamin D per day. It is possible to meet your calcium requirement with diet alone, but a vitamin D supplement is usually necessary to meet this goal.  · When exposed to the sun, use a sunscreen that protects against both UVA and UVB radiation with an SPF of 30 or greater. Reapply every 2 to 3 hours or after sweating, drying off with a towel, or swimming. · Always wear a seat belt when traveling in a car. Always wear a helmet when riding a bicycle or motorcycle. Heart-Healthy Diet   Sodium, Fat, and Cholesterol Controlled Diet       What Is a Heart Healthy Diet?    A heart-healthy diet is one that limits sodium , certain types of fat , and cholesterol . This type of diet is recommended for:   People with any form of cardiovascular disease (eg, coronary heart disease , peripheral vascular disease , previous heart attack , previous stroke )   People with risk factors for cardiovascular disease, such as high blood pressure , high cholesterol , or diabetes   Anyone who wants to lower their risk of developing cardiovascular disease   Sodium    Sodium is a mineral found in many foods. In general, most people consume much more sodium than they need. Diets high in sodium can increase blood pressure and lead to edema (water retention). On a heart-healthy diet, you should consume no more than 2,300 mg (milligrams) of sodium per dayabout the amount in one teaspoon of table salt. The foods highest in sodium include table salt (about 50% sodium), processed foods, convenience foods, and preserved foods. Cholesterol    Cholesterol is a fat-like, waxy substance in your blood. Our bodies make some cholesterol. It is also found in animal products, with the highest amounts in fatty meat, egg yolks, whole milk, cheese, shellfish, and organ meats. On a heart-healthy diet, you should limit your cholesterol intake to less than 200 mg per day. It is normal and important to have some cholesterol in your bloodstream. But too much cholesterol can cause plaque to build up within your arteries, which can eventually lead to a heart attack or stroke. The two types of cholesterol that are most commonly referred to are:   Low-density lipoprotein (LDL) cholesterol  Also known as bad cholesterol, this is the cholesterol that tends to build up along your arteries. Bad cholesterol levels are increased by eating fats that are saturated or hydrogenated. Optimal level of this cholesterol is less than 100. Over 130 starts to get risky for heart disease.    High-density lipoprotein (HDL) cholesterol  Also known as good cholesterol, this type of cholesterol actually carries cholesterol away from your arteries and may, therefore, help lower your risk of having a heart attack. You want this level to be high (ideally greater than 60). It is a risk to have a level less than 40. You can raise this good cholesterol by eating olive oil, canola oil, avocados, or nuts. Exercise raises this level, too. Fat    Fat is calorie dense and packs a lot of calories into a small amount of food. Even though fats should be limited due to their high calorie content, not all fats are bad. In fact, some fats are quite healthful. Fat can be broken down into four main types. The good-for-you fats are:   Monounsaturated fat  found in oils such as olive and canola, avocados, and nuts and natural nut butters; can decrease cholesterol levels, while keeping levels of HDL cholesterol high   Polyunsaturated fat  found in oils such as safflower, sunflower, soybean, corn, and sesame; can decrease total cholesterol and LDL cholesterol   Omega-3 fatty acids  particularly those found in fatty fish (such as salmon, trout, tuna, mackerel, herring, and sardines); can decrease risk of arrhythmias, decrease triglyceride levels, and slightly lower blood pressure   The fats that you want to limit are:   Saturated fat  found in animal products, many fast foods, and a few vegetables; increases total blood cholesterol, including LDL levels   Animal fats that are saturated include: butter, lard, whole-milk dairy products, meat fat, and poultry skin   Vegetable fats that are saturated include: hydrogenated shortening, palm oil, coconut oil, cocoa butter   Hydrogenated or trans fat  found in margarine and vegetable shortening, most shelf stable snack foods, and fried foods; increases LDL and decreases HDL     It is generally recommended that you limit your total fat for the day to less than 30% of your total calories.  If you follow an 1800-calorie heart healthy diet, for example, this would mean 60 grams of fat or less per day. Saturated fat and trans fat in your diet raises your blood cholesterol the most, much more than dietary cholesterol does. For this reason, on a heart-healthy diet, less than 7% of your calories should come from saturated fat and ideally 0% from trans fat. On an 1800-calorie diet, this translates into less than 14 grams of saturated fat per day, leaving 46 grams of fat to come from mono- and polyunsaturated fats.    Food Choices on a Heart Healthy Diet   Food Category   Foods Recommended   Foods to Avoid   Grains   Breads and rolls without salted tops Most dry and cooked cereals Unsalted crackers and breadsticks Low-sodium or homemade breadcrumbs or stuffing All rice and pastas   Breads, rolls, and crackers with salted tops High-fat baked goods (eg, muffins, donuts, pastries) Quick breads, self-rising flour, and biscuit mixes Regular bread crumbs Instant hot cereals Commercially prepared rice, pasta, or stuffing mixes   Vegetables   Most fresh, frozen, and low-sodium canned vegetables Low-sodium and salt-free vegetable juices Canned vegetables if unsalted or rinsed   Regular canned vegetables and juices, including sauerkraut and pickled vegetables Frozen vegetables with sauces Commercially prepared potato and vegetable mixes   Fruits   Most fresh, frozen, and canned fruits All fruit juices   Fruits processed with salt or sodium   Milk   Nonfat or low-fat (1%) milk Nonfat or low-fat yogurt Cottage cheese, low-fat ricotta, cheeses labeled as low-fat and low-sodium   Whole milk Reduced-fat (2%) milk Malted and chocolate milk Full fat yogurt Most cheeses (unless low-fat and low salt) Buttermilk (no more than 1 cup per week)   Meats and Beans   Lean cuts of fresh or frozen beef, veal, lamb, or pork (look for the word loin) Fresh or frozen poultry without the skin Fresh or frozen fish and some shellfish Egg whites and egg substitutes (Limit whole eggs to three per week) Tofu Nuts or seeds (unsalted, dry-roasted), low-sodium peanut butter Dried peas, beans, and lentils   Any smoked, cured, salted, or canned meat, fish, or poultry (including wilder, chipped beef, cold cuts, hot dogs, sausages, sardines, and anchovies) Poultry skins Breaded and/or fried fish or meats Canned peas, beans, and lentils Salted nuts   Fats and Oils   Olive oil and canola oil Low-sodium, low-fat salad dressings and mayonnaise   Butter, margarine, coconut and palm oils, wilder fat   Snacks, Sweets, and Condiments   Low-sodium or unsalted versions of broths, soups, soy sauce, and condiments Pepper, herbs, and spices; vinegar, lemon, or lime juice Low-fat frozen desserts (yogurt, sherbet, fruit bars) Sugar, cocoa powder, honey, syrup, jam, and preserves Low-fat, trans-fat free cookies, cakes, and pies Kamlesh and animal crackers, fig bars, miriam snaps   High-fat desserts Broth, soups, gravies, and sauces, made from instant mixes or other high-sodium ingredients Salted snack foods Canned olives Meat tenderizers, seasoning salt, and most flavored vinegars   Beverages   Low-sodium carbonated beverages Tea and coffee in moderation Soy milk   Commercially softened water   Suggestions   Make whole grains, fruits, and vegetables the base of your diet. Choose heart-healthy fats such as canola, olive, and flaxseed oil, and foods high in heart-healthy fats, such as nuts, seeds, soybeans, tofu, and fish. Eat fish at least twice per week; the fish highest in omega-3 fatty acids and lowest in mercury include salmon, herring, mackerel, sardines, and canned chunk light tuna. If you eat fish less than twice per week or have high triglycerides, talk to your doctor about taking fish oil supplements. Read food labels.    For products low in fat and cholesterol, look for fat free, low-fat, cholesterol free, saturated fat free, and trans fat freeAlso scan the Nutrition Facts Label, which lists saturated fat, trans fat, and cholesterol amounts. For products low in sodium, look for sodium free, very low sodium, low sodium, no added salt, and unsalted   Skip the salt when cooking or at the table; if food needs more flavor, get creative and try out different herbs and spices. Garlic and onion also add substantial flavor to foods. Trim any visible fat off meat and poultry before cooking, and drain the fat off after partida. Use cooking methods that require little or no added fat, such as grilling, boiling, baking, poaching, broiling, roasting, steaming, stir-frying, and sauting. Avoid fast food and convenience food. They tend to be high in saturated and trans fat and have a lot of added salt. Talk to a registered dietitian for individualized diet advice.       Last Reviewed: March 2011 Kathy French MS, MPH, RD   Updated: 3/29/2011   ·

## 2022-05-26 ENCOUNTER — HOSPITAL ENCOUNTER (OUTPATIENT)
Dept: MAMMOGRAPHY | Age: 72
Discharge: HOME | End: 2022-05-28
Payer: MEDICARE

## 2022-05-26 VITALS — HEIGHT: 67 IN | WEIGHT: 215 LBS | BODY MASS INDEX: 33.74 KG/M2

## 2022-05-26 DIAGNOSIS — Z12.31 ENCOUNTER FOR SCREENING MAMMOGRAM FOR MALIGNANT NEOPLASM OF BREAST: ICD-10-CM

## 2022-05-26 PROCEDURE — 77063 BREAST TOMOSYNTHESIS BI: CPT

## 2022-06-07 ENCOUNTER — HOSPITAL ENCOUNTER (OUTPATIENT)
Dept: LAB | Age: 72
Discharge: HOME | End: 2022-06-07
Payer: MEDICARE

## 2022-06-07 DIAGNOSIS — E78.2 MIXED HYPERLIPIDEMIA: ICD-10-CM

## 2022-06-07 DIAGNOSIS — E11.42 TYPE 2 DIABETES MELLITUS WITH DIABETIC POLYNEUROPATHY, WITHOUT LONG-TERM CURRENT USE OF INSULIN (HCC): ICD-10-CM

## 2022-06-07 DIAGNOSIS — I10 ESSENTIAL HYPERTENSION: ICD-10-CM

## 2022-06-07 LAB
ABSOLUTE EOS #: 0.2 K/UL (ref 0–0.44)
ABSOLUTE IMMATURE GRANULOCYTE: 0.03 K/UL (ref 0–0.3)
ABSOLUTE LYMPH #: 2.17 K/UL (ref 1.1–3.7)
ABSOLUTE MONO #: 0.65 K/UL (ref 0.1–1.2)
ALBUMIN SERPL-MCNC: 4 G/DL (ref 3.5–5.2)
ALBUMIN/GLOBULIN RATIO: 1.3 (ref 1–2.5)
ALP BLD-CCNC: 92 U/L (ref 35–104)
ALT SERPL-CCNC: 15 U/L (ref 5–33)
ANION GAP SERPL CALCULATED.3IONS-SCNC: 11 MMOL/L (ref 9–17)
AST SERPL-CCNC: 15 U/L
BASOPHILS # BLD: 0 % (ref 0–2)
BASOPHILS ABSOLUTE: 0.03 K/UL (ref 0–0.2)
BILIRUB SERPL-MCNC: 0.3 MG/DL (ref 0.3–1.2)
BUN BLDV-MCNC: 12 MG/DL (ref 8–23)
BUN/CREAT BLD: 17 (ref 9–20)
CALCIUM SERPL-MCNC: 9.4 MG/DL (ref 8.6–10.4)
CHLORIDE BLD-SCNC: 103 MMOL/L (ref 98–107)
CHOLESTEROL/HDL RATIO: 3.3
CHOLESTEROL: 125 MG/DL
CO2: 29 MMOL/L (ref 20–31)
CREAT SERPL-MCNC: 0.71 MG/DL (ref 0.5–0.9)
EOSINOPHILS RELATIVE PERCENT: 2 % (ref 1–4)
ESTIMATED AVERAGE GLUCOSE: 148 MG/DL
GFR AFRICAN AMERICAN: >60 ML/MIN
GFR NON-AFRICAN AMERICAN: >60 ML/MIN
GFR SERPL CREATININE-BSD FRML MDRD: ABNORMAL ML/MIN/{1.73_M2}
GLUCOSE BLD-MCNC: 116 MG/DL (ref 70–99)
HBA1C MFR BLD: 6.8 % (ref 4–6)
HCT VFR BLD CALC: 40.2 % (ref 36.3–47.1)
HDLC SERPL-MCNC: 38 MG/DL
HEMOGLOBIN: 12.9 G/DL (ref 11.9–15.1)
IMMATURE GRANULOCYTES: 0 %
LDL CHOLESTEROL: 61 MG/DL (ref 0–130)
LYMPHOCYTES # BLD: 25 % (ref 24–43)
MCH RBC QN AUTO: 28.5 PG (ref 25.2–33.5)
MCHC RBC AUTO-ENTMCNC: 32.1 G/DL (ref 25.2–33.5)
MCV RBC AUTO: 88.9 FL (ref 82.6–102.9)
MONOCYTES # BLD: 8 % (ref 3–12)
NRBC AUTOMATED: 0 PER 100 WBC
PDW BLD-RTO: 14.3 % (ref 11.8–14.4)
PLATELET # BLD: 241 K/UL (ref 138–453)
PMV BLD AUTO: 9.6 FL (ref 8.1–13.5)
POTASSIUM SERPL-SCNC: 3.9 MMOL/L (ref 3.7–5.3)
RBC # BLD: 4.52 M/UL (ref 3.95–5.11)
SEG NEUTROPHILS: 65 % (ref 36–65)
SEGMENTED NEUTROPHILS ABSOLUTE COUNT: 5.58 K/UL (ref 1.5–8.1)
SODIUM BLD-SCNC: 143 MMOL/L (ref 135–144)
TOTAL PROTEIN: 7.1 G/DL (ref 6.4–8.3)
TRIGL SERPL-MCNC: 129 MG/DL
WBC # BLD: 8.7 K/UL (ref 3.5–11.3)

## 2022-06-07 PROCEDURE — 36415 COLL VENOUS BLD VENIPUNCTURE: CPT

## 2022-06-07 PROCEDURE — 83036 HEMOGLOBIN GLYCOSYLATED A1C: CPT

## 2022-06-07 PROCEDURE — 80053 COMPREHEN METABOLIC PANEL: CPT

## 2022-06-07 PROCEDURE — 80061 LIPID PANEL: CPT

## 2022-06-07 PROCEDURE — 85025 COMPLETE CBC W/AUTO DIFF WBC: CPT

## 2022-06-09 ENCOUNTER — OFFICE VISIT (OUTPATIENT)
Dept: FAMILY MEDICINE CLINIC | Age: 72
End: 2022-06-09
Payer: MEDICARE

## 2022-06-09 VITALS
DIASTOLIC BLOOD PRESSURE: 64 MMHG | BODY MASS INDEX: 33.59 KG/M2 | OXYGEN SATURATION: 96 % | HEART RATE: 64 BPM | SYSTOLIC BLOOD PRESSURE: 122 MMHG | RESPIRATION RATE: 16 BRPM | WEIGHT: 214 LBS | HEIGHT: 67 IN

## 2022-06-09 DIAGNOSIS — M62.838 SPASM OF RIGHT PIRIFORMIS MUSCLE: ICD-10-CM

## 2022-06-09 DIAGNOSIS — E11.42 TYPE 2 DIABETES MELLITUS WITH DIABETIC POLYNEUROPATHY, WITHOUT LONG-TERM CURRENT USE OF INSULIN (HCC): Primary | ICD-10-CM

## 2022-06-09 DIAGNOSIS — I10 ESSENTIAL HYPERTENSION: ICD-10-CM

## 2022-06-09 DIAGNOSIS — E78.2 MIXED HYPERLIPIDEMIA: ICD-10-CM

## 2022-06-09 DIAGNOSIS — K21.9 GASTROESOPHAGEAL REFLUX DISEASE WITHOUT ESOPHAGITIS: ICD-10-CM

## 2022-06-09 DIAGNOSIS — G63 POLYNEUROPATHY ASSOCIATED WITH UNDERLYING DISEASE (HCC): ICD-10-CM

## 2022-06-09 PROCEDURE — 99214 OFFICE O/P EST MOD 30 MIN: CPT | Performed by: FAMILY MEDICINE

## 2022-06-09 PROCEDURE — G8427 DOCREV CUR MEDS BY ELIG CLIN: HCPCS | Performed by: FAMILY MEDICINE

## 2022-06-09 PROCEDURE — 1123F ACP DISCUSS/DSCN MKR DOCD: CPT | Performed by: FAMILY MEDICINE

## 2022-06-09 PROCEDURE — G8417 CALC BMI ABV UP PARAM F/U: HCPCS | Performed by: FAMILY MEDICINE

## 2022-06-09 PROCEDURE — 1090F PRES/ABSN URINE INCON ASSESS: CPT | Performed by: FAMILY MEDICINE

## 2022-06-09 PROCEDURE — 3044F HG A1C LEVEL LT 7.0%: CPT | Performed by: FAMILY MEDICINE

## 2022-06-09 PROCEDURE — 99212 OFFICE O/P EST SF 10 MIN: CPT | Performed by: FAMILY MEDICINE

## 2022-06-09 PROCEDURE — 3017F COLORECTAL CA SCREEN DOC REV: CPT | Performed by: FAMILY MEDICINE

## 2022-06-09 PROCEDURE — PBSHW PBB SHADOW CHARGE: Performed by: FAMILY MEDICINE

## 2022-06-09 PROCEDURE — 20552 NJX 1/MLT TRIGGER POINT 1/2: CPT | Performed by: FAMILY MEDICINE

## 2022-06-09 PROCEDURE — G8400 PT W/DXA NO RESULTS DOC: HCPCS | Performed by: FAMILY MEDICINE

## 2022-06-09 PROCEDURE — 2022F DILAT RTA XM EVC RTNOPTHY: CPT | Performed by: FAMILY MEDICINE

## 2022-06-09 PROCEDURE — 1036F TOBACCO NON-USER: CPT | Performed by: FAMILY MEDICINE

## 2022-06-09 RX ORDER — TRIAMCINOLONE ACETONIDE 40 MG/ML
40 INJECTION, SUSPENSION INTRA-ARTICULAR; INTRAMUSCULAR ONCE
Status: COMPLETED | OUTPATIENT
Start: 2022-06-09 | End: 2022-06-09

## 2022-06-09 RX ADMIN — TRIAMCINOLONE ACETONIDE 40 MG: 40 INJECTION, SUSPENSION INTRA-ARTICULAR; INTRAMUSCULAR at 09:44

## 2022-06-09 SDOH — ECONOMIC STABILITY: FOOD INSECURITY: WITHIN THE PAST 12 MONTHS, YOU WORRIED THAT YOUR FOOD WOULD RUN OUT BEFORE YOU GOT MONEY TO BUY MORE.: NEVER TRUE

## 2022-06-09 SDOH — ECONOMIC STABILITY: FOOD INSECURITY: WITHIN THE PAST 12 MONTHS, THE FOOD YOU BOUGHT JUST DIDN'T LAST AND YOU DIDN'T HAVE MONEY TO GET MORE.: NEVER TRUE

## 2022-06-09 ASSESSMENT — PATIENT HEALTH QUESTIONNAIRE - PHQ9
8. MOVING OR SPEAKING SO SLOWLY THAT OTHER PEOPLE COULD HAVE NOTICED. OR THE OPPOSITE, BEING SO FIGETY OR RESTLESS THAT YOU HAVE BEEN MOVING AROUND A LOT MORE THAN USUAL: 0
SUM OF ALL RESPONSES TO PHQ QUESTIONS 1-9: 0
5. POOR APPETITE OR OVEREATING: 0
SUM OF ALL RESPONSES TO PHQ9 QUESTIONS 1 & 2: 0
10. IF YOU CHECKED OFF ANY PROBLEMS, HOW DIFFICULT HAVE THESE PROBLEMS MADE IT FOR YOU TO DO YOUR WORK, TAKE CARE OF THINGS AT HOME, OR GET ALONG WITH OTHER PEOPLE: 0
9. THOUGHTS THAT YOU WOULD BE BETTER OFF DEAD, OR OF HURTING YOURSELF: 0
4. FEELING TIRED OR HAVING LITTLE ENERGY: 0
3. TROUBLE FALLING OR STAYING ASLEEP: 0
7. TROUBLE CONCENTRATING ON THINGS, SUCH AS READING THE NEWSPAPER OR WATCHING TELEVISION: 0
SUM OF ALL RESPONSES TO PHQ QUESTIONS 1-9: 0
2. FEELING DOWN, DEPRESSED OR HOPELESS: 0
1. LITTLE INTEREST OR PLEASURE IN DOING THINGS: 0
SUM OF ALL RESPONSES TO PHQ QUESTIONS 1-9: 0
SUM OF ALL RESPONSES TO PHQ QUESTIONS 1-9: 0
6. FEELING BAD ABOUT YOURSELF - OR THAT YOU ARE A FAILURE OR HAVE LET YOURSELF OR YOUR FAMILY DOWN: 0

## 2022-06-09 ASSESSMENT — ENCOUNTER SYMPTOMS
ABDOMINAL PAIN: 0
TROUBLE SWALLOWING: 0
SORE THROAT: 0
DIARRHEA: 0
WHEEZING: 0
VOMITING: 0
CONSTIPATION: 0
SINUS PRESSURE: 0
EYE REDNESS: 0
EYE DISCHARGE: 0
SHORTNESS OF BREATH: 0
COUGH: 0
NAUSEA: 0
RHINORRHEA: 0

## 2022-06-09 ASSESSMENT — SOCIAL DETERMINANTS OF HEALTH (SDOH): HOW HARD IS IT FOR YOU TO PAY FOR THE VERY BASICS LIKE FOOD, HOUSING, MEDICAL CARE, AND HEATING?: NOT VERY HARD

## 2022-06-09 NOTE — PATIENT INSTRUCTIONS
Hospital Outpatient Visit on 06/07/2022   Component Date Value Ref Range Status    Cholesterol 06/07/2022 125  <200 mg/dL Final    Comment:    Cholesterol Guidelines:      <200  Desirable   200-240  Borderline      >240  Undesirable         HDL 06/07/2022 38* >40 mg/dL Final    Comment:    HDL Guidelines:    <40     Undesirable   40-59    Borderline    >59     Desirable         LDL Cholesterol 06/07/2022 61  0 - 130 mg/dL Final    Comment:    LDL Guidelines:     <100    Desirable   100-129   Near to/above Desirable   130-159   Borderline      >159   Undesirable     Direct (measured) LDL and calculated LDL are not interchangeable tests.  Chol/HDL Ratio 06/07/2022 3.3  <5 Final            Triglycerides 06/07/2022 129  <150 mg/dL Final    Comment:    Triglyceride Guidelines:     <150   Desirable   150-199  Borderline   200-499  High     >499   Very high   Based on AHA Guidelines for fasting triglyceride, October 2012.  Hemoglobin A1C 06/07/2022 6.8* 4.0 - 6.0 % Final    Estimated Avg Glucose 06/07/2022 148  mg/dL Final    Comment: The ADA and AACC recommend providing the estimated average glucose result to permit better   patient understanding of their HBA1c result.       Glucose 06/07/2022 116* 70 - 99 mg/dL Final    BUN 06/07/2022 12  8 - 23 mg/dL Final    CREATININE 06/07/2022 0.71  0.50 - 0.90 mg/dL Final    Bun/Cre Ratio 06/07/2022 17  9 - 20 Final    Calcium 06/07/2022 9.4  8.6 - 10.4 mg/dL Final    Sodium 06/07/2022 143  135 - 144 mmol/L Final    Potassium 06/07/2022 3.9  3.7 - 5.3 mmol/L Final    Chloride 06/07/2022 103  98 - 107 mmol/L Final    CO2 06/07/2022 29  20 - 31 mmol/L Final    Anion Gap 06/07/2022 11  9 - 17 mmol/L Final    Alkaline Phosphatase 06/07/2022 92  35 - 104 U/L Final    ALT 06/07/2022 15  5 - 33 U/L Final    AST 06/07/2022 15  <32 U/L Final    Total Bilirubin 06/07/2022 0.30  0.3 - 1.2 mg/dL Final    Total Protein 06/07/2022 7.1  6.4 - 8.3 g/dL Final    Albumin 06/07/2022 4.0  3.5 - 5.2 g/dL Final    Albumin/Globulin Ratio 06/07/2022 1.3  1.0 - 2.5 Final    GFR Non- 06/07/2022 >60  >60 mL/min Final    GFR  06/07/2022 >60  >60 mL/min Final    GFR Comment 06/07/2022        Final    Comment: Average GFR for 79or more years old:   76 mL/min/1.73sq m  Chronic Kidney Disease:   <60 mL/min/1.73sq m  Kidney failure:   <15 mL/min/1.73sq m              eGFR calculated using average adult body mass.  Additional eGFR calculator available at:        [email or web address]            WBC 06/07/2022 8.7  3.5 - 11.3 k/uL Final    RBC 06/07/2022 4.52  3.95 - 5.11 m/uL Final    Hemoglobin 06/07/2022 12.9  11.9 - 15.1 g/dL Final    Hematocrit 06/07/2022 40.2  36.3 - 47.1 % Final    MCV 06/07/2022 88.9  82.6 - 102.9 fL Final    MCH 06/07/2022 28.5  25.2 - 33.5 pg Final    MCHC 06/07/2022 32.1  25.2 - 33.5 g/dL Final    RDW 06/07/2022 14.3  11.8 - 14.4 % Final    Platelets 37/14/7934 241  138 - 453 k/uL Final    MPV 06/07/2022 9.6  8.1 - 13.5 fL Final    NRBC Automated 06/07/2022 0.0  0.0 per 100 WBC Final    Seg Neutrophils 06/07/2022 65  36 - 65 % Final    Lymphocytes 06/07/2022 25  24 - 43 % Final    Monocytes 06/07/2022 8  3 - 12 % Final    Eosinophils % 06/07/2022 2  1 - 4 % Final    Basophils 06/07/2022 0  0 - 2 % Final    Immature Granulocytes 06/07/2022 0  0 % Final    Segs Absolute 06/07/2022 5.58  1.50 - 8.10 k/uL Final    Absolute Lymph # 06/07/2022 2.17  1.10 - 3.70 k/uL Final    Absolute Mono # 06/07/2022 0.65  0.10 - 1.20 k/uL Final    Absolute Eos # 06/07/2022 0.20  0.00 - 0.44 k/uL Final    Basophils Absolute 06/07/2022 0.03  0.00 - 0.20 k/uL Final    Absolute Immature Granulocyte 06/07/2022 0.03  0.00 - 0.30 k/uL Final

## 2022-06-09 NOTE — PROGRESS NOTES
2022     Varsha Ulrich (:  1950) is a 71 y.o. female, here for evaluation of the following medical concerns:    HPI  Patient comes in today for follow up of chronic health issues Patient did go see him Dr. Vanita Hagan regarding her ongoing issues with right lower extremity pain from the right gluteal region down into the hamstring. She was having some pain behind the knee as well and so she did discuss this with him at her visit. Apparently he felt that it was more her knee that was contributing to her symptoms and ultimately did do an injection in the knee joint itself which did not provide her with any benefit. She has gotten back to the pool for routine swimming exercise and also has been doing some stretches in the pool but does not get lot of improvement. With regards to her other chronic health conditions she has a known history of diabetes mellitus type 2 which is stable and controlled on her current medical regimen. Does have known polyneuropathy which is stable at this time without ongoing medical therapy. Patient does have a known history of hypertension and her blood pressure is stable and controlled on her current medical regimen. Has a known history of hyperlipidemia and her cholesterol levels are stable and adequately controlled with her current statin dose. Has a known history of GERD which is stable and controlled on her current medical therapy. Patient otherwise has no other acute medical concerns. .  Patient's recent lab reports are as follows:    Results for orders placed or performed during the hospital encounter of 22   Lipid Panel   Result Value Ref Range    Cholesterol 125 <200 mg/dL    HDL 38 (L) >40 mg/dL    LDL Cholesterol 61 0 - 130 mg/dL    Chol/HDL Ratio 3.3 <5    Triglycerides 129 <150 mg/dL   Hemoglobin A1C   Result Value Ref Range    Hemoglobin A1C 6.8 (H) 4.0 - 6.0 %    Estimated Avg Glucose 148 mg/dL   Comprehensive Metabolic Panel   Result Value Ref Range    Glucose 116 (H) 70 - 99 mg/dL    BUN 12 8 - 23 mg/dL    CREATININE 0.71 0.50 - 0.90 mg/dL    Bun/Cre Ratio 17 9 - 20    Calcium 9.4 8.6 - 10.4 mg/dL    Sodium 143 135 - 144 mmol/L    Potassium 3.9 3.7 - 5.3 mmol/L    Chloride 103 98 - 107 mmol/L    CO2 29 20 - 31 mmol/L    Anion Gap 11 9 - 17 mmol/L    Alkaline Phosphatase 92 35 - 104 U/L    ALT 15 5 - 33 U/L    AST 15 <32 U/L    Total Bilirubin 0.30 0.3 - 1.2 mg/dL    Total Protein 7.1 6.4 - 8.3 g/dL    Albumin 4.0 3.5 - 5.2 g/dL    Albumin/Globulin Ratio 1.3 1.0 - 2.5    GFR Non-African American >60 >60 mL/min    GFR African American >60 >60 mL/min    GFR Comment         CBC Auto Differential   Result Value Ref Range    WBC 8.7 3.5 - 11.3 k/uL    RBC 4.52 3.95 - 5.11 m/uL    Hemoglobin 12.9 11.9 - 15.1 g/dL    Hematocrit 40.2 36.3 - 47.1 %    MCV 88.9 82.6 - 102.9 fL    MCH 28.5 25.2 - 33.5 pg    MCHC 32.1 25.2 - 33.5 g/dL    RDW 14.3 11.8 - 14.4 %    Platelets 312 395 - 685 k/uL    MPV 9.6 8.1 - 13.5 fL    NRBC Automated 0.0 0.0 per 100 WBC    Seg Neutrophils 65 36 - 65 %    Lymphocytes 25 24 - 43 %    Monocytes 8 3 - 12 %    Eosinophils % 2 1 - 4 %    Basophils 0 0 - 2 %    Immature Granulocytes 0 0 %    Segs Absolute 5.58 1.50 - 8.10 k/uL    Absolute Lymph # 2.17 1.10 - 3.70 k/uL    Absolute Mono # 0.65 0.10 - 1.20 k/uL    Absolute Eos # 0.20 0.00 - 0.44 k/uL    Basophils Absolute 0.03 0.00 - 0.20 k/uL    Absolute Immature Granulocyte 0.03 0.00 - 0.30 k/uL      Other review of systems are as noted below. Preventative measures are reviewed today. See health maintenance section for complete preventative plan of care. Did review patient's med list, allergies, social history, fam history, pmhx and pshx today as noted in the record. Review of Systems   Constitutional: Negative for chills, fatigue and fever. HENT: Negative for congestion, ear pain, postnasal drip, rhinorrhea, sinus pressure, sore throat and trouble swallowing.     Eyes: Negative for discharge and redness. Respiratory: Negative for cough, shortness of breath and wheezing. Cardiovascular: Negative for chest pain. Gastrointestinal: Negative for abdominal pain, constipation, diarrhea, nausea and vomiting. Genitourinary: Negative for dysuria, flank pain, frequency and urgency. Musculoskeletal: Positive for arthralgias and myalgias. Negative for neck pain. Skin: Negative for rash and wound. Allergic/Immunologic: Negative for environmental allergies. Neurological: Negative for dizziness, weakness, light-headedness and headaches. Hematological: Negative for adenopathy. Psychiatric/Behavioral: Negative. Prior to Visit Medications    Medication Sig Taking? Authorizing Provider   Simethicone (GAS-X ULTRA STRENGTH) 180 MG CAPS Take by mouth in the morning and at bedtime  Historical Provider, MD   blood glucose test strips (ONE TOUCH TEST STRIPS) strip Tests 1 X daily.   DX E11.9  Justine Pastor DO   ONE TOUCH LANCETS MISC Tests 1 x daily DX. E11.9  Justine Pastor DO   scopolamine (TRANSDERM-SCOP) transdermal patch Place 1 patch onto the skin every 72 hours  Fabby Lima DO   omeprazole (PRILOSEC) 20 MG delayed release capsule Take 1 capsule by mouth 2 times daily  Fabby Lima DO   meloxicam (MOBIC) 7.5 MG tablet TAKE 1 TABLET ONCE A DAY  Fabby Lima DO   atenolol (TENORMIN) 25 MG tablet Take 1 tablet by mouth daily  Justine Pastor DO   metFORMIN (GLUCOPHAGE-XR) 500 MG extended release tablet Take 1 tablet by mouth 2 times daily  Fabby Lima DO   montelukast (SINGULAIR) 10 MG tablet Take 1 tablet by mouth daily  Fabby Lima DO   cetirizine (ZYRTEC ALLERGY) 10 MG tablet Take 1 tablet by mouth daily  Fabby Lima DO   atorvastatin (LIPITOR) 20 MG tablet Take 1 tablet by mouth daily  Patient not taking: Reported on 4/15/2022  Justine Pastor DO   albuterol sulfate HFA (VENTOLIN HFA) 108 (90 Base) MCG/ACT inhaler Inhale 2 puffs into the lungs every 4 hours as needed for Wheezing  Fabby ANGELIQUE Lima,    VITAMIN D PO Take 2,000 Units by mouth daily  Historical Provider, MD   nystatin (MYCOSTATIN) 267604 UNIT/GM powder Apply 2 times daily as needed for itching or irritation. Ivan Georges DO   pseudoephedrine (SUDAFED) 30 MG tablet Take 30 mg by mouth every 4 hours as needed. Historical Provider, MD   therapeutic multivitamin-minerals (THERAGRAN-M) tablet Take 1 tablet by mouth daily. Historical Provider, MD   Ascorbic Acid (VITAMIN C) 500 MG tablet Take 500 mg by mouth daily. Historical Provider, MD        Social History     Tobacco Use    Smoking status: Passive Smoke Exposure - Never Smoker    Smokeless tobacco: Never Used    Tobacco comment: very remotely would smoke a few cigarettes on weekends/college days   Substance Use Topics    Alcohol use: Yes     Comment: occasional use        There were no vitals filed for this visit. Estimated body mass index is 33.67 kg/m² as calculated from the following:    Height as of 5/26/22: 5' 7\" (1.702 m). Weight as of 5/26/22: 215 lb (97.5 kg). Physical Exam  Vitals and nursing note reviewed. Constitutional:       General: She is not in acute distress. Appearance: Normal appearance. She is well-developed. She is not diaphoretic. HENT:      Head: Normocephalic and atraumatic. Right Ear: Tympanic membrane, ear canal and external ear normal.      Left Ear: Tympanic membrane, ear canal and external ear normal.      Nose: Nose normal.      Mouth/Throat:      Mouth: Mucous membranes are moist.      Pharynx: Oropharynx is clear. No oropharyngeal exudate. Eyes:      General:         Right eye: No discharge. Left eye: No discharge. Conjunctiva/sclera: Conjunctivae normal.      Pupils: Pupils are equal, round, and reactive to light. Neck:      Thyroid: No thyromegaly. Cardiovascular:      Rate and Rhythm: Normal rate and regular rhythm.       Heart sounds: Normal heart sounds. Pulmonary:      Effort: Pulmonary effort is normal.      Breath sounds: Normal breath sounds. No wheezing or rales. Abdominal:      General: Bowel sounds are normal. There is no distension. Palpations: Abdomen is soft. Tenderness: There is no abdominal tenderness. Musculoskeletal:         General: Tenderness present. No swelling. Cervical back: Normal range of motion and neck supple. Comments: Reproducible pain with palpation to the deep right gluteal musculature. Does have pain with palpation down into the right hamstring muscle and has tightness with internal rotation of the right hip. Patient had a diabetic foot exam today. No open areas or ulcerations noted. Fine filament testing to the entire dorsal and plantar aspect of the foot reveals good sensation in all areas. No cyanosis. +2/4 pedal pulses, symmetric bilaterally     Lymphadenopathy:      Cervical: No cervical adenopathy. Skin:     General: Skin is warm and dry. Findings: No rash. Neurological:      Mental Status: She is alert and oriented to person, place, and time. Psychiatric:         Behavior: Behavior normal.         Thought Content: Thought content normal.         Judgment: Judgment normal.           ASSESSMENT/PLAN:  Encounter Diagnoses   Name Primary?     Type 2 diabetes mellitus with diabetic polyneuropathy, without long-term current use of insulin ([facility]) Yes    Polyneuropathy associated with underlying disease ([facility] [address]     Essential hypertension     Mixed hyperlipidemia     Gastroesophageal reflux disease without esophagitis     Spasm of right piriformis muscle      Orders Placed This Encounter   Medications    triamcinolone acetonide (KENALOG-40) injection 40 mg     Orders Placed This Encounter   Procedures    CBC with Auto Differential     Standing Status:   Future     Standing Expiration Date:   6/9/2023    Comprehensive Metabolic Panel     Standing Status:   Future Standing Expiration Date:   6/9/2023    Hemoglobin A1C     Standing Status:   Future     Standing Expiration Date:   6/9/2023    Lipid Panel     Standing Status:   Future     Standing Expiration Date:   6/9/2023     Order Specific Question:   Is Patient Fasting?/# of Hours     Answer:   12 hours    Microalbumin, Ur     Standing Status:   Future     Standing Expiration Date:   6/9/2023     Scheduling Instructions: To be done in 6 months     DIABETES FOOT EXAM    OH INJECT TRIGGER POINT, 1 OR 2       Trigger Point Injection Procedure Note  Indication: Severe pain and pain control    Procedure: The patient was placed in the appropriate position and the area over the trigger point was prepped with alcohol. Injection was performed into the trigger point area using 1 cc of Kenalog (triamcinalone 40mg/ml), mixed with 2 cc of 1% Lidocaine without epinephrine. The injection site was covered with a bandage. The patient tolerated the procedure well. Complications: None    Patient is to continue on the rest of her current medical therapy. No additional changes are made at this time. Patient is to continue to follow a low-carb/low sugar/low-fat diet and increase exercise for optimal blood sugar and cholesterol control. Did demonstrate exercises that she can do to help stretch out the piriformis muscle. Patient is to return to my office in 6 months duration or sooner if any further problems or symptoms arise. (Please note that portions of this note were completed with a voice recognition program. Efforts were made to edit the dictations but occasionally words are mis-transcribed.)              No follow-ups on file. An electronic signature was used to authenticate this note.     --Glendy Webb DO on 6/9/2022 at 7:24 AM

## 2022-06-22 RX ORDER — MONTELUKAST SODIUM 10 MG/1
10 TABLET ORAL DAILY
Qty: 90 TABLET | Refills: 1 | Status: SHIPPED | OUTPATIENT
Start: 2022-06-22

## 2022-06-22 RX ORDER — CETIRIZINE HYDROCHLORIDE 10 MG/1
10 TABLET ORAL DAILY
Qty: 90 TABLET | Refills: 1 | Status: SHIPPED | OUTPATIENT
Start: 2022-06-22

## 2022-06-22 NOTE — TELEPHONE ENCOUNTER
Rubens Hoyt called requesting a refill of the below medication which has been pended for you:     Requested Prescriptions     Pending Prescriptions Disp Refills    montelukast (SINGULAIR) 10 MG tablet 90 tablet 1     Sig: Take 1 tablet by mouth daily    cetirizine (ZYRTEC ALLERGY) 10 MG tablet 90 tablet 1     Sig: Take 1 tablet by mouth daily       Last Appointment Date: 6/9/2022  Next Appointment Date: 12/9/2022    Allergies   Allergen Reactions    Aspirin Other (See Comments)     Abdominal pain    Ibuprofen Other (See Comments)     Abdominal pain    Penicillin G Other (See Comments)    Sulfamethoxazole-Trimethoprim Nausea Only and Other (See Comments)    Penicillins Rash

## 2022-07-13 RX ORDER — MELOXICAM 7.5 MG/1
TABLET ORAL
Qty: 90 TABLET | Refills: 1 | Status: SHIPPED | OUTPATIENT
Start: 2022-07-13

## 2022-07-13 RX ORDER — ATENOLOL 25 MG/1
25 TABLET ORAL DAILY
Qty: 90 TABLET | Refills: 1 | Status: SHIPPED | OUTPATIENT
Start: 2022-07-13

## 2022-07-13 NOTE — TELEPHONE ENCOUNTER
Bertrand Mariano called requesting a refill of the below medication which has been pended for you:     Requested Prescriptions     Pending Prescriptions Disp Refills    meloxicam (MOBIC) 7.5 MG tablet 90 tablet 1     Sig: TAKE 1 TABLET ONCE A DAY    atenolol (TENORMIN) 25 MG tablet 90 tablet 1     Sig: Take 1 tablet by mouth daily       Last Appointment Date: 6/9/2022  Next Appointment Date: 12/9/2022    Allergies   Allergen Reactions    Aspirin Other (See Comments)     Abdominal pain    Ibuprofen Other (See Comments)     Abdominal pain    Penicillin G Other (See Comments)    Sulfamethoxazole-Trimethoprim Nausea Only and Other (See Comments)    Penicillins Rash

## 2022-07-14 RX ORDER — METFORMIN HYDROCHLORIDE 500 MG/1
500 TABLET, EXTENDED RELEASE ORAL 2 TIMES DAILY
Qty: 180 TABLET | Refills: 1 | Status: SHIPPED | OUTPATIENT
Start: 2022-07-14

## 2022-07-14 NOTE — TELEPHONE ENCOUNTER
Aris White called requesting a refill of the below medication which has been pended for you:     Requested Prescriptions     Pending Prescriptions Disp Refills    metFORMIN (GLUCOPHAGE-XR) 500 MG extended release tablet 180 tablet 1     Sig: Take 1 tablet by mouth 2 times daily       Last Appointment Date: 6/9/2022  Next Appointment Date: 12/9/2022    Allergies   Allergen Reactions    Aspirin Other (See Comments)     Abdominal pain    Ibuprofen Other (See Comments)     Abdominal pain    Penicillin G Other (See Comments)    Sulfamethoxazole-Trimethoprim Nausea Only and Other (See Comments)    Penicillins Rash

## 2022-07-18 ENCOUNTER — PATIENT MESSAGE (OUTPATIENT)
Dept: FAMILY MEDICINE CLINIC | Age: 72
End: 2022-07-18

## 2022-07-18 NOTE — TELEPHONE ENCOUNTER
From: Star Maldonado  To: Dr. Yarbrough Apple: 7/18/2022 8:16 AM EDT  Subject: Right leg    The pain in my right leg has gotten worse. The shot helped for a few weeks but now it is worse. I was on my feet a lot last week and now I can barely get around. The leg gives way and the pain is making me nauseous and shaky. I am putting ice on it taking tylenol and using voltaren . Nothing is helping. It hurts from my ankle up to my buttocks. I think the inflamation is so bad it is effecting my bursitis also. The whole leg is just angry.    Star Maldonado

## 2022-07-19 ENCOUNTER — OFFICE VISIT (OUTPATIENT)
Dept: PRIMARY CARE CLINIC | Age: 72
End: 2022-07-19
Payer: MEDICARE

## 2022-07-19 VITALS
TEMPERATURE: 97.3 F | WEIGHT: 213 LBS | HEIGHT: 67 IN | DIASTOLIC BLOOD PRESSURE: 88 MMHG | OXYGEN SATURATION: 94 % | BODY MASS INDEX: 33.43 KG/M2 | HEART RATE: 65 BPM | SYSTOLIC BLOOD PRESSURE: 138 MMHG | RESPIRATION RATE: 20 BRPM

## 2022-07-19 DIAGNOSIS — M54.31 RIGHT SIDED SCIATICA: Primary | ICD-10-CM

## 2022-07-19 PROCEDURE — G8427 DOCREV CUR MEDS BY ELIG CLIN: HCPCS | Performed by: FAMILY MEDICINE

## 2022-07-19 PROCEDURE — G8417 CALC BMI ABV UP PARAM F/U: HCPCS | Performed by: FAMILY MEDICINE

## 2022-07-19 PROCEDURE — 1036F TOBACCO NON-USER: CPT | Performed by: FAMILY MEDICINE

## 2022-07-19 PROCEDURE — 99213 OFFICE O/P EST LOW 20 MIN: CPT | Performed by: FAMILY MEDICINE

## 2022-07-19 PROCEDURE — 99212 OFFICE O/P EST SF 10 MIN: CPT | Performed by: FAMILY MEDICINE

## 2022-07-19 PROCEDURE — 1090F PRES/ABSN URINE INCON ASSESS: CPT | Performed by: FAMILY MEDICINE

## 2022-07-19 PROCEDURE — G8400 PT W/DXA NO RESULTS DOC: HCPCS | Performed by: FAMILY MEDICINE

## 2022-07-19 PROCEDURE — 1123F ACP DISCUSS/DSCN MKR DOCD: CPT | Performed by: FAMILY MEDICINE

## 2022-07-19 PROCEDURE — 3017F COLORECTAL CA SCREEN DOC REV: CPT | Performed by: FAMILY MEDICINE

## 2022-07-19 RX ORDER — TIZANIDINE 4 MG/1
4 TABLET ORAL 3 TIMES DAILY PRN
Qty: 30 TABLET | Refills: 0 | Status: SHIPPED | OUTPATIENT
Start: 2022-07-19 | End: 2022-09-06 | Stop reason: SDUPTHER

## 2022-07-19 RX ORDER — PREDNISONE 20 MG/1
TABLET ORAL
Qty: 15 TABLET | Refills: 0 | Status: SHIPPED | OUTPATIENT
Start: 2022-07-19 | End: 2022-07-29 | Stop reason: ALTCHOICE

## 2022-07-19 ASSESSMENT — ENCOUNTER SYMPTOMS
BACK PAIN: 1
COLOR CHANGE: 0

## 2022-07-19 NOTE — PROGRESS NOTES
2022     Franchesca Welsh (:  1950) is a 71 y.o. female, here for evaluation of the following medical concerns:    Leg Pain   The incident occurred more than 1 week ago (patient has been having issues with sciatic pain, but last week helped grandson move, then did a bunch of cleaning around house and then sat on a hard chair at a wedding for 2 hours and pain is now severe). The pain is present in the right leg (right gluteal region radiating down to right lower extremity). The pain is severe. The pain has been Constant since onset. Associated symptoms include an inability to bear weight. Pertinent negatives include no muscle weakness, numbness or tingling. The symptoms are aggravated by movement, palpation and weight bearing. She has tried ice, heat, NSAIDs, acetaminophen and rest (I did do a trigger point injection a few weeks ago and this did help some, but now since she has done all of the activities listed above she is significantly worse) for the symptoms. The treatment provided no relief. Did review patient's med list, allergies, social history,pmhx and pshx today as noted in the record. Review of Systems   Constitutional:  Negative for chills, fatigue and fever. Musculoskeletal:  Positive for back pain, gait problem and myalgias. Negative for arthralgias, joint swelling, neck pain and neck stiffness. Skin:  Negative for color change, pallor, rash and wound. Neurological:  Negative for tingling, weakness and numbness. Prior to Visit Medications    Medication Sig Taking?  Authorizing Provider   metFORMIN (GLUCOPHAGE-XR) 500 MG extended release tablet Take 1 tablet by mouth 2 times daily Yes Amandeepner Charity, DO   meloxicam (MOBIC) 7.5 MG tablet TAKE 1 TABLET ONCE A DAY Yes Fabby Lima,    atenolol (TENORMIN) 25 MG tablet Take 1 tablet by mouth daily Yes Amandeepner Matter, DO   montelukast (SINGULAIR) 10 MG tablet Take 1 tablet by mouth daily Yes Elner Matter, DO cetirizine (ZYRTEC ALLERGY) 10 MG tablet Take 1 tablet by mouth daily Yes Emmalene Rolling, DO   Simethicone (GAS-X ULTRA STRENGTH) 180 MG CAPS Take by mouth in the morning and at bedtime  Yes Historical Provider, MD   blood glucose test strips (ONE TOUCH TEST STRIPS) strip Tests 1 X daily. DX E11.9 Yes Emmalene Rolling, DO   ONE TOUCH LANCETS MISC Tests 1 x daily DX. E11.9 Yes Emmalene Rolling, DO   scopolamine (TRANSDERM-SCOP) transdermal patch Place 1 patch onto the skin every 72 hours Yes Emmalene Rolling, DO   omeprazole (PRILOSEC) 20 MG delayed release capsule Take 1 capsule by mouth 2 times daily Yes Emmalene Rolling, DO   atorvastatin (LIPITOR) 20 MG tablet Take 1 tablet by mouth daily Yes Emmalene Rolling, DO   albuterol sulfate HFA (VENTOLIN HFA) 108 (90 Base) MCG/ACT inhaler Inhale 2 puffs into the lungs every 4 hours as needed for Wheezing Yes Emmalene Rolling, DO   VITAMIN D PO Take 2,000 Units by mouth daily Yes Historical Provider, MD   pseudoephedrine (SUDAFED) 30 MG tablet Take 30 mg by mouth every 4 hours as needed. Yes Historical Provider, MD   therapeutic multivitamin-minerals (THERAGRAN-M) tablet Take 1 tablet by mouth daily. Yes Historical Provider, MD   Ascorbic Acid (VITAMIN C) 500 MG tablet Take 500 mg by mouth daily.  Yes Historical Provider, MD        Social History     Tobacco Use    Smoking status: Never     Passive exposure: Yes    Smokeless tobacco: Never    Tobacco comments:     very remotely would smoke a few cigarettes on weekends/college days   Substance Use Topics    Alcohol use: Yes     Comment: occasional use        Vitals:    07/19/22 1052   BP: 138/88   Site: Right Upper Arm   Position: Sitting   Cuff Size: Medium Adult   Pulse: 65   Resp: 20   Temp: 97.3 °F (36.3 °C)   TempSrc: Infrared   SpO2: 94%   Weight: 213 lb (96.6 kg)   Height: 5' 7\" (1.702 m)     Estimated body mass index is 33.36 kg/m² as calculated from the following:    Height as of this encounter: 5' 7\" (1.702 m). Weight as of this encounter: 213 lb (96.6 kg). Physical Exam  Vitals and nursing note reviewed. Constitutional:       General: She is not in acute distress. Appearance: She is well-developed. She is not diaphoretic. HENT:      Head: Normocephalic and atraumatic. Eyes:      Conjunctiva/sclera: Conjunctivae normal.   Pulmonary:      Effort: Pulmonary effort is normal.   Musculoskeletal:         General: Tenderness present. Cervical back: Normal range of motion. Comments: Right gluteal muscle with pain with palpation to the mid gluteal region. Spasm and pain noted to the mid muscle. There is pain with weight bearing to the right lower extremity. Skin:     General: Skin is warm and dry. Coloration: Skin is not pale. Findings: No erythema or rash. Neurological:      Mental Status: She is alert and oriented to person, place, and time. Psychiatric:         Behavior: Behavior normal.         Thought Content: Thought content normal.         Judgment: Judgment normal.       ASSESSMENT/PLAN:  Encounter Diagnosis   Name Primary? Right sided sciatica Yes     Orders Placed This Encounter   Medications    predniSONE (DELTASONE) 20 MG tablet     Si bid for 5 days, 1 qd for 5 days     Dispense:  15 tablet     Refill:  0    tiZANidine (ZANAFLEX) 4 MG tablet     Sig: Take 1 tablet by mouth 3 times daily as needed (muscle spasm)     Dispense:  30 tablet     Refill:  0     RX as noted above. Would recommend rest, stretching and tylenol as needed for pain. Continue with ice to help with inflammation as heat made symptoms worse. Return  if no improvement in symptoms or if any further symptoms arise. No follow-ups on file. An electronic signature was used to authenticate this note.     --Jordyn March DO on 2022 at 11:37 AM

## 2022-07-27 ENCOUNTER — PATIENT MESSAGE (OUTPATIENT)
Dept: PRIMARY CARE CLINIC | Age: 72
End: 2022-07-27

## 2022-07-27 DIAGNOSIS — M54.16 LUMBAR BACK PAIN WITH RADICULOPATHY AFFECTING RIGHT LOWER EXTREMITY: Primary | ICD-10-CM

## 2022-07-27 NOTE — TELEPHONE ENCOUNTER
From: Alain[place]  To: Dr. Mills Dawn: 7/27/2022 10:23 AM EDT  Subject: right leg    Hi, The prednisone has calmed the leg down to about an eight. I have one more pill to take tomorrow. I am using icy hot & icing it. Tried going in pool for light stretches but it doesn't seem to help. I would like to find out what is causing the sciatica nerve to be so inflamed. I'm not crazy about going back to [place], he just wants to replace my knee. Is there someone else I can go to that can tell me the cause. Is it the cyst or spinal stenosis or just arthritis? I need to walk, swim, and be active. I can't stand to just sit in a chair so this will keep happening.   [facility]

## 2022-07-29 ENCOUNTER — OFFICE VISIT (OUTPATIENT)
Dept: PRIMARY CARE CLINIC | Age: 72
End: 2022-07-29
Payer: MEDICARE

## 2022-07-29 VITALS
SYSTOLIC BLOOD PRESSURE: 154 MMHG | RESPIRATION RATE: 18 BRPM | BODY MASS INDEX: 34.21 KG/M2 | OXYGEN SATURATION: 97 % | TEMPERATURE: 98 F | HEART RATE: 60 BPM | WEIGHT: 218 LBS | HEIGHT: 67 IN | DIASTOLIC BLOOD PRESSURE: 102 MMHG

## 2022-07-29 DIAGNOSIS — M54.31 RIGHT SIDED SCIATICA: Primary | ICD-10-CM

## 2022-07-29 PROCEDURE — G8427 DOCREV CUR MEDS BY ELIG CLIN: HCPCS | Performed by: FAMILY MEDICINE

## 2022-07-29 PROCEDURE — 1090F PRES/ABSN URINE INCON ASSESS: CPT | Performed by: FAMILY MEDICINE

## 2022-07-29 PROCEDURE — 99214 OFFICE O/P EST MOD 30 MIN: CPT

## 2022-07-29 PROCEDURE — 1123F ACP DISCUSS/DSCN MKR DOCD: CPT | Performed by: FAMILY MEDICINE

## 2022-07-29 PROCEDURE — G8417 CALC BMI ABV UP PARAM F/U: HCPCS | Performed by: FAMILY MEDICINE

## 2022-07-29 PROCEDURE — G8400 PT W/DXA NO RESULTS DOC: HCPCS | Performed by: FAMILY MEDICINE

## 2022-07-29 PROCEDURE — 1036F TOBACCO NON-USER: CPT | Performed by: FAMILY MEDICINE

## 2022-07-29 PROCEDURE — 99214 OFFICE O/P EST MOD 30 MIN: CPT | Performed by: FAMILY MEDICINE

## 2022-07-29 PROCEDURE — 3017F COLORECTAL CA SCREEN DOC REV: CPT | Performed by: FAMILY MEDICINE

## 2022-07-29 RX ORDER — PREDNISONE 20 MG/1
TABLET ORAL
Qty: 15 TABLET | Refills: 0 | Status: SHIPPED | OUTPATIENT
Start: 2022-07-29 | End: 2022-09-28

## 2022-07-29 RX ORDER — GABAPENTIN 100 MG/1
100 CAPSULE ORAL 2 TIMES DAILY
Qty: 60 CAPSULE | Refills: 0 | Status: SHIPPED | OUTPATIENT
Start: 2022-07-29 | End: 2022-08-15 | Stop reason: SINTOL

## 2022-07-29 NOTE — PROGRESS NOTES
2022     Hermann Haro (:  1950) is a 71 y.o. female, here for evaluation of the following medical concerns:    HPI  Patient is seen today for ongoing issues with pain into the right lower extremity. Initially started with some palpable nodules to the posterior knee back in April. She just noticed these and ultimately they were causing her significant discomfort but she did go see Dr. Yudelka Carroll for evaluation of this issue. Was having some pain by the time she had seen Dr. Yudelka Carroll in May and he felt that it was likely more related to her knee joint itself. Did do an injection into the knee joint itself which provided her with no benefit. By the time I had seen her in  she started complaining more of a radicular type pain down into the lower extremity and I felt at that time it was likely more of a piriformis spasm as she was having pain in the gluteal muscle radiating down into the lower extremity. Did not seem to be focalized around the knee. We had done a piriformis injection which provided her with no benefit. Ultimately she had contacted my office mid July after she had helped her grandson move and the pain seemed to significantly magnify at that time. She had seen me in urgent care and at that time had placed her on an oral prednisone course with some muscle relaxer therapy. Was getting better with the steroid course but states that she finished the oral steroid yesterday and the pain has now since magnified. It is just intolerable at this point and radiates down into the calf and up into the hamstring muscle. Just cannot get relief unless she is sitting or lying but cannot get up and do anything without significant pain.   She contacted my office on the  and at that time I had recommended getting an MRI of her lumbar spine as I did have questions that perhaps there could have been an underlying disc herniation or bulging disc that could be contributing to some nerve impingement as that seems to be more of a sciatic type flare. She has a scheduled for next week but is not able to tolerate the pain until that time. Has been doing stretching exercises at home consistently and trying to rest and use ice and heat alternating. Tylenol and ibuprofen do not provide much benefit. No other acute issues. Did review patient's med list, allergies, social history,pmhx and pshx today as noted in the record. .    Review of Systems   Constitutional:  Negative for chills, fatigue and fever. Musculoskeletal:  Positive for arthralgias, back pain, gait problem and myalgias. Neurological:  Negative for weakness and numbness. Prior to Visit Medications    Medication Sig Taking? Authorizing Provider   gabapentin (NEURONTIN) 100 MG capsule Take 1 capsule by mouth in the morning and 1 capsule before bedtime. Do all this for 30 days. Intended supply: 30 days. Yes Joanne Bray DO   predniSONE (DELTASONE) 20 MG tablet 1 bid for 5 days, 1 qd for 5 days Yes Joanne Bray DO   tiZANidine (ZANAFLEX) 4 MG tablet Take 1 tablet by mouth 3 times daily as needed (muscle spasm) Yes Joanne Bray DO   metFORMIN (GLUCOPHAGE-XR) 500 MG extended release tablet Take 1 tablet by mouth 2 times daily Yes Joanne Bray DO   meloxicam (MOBIC) 7.5 MG tablet TAKE 1 TABLET ONCE A DAY Yes Fabby [place] DO   atenolol (TENORMIN) 25 MG tablet Take 1 tablet by mouth daily Yes Fabby [place] DO   montelukast (SINGULAIR) 10 MG tablet Take 1 tablet by mouth daily Yes Joanne Bray DO   cetirizine (ZYRTEC ALLERGY) 10 MG tablet Take 1 tablet by mouth daily Yes Fabby [place] DO   Simethicone (GAS-X ULTRA STRENGTH) 180 MG CAPS Take by mouth in the morning and at bedtime  Yes Historical Provider, MD   blood glucose test strips (ONE TOUCH TEST STRIPS) strip Tests 1 X daily.   DX E11.9 Yes Joanne Bray DO   ONE TOUCH LANCETS MISC Tests 1 x daily DX. E11.9 Yes Joanne Bray DO Conjunctiva/sclera: Conjunctivae normal.   Pulmonary:      Effort: Pulmonary effort is normal.   Musculoskeletal:         General: Tenderness present. No swelling. Cervical back: Normal range of motion. Comments: Reproducible pain with palpation to the right gluteal musculature with positive straight leg raise on the right. Has pain to the right mid lateral thigh, but this is linear in nature. Has a couple of cystic nodules noted behind the right knee, but these are non tender with palpation. Skin:     General: Skin is warm and dry. Coloration: Skin is not pale. Findings: No erythema or rash. Neurological:      Mental Status: She is alert and oriented to person, place, and time. Psychiatric:         Behavior: Behavior normal.         Thought Content: Thought content normal.         Judgment: Judgment normal.       ASSESSMENT/PLAN:  Encounter Diagnosis   Name Primary? Right sided sciatica Yes     Orders Placed This Encounter   Medications    gabapentin (NEURONTIN) 100 MG capsule     Sig: Take 1 capsule by mouth in the morning and 1 capsule before bedtime. Do all this for 30 days. Intended supply: 30 days. Dispense:  60 capsule     Refill:  0    predniSONE (DELTASONE) 20 MG tablet     Si bid for 5 days, 1 qd for 5 days     Dispense:  15 tablet     Refill:  0     Will give patient another course of prednisone as that did calm down her pain somewhat until she can get her MRI for further evaluation. I also gave her a low-dose of gabapentin to see if this will help with some of the nerve pain. Did talk about potential side effects of this medication. With regards to prednisone she will need to monitor her blood sugar levels but with her last course of prednisone this did seem to stay reasonably well controlled with just monitoring her dietary intake.     Patient is to continue with home stretching program as discussed, ice alternating with heat, Tylenol or ibuprofen as needed for pain.    Return  if no improvement in symptoms or if any further symptoms arise. (Please note that portions of this note were completed with a voice recognition program. Efforts were made to edit the dictations but occasionally words are mis-transcribed.)      No follow-ups on file. An electronic signature was used to authenticate this note.     --Yosvany Scanlon, DO on 7/29/2022 at 7:16 PM

## 2022-07-31 ASSESSMENT — ENCOUNTER SYMPTOMS: BACK PAIN: 1

## 2022-08-05 ENCOUNTER — HOSPITAL ENCOUNTER (OUTPATIENT)
Dept: MRI IMAGING | Age: 72
Discharge: HOME | End: 2022-08-07
Payer: MEDICARE

## 2022-08-05 DIAGNOSIS — M54.16 LUMBAR BACK PAIN WITH RADICULOPATHY AFFECTING RIGHT LOWER EXTREMITY: ICD-10-CM

## 2022-08-05 PROCEDURE — 72148 MRI LUMBAR SPINE W/O DYE: CPT

## 2022-08-12 ENCOUNTER — TELEPHONE (OUTPATIENT)
Dept: FAMILY MEDICINE CLINIC | Age: 72
End: 2022-08-12

## 2022-08-12 NOTE — TELEPHONE ENCOUNTER
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 Patient sees Dr Gerard Leyva 8/26/2022 to discuss this.

## 2022-08-12 NOTE — TELEPHONE ENCOUNTER
I would wait to see Dr. Wanda Acevedo to see what he thinks about this area first before doing an additional MRI.

## 2022-08-12 NOTE — TELEPHONE ENCOUNTER
Pt calling stating she was given MRI results and referred to PM, but pt would like to discuss results in detail with [facility] and other options, do you want to work pt in for an appt or call with recommendations, please advise.

## 2022-08-15 ENCOUNTER — PATIENT MESSAGE (OUTPATIENT)
Dept: PRIMARY CARE CLINIC | Age: 72
End: 2022-08-15

## 2022-08-15 DIAGNOSIS — M54.31 RIGHT SIDED SCIATICA: Primary | ICD-10-CM

## 2022-08-15 DIAGNOSIS — M54.16 LUMBAR BACK PAIN WITH RADICULOPATHY AFFECTING RIGHT LOWER EXTREMITY: ICD-10-CM

## 2022-08-15 RX ORDER — PREGABALIN 50 MG/1
50 CAPSULE ORAL 2 TIMES DAILY
Qty: 60 CAPSULE | Refills: 0 | Status: SHIPPED | OUTPATIENT
Start: 2022-08-15 | End: 2022-09-28

## 2022-08-15 NOTE — TELEPHONE ENCOUNTER
Km Durand RN 8/15/2022 12:56 PM EDT      ----- Message -----  From: Jerie Halsted  Sent: 8/15/2022 12:37 PM EDT  To: [facility] Clinical Staff  Subject: Right leg     I have been trying to get by without more steroids. My last pill was last Monday. it seems to take the edge off the pain. Today the pain is bad enough that it is making me sick to my stomach again, I also have a dull headache. I am using icy hot twice a day , taking tylenlol and using ice packs. I don't really want to take any more steroids but don't know what else to do.   Jerie Halsted

## 2022-08-16 DIAGNOSIS — E11.42 TYPE 2 DIABETES MELLITUS WITH DIABETIC POLYNEUROPATHY, WITHOUT LONG-TERM CURRENT USE OF INSULIN (HCC): Primary | ICD-10-CM

## 2022-08-16 DIAGNOSIS — I10 ESSENTIAL HYPERTENSION: ICD-10-CM

## 2022-08-18 ENCOUNTER — HOSPITAL ENCOUNTER (OUTPATIENT)
Dept: LAB | Age: 72
Discharge: HOME | End: 2022-08-18
Payer: MEDICARE

## 2022-08-18 DIAGNOSIS — E11.42 TYPE 2 DIABETES MELLITUS WITH DIABETIC POLYNEUROPATHY, WITHOUT LONG-TERM CURRENT USE OF INSULIN (HCC): ICD-10-CM

## 2022-08-18 DIAGNOSIS — I10 ESSENTIAL HYPERTENSION: ICD-10-CM

## 2022-08-18 LAB
CREAT SERPL-MCNC: 0.73 MG/DL (ref 0.5–0.9)
GFR AFRICAN AMERICAN: >60 ML/MIN
GFR NON-AFRICAN AMERICAN: >60 ML/MIN
GFR SERPL CREATININE-BSD FRML MDRD: NORMAL ML/MIN/{1.73_M2}

## 2022-08-18 PROCEDURE — 82565 ASSAY OF CREATININE: CPT

## 2022-08-18 PROCEDURE — 36415 COLL VENOUS BLD VENIPUNCTURE: CPT

## 2022-08-23 ENCOUNTER — HOSPITAL ENCOUNTER (OUTPATIENT)
Dept: MRI IMAGING | Age: 72
Discharge: HOME | End: 2022-08-25
Payer: MEDICARE

## 2022-08-23 DIAGNOSIS — M54.16 LUMBAR BACK PAIN WITH RADICULOPATHY AFFECTING RIGHT LOWER EXTREMITY: ICD-10-CM

## 2022-08-23 DIAGNOSIS — M54.31 RIGHT SIDED SCIATICA: ICD-10-CM

## 2022-08-23 PROCEDURE — A9579 GAD-BASE MR CONTRAST NOS,1ML: HCPCS | Performed by: FAMILY MEDICINE

## 2022-08-23 PROCEDURE — 6360000004 HC RX CONTRAST MEDICATION: Performed by: FAMILY MEDICINE

## 2022-08-23 PROCEDURE — 2709999900 MRI LUMBAR SPINE W WO CONTRAST

## 2022-08-23 RX ORDER — OMEPRAZOLE 20 MG/1
20 CAPSULE, DELAYED RELEASE ORAL 2 TIMES DAILY
Qty: 180 CAPSULE | Refills: 1 | Status: SHIPPED | OUTPATIENT
Start: 2022-08-23

## 2022-08-23 RX ADMIN — GADOTERIDOL 15 ML: 279.3 INJECTION, SOLUTION INTRAVENOUS at 13:58

## 2022-08-23 NOTE — TELEPHONE ENCOUNTER
Prabhakar Collado called requesting a refill of the below medication which has been pended for you:     Requested Prescriptions     Pending Prescriptions Disp Refills    omeprazole (PRILOSEC) 20 MG delayed release capsule 180 capsule 1     Sig: Take 1 capsule by mouth 2 times daily       Last Appointment Date: 7/29/2022  Next Appointment Date: 12/9/2022    Allergies   Allergen Reactions    Aspirin Other (See Comments)     Abdominal pain    Ibuprofen Other (See Comments)     Abdominal pain    Penicillin G Other (See Comments)    Sulfamethoxazole-Trimethoprim Nausea Only and Other (See Comments)    Penicillins Rash

## 2022-08-26 ENCOUNTER — OFFICE VISIT (OUTPATIENT)
Dept: ORTHOPEDIC SURGERY | Age: 72
End: 2022-08-26
Payer: MEDICARE

## 2022-08-26 VITALS
BODY MASS INDEX: 34.21 KG/M2 | HEART RATE: 79 BPM | DIASTOLIC BLOOD PRESSURE: 80 MMHG | HEIGHT: 67 IN | SYSTOLIC BLOOD PRESSURE: 122 MMHG | WEIGHT: 218 LBS

## 2022-08-26 DIAGNOSIS — M54.50 LOW BACK PAIN, UNSPECIFIED BACK PAIN LATERALITY, UNSPECIFIED CHRONICITY, UNSPECIFIED WHETHER SCIATICA PRESENT: Primary | ICD-10-CM

## 2022-08-26 DIAGNOSIS — M54.16 LUMBAR RADICULAR PAIN: ICD-10-CM

## 2022-08-26 PROCEDURE — G8428 CUR MEDS NOT DOCUMENT: HCPCS | Performed by: ORTHOPAEDIC SURGERY

## 2022-08-26 PROCEDURE — 3017F COLORECTAL CA SCREEN DOC REV: CPT | Performed by: ORTHOPAEDIC SURGERY

## 2022-08-26 PROCEDURE — G8417 CALC BMI ABV UP PARAM F/U: HCPCS | Performed by: ORTHOPAEDIC SURGERY

## 2022-08-26 PROCEDURE — 1036F TOBACCO NON-USER: CPT | Performed by: ORTHOPAEDIC SURGERY

## 2022-08-26 PROCEDURE — 1123F ACP DISCUSS/DSCN MKR DOCD: CPT | Performed by: ORTHOPAEDIC SURGERY

## 2022-08-26 PROCEDURE — 99214 OFFICE O/P EST MOD 30 MIN: CPT | Performed by: ORTHOPAEDIC SURGERY

## 2022-08-26 PROCEDURE — 99203 OFFICE O/P NEW LOW 30 MIN: CPT | Performed by: ORTHOPAEDIC SURGERY

## 2022-08-26 PROCEDURE — 1090F PRES/ABSN URINE INCON ASSESS: CPT | Performed by: ORTHOPAEDIC SURGERY

## 2022-08-26 PROCEDURE — G8400 PT W/DXA NO RESULTS DOC: HCPCS | Performed by: ORTHOPAEDIC SURGERY

## 2022-08-26 NOTE — PROGRESS NOTES
daily, Disp: 90 tablet, Rfl: 1    Simethicone (GAS-X ULTRA STRENGTH) 180 MG CAPS, Take by mouth in the morning and at bedtime , Disp: , Rfl:     blood glucose test strips (ONE TOUCH TEST STRIPS) strip, Tests 1 X daily. DX E11.9, Disp: 100 each, Rfl: 3    ONE TOUCH LANCETS MISC, Tests 1 x daily DX. E11.9, Disp: 100 each, Rfl: 3    atorvastatin (LIPITOR) 20 MG tablet, Take 1 tablet by mouth daily, Disp: 90 tablet, Rfl: 1    albuterol sulfate HFA (VENTOLIN HFA) 108 (90 Base) MCG/ACT inhaler, Inhale 2 puffs into the lungs every 4 hours as needed for Wheezing, Disp: 1 Inhaler, Rfl: 1    VITAMIN D PO, Take 2,000 Units by mouth daily, Disp: , Rfl:     pseudoephedrine (SUDAFED) 30 MG tablet, Take 30 mg by mouth every 4 hours as needed. , Disp: , Rfl:     therapeutic multivitamin-minerals (THERAGRAN-M) tablet, Take 1 tablet by mouth daily. , Disp: , Rfl:     Ascorbic Acid (VITAMIN C) 500 MG tablet, Take 500 mg by mouth daily. , Disp: , Rfl:     pregabalin (LYRICA) 50 MG capsule, Take 1 capsule by mouth in the morning and 1 capsule before bedtime. Do all this for 30 days.  (Patient not taking: Reported on 8/26/2022), Disp: 60 capsule, Rfl: 0    predniSONE (DELTASONE) 20 MG tablet, 1 bid for 5 days, 1 qd for 5 days (Patient not taking: Reported on 8/26/2022), Disp: 15 tablet, Rfl: 0    scopolamine (TRANSDERM-SCOP) transdermal patch, Place 1 patch onto the skin every 72 hours (Patient not taking: Reported on 8/26/2022), Disp: 2 patch, Rfl: 0  Allergies   Allergen Reactions    Aspirin Other (See Comments)     Abdominal pain    Ibuprofen Other (See Comments)     Abdominal pain    Penicillin G Other (See Comments)    Sulfamethoxazole-Trimethoprim Nausea Only and Other (See Comments)    Penicillins Rash     Social History     Socioeconomic History    Marital status:      Spouse name: Not on file    Number of children: Not on file    Years of education: Not on file    Highest education level: Not on file   Occupational History Not on file   Tobacco Use    Smoking status: Never     Passive exposure: Yes    Smokeless tobacco: Never    Tobacco comments:     very remotely would smoke a few cigarettes on weekends/college days   Vaping Use    Vaping Use: Never used   Substance and Sexual Activity    Alcohol use: Yes     Comment: occasional use    Drug use: No    Sexual activity: Not on file   Other Topics Concern    Not on file   Social History Narrative    Not on file     Social Determinants of Health     Financial Resource Strain: Low Risk     Difficulty of Paying Living Expenses: Not very hard   Food Insecurity: No Food Insecurity    Worried About Running Out of Food in the Last Year: Never true    Ran Out of Food in the Last Year: Never true   Transportation Needs: Not on file   Physical Activity: Sufficiently Active    Days of Exercise per Week: 5 days    Minutes of Exercise per Session: 40 min   Stress: Not on file   Social Connections: Not on file   Intimate Partner Violence: Not on file   Housing Stability: Not on file     Past Medical History:   Diagnosis Date    Allergic rhinitis     Anxiety     due to situational stress    Bursitis     Bursitis of hip, right     recurrent    Carpal tunnel syndrome, bilateral     GERD (gastroesophageal reflux disease)     Hyperlipidemia     Hypertension     Impaired fasting glucose     Labyrinthitis     causing continuted vertigo    [facility]2017     to bridge of nose; Dr Rosenda Teran in [address] [place] [address] in situ of nose ([facility] 10/05/2017    biopsy confirmed on 10/05/2017 by Dr Susie Andrade [facility]     Osteoarthritis     Palpitations      Past Surgical History:   Procedure Laterality Date    BREAST BIOPSY Right 2003    Excisional Biospy    CARPAL TUNNEL RELEASE Right 10/2021    CARPAL TUNNEL RELEASE Left 11/2021    CATARACT REMOVAL WITH IMPLANT Right 07/29/2017    Dr Gregorio Franco WITH IMPLANT Left 08/05/2017    Dr Anamika Ramos    COLONOSCOPY  12/2008    normal, repeat in 5 years    COLONOSCOPY  1998    COLONOSCOPY  2003    COLONOSCOPY N/A 02/14/2019    few diverticuli, Dr. 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 Sherrie Benavides  07/03/1983    TUBAL LIGATION  1983    UPPER GASTROINTESTINAL ENDOSCOPY N/A 02/14/2019    mild gastritis, mild esophagitis, Dr. De Leon Quivers     Family History   Problem Relation Age of Onset    Prostate Cancer Father     Cancer Sister         breast    Breast Cancer Sister 61    Cancer Sister         colorectal    Breast Cancer Sister 36    Other Mother         ulcerative colitis    Other Brother 79        coronary artery disease    Other Son         ulcerative colitis    Breast Cancer Paternal Aunt         Physical Exam:  Vitals signs and nursing note reviewed. Constitutional:       Appearance: well-developed. HENT:      Head: Normocephalic and atraumatic. Nose: Nose normal.   Eyes:      Conjunctiva/sclera: Conjunctivae normal.   Neck:      Musculoskeletal: Normal range of motion and neck supple. Pulmonary:      Effort: Pulmonary effort is normal. No respiratory distress. Musculoskeletal:      Comments: Normal gait     Skin:     General: Skin is warm and dry. Neurological:      Mental Status: Alert and oriented to person, place, and time. Sensory: No sensory deficit. Psychiatric:         Behavior: Behavior normal.         Thought Content: Thought content normal.    Positive straight leg raise    Provider Attestation:  Papo Burton, personally performed the services described in this documentation. All medical record entries made by the scribe were at my direction and in my presence. I have reviewed the chart and discharge instructions and agree that the records reflect my personal performance and is accurate and complete. Braulio Brothers MD 8/26/22       Scribe Attestation:  By signing my name below, Raheem Bryant, attest that this documentation has been prepared under the direction and in the presence of Dr. Aurelio Nguyen. Electronically signed: Felicia Rojas, 8/26/22     Please note that this chart was generated using voice recognition Dragon dictation software. Although every effort was made to ensure the accuracy of this automated transcription, some errors in transcription may have occurred.

## 2022-09-06 RX ORDER — TIZANIDINE 4 MG/1
4 TABLET ORAL 3 TIMES DAILY PRN
Qty: 30 TABLET | Refills: 0 | Status: SHIPPED | OUTPATIENT
Start: 2022-09-06

## 2022-09-06 NOTE — TELEPHONE ENCOUNTER
Kinga Viveros called requesting a refill of the below medication which has been pended for you:     Requested Prescriptions     Pending Prescriptions Disp Refills    tiZANidine (ZANAFLEX) 4 MG tablet 30 tablet 0     Sig: Take 1 tablet by mouth 3 times daily as needed (muscle spasm)       Last Appointment Date: 7/29/2022  Next Appointment Date: 12/9/2022    Allergies   Allergen Reactions    Aspirin Other (See Comments)     Abdominal pain    Ibuprofen Other (See Comments)     Abdominal pain    Penicillin G Other (See Comments)    Sulfamethoxazole-Trimethoprim Nausea Only and Other (See Comments)    Penicillins Rash

## 2022-09-16 ENCOUNTER — PATIENT MESSAGE (OUTPATIENT)
Dept: FAMILY MEDICINE CLINIC | Age: 72
End: 2022-09-16

## 2022-09-16 NOTE — TELEPHONE ENCOUNTER
Looks like he ordered and ESR and sed rate both were elevated. Do you want to see patient in UC today?

## 2022-09-16 NOTE — TELEPHONE ENCOUNTER
From: Bill Alba  To: Dr. Parra Poll: 9/16/2022 7:41 AM EDT  Subject: fever    I've been having a low grade fever almost every day since August 21. I thought it was because of my pain in leg but I saw Dr. Mary Metcalf on Tuesday and he said it wasn't connected to my back pain. He said I should see my primary doctor.  He also ran a couple blood test.

## 2022-09-22 ENCOUNTER — TELEPHONE (OUTPATIENT)
Dept: FAMILY MEDICINE CLINIC | Age: 72
End: 2022-09-22

## 2022-09-22 ENCOUNTER — PATIENT MESSAGE (OUTPATIENT)
Dept: FAMILY MEDICINE CLINIC | Age: 72
End: 2022-09-22

## 2022-09-22 NOTE — TELEPHONE ENCOUNTER
----- Message from Lo Mata sent at 9/22/2022 12:02 PM EDT -----  Subject: Appointment Request    Reason for Call: Established Patient Appointment needed: Routine Existing   Condition Follow Up    QUESTIONS    Reason for appointment request? No appointments available during search     Additional Information for Provider? Patient had to cx appt tomorrow due   to pain mgmt appt same time needs something sooner now to reschedule about   her low grade temps she would like to come in soon.  cb pt   ---------------------------------------------------------------------------  --------------  Wilian WATSON  [phone]; OK to leave message on voicemail  ---------------------------------------------------------------------------  --------------  SCRIPT ANSWERS  COVID Screen: Ruba Alonso

## 2022-09-22 NOTE — TELEPHONE ENCOUNTER
Spoke with patient. Writer unable to schedule patient within preferred timeframe. Patient requesting to speak with nurse to schedule appt ASAP. Please advise.

## 2022-09-28 ENCOUNTER — HOSPITAL ENCOUNTER (OUTPATIENT)
Dept: LAB | Age: 72
Discharge: HOME | End: 2022-09-28
Payer: MEDICARE

## 2022-09-28 ENCOUNTER — OFFICE VISIT (OUTPATIENT)
Dept: FAMILY MEDICINE CLINIC | Age: 72
End: 2022-09-28
Payer: MEDICARE

## 2022-09-28 VITALS
SYSTOLIC BLOOD PRESSURE: 114 MMHG | DIASTOLIC BLOOD PRESSURE: 80 MMHG | OXYGEN SATURATION: 95 % | BODY MASS INDEX: 33.9 KG/M2 | HEART RATE: 68 BPM | WEIGHT: 216 LBS | HEIGHT: 67 IN | RESPIRATION RATE: 16 BRPM | TEMPERATURE: 96 F

## 2022-09-28 DIAGNOSIS — R50.9 LOW GRADE FEVER: ICD-10-CM

## 2022-09-28 DIAGNOSIS — R10.30 LOWER ABDOMINAL PAIN: Primary | ICD-10-CM

## 2022-09-28 LAB
ABSOLUTE EOS #: 0.21 K/UL (ref 0–0.44)
ABSOLUTE IMMATURE GRANULOCYTE: 0.09 K/UL (ref 0–0.3)
ABSOLUTE LYMPH #: 2.35 K/UL (ref 1.1–3.7)
ABSOLUTE MONO #: 1.03 K/UL (ref 0.1–1.2)
ALBUMIN SERPL-MCNC: 4.2 G/DL (ref 3.5–5.2)
ALBUMIN/GLOBULIN RATIO: 1.4 (ref 1–2.5)
ALP BLD-CCNC: 85 U/L (ref 35–104)
ALT SERPL-CCNC: 13 U/L (ref 5–33)
ANION GAP SERPL CALCULATED.3IONS-SCNC: 10 MMOL/L (ref 9–17)
AST SERPL-CCNC: 14 U/L
BASOPHILS # BLD: 1 % (ref 0–2)
BASOPHILS ABSOLUTE: 0.06 K/UL (ref 0–0.2)
BILIRUB SERPL-MCNC: 0.2 MG/DL (ref 0.3–1.2)
BUN BLDV-MCNC: 16 MG/DL (ref 8–23)
BUN/CREAT BLD: 20 (ref 9–20)
CALCIUM SERPL-MCNC: 9.8 MG/DL (ref 8.6–10.4)
CHLORIDE BLD-SCNC: 100 MMOL/L (ref 98–107)
CO2: 29 MMOL/L (ref 20–31)
CREAT SERPL-MCNC: 0.79 MG/DL (ref 0.5–0.9)
EOSINOPHILS RELATIVE PERCENT: 2 % (ref 1–4)
GFR AFRICAN AMERICAN: >60 ML/MIN
GFR NON-AFRICAN AMERICAN: >60 ML/MIN
GFR SERPL CREATININE-BSD FRML MDRD: ABNORMAL ML/MIN/{1.73_M2}
GLUCOSE BLD-MCNC: 135 MG/DL (ref 70–99)
HCT VFR BLD CALC: 40.1 % (ref 36.3–47.1)
HEMOGLOBIN: 12.8 G/DL (ref 11.9–15.1)
IMMATURE GRANULOCYTES: 1 %
LYMPHOCYTES # BLD: 19 % (ref 24–43)
MCH RBC QN AUTO: 28.4 PG (ref 25.2–33.5)
MCHC RBC AUTO-ENTMCNC: 31.9 G/DL (ref 25.2–33.5)
MCV RBC AUTO: 88.9 FL (ref 82.6–102.9)
MONOCYTES # BLD: 8 % (ref 3–12)
MONONUCLEOSIS SCREEN: NEGATIVE
NRBC AUTOMATED: 0 PER 100 WBC
PDW BLD-RTO: 14 % (ref 11.8–14.4)
PLATELET # BLD: 319 K/UL (ref 138–453)
PMV BLD AUTO: 8.7 FL (ref 8.1–13.5)
POTASSIUM SERPL-SCNC: 4.1 MMOL/L (ref 3.7–5.3)
RBC # BLD: 4.51 M/UL (ref 3.95–5.11)
SEG NEUTROPHILS: 69 % (ref 36–65)
SEGMENTED NEUTROPHILS ABSOLUTE COUNT: 8.55 K/UL (ref 1.5–8.1)
SODIUM BLD-SCNC: 139 MMOL/L (ref 135–144)
TOTAL PROTEIN: 7.3 G/DL (ref 6.4–8.3)
WBC # BLD: 12.3 K/UL (ref 3.5–11.3)

## 2022-09-28 PROCEDURE — 86665 EPSTEIN-BARR CAPSID VCA: CPT

## 2022-09-28 PROCEDURE — 85025 COMPLETE CBC W/AUTO DIFF WBC: CPT

## 2022-09-28 PROCEDURE — 86645 CMV ANTIBODY IGM: CPT

## 2022-09-28 PROCEDURE — 99214 OFFICE O/P EST MOD 30 MIN: CPT | Performed by: FAMILY MEDICINE

## 2022-09-28 PROCEDURE — G8427 DOCREV CUR MEDS BY ELIG CLIN: HCPCS | Performed by: FAMILY MEDICINE

## 2022-09-28 PROCEDURE — 36415 COLL VENOUS BLD VENIPUNCTURE: CPT

## 2022-09-28 PROCEDURE — 86663 EPSTEIN-BARR ANTIBODY: CPT

## 2022-09-28 PROCEDURE — 86308 HETEROPHILE ANTIBODY SCREEN: CPT

## 2022-09-28 PROCEDURE — 1090F PRES/ABSN URINE INCON ASSESS: CPT | Performed by: FAMILY MEDICINE

## 2022-09-28 PROCEDURE — 99212 OFFICE O/P EST SF 10 MIN: CPT | Performed by: FAMILY MEDICINE

## 2022-09-28 PROCEDURE — 3017F COLORECTAL CA SCREEN DOC REV: CPT | Performed by: FAMILY MEDICINE

## 2022-09-28 PROCEDURE — 1036F TOBACCO NON-USER: CPT | Performed by: FAMILY MEDICINE

## 2022-09-28 PROCEDURE — 86664 EPSTEIN-BARR NUCLEAR ANTIGEN: CPT

## 2022-09-28 PROCEDURE — 80053 COMPREHEN METABOLIC PANEL: CPT

## 2022-09-28 PROCEDURE — G8400 PT W/DXA NO RESULTS DOC: HCPCS | Performed by: FAMILY MEDICINE

## 2022-09-28 PROCEDURE — 86644 CMV ANTIBODY: CPT

## 2022-09-28 PROCEDURE — 1123F ACP DISCUSS/DSCN MKR DOCD: CPT | Performed by: FAMILY MEDICINE

## 2022-09-28 PROCEDURE — G8417 CALC BMI ABV UP PARAM F/U: HCPCS | Performed by: FAMILY MEDICINE

## 2022-09-28 ASSESSMENT — ENCOUNTER SYMPTOMS
WHEEZING: 0
SHORTNESS OF BREATH: 0
ABDOMINAL PAIN: 1
COUGH: 0
EYE REDNESS: 0
NAUSEA: 1
BACK PAIN: 1
TROUBLE SWALLOWING: 0
VOMITING: 0
DIARRHEA: 0
SORE THROAT: 0
RHINORRHEA: 0
CONSTIPATION: 0
EYE DISCHARGE: 0
SINUS PRESSURE: 0

## 2022-09-28 NOTE — PROGRESS NOTES
2022     [facility] (:  1950) is a 71 y.o. female, here for evaluation of the following medical concerns:    HPI    Is seen today secondary to persistent low-grade temps for the last month and a half duration. Patient states that in August she started having increased issues with low-grade temperatures fairly consistently particularly at night. She would wake up have chills and generalized body aches and would check her temp and it would typically be in the mid 99's range. Initially when this started she felt very terrible as she just was achy and sore and fatigued. This has slightly gotten better but still having ongoing symptoms. She states that she did see the neurosurgeon and mentioned this to him and he advised her that it had nothing to do with her disc issues or sciatic issues. She felt that likely it was just related to the increased inflammation and pain in her low back and lower extremities. He had apparently mentioned that this can happen in lymphoma and so she is just concerned that she does not know what is continuing to cause her symptoms. She has had some lower abdominal pain and cramping that is been persistent since January. She has had some diminished appetite. She did have constipation initially when the back pain started but that has improved and she otherwise has not had any change in her bowels. Not having any urinary complaints. Some occasional nausea. She does eat consistently as she knows with being on metformin she should be eating consistent meals. She does describe some increased gas and bloating. She has been taking simethicone which has helped with some of the gas but she states it was pretty significant for a while. She has not had any change in her diet otherwise. Has not noticed any lymph node swelling. Not having other symptoms of cough chest congestion shortness of breath chest pain sore throat sinus drainage.   Intermittently will get some slight left ear pain but this is not consistent and this has happened in the past with allergy symptoms. No skin rashes. No other acute related symptoms. Did review patient's med list, allergies, social history,pmhx and pshx today as noted in the record. Review of Systems   Constitutional:  Positive for appetite change, chills, fatigue and fever. HENT:  Negative for congestion, ear pain, postnasal drip, rhinorrhea, sinus pressure, sore throat and trouble swallowing. Eyes:  Negative for discharge and redness. Respiratory:  Negative for cough, shortness of breath and wheezing. Cardiovascular:  Negative for chest pain. Gastrointestinal:  Positive for abdominal pain and nausea. Negative for constipation, diarrhea and vomiting. Genitourinary:  Negative for dysuria, flank pain, frequency and urgency. Musculoskeletal:  Positive for back pain and myalgias. Negative for arthralgias and neck pain. Skin:  Negative for rash and wound. Allergic/Immunologic: Negative for environmental allergies. Neurological:  Negative for dizziness, weakness, light-headedness and headaches. Hematological:  Negative for adenopathy. Psychiatric/Behavioral: Negative. Prior to Visit Medications    Medication Sig Taking? Authorizing Provider   tiZANidine (ZANAFLEX) 4 MG tablet Take 1 tablet by mouth 3 times daily as needed (muscle spasm)  Mary Anderson MD   omeprazole (PRILOSEC) 20 MG delayed release capsule Take 1 capsule by mouth 2 times daily  Fabby Lima DO   pregabalin (LYRICA) 50 MG capsule Take 1 capsule by mouth in the morning and 1 capsule before bedtime. Do all this for 30 days.   Patient not taking: Reported on 8/26/2022  Vilma Kennedy DO   predniSONE (DELTASONE) 20 MG tablet 1 bid for 5 days, 1 qd for 5 days  Patient not taking: Reported on 8/26/2022  Vilma Kennedy DO   metFORMIN (GLUCOPHAGE-XR) 500 MG extended release tablet Take 1 tablet by mouth 2 times daily  Vilma Kennedy DO   meloxicam (MOBIC) 7.5 MG tablet TAKE 1 TABLET ONCE A DAY  Fabby Lima DO   atenolol (TENORMIN) 25 MG tablet Take 1 tablet by mouth daily  Fabby Lima DO   montelukast (SINGULAIR) 10 MG tablet Take 1 tablet by mouth daily  Fabby Lima DO   cetirizine (ZYRTEC ALLERGY) 10 MG tablet Take 1 tablet by mouth daily  Fabby Lima DO   Simethicone (GAS-X ULTRA STRENGTH) 180 MG CAPS Take by mouth in the morning and at bedtime   Historical Provider, MD   blood glucose test strips (ONE TOUCH TEST STRIPS) strip Tests 1 X daily. DX E11.9  Samantha Mcpherson DO   ONE TOUCH LANCETS MISC Tests 1 x daily DX. E11.9  Samantha Mcpherson DO   scopolamine (TRANSDERM-SCOP) transdermal patch Place 1 patch onto the skin every 72 hours  Patient not taking: Reported on 8/26/2022  Samantha Mcpherson DO   atorvastatin (LIPITOR) 20 MG tablet Take 1 tablet by mouth daily  Fabby Lima DO   albuterol sulfate HFA (VENTOLIN HFA) 108 (90 Base) MCG/ACT inhaler Inhale 2 puffs into the lungs every 4 hours as needed for Wheezing  Fabby Lima DO   VITAMIN D PO Take 2,000 Units by mouth daily  Historical Provider, MD   pseudoephedrine (SUDAFED) 30 MG tablet Take 30 mg by mouth every 4 hours as needed. Historical Provider, MD   therapeutic multivitamin-minerals (THERAGRAN-M) tablet Take 1 tablet by mouth daily. Historical Provider, MD   Ascorbic Acid (VITAMIN C) 500 MG tablet Take 500 mg by mouth daily. Historical Provider, MD        Social History     Tobacco Use    Smoking status: Never     Passive exposure: Yes    Smokeless tobacco: Never    Tobacco comments:     very remotely would smoke a few cigarettes on weekends/college days   Substance Use Topics    Alcohol use: Yes     Comment: occasional use        There were no vitals filed for this visit. Estimated body mass index is 34.14 kg/m² as calculated from the following:    Height as of 8/26/22: 5' 7\" (1.702 m).     Weight as of 8/26/22: 218 lb (98.9 kg).    Physical Exam  Vitals and nursing note reviewed. Constitutional:       General: She is not in acute distress. Appearance: Normal appearance. She is well-developed. She is not diaphoretic. HENT:      Head: Normocephalic and atraumatic. Right Ear: Tympanic membrane and ear canal normal.      Left Ear: Tympanic membrane and ear canal normal.      Nose: Nose normal.      Mouth/Throat:      Mouth: Mucous membranes are moist.      Pharynx: Oropharynx is clear. Comments: Clear PND noted  Eyes:      General:         Right eye: No discharge. Left eye: No discharge. Conjunctiva/sclera: Conjunctivae normal.   Cardiovascular:      Rate and Rhythm: Normal rate and regular rhythm. Pulmonary:      Effort: Pulmonary effort is normal. No respiratory distress. Breath sounds: Normal breath sounds. No wheezing. Abdominal:      General: Bowel sounds are normal. There is no distension. Palpations: Abdomen is soft. There is no mass. Tenderness: There is abdominal tenderness (mild lower abdominal tenderness noted). There is no guarding or rebound. Musculoskeletal:      Cervical back: Normal range of motion and neck supple. No rigidity. Lymphadenopathy:      Head:      Right side of head: No posterior auricular or occipital adenopathy. Left side of head: No posterior auricular or occipital adenopathy. Cervical: No cervical adenopathy. Upper Body:      Right upper body: No supraclavicular or axillary adenopathy. Left upper body: No supraclavicular or axillary adenopathy. Lower Body: No right inguinal adenopathy. No left inguinal adenopathy. Skin:     General: Skin is warm and dry. Neurological:      Mental Status: She is alert and oriented to person, place, and time. Psychiatric:         Behavior: Behavior normal.         Thought Content:  Thought content normal.         Judgment: Judgment normal.       ASSESSMENT/PLAN:    Encounter Diagnoses   Name Primary? Lower abdominal pain Yes    Low grade fever      No orders of the defined types were placed in this encounter. Orders Placed This Encounter   Procedures    CT ABDOMEN PELVIS W IV CONTRAST Additional Contrast? Oral     Standing Status:   Future     Standing Expiration Date:   9/28/2023     Order Specific Question:   Additional Contrast?     Answer:   Oral     Order Specific Question:   STAT Creatinine as needed:     Answer:   Yes     Order Specific Question:   Reason for exam:     Answer:   lower abdominal pain and cramping, persistent since December, low grade temps persistent since August, decreased appetite    CBC with Auto Differential     Standing Status:   Future     Standing Expiration Date:   9/28/2023    Ariana-Barr Virus VCA Antibody Panel     Standing Status:   Future     Standing Expiration Date:   9/28/2023    Cytomegalovirus Antibody, IgM     Standing Status:   Future     Standing Expiration Date:   9/28/2023    Cytomegalovirus Antibody, IgG     Standing Status:   Future     Standing Expiration Date:   9/28/2023    Mononucleosis Screen     Standing Status:   Future     Standing Expiration Date:   9/28/2023    Comprehensive Metabolic Panel     Standing Status:   Future     Standing Expiration Date:   9/28/2023     Unclear etiology of her symptoms. As her symptoms slightly improved this could possibly be an underlying viral etiology contributing to her overall symptoms that may be slowly improving. We will check lab testing as noted. I also think getting a CT of the abdomen and pelvis due to the abdominal discomfort bloating gas and low-grade fevers may be beneficial.    Patient is continue to work with the neurosurgeon and pain management regarding her ongoing chronic back issues. She is also going through physical therapy so should continue with physical therapy as well. Return  if no improvement in symptoms or if any further symptoms arise.     (Please note that portions of this note were completed with a voice recognition program. Efforts were made to edit the dictations but occasionally words are mis-transcribed.)    No follow-ups on file. An electronic signature was used to authenticate this note.     --Kira Coleman DO on 9/28/2022 at 7:27 AM

## 2022-09-29 ENCOUNTER — HOSPITAL ENCOUNTER (OUTPATIENT)
Dept: CT IMAGING | Age: 72
Discharge: HOME | End: 2022-10-01
Payer: MEDICARE

## 2022-09-29 DIAGNOSIS — R10.30 LOWER ABDOMINAL PAIN: ICD-10-CM

## 2022-09-29 LAB
CMV IGM: 0.5
CYTOMEGALOVIRUS IGG ANTIBODY: 32.1
EBV EARLY ANTIGEN IGG: 65 U/ML
EBV INTERPRETATION: ABNORMAL
EBV NUCLEAR AG AB: 101 U/ML
EPSTEIN-BARR VCA IGG: 804 U/ML
EPSTEIN-BARR VCA IGM: 24 U/ML

## 2022-09-29 PROCEDURE — 6360000004 HC RX CONTRAST MEDICATION: Performed by: FAMILY MEDICINE

## 2022-09-29 PROCEDURE — 2709999900 CT ABDOMEN PELVIS W IV CONTRAST

## 2022-09-29 RX ADMIN — IOPAMIDOL 100 ML: 755 INJECTION, SOLUTION INTRAVENOUS at 09:56

## 2022-09-29 RX ADMIN — IOHEXOL 50 ML: 300 INJECTION, SOLUTION INTRAVENOUS at 08:45

## 2022-10-03 DIAGNOSIS — D72.829 LEUKOCYTOSIS, UNSPECIFIED TYPE: Primary | ICD-10-CM

## 2022-10-14 ENCOUNTER — PATIENT MESSAGE (OUTPATIENT)
Dept: FAMILY MEDICINE CLINIC | Age: 72
End: 2022-10-14

## 2022-10-14 DIAGNOSIS — M79.10 MYALGIA: Primary | ICD-10-CM

## 2022-10-14 DIAGNOSIS — R50.9 FEVER, UNSPECIFIED FEVER CAUSE: ICD-10-CM

## 2022-10-14 NOTE — TELEPHONE ENCOUNTER
From: Silvino Evans  To: Dr. Ham North: 10/14/2022 1:14 PM EDT  Subject: fever    I am still experiencing a rise in temperature every evening. Along with chills and body aches and general feeling of being unwell. 99.1-99.8 If I take tylenol at four o'clock it will only get to 98.8. My shoulders and knees are hurting very bad. I have trouble sleeping at night because everything hurts. I am getting the lab work done on Monday and was wondering if I can come in again next week to discuss what to do.   Silvino Evans

## 2022-10-18 ENCOUNTER — HOSPITAL ENCOUNTER (OUTPATIENT)
Dept: LAB | Age: 72
Discharge: HOME | End: 2022-10-18
Payer: MEDICARE

## 2022-10-18 DIAGNOSIS — R50.9 FEVER, UNSPECIFIED FEVER CAUSE: ICD-10-CM

## 2022-10-18 DIAGNOSIS — M79.10 MYALGIA: ICD-10-CM

## 2022-10-18 LAB
ABSOLUTE EOS #: 0.19 K/UL (ref 0–0.44)
ABSOLUTE IMMATURE GRANULOCYTE: 0.06 K/UL (ref 0–0.3)
ABSOLUTE LYMPH #: 2.08 K/UL (ref 1.1–3.7)
ABSOLUTE MONO #: 0.9 K/UL (ref 0.1–1.2)
BASOPHILS # BLD: 0 % (ref 0–2)
BASOPHILS ABSOLUTE: 0.03 K/UL (ref 0–0.2)
C-REACTIVE PROTEIN: 53.5 MG/L (ref 0–5)
EOSINOPHILS RELATIVE PERCENT: 2 % (ref 1–4)
HCT VFR BLD CALC: 36.8 % (ref 36.3–47.1)
HEMOGLOBIN: 11.7 G/DL (ref 11.9–15.1)
IMMATURE GRANULOCYTES: 1 %
LYMPHOCYTES # BLD: 20 % (ref 24–43)
MCH RBC QN AUTO: 27.6 PG (ref 25.2–33.5)
MCHC RBC AUTO-ENTMCNC: 31.8 G/DL (ref 25.2–33.5)
MCV RBC AUTO: 86.8 FL (ref 82.6–102.9)
MONOCYTES # BLD: 9 % (ref 3–12)
NRBC AUTOMATED: 0 PER 100 WBC
PDW BLD-RTO: 14.4 % (ref 11.8–14.4)
PLATELET # BLD: 327 K/UL (ref 138–453)
PMV BLD AUTO: 9.1 FL (ref 8.1–13.5)
RBC # BLD: 4.24 M/UL (ref 3.95–5.11)
RHEUMATOID FACTOR: <10 IU/ML
SEDIMENTATION RATE, ERYTHROCYTE: 64 MM/HR (ref 0–30)
SEG NEUTROPHILS: 68 % (ref 36–65)
SEGMENTED NEUTROPHILS ABSOLUTE COUNT: 7.14 K/UL (ref 1.5–8.1)
URIC ACID: 4.1 MG/DL (ref 2.4–5.7)
WBC # BLD: 10.4 K/UL (ref 3.5–11.3)

## 2022-10-18 PROCEDURE — 86038 ANTINUCLEAR ANTIBODIES: CPT

## 2022-10-18 PROCEDURE — 84550 ASSAY OF BLOOD/URIC ACID: CPT

## 2022-10-18 PROCEDURE — 85652 RBC SED RATE AUTOMATED: CPT

## 2022-10-18 PROCEDURE — 85025 COMPLETE CBC W/AUTO DIFF WBC: CPT

## 2022-10-18 PROCEDURE — 86431 RHEUMATOID FACTOR QUANT: CPT

## 2022-10-18 PROCEDURE — 86225 DNA ANTIBODY NATIVE: CPT

## 2022-10-18 PROCEDURE — 36415 COLL VENOUS BLD VENIPUNCTURE: CPT

## 2022-10-18 PROCEDURE — 86140 C-REACTIVE PROTEIN: CPT

## 2022-10-19 ENCOUNTER — TELEPHONE (OUTPATIENT)
Dept: FAMILY MEDICINE CLINIC | Age: 72
End: 2022-10-19

## 2022-10-19 DIAGNOSIS — M79.10 MYALGIA: Primary | ICD-10-CM

## 2022-10-19 DIAGNOSIS — R50.9 FEVER, UNSPECIFIED FEVER CAUSE: ICD-10-CM

## 2022-10-19 RX ORDER — PREDNISONE 10 MG/1
TABLET ORAL
Qty: 53 TABLET | Refills: 0 | Status: SHIPPED | OUTPATIENT
Start: 2022-10-19 | End: 2022-11-04

## 2022-10-19 NOTE — TELEPHONE ENCOUNTER
Referral for rheumatology was placed per instruction from Dr Claudetta Burke on labs completed 10/18/2022. Patient would also like some Prednisone sent to [facility] as she is in so much pain while awaiting appt with rheumatology.

## 2022-10-20 LAB
ANTI DNA DOUBLE STRANDED: 0.9 IU/ML
ANTI-NUCLEAR ANTIBODY (ANA): NEGATIVE
ENA ANTIBODIES SCREEN: 0.4 U/ML

## 2022-11-04 ENCOUNTER — TELEPHONE (OUTPATIENT)
Dept: FAMILY MEDICINE CLINIC | Age: 72
End: 2022-11-04

## 2022-11-04 RX ORDER — PREDNISONE 10 MG/1
TABLET ORAL
Qty: 30 TABLET | Refills: 0 | Status: SHIPPED | OUTPATIENT
Start: 2022-11-04

## 2022-11-04 RX ORDER — BUSPIRONE HYDROCHLORIDE 5 MG/1
5 TABLET ORAL 3 TIMES DAILY PRN
Qty: 90 TABLET | Refills: 0 | Status: SHIPPED | OUTPATIENT
Start: 2022-11-04 | End: 2022-12-04

## 2022-11-04 NOTE — TELEPHONE ENCOUNTER
I will send in buspar for her to take for increased anxiety symptoms that she can use PRN. In her age bracket, xanax is not recommended. I can send in some prednisone for her but just questioning when she is able to get into rheumatology. Will send in 10mg pills and have her titrate.

## 2022-11-04 NOTE — TELEPHONE ENCOUNTER
Patient is just triturating down to Prednisone 10 mg daily and her pain has increased to the point she has significant mobility issues. She was wondering if you could call in some more Prednisone so she can do another couple days of 30 mg then titrate back down again. Patient has been having a lot of stress at home so not sure if this has caused her pain to flair back up but she is trying to get her  into an inpatient detox center in [facility]. She was wondering if she could have a few Xanax to take at bedtime to use sparingly. Asked if she wanted us to send something like Buspar or an antidepressant in for her for daily stress reduction and she said know she just needs a few pills for when nights are really bad and she can get him in treatment center.  Please send to [facility]

## 2022-11-10 ENCOUNTER — PATIENT MESSAGE (OUTPATIENT)
Dept: FAMILY MEDICINE CLINIC | Age: 72
End: 2022-11-10

## 2022-11-10 NOTE — TELEPHONE ENCOUNTER
From: Silvino Evans  To: Dr. Fatoumata Delgado: 11/10/2022 9:53 AM EST  Subject: lumps    Sorry to keep, bothering you but I found a lump in the soft tissue in my upper right arm. It feels like the ones behind my knee. The ones behind the knee have never stopped hurting, not after the epidural or steroids. I am concerned.   Silvino Evans

## 2022-11-15 ENCOUNTER — OFFICE VISIT (OUTPATIENT)
Dept: FAMILY MEDICINE CLINIC | Age: 72
End: 2022-11-15
Payer: MEDICARE

## 2022-11-15 VITALS
WEIGHT: 216 LBS | HEIGHT: 67 IN | OXYGEN SATURATION: 96 % | BODY MASS INDEX: 33.9 KG/M2 | RESPIRATION RATE: 16 BRPM | SYSTOLIC BLOOD PRESSURE: 138 MMHG | HEART RATE: 69 BPM | DIASTOLIC BLOOD PRESSURE: 72 MMHG

## 2022-11-15 DIAGNOSIS — R22.41 SUBCUTANEOUS NODULE OF RIGHT LOWER EXTREMITY: ICD-10-CM

## 2022-11-15 DIAGNOSIS — M79.10 MYALGIA: ICD-10-CM

## 2022-11-15 DIAGNOSIS — R22.31 SUBCUTANEOUS NODULE OF RIGHT UPPER EXTREMITY: Primary | ICD-10-CM

## 2022-11-15 PROCEDURE — G8484 FLU IMMUNIZE NO ADMIN: HCPCS | Performed by: FAMILY MEDICINE

## 2022-11-15 PROCEDURE — 1123F ACP DISCUSS/DSCN MKR DOCD: CPT | Performed by: FAMILY MEDICINE

## 2022-11-15 PROCEDURE — 3074F SYST BP LT 130 MM HG: CPT | Performed by: FAMILY MEDICINE

## 2022-11-15 PROCEDURE — G8400 PT W/DXA NO RESULTS DOC: HCPCS | Performed by: FAMILY MEDICINE

## 2022-11-15 PROCEDURE — 3017F COLORECTAL CA SCREEN DOC REV: CPT | Performed by: FAMILY MEDICINE

## 2022-11-15 PROCEDURE — 99213 OFFICE O/P EST LOW 20 MIN: CPT | Performed by: FAMILY MEDICINE

## 2022-11-15 PROCEDURE — 1090F PRES/ABSN URINE INCON ASSESS: CPT | Performed by: FAMILY MEDICINE

## 2022-11-15 PROCEDURE — 3078F DIAST BP <80 MM HG: CPT | Performed by: FAMILY MEDICINE

## 2022-11-15 PROCEDURE — G8417 CALC BMI ABV UP PARAM F/U: HCPCS | Performed by: FAMILY MEDICINE

## 2022-11-15 PROCEDURE — 1036F TOBACCO NON-USER: CPT | Performed by: FAMILY MEDICINE

## 2022-11-15 PROCEDURE — G8427 DOCREV CUR MEDS BY ELIG CLIN: HCPCS | Performed by: FAMILY MEDICINE

## 2022-11-15 RX ORDER — PREDNISONE 20 MG/1
TABLET ORAL
Qty: 30 TABLET | Refills: 0 | Status: SHIPPED | OUTPATIENT
Start: 2022-11-15

## 2022-11-15 ASSESSMENT — PATIENT HEALTH QUESTIONNAIRE - PHQ9
4. FEELING TIRED OR HAVING LITTLE ENERGY: 0
8. MOVING OR SPEAKING SO SLOWLY THAT OTHER PEOPLE COULD HAVE NOTICED. OR THE OPPOSITE, BEING SO FIGETY OR RESTLESS THAT YOU HAVE BEEN MOVING AROUND A LOT MORE THAN USUAL: 0
SUM OF ALL RESPONSES TO PHQ QUESTIONS 1-9: 1
SUM OF ALL RESPONSES TO PHQ QUESTIONS 1-9: 1
SUM OF ALL RESPONSES TO PHQ9 QUESTIONS 1 & 2: 0
5. POOR APPETITE OR OVEREATING: 0
SUM OF ALL RESPONSES TO PHQ QUESTIONS 1-9: 1
3. TROUBLE FALLING OR STAYING ASLEEP: 1
10. IF YOU CHECKED OFF ANY PROBLEMS, HOW DIFFICULT HAVE THESE PROBLEMS MADE IT FOR YOU TO DO YOUR WORK, TAKE CARE OF THINGS AT HOME, OR GET ALONG WITH OTHER PEOPLE: 0
6. FEELING BAD ABOUT YOURSELF - OR THAT YOU ARE A FAILURE OR HAVE LET YOURSELF OR YOUR FAMILY DOWN: 0
1. LITTLE INTEREST OR PLEASURE IN DOING THINGS: 0
7. TROUBLE CONCENTRATING ON THINGS, SUCH AS READING THE NEWSPAPER OR WATCHING TELEVISION: 0
9. THOUGHTS THAT YOU WOULD BE BETTER OFF DEAD, OR OF HURTING YOURSELF: 0
SUM OF ALL RESPONSES TO PHQ QUESTIONS 1-9: 1
2. FEELING DOWN, DEPRESSED OR HOPELESS: 0

## 2022-11-15 NOTE — PROGRESS NOTES
11/15/2022     [facility] (:  1950) is a 71 y.o. female, here for evaluation of the following medical concerns: Mass  Associated symptoms include arthralgias and myalgias. Patient comes in today secondary to discovery of a new soft tissue nodule to the right upper extremity and persistent pain to the nodule to her right posterior knee. She has had nodularity to the right posterior knee for quite some time and these nodules despite treatment with steroid and having epidural injections for sciatic nerve impingement just have never really calm down. She is just concerned that there is something more ominous and so I did suggest getting an ultrasound for clarification to see if they are just benign cyst or if there is something more concerning. She now has discovered a nodule to the right lateral upper arm that is also slightly tender. She has had ongoing issues with fairly significant myalgias particularly to the bilateral shoulder region and bilateral upper leg and hip region. We did do some lab testing and it did show evidence of elevated C-reactive protein and sed rate. Do have high suspicion for polymyalgia rheumatica but she does have a follow-up scheduled with the rheumatologist for opinion in the near future. In the interim we have placed her on prednisone therapy which has significantly helped but going from 20 down to 10 caused increased flare of symptoms so she is questioning if she can stay on 20 mg for a longer period of time. We can order 20 mg tabs for her to stay on for a little bit longer but I did advise her that the goal would be to eventually titrate her down as low as possible. She does express understanding of this. Patient otherwise has no other acute medical concerns. Did review patient's med list, allergies, social history,pmhx and pshx today as noted in the record. Review of Systems   Musculoskeletal:  Positive for arthralgias and myalgias.      Prior to Visit Medications    Medication Sig Taking? Authorizing Provider   predniSONE (DELTASONE) 20 MG tablet 1 daily Yes Cloretta Italy, DO   predniSONE (DELTASONE) 10 MG tablet Take 3 tabs daily for 5 days, 2 tablets daily for 5 days, 1 tablet daily for 5 days Yes Cloretta Edmundo, DO   omeprazole (PRILOSEC) 20 MG delayed release capsule Take 1 capsule by mouth 2 times daily Yes Cloretta Italy, DO   metFORMIN (GLUCOPHAGE-XR) 500 MG extended release tablet Take 1 tablet by mouth 2 times daily Yes Cloretta Edmundo, DO   meloxicam (MOBIC) 7.5 MG tablet TAKE 1 TABLET ONCE A DAY Yes Fabby Lima, DO   atenolol (TENORMIN) 25 MG tablet Take 1 tablet by mouth daily Yes Fabby Lima DO   montelukast (SINGULAIR) 10 MG tablet Take 1 tablet by mouth daily Yes Cloretta Edmundo, DO   cetirizine (ZYRTEC ALLERGY) 10 MG tablet Take 1 tablet by mouth daily Yes Fabby Lima DO   Simethicone (GAS-X ULTRA STRENGTH) 180 MG CAPS Take by mouth in the morning and at bedtime  Yes Historical Provider, MD   blood glucose test strips (ONE TOUCH TEST STRIPS) strip Tests 1 X daily. DX E11.9 Yes Cloretta Edmundo, DO   ONE TOUCH LANCETS MISC Tests 1 x daily DX. E11.9 Yes Cloretta Edmundo, DO   albuterol sulfate HFA (VENTOLIN HFA) 108 (90 Base) MCG/ACT inhaler Inhale 2 puffs into the lungs every 4 hours as needed for Wheezing Yes Mayda Wyatt, DO   VITAMIN D PO Take 2,000 Units by mouth daily Yes Historical Provider, MD   pseudoephedrine (SUDAFED) 30 MG tablet Take 30 mg by mouth every 4 hours as needed. Yes Historical Provider, MD   therapeutic multivitamin-minerals (THERAGRAN-M) tablet Take 1 tablet by mouth daily. Yes Historical Provider, MD   Ascorbic Acid (VITAMIN C) 500 MG tablet Take 500 mg by mouth daily.  Yes Historical Provider, MD   busPIRone (BUSPAR) 5 MG tablet Take 1 tablet by mouth 3 times daily as needed (anxiety)  Patient not taking: Reported on 11/15/2022  Cloquentin Wyatt DO   tiZANidine (Prisca Luevano) 4 MG tablet Take 1 tablet by mouth 3 times daily as needed (muscle spasm)  Patient not taking: Reported on 11/15/2022  Christin Dominguez MD   scopolamine (TRANSDERM-SCOP) transdermal patch Place 1 patch onto the skin every 72 hours  Patient not taking: No sig reported  Hayden Scheuermann, DO   atorvastatin (LIPITOR) 20 MG tablet Take 1 tablet by mouth daily  Patient not taking: Reported on 11/15/2022  Hayden Scheuermann, DO        Social History     Tobacco Use    Smoking status: Never     Passive exposure: Yes    Smokeless tobacco: Never    Tobacco comments:     very remotely would smoke a few cigarettes on weekends/college days   Substance Use Topics    Alcohol use: Yes     Comment: occasional use        Vitals:    11/15/22 0909   BP: 138/72   Site: Left Upper Arm   Position: Sitting   Cuff Size: Large Adult   Pulse: 69   Resp: 16   SpO2: 96%   Weight: 216 lb (98 kg)   Height: 5' 7\" (1.702 m)     Estimated body mass index is 33.83 kg/m² as calculated from the following:    Height as of this encounter: 5' 7\" (1.702 m). Weight as of this encounter: 216 lb (98 kg). Physical Exam  Vitals and nursing note reviewed. Constitutional:       General: She is not in acute distress. Appearance: She is well-developed. She is not diaphoretic. HENT:      Head: Normocephalic and atraumatic. Eyes:      Conjunctiva/sclera: Conjunctivae normal.   Pulmonary:      Effort: Pulmonary effort is normal.   Musculoskeletal:      Cervical back: Normal range of motion. Comments: Palpable nodule to the right lateral upper arm just at the lower aspect of the deltoid tendon. The lesion is approximately 1 cm in length and is mildly firm but movable. She does have palpable small nodules noted to the right distal lateral hamstring tendon that are tender with palpation. Each of these are approximately 1 cm in diameter. Skin:     General: Skin is warm and dry. Coloration: Skin is not pale. Findings: No erythema or rash. Neurological:      Mental Status: She is alert and oriented to person, place, and time. Psychiatric:         Behavior: Behavior normal.         Thought Content: Thought content normal.         Judgment: Judgment normal.       ASSESSMENT/PLAN:    Encounter Diagnoses   Name Primary? Subcutaneous nodule of right upper extremity Yes    Subcutaneous nodule of right lower extremity     Myalgia      Orders Placed This Encounter   Medications    predniSONE (DELTASONE) 20 MG tablet     Si daily     Dispense:  30 tablet     Refill:  0     Orders Placed This Encounter   Procedures    US EXTREMITY RIGHT NON VASC LIMITED           Standing Status:   Future     Standing Expiration Date:   11/15/2023     Order Specific Question:   Reason for exam:     Answer:   right upper extremity nodule    US EXTREMITY RIGHT NON VASC LIMITED           Standing Status:   Future     Standing Expiration Date:   11/15/2023     Order Specific Question:   Reason for exam:     Answer:   multiple nodules right lateral posterior knee with ongoing pain. Will order an ultrasound of both the upper extremity nodule and the lower extremity nodules for further opinion. Do suspect these are cystic in nature and may need to refer back to the orthopedist to see if they would be agreeable to removing them. Did order additional prednisone to keep her on the higher dose until she can get into see the rheumatologist.    Return  if no improvement in symptoms or if any further symptoms arise. (Please note that portions of this note were completed with a voice recognition program. Efforts were made to edit the dictations but occasionally words are mis-transcribed.)    No follow-ups on file. An electronic signature was used to authenticate this note.     --Makayla Macias,  on 11/15/2022 at 9:33 AM

## 2022-11-25 ENCOUNTER — HOSPITAL ENCOUNTER (OUTPATIENT)
Dept: ULTRASOUND IMAGING | Age: 72
Discharge: HOME | End: 2022-11-27
Payer: MEDICARE

## 2022-11-25 DIAGNOSIS — R22.41 SUBCUTANEOUS NODULE OF RIGHT LOWER EXTREMITY: ICD-10-CM

## 2022-11-25 DIAGNOSIS — R22.31 SUBCUTANEOUS NODULE OF RIGHT UPPER EXTREMITY: ICD-10-CM

## 2022-11-25 PROCEDURE — 76882 US LMTD JT/FCL EVL NVASC XTR: CPT

## 2022-11-28 DIAGNOSIS — D17.21 LIPOMA OF RIGHT UPPER EXTREMITY: Primary | ICD-10-CM

## 2022-11-28 DIAGNOSIS — D17.23 LIPOMA OF RIGHT LOWER EXTREMITY: ICD-10-CM

## 2022-11-28 NOTE — PROGRESS NOTES
Referral placed to general surgery for lipoma's seen on 11/15/2022 ultrasound.  Did place referral to ortho intially however they said general surgery removes lipoma's

## 2022-11-29 ENCOUNTER — HOSPITAL ENCOUNTER (OUTPATIENT)
Dept: LAB | Age: 72
Discharge: HOME | End: 2022-11-29
Payer: MEDICARE

## 2022-11-29 DIAGNOSIS — E11.42 TYPE 2 DIABETES MELLITUS WITH DIABETIC POLYNEUROPATHY, WITHOUT LONG-TERM CURRENT USE OF INSULIN (HCC): ICD-10-CM

## 2022-11-29 DIAGNOSIS — E78.2 MIXED HYPERLIPIDEMIA: ICD-10-CM

## 2022-11-29 LAB
ABSOLUTE EOS #: 0.14 K/UL (ref 0–0.44)
ABSOLUTE IMMATURE GRANULOCYTE: 0.06 K/UL (ref 0–0.3)
ABSOLUTE LYMPH #: 3.84 K/UL (ref 1.1–3.7)
ABSOLUTE MONO #: 0.77 K/UL (ref 0.1–1.2)
ALBUMIN SERPL-MCNC: 4 G/DL (ref 3.5–5.2)
ALBUMIN/GLOBULIN RATIO: 1.4 (ref 1–2.5)
ALP BLD-CCNC: 64 U/L (ref 35–104)
ALT SERPL-CCNC: 15 U/L (ref 5–33)
ANION GAP SERPL CALCULATED.3IONS-SCNC: 9 MMOL/L (ref 9–17)
AST SERPL-CCNC: 17 U/L
BASOPHILS # BLD: 0 % (ref 0–2)
BASOPHILS ABSOLUTE: 0.04 K/UL (ref 0–0.2)
BILIRUB SERPL-MCNC: 0.2 MG/DL (ref 0.3–1.2)
BUN BLDV-MCNC: 15 MG/DL (ref 8–23)
BUN/CREAT BLD: 19 (ref 9–20)
CALCIUM SERPL-MCNC: 9.8 MG/DL (ref 8.6–10.4)
CHLORIDE BLD-SCNC: 103 MMOL/L (ref 98–107)
CHOLESTEROL/HDL RATIO: 4.3
CHOLESTEROL: 194 MG/DL
CO2: 30 MMOL/L (ref 20–31)
CREAT SERPL-MCNC: 0.8 MG/DL (ref 0.5–0.9)
CREATININE URINE: 122.2 MG/DL (ref 28–217)
EOSINOPHILS RELATIVE PERCENT: 1 % (ref 1–4)
GFR SERPL CREATININE-BSD FRML MDRD: >60 ML/MIN/1.73M2
GLUCOSE BLD-MCNC: 105 MG/DL (ref 70–99)
HCT VFR BLD CALC: 40.2 % (ref 36.3–47.1)
HDLC SERPL-MCNC: 45 MG/DL
HEMOGLOBIN: 12.9 G/DL (ref 11.9–15.1)
IMMATURE GRANULOCYTES: 1 %
LDL CHOLESTEROL: 97 MG/DL (ref 0–130)
LYMPHOCYTES # BLD: 36 % (ref 24–43)
MCH RBC QN AUTO: 28.4 PG (ref 25.2–33.5)
MCHC RBC AUTO-ENTMCNC: 32.1 G/DL (ref 25.2–33.5)
MCV RBC AUTO: 88.5 FL (ref 82.6–102.9)
MICROALBUMIN/CREAT 24H UR: <12 MG/L
MICROALBUMIN/CREAT UR-RTO: NORMAL MCG/MG CREAT
MONOCYTES # BLD: 7 % (ref 3–12)
NRBC AUTOMATED: 0 PER 100 WBC
PDW BLD-RTO: 15.9 % (ref 11.8–14.4)
PLATELET # BLD: 261 K/UL (ref 138–453)
PMV BLD AUTO: 9.3 FL (ref 8.1–13.5)
POTASSIUM SERPL-SCNC: 4.2 MMOL/L (ref 3.7–5.3)
RBC # BLD: 4.54 M/UL (ref 3.95–5.11)
RBC # BLD: ABNORMAL 10*6/UL
SEG NEUTROPHILS: 55 % (ref 36–65)
SEGMENTED NEUTROPHILS ABSOLUTE COUNT: 5.8 K/UL (ref 1.5–8.1)
SODIUM BLD-SCNC: 142 MMOL/L (ref 135–144)
TOTAL PROTEIN: 6.8 G/DL (ref 6.4–8.3)
TRIGL SERPL-MCNC: 261 MG/DL
WBC # BLD: 10.7 K/UL (ref 3.5–11.3)

## 2022-11-29 PROCEDURE — 82043 UR ALBUMIN QUANTITATIVE: CPT

## 2022-11-29 PROCEDURE — 83036 HEMOGLOBIN GLYCOSYLATED A1C: CPT

## 2022-11-29 PROCEDURE — 80053 COMPREHEN METABOLIC PANEL: CPT

## 2022-11-29 PROCEDURE — 82570 ASSAY OF URINE CREATININE: CPT

## 2022-11-29 PROCEDURE — 80061 LIPID PANEL: CPT

## 2022-11-29 PROCEDURE — 36415 COLL VENOUS BLD VENIPUNCTURE: CPT

## 2022-11-29 PROCEDURE — 85025 COMPLETE CBC W/AUTO DIFF WBC: CPT

## 2022-11-30 LAB
ESTIMATED AVERAGE GLUCOSE: 146 MG/DL
HBA1C MFR BLD: 6.7 % (ref 4–6)

## 2022-12-06 ENCOUNTER — OFFICE VISIT (OUTPATIENT)
Dept: FAMILY MEDICINE CLINIC | Age: 72
End: 2022-12-06
Payer: MEDICARE

## 2022-12-06 ENCOUNTER — HOSPITAL ENCOUNTER (OUTPATIENT)
Age: 72
Setting detail: SPECIMEN
Discharge: HOME | End: 2022-12-06
Payer: MEDICARE

## 2022-12-06 VITALS
DIASTOLIC BLOOD PRESSURE: 90 MMHG | SYSTOLIC BLOOD PRESSURE: 148 MMHG | HEART RATE: 72 BPM | HEIGHT: 67 IN | BODY MASS INDEX: 34.21 KG/M2 | WEIGHT: 218 LBS

## 2022-12-06 DIAGNOSIS — Z12.4 PAP SMEAR FOR CERVICAL CANCER SCREENING: ICD-10-CM

## 2022-12-06 DIAGNOSIS — E11.42 TYPE 2 DIABETES MELLITUS WITH DIABETIC POLYNEUROPATHY, WITHOUT LONG-TERM CURRENT USE OF INSULIN (HCC): Primary | ICD-10-CM

## 2022-12-06 DIAGNOSIS — K21.9 GASTROESOPHAGEAL REFLUX DISEASE WITHOUT ESOPHAGITIS: ICD-10-CM

## 2022-12-06 DIAGNOSIS — I10 ESSENTIAL HYPERTENSION: ICD-10-CM

## 2022-12-06 DIAGNOSIS — Z12.31 VISIT FOR SCREENING MAMMOGRAM: ICD-10-CM

## 2022-12-06 DIAGNOSIS — E78.2 MIXED HYPERLIPIDEMIA: ICD-10-CM

## 2022-12-06 PROCEDURE — G0145 SCR C/V CYTO,THINLAYER,RESCR: HCPCS

## 2022-12-06 RX ORDER — MELOXICAM 7.5 MG/1
TABLET ORAL
Qty: 90 TABLET | Refills: 1 | Status: SHIPPED | OUTPATIENT
Start: 2022-12-06

## 2022-12-06 RX ORDER — ATENOLOL 25 MG/1
25 TABLET ORAL DAILY
Qty: 90 TABLET | Refills: 1 | Status: SHIPPED | OUTPATIENT
Start: 2022-12-06

## 2022-12-06 RX ORDER — CETIRIZINE HYDROCHLORIDE 10 MG/1
10 TABLET ORAL DAILY
Qty: 90 TABLET | Refills: 1 | Status: SHIPPED | OUTPATIENT
Start: 2022-12-06

## 2022-12-06 RX ORDER — MONTELUKAST SODIUM 10 MG/1
10 TABLET ORAL DAILY
Qty: 90 TABLET | Refills: 1 | Status: SHIPPED | OUTPATIENT
Start: 2022-12-06

## 2022-12-06 RX ORDER — OMEPRAZOLE 20 MG/1
20 CAPSULE, DELAYED RELEASE ORAL 2 TIMES DAILY
Qty: 180 CAPSULE | Refills: 1 | Status: SHIPPED | OUTPATIENT
Start: 2022-12-06

## 2022-12-06 RX ORDER — GLUCOSAMINE HCL/CHONDROITIN SU 500-400 MG
CAPSULE ORAL
Qty: 100 STRIP | Refills: 3 | Status: SHIPPED | OUTPATIENT
Start: 2022-12-06

## 2022-12-06 RX ORDER — METFORMIN HYDROCHLORIDE 500 MG/1
500 TABLET, EXTENDED RELEASE ORAL 2 TIMES DAILY
Qty: 180 TABLET | Refills: 1 | Status: SHIPPED | OUTPATIENT
Start: 2022-12-06

## 2022-12-06 ASSESSMENT — ENCOUNTER SYMPTOMS
CONSTIPATION: 0
SINUS PRESSURE: 0
TROUBLE SWALLOWING: 0
COUGH: 0
VOMITING: 0
SHORTNESS OF BREATH: 0
ABDOMINAL PAIN: 0
NAUSEA: 0
EYE REDNESS: 0
EYE DISCHARGE: 0
WHEEZING: 0
SORE THROAT: 0
DIARRHEA: 0
RHINORRHEA: 0

## 2022-12-06 NOTE — PROGRESS NOTES
2022     Jeromy Weeks (:  1950) is a 71 y.o. female, here for evaluation of the following medical concerns:    HPI  Patient comes in today for follow up of chronic health issues and for a routine pap examination. Patient does not have any acute gynecologic concerns. She does state that she has sisters who had cervical cancer so she does want to make sure that she continues with routine Pap examinations. Is not having any complaint of vaginal discharge or irritation. She has not had a known history of abnormal Pap in the past.  No other acute gynecologic concerns noted. Continues to have ongoing pain issues and does state that with the 20 mg of prednisone daily it is currently keeping her symptoms of polymyalgia rheumatica under good control. Does have an upcoming appointment in the next few weeks with the rheumatologist.  Attempts to titrate down the prednisone to a lower dose has caused increased flare of her proximal muscle pain. She states that she takes higher than 20 mg she has issues with feeling very anxious and having difficulty sleeping. Patient will be seeing Dr. 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 With regards to her chronic health condition she has a known history of diabetes mellitus type 2 which is stable and controlled on her current medical regimen. Does have a known history of hypertension and her blood pressure is stable and controlled with her current medical therapy. Does have a known history of hyperlipidemia and her cholesterol levels are stable and controlled on her current statin dose. Has a known history of GERD which is stable with her current medical therapy. Patient otherwise has no other acute medical concerns. .  Patient's recent lab reports are as follows:    Results for orders placed or performed during the hospital encounter of 22 Microalbumin, Ur   Result Value Ref Range    Microalb, Ur <12 <21 mg/L    Creatinine, Ur 122.2 28.0 - 217.0 mg/dL    Microalb/Crt.  Ratio Can not be calculated <25 mcg/mg creat   Lipid Panel   Result Value Ref Range    Cholesterol 194 <200 mg/dL    HDL 45 >40 mg/dL    LDL Cholesterol 97 0 - 130 mg/dL    Chol/HDL Ratio 4.3 <5    Triglycerides 261 (H) <150 mg/dL   Hemoglobin A1C   Result Value Ref Range    Hemoglobin A1C 6.7 (H) 4.0 - 6.0 %    Estimated Avg Glucose 146 mg/dL   Comprehensive Metabolic Panel   Result Value Ref Range    Glucose 105 (H) 70 - 99 mg/dL    BUN 15 8 - 23 mg/dL    Creatinine 0.80 0.50 - 0.90 mg/dL    Est, Glom Filt Rate >60 >60 mL/min/1.73m2    Bun/Cre Ratio 19 9 - 20    Calcium 9.8 8.6 - 10.4 mg/dL    Sodium 142 135 - 144 mmol/L    Potassium 4.2 3.7 - 5.3 mmol/L    Chloride 103 98 - 107 mmol/L    CO2 30 20 - 31 mmol/L    Anion Gap 9 9 - 17 mmol/L    Alkaline Phosphatase 64 35 - 104 U/L    ALT 15 5 - 33 U/L    AST 17 <32 U/L    Total Bilirubin 0.2 (L) 0.3 - 1.2 mg/dL    Total Protein 6.8 6.4 - 8.3 g/dL    Albumin 4.0 3.5 - 5.2 g/dL    Albumin/Globulin Ratio 1.4 1.0 - 2.5   CBC with Auto Differential   Result Value Ref Range    WBC 10.7 3.5 - 11.3 k/uL    RBC 4.54 3.95 - 5.11 m/uL    Hemoglobin 12.9 11.9 - 15.1 g/dL    Hematocrit 40.2 36.3 - 47.1 %    MCV 88.5 82.6 - 102.9 fL    MCH 28.4 25.2 - 33.5 pg    MCHC 32.1 25.2 - 33.5 g/dL    RDW 15.9 (H) 11.8 - 14.4 %    Platelets 811 190 - 711 k/uL    MPV 9.3 8.1 - 13.5 fL    NRBC Automated 0.0 0.0 per 100 WBC    Seg Neutrophils 55 36 - 65 %    Lymphocytes 36 24 - 43 %    Monocytes 7 3 - 12 %    Eosinophils % 1 1 - 4 %    Basophils 0 0 - 2 %    Immature Granulocytes 1 (H) 0 %    Segs Absolute 5.80 1.50 - 8.10 k/uL    Absolute Lymph # 3.84 (H) 1.10 - 3.70 k/uL    Absolute Mono # 0.77 0.10 - 1.20 k/uL    Absolute Eos # 0.14 0.00 - 0.44 k/uL    Basophils Absolute 0.04 0.00 - 0.20 k/uL    Absolute Immature Granulocyte 0.06 0.00 - 0.30 k/uL    RBC Morphology ANISOCYTOSIS PRESENT       Other review of systems are as noted below. Preventative measures are reviewed today. See health maintenance section for complete preventative plan of care. Did review patient's med list, allergies, social history, fam history, pmhx and pshx today as noted in the record. Review of Systems   Constitutional:  Negative for chills, fatigue and fever. HENT:  Negative for congestion, ear pain, postnasal drip, rhinorrhea, sinus pressure, sore throat and trouble swallowing. Eyes:  Negative for discharge and redness. Respiratory:  Negative for cough, shortness of breath and wheezing. Cardiovascular:  Negative for chest pain. Gastrointestinal:  Negative for abdominal pain, constipation, diarrhea, nausea and vomiting. Genitourinary:  Negative for dysuria, flank pain, frequency and urgency. Musculoskeletal:  Negative for arthralgias, myalgias and neck pain. Skin:  Negative for rash and wound. Allergic/Immunologic: Negative for environmental allergies. Neurological:  Negative for dizziness, weakness, light-headedness and headaches. Hematological:  Negative for adenopathy. Psychiatric/Behavioral: Negative. Prior to Visit Medications    Medication Sig Taking?  Authorizing Provider   predniSONE (DELTASONE) 20 MG tablet 1 daily  Fabby Lima DO   predniSONE (DELTASONE) 10 MG tablet Take 3 tabs daily for 5 days, 2 tablets daily for 5 days, 1 tablet daily for 5 days  Fabby Luis DO   tiZANidine (ZANAFLEX) 4 MG tablet Take 1 tablet by mouth 3 times daily as needed (muscle spasm)  Patient not taking: Reported on 11/15/2022  Pastor Mirian MD   omeprazole (PRILOSEC) 20 MG delayed release capsule Take 1 capsule by mouth 2 times daily  Levis Aase, DO   metFORMIN (GLUCOPHAGE-XR) 500 MG extended release tablet Take 1 tablet by mouth 2 times daily  Fabby Lima DO   meloxicam (MOBIC) 7.5 MG tablet TAKE 1 TABLET ONCE A DAY  Michael MERINO DO Janie   atenolol (TENORMIN) 25 MG tablet Take 1 tablet by mouth daily  Fabby Lima DO   montelukast (SINGULAIR) 10 MG tablet Take 1 tablet by mouth daily  Fabby Lima DO   cetirizine (ZYRTEC ALLERGY) 10 MG tablet Take 1 tablet by mouth daily  Fabby Lima DO   Simethicone (GAS-X ULTRA STRENGTH) 180 MG CAPS Take by mouth in the morning and at bedtime   Historical Provider, MD   blood glucose test strips (ONE TOUCH TEST STRIPS) strip Tests 1 X daily. DX E11.9  Ivette Epley, DO   ONE TOUCH LANCETS MISC Tests 1 x daily DX. E11.9  Ivette Epley, DO   scopolamine (TRANSDERM-SCOP) transdermal patch Place 1 patch onto the skin every 72 hours  Patient not taking: No sig reported  Ivette Epley, DO   atorvastatin (LIPITOR) 20 MG tablet Take 1 tablet by mouth daily  Patient not taking: Reported on 11/15/2022  Ivette Epley, DO   albuterol sulfate HFA (VENTOLIN HFA) 108 (90 Base) MCG/ACT inhaler Inhale 2 puffs into the lungs every 4 hours as needed for Wheezing  Fabby Lima DO   VITAMIN D PO Take 2,000 Units by mouth daily  Historical Provider, MD   pseudoephedrine (SUDAFED) 30 MG tablet Take 30 mg by mouth every 4 hours as needed. Historical Provider, MD   therapeutic multivitamin-minerals (THERAGRAN-M) tablet Take 1 tablet by mouth daily. Historical Provider, MD   Ascorbic Acid (VITAMIN C) 500 MG tablet Take 500 mg by mouth daily. Historical Provider, MD        Social History     Tobacco Use    Smoking status: Never     Passive exposure: Yes    Smokeless tobacco: Never    Tobacco comments:     very remotely would smoke a few cigarettes on weekends/college days   Substance Use Topics    Alcohol use: Yes     Comment: occasional use        There were no vitals filed for this visit. Estimated body mass index is 33.83 kg/m² as calculated from the following:    Height as of 11/15/22: 5' 7\" (1.702 m). Weight as of 11/15/22: 216 lb (98 kg).     Physical Exam  Vitals and nursing note reviewed. Exam conducted with a chaperone present. Constitutional:       General: She is not in acute distress. Appearance: Normal appearance. She is not diaphoretic. HENT:      Head: Normocephalic and atraumatic. Right Ear: Tympanic membrane, ear canal and external ear normal.      Left Ear: Tympanic membrane, ear canal and external ear normal.      Nose: Nose normal. No congestion or rhinorrhea. Mouth/Throat:      Mouth: Mucous membranes are moist.      Pharynx: Oropharynx is clear. No oropharyngeal exudate. Eyes:      General:         Right eye: No discharge. Left eye: No discharge. Extraocular Movements: Extraocular movements intact. Conjunctiva/sclera: Conjunctivae normal.      Pupils: Pupils are equal, round, and reactive to light. Cardiovascular:      Rate and Rhythm: Normal rate and regular rhythm. Heart sounds: Normal heart sounds. No murmur heard. Pulmonary:      Effort: Pulmonary effort is normal. No respiratory distress. Breath sounds: Normal breath sounds. No wheezing, rhonchi or rales. Abdominal:      General: Bowel sounds are normal. There is no distension. Palpations: Abdomen is soft. There is no mass. Tenderness: There is no abdominal tenderness. Genitourinary:     Comments: Breasts: breasts appear normal, no suspicious masses, no skin or nipple changes or axillary nodes. Pelvic exam: VULVA: normal appearing vulva with no masses, tenderness or lesions, VAGINA: normal appearing vagina with normal color and discharge, no lesions, CERVIX: normal appearing cervix without discharge or lesions, UTERUS: uterus is normal size, shape, consistency and nontender, ADNEXA: normal adnexa in size, nontender and no masses, PAP: Pap smear done today, thin-prep method. Musculoskeletal:      Cervical back: Normal range of motion and neck supple. No rigidity. No muscular tenderness.    Lymphadenopathy:      Cervical: No cervical adenopathy. Skin:     General: Skin is warm and dry. Findings: No rash. Neurological:      General: No focal deficit present. Mental Status: She is alert. Psychiatric:         Mood and Affect: Mood normal.         Behavior: Behavior normal.         Thought Content: Thought content normal.         Judgment: Judgment normal.         ASSESSMENT/PLAN:    Encounter Diagnoses   Name Primary? Type 2 diabetes mellitus with diabetic polyneuropathy, without long-term current use of insulin (HCC) Yes    Essential hypertension     Mixed hyperlipidemia     Gastroesophageal reflux disease without esophagitis     Pap smear for cervical cancer screening     Visit for screening mammogram      Orders Placed This Encounter   Procedures    TITI CODY DIGITAL SCREEN BILATERAL     Standing Status:   Future     Standing Expiration Date:   12/6/2023     Order Specific Question:   Reason for exam:     Answer:   screeing    CBC with Auto Differential     Standing Status:   Future     Standing Expiration Date:   12/6/2023    Comprehensive Metabolic Panel     Standing Status:   Future     Standing Expiration Date:   12/6/2023    Hemoglobin A1C     Standing Status:   Future     Standing Expiration Date:   12/6/2023    Lipid Panel     Standing Status:   Future     Standing Expiration Date:   12/6/2023     Order Specific Question:   Is Patient Fasting?/# of Hours     Answer:   12 hours    PAP SMEAR     Patient History:    No LMP recorded (lmp unknown). Patient is postmenopausal.  OBGYN Status: Postmenopausal  Past Surgical History:  2003: BREAST BIOPSY; Right      Comment:  Excisional Biospy  10/2021: CARPAL TUNNEL RELEASE; Right  11/2021: CARPAL TUNNEL RELEASE; Left  07/29/2017: CATARACT REMOVAL WITH IMPLANT; Right      Comment:  Dr Teddy Vanegas  08/05/2017: CATARACT REMOVAL WITH IMPLANT;  Left      Comment:  Dr Teddy Vanegas  12/2008: COLONOSCOPY      Comment:  normal, repeat in 5 years  1998: COLONOSCOPY  2003: COLONOSCOPY  02/14/2019: COLONOSCOPY; N/A      Comment:  few diverticuli, Dr. 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 Luna Frames: TONSILLECTOMY  07/03/1983: TUBAL LIGATION  1983: TUBAL LIGATION  02/14/2019: UPPER GASTROINTESTINAL ENDOSCOPY; N/A      Comment:  mild gastritis, mild esophagitis, Dr. Ginette Cullen      Social History    Tobacco Use      Smoking status: Never      Smokeless tobacco: Never      Tobacco comments: very remotely would smoke a few cigarettes on weekends/college days       Standing Status:   Future     Number of Occurrences:   1     Standing Expiration Date:   12/6/2023     Order Specific Question:   Collection Type     Answer: Thin Prep     Order Specific Question:   Prior Abnormal Pap Test     Answer:   No     Order Specific Question:   Screening or Diagnostic     Answer:   Screening     Order Specific Question:   HPV Requested?      Answer:   Yes -  If ASCUS Reflex HPV     Order Specific Question:   High Risk Patient     Answer:   N/A    [place] CA SCREEN;PELVIC/BREAST EXAM    [place] OBTAINING SCREEN PAP SMEAR     V76.2 or V15.89     Orders Placed This Encounter   Medications    metFORMIN (GLUCOPHAGE-XR) 500 MG extended release tablet     Sig: Take 1 tablet by mouth 2 times daily     Dispense:  180 tablet     Refill:  1    meloxicam (MOBIC) 7.5 MG tablet     Sig: TAKE 1 TABLET ONCE A DAY     Dispense:  90 tablet     Refill:  1    atenolol (TENORMIN) 25 MG tablet     Sig: Take 1 tablet by mouth daily     Dispense:  90 tablet Refill:  1    montelukast (SINGULAIR) 10 MG tablet     Sig: Take 1 tablet by mouth daily     Dispense:  90 tablet     Refill:  1    cetirizine (ZYRTEC ALLERGY) 10 MG tablet     Sig: Take 1 tablet by mouth daily     Dispense:  90 tablet     Refill:  1    blood glucose monitor strips     Sig: Test 1 times a day     Dispense:  100 strip     Refill:  3     Brand per patient preference. May round up to next available package size. omeprazole (PRILOSEC) 20 MG delayed release capsule     Sig: Take 1 capsule by mouth 2 times daily     Dispense:  180 capsule     Refill:  1     Thin prep PAP sample is taken, patient will be notified of results. Patient is to continue on her current medical therapy. No additional changes are made at this time. Patient is to continue to follow a low-carb/low sugar/low-fat diet and increase exercise for optimal blood sugar and cholesterol control. Patient is to follow-up with the rheumatologist as scheduled for further evaluation to determine if patient truly does have polymyalgia rheumatica or another underlying autoimmune condition that is contributing to her ongoing chronic muscular pain issues. In the interim did keep her on 20 mg of prednisone but would titrate her down to the lowest effective dose in order to control her pain. Patient is to follow-up with Dr. Ban Schuster as scheduled for further evaluation of the suspected lipomas. Patient is to return to my office in 6 months duration or sooner if any further problems or symptoms arise. (Please note that portions of this note were completed with a voice recognition program. Efforts were made to edit the dictations but occasionally words are mis-transcribed.)    No follow-ups on file. An electronic signature was used to authenticate this note.     --Kevin Newell,  on 12/6/2022 at 6:55 AM

## 2022-12-06 NOTE — PATIENT INSTRUCTIONS
Hospital Outpatient Visit on 11/29/2022   Component Date Value Ref Range Status    Microalb, Ur 11/29/2022 <12  <21 mg/L Final    Creatinine, Ur 11/29/2022 122.2  28.0 - 217.0 mg/dL Final    Microalb/Crt. Ratio 11/29/2022 Can not be calculated  <25 mcg/mg creat Final    Cholesterol 11/29/2022 194  <200 mg/dL Final    Comment:    Cholesterol Guidelines:      <200  Desirable   200-240  Borderline      >240  Undesirable         HDL 11/29/2022 45  >40 mg/dL Final    Comment:    HDL Guidelines:    <40     Undesirable   40-59    Borderline    >59     Desirable         LDL Cholesterol 11/29/2022 97  0 - 130 mg/dL Final    Comment:    LDL Guidelines:     <100    Desirable   100-129   Near to/above Desirable   130-159   Borderline      >159   Undesirable     Direct (measured) LDL and calculated LDL are not interchangeable tests. Chol/HDL Ratio 11/29/2022 4.3  <5 Final            Triglycerides 11/29/2022 261 (A)  <150 mg/dL Final    Comment:    Triglyceride Guidelines:     <150   Desirable   150-199  Borderline   200-499  High     >499   Very high   Based on AHA Guidelines for fasting triglyceride, October 2012. Hemoglobin A1C 11/29/2022 6.7 (A)  4.0 - 6.0 % Final    Estimated Avg Glucose 11/29/2022 146  mg/dL Final    Comment: The ADA and AACC recommend providing the estimated average glucose result to permit better   patient understanding of their HBA1c result. Glucose 11/29/2022 105 (A)  70 - 99 mg/dL Final    BUN 11/29/2022 15  8 - 23 mg/dL Final    Creatinine 11/29/2022 0.80  0.50 - 0.90 mg/dL Final    Est, Glom Filt Rate 11/29/2022 >60  >60 mL/min/1.73m2 Final    Comment:       Effective Oct 3, 2022        These results are not intended for use in patients <25years of age. eGFR results are calculated without a race factor using the 2021 CKD-EPI equation. Careful clinical correlation is recommended, particularly when comparing to results   calculated using previous equations.   The CKD-EPI equation is less accurate in patients with extremes of muscle mass, extra-renal   metabolism of creatine, excessive creatine ingestion, or following therapy that affects   renal tubular secretion.       Bun/Cre Ratio 11/29/2022 19  9 - 20 Final    Calcium 11/29/2022 9.8  8.6 - 10.4 mg/dL Final    Sodium 11/29/2022 142  135 - 144 mmol/L Final    Potassium 11/29/2022 4.2  3.7 - 5.3 mmol/L Final    Chloride 11/29/2022 103  98 - 107 mmol/L Final    CO2 11/29/2022 30  20 - 31 mmol/L Final    Anion Gap 11/29/2022 9  9 - 17 mmol/L Final    Alkaline Phosphatase 11/29/2022 64  35 - 104 U/L Final    ALT 11/29/2022 15  5 - 33 U/L Final    AST 11/29/2022 17  <32 U/L Final    Total Bilirubin 11/29/2022 0.2 (A)  0.3 - 1.2 mg/dL Final    Total Protein 11/29/2022 6.8  6.4 - 8.3 g/dL Final    Albumin 11/29/2022 4.0  3.5 - 5.2 g/dL Final    Albumin/Globulin Ratio 11/29/2022 1.4  1.0 - 2.5 Final    WBC 11/29/2022 10.7  3.5 - 11.3 k/uL Final    RBC 11/29/2022 4.54  3.95 - 5.11 m/uL Final    Hemoglobin 11/29/2022 12.9  11.9 - 15.1 g/dL Final    Hematocrit 11/29/2022 40.2  36.3 - 47.1 % Final    MCV 11/29/2022 88.5  82.6 - 102.9 fL Final    MCH 11/29/2022 28.4  25.2 - 33.5 pg Final    MCHC 11/29/2022 32.1  25.2 - 33.5 g/dL Final    RDW 11/29/2022 15.9 (A)  11.8 - 14.4 % Final    Platelets 77/21/4352 261  138 - 453 k/uL Final    MPV 11/29/2022 9.3  8.1 - 13.5 fL Final    NRBC Automated 11/29/2022 0.0  0.0 per 100 WBC Final    Seg Neutrophils 11/29/2022 55  36 - 65 % Final    Lymphocytes 11/29/2022 36  24 - 43 % Final    Monocytes 11/29/2022 7  3 - 12 % Final    Eosinophils % 11/29/2022 1  1 - 4 % Final    Basophils 11/29/2022 0  0 - 2 % Final    Immature Granulocytes 11/29/2022 1 (A)  0 % Final    Segs Absolute 11/29/2022 5.80  1.50 - 8.10 k/uL Final    Absolute Lymph # 11/29/2022 3.84 (A)  1.10 - 3.70 k/uL Final    Absolute Mono # 11/29/2022 0.77  0.10 - 1.20 k/uL Final    Absolute Eos # 11/29/2022 0.14  0.00 - 0.44 k/uL Final Basophils Absolute 11/29/2022 0.04  0.00 - 0.20 k/uL Final    Absolute Immature Granulocyte 11/29/2022 0.06  0.00 - 0.30 k/uL Final    RBC Morphology 11/29/2022 ANISOCYTOSIS PRESENT   Final

## 2023-01-11 ENCOUNTER — NURSE ONLY (OUTPATIENT)
Dept: LAB | Age: 73
End: 2023-01-11
Payer: MEDICARE

## 2023-01-11 DIAGNOSIS — Z11.1 SCREENING FOR TUBERCULOSIS: Primary | ICD-10-CM

## 2023-01-11 PROCEDURE — PBSHW MANTOUX TESTING: Performed by: FAMILY MEDICINE

## 2023-01-11 PROCEDURE — 86580 TB INTRADERMAL TEST: CPT

## 2023-01-11 NOTE — PROGRESS NOTES
Tuberculin skin test applied to left ventral forearm. Patient reminded to return in 48 - 72 hours to have test read. Patient verbalizes understanding.

## 2023-01-13 LAB
INDURATION: 0
TB SKIN TEST: NORMAL

## 2023-01-19 ENCOUNTER — TELEPHONE (OUTPATIENT)
Dept: SURGERY | Age: 73
End: 2023-01-19

## 2023-01-19 ENCOUNTER — PROCEDURE VISIT (OUTPATIENT)
Dept: SURGERY | Age: 73
End: 2023-01-19
Payer: MEDICARE

## 2023-01-19 VITALS
WEIGHT: 220.4 LBS | DIASTOLIC BLOOD PRESSURE: 86 MMHG | TEMPERATURE: 97.2 F | BODY MASS INDEX: 34.59 KG/M2 | RESPIRATION RATE: 20 BRPM | HEART RATE: 90 BPM | SYSTOLIC BLOOD PRESSURE: 134 MMHG | HEIGHT: 67 IN

## 2023-01-19 DIAGNOSIS — Z01.818 PRE-OP TESTING: Primary | ICD-10-CM

## 2023-01-19 PROCEDURE — 99215 OFFICE O/P EST HI 40 MIN: CPT

## 2023-01-19 RX ORDER — PREDNISONE 1 MG/1
TABLET ORAL
COMMUNITY
Start: 2022-12-22

## 2023-01-19 NOTE — PROGRESS NOTES
Deana Walhs is a 71 y.o. female      CC:    Lipoma     HISTORY OF PRESENT ILLNESS:    Pt is 70 yo F with multiple lipomas that she thinks are causing pain. Right upper arm and right posterior knee. Have been there for last year and now are getting very painful. She thinks they are pushing on nerves causing pain.           Review of Systems:    General:  Fever: Negative  Weight Change:Negative  Night Sweats: Negative    Eye:  Blurry Vision:Negative  Double Vision: Negative    Ent:  Headaches: Negative  Sore throat: Negative    Allergy/Immunology:  Hives: Negative  Latex allergy: Negative    Hematology/Lymphatic:  Bleeding Problems: Negative  Blood Clots: Negative  Swollen Lymph Nodes: Negative    Lungs:  Cough: Negative  SOB: Negative  Wheezing:Negative    Cardiovascular:  Chest Pain: Negative  Palpitations:Negative    GI:   Decreased Appetite: Negative  Heartburn: Negative  Dysphagia: Negative  Nausea/Vomiting: Negative  Abdominal Pain: Negative  Change in Bowels:Negative  Constipation: Negative  Diarrhea: Negative  Rectal Bleeding: Negative    :   Dysuria: Negative  Increase Urinary Frequency/Urgency: Negative    Neuro:  Seizures: Negative  Confusion: Negative        PAST MEDICAL HISTORY:      Family History   Problem Relation Age of Onset    Prostate Cancer Father     Cancer Sister         breast    Breast Cancer Sister 61    Cancer Sister         colorectal    Breast Cancer Sister 36    Other Mother         ulcerative colitis    Other Brother 79        coronary artery disease    Other Son         ulcerative colitis    Breast Cancer Paternal Aunt      Social History     Socioeconomic History    Marital status:      Spouse name: Not on file    Number of children: Not on file    Years of education: Not on file    Highest education level: Not on file   Occupational History    Not on file   Tobacco Use    Smoking status: Never     Passive exposure: Yes    Smokeless tobacco: Never    Tobacco comments: very remotely would smoke a few cigarettes on weekends/college days   Vaping Use    Vaping Use: Never used   Substance and Sexual Activity    Alcohol use: Yes     Comment: occasional use    Drug use: No    Sexual activity: Not on file   Other Topics Concern    Not on file   Social History Narrative    Not on file     Social Determinants of Health     Financial Resource Strain: Low Risk     Difficulty of Paying Living Expenses: Not very hard   Food Insecurity: No Food Insecurity    Worried About Running Out of Food in the Last Year: Never true    Ran Out of Food in the Last Year: Never true   Transportation Needs: Not on file   Physical Activity: Sufficiently Active    Days of Exercise per Week: 5 days    Minutes of Exercise per Session: 40 min   Stress: Not on file   Social Connections: Not on file   Intimate Partner Violence: Not on file   Housing Stability: Not on file     Past Surgical History:   Procedure Laterality Date    BREAST BIOPSY Right 2003    Excisional Biospy    CARPAL TUNNEL RELEASE Right 10/2021    CARPAL TUNNEL RELEASE Left 11/2021    CATARACT REMOVAL WITH IMPLANT Right 07/29/2017    Dr Danielle Cruz Left 08/05/2017    Dr Rolando Londono    COLONOSCOPY  12/2008    normal, repeat in 5 years    [address] [place] [address]  2003    COLONOSCOPY N/A 02/14/2019    few diverticuli, Dr. 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 Joanne Gomez  07/03/1983    [address] ENDOSCOPY N/A 02/14/2019 mild gastritis, mild esophagitis, Dr. Babs Bumpers     Past Medical History:   Diagnosis Date    Allergic rhinitis     Anxiety     due to situational stress    Bursitis     Bursitis of hip, right     recurrent    Carpal tunnel syndrome, bilateral     GERD (gastroesophageal reflux disease)     Hyperlipidemia     Hypertension     Impaired fasting glucose     Labyrinthitis     causing continuted vertigo    Melanoma in situ ([facility] 10/2017     to bridge of nose; Dr Edin Campbell in [address] [place] [address] in situ of nose ([facility] 10/05/2017    biopsy confirmed on 10/05/2017 by Dr Lee Pinedo at [facility] Osteoarthritis     Palpitations      Current Outpatient Medications on File Prior to Visit   Medication Sig Dispense Refill    metFORMIN (GLUCOPHAGE-XR) 500 MG extended release tablet Take 1 tablet by mouth 2 times daily 180 tablet 1    meloxicam (MOBIC) 7.5 MG tablet TAKE 1 TABLET ONCE A DAY 90 tablet 1    atenolol (TENORMIN) 25 MG tablet Take 1 tablet by mouth daily 90 tablet 1    montelukast (SINGULAIR) 10 MG tablet Take 1 tablet by mouth daily 90 tablet 1    cetirizine (ZYRTEC ALLERGY) 10 MG tablet Take 1 tablet by mouth daily 90 tablet 1    blood glucose monitor strips Test 1 times a day 100 strip 3    omeprazole (PRILOSEC) 20 MG delayed release capsule Take 1 capsule by mouth 2 times daily 180 capsule 1    predniSONE (DELTASONE) 20 MG tablet 1 daily 30 tablet 0    ONE TOUCH LANCETS MISC Tests 1 x daily DX. E11.9 100 each 3    VITAMIN D PO Take 2,000 Units by mouth daily      therapeutic multivitamin-minerals (THERAGRAN-M) tablet Take 1 tablet by mouth daily. Ascorbic Acid (VITAMIN C) 500 MG tablet Take 500 mg by mouth daily.       predniSONE (DELTASONE) 5 MG tablet TAKE 3 TABLETS BY MOUTH ONCE DAILY FOR 7 DAYS AND THEN 2 & 1/2 TABS ONCE DAILY FOR 7 DAYS, THEN 2 ONCE DAILY FOR 7 DAYS, THEN 1 & 1/2 (ONE & ONE-HALF) ONCE DAILY FOR 7 DAYS, THEN 1 ONCE DAILY FOR 7 DAYS AND THEN 1/2 ONCE DAILY FOR 7 DAYS      atorvastatin (LIPITOR) 20 MG tablet Take 1 tablet by mouth daily (Patient not taking: No sig reported) 90 tablet 1     No current facility-administered medications on file prior to visit. Allergies as of 01/19/2023 - Fully Reviewed 01/19/2023   Allergen Reaction Noted    Aspirin Other (See Comments) 02/23/2006    Ibuprofen Other (See Comments) 07/24/2013    Penicillin g Other (See Comments) 10/05/2020    Sulfamethoxazole-trimethoprim Nausea Only and Other (See Comments) 10/28/2016    Penicillins Rash 07/24/2013         PHYSICAL EXAM:    Blood pressure 134/86, pulse 90, temperature 97.2 °F (36.2 °C), temperature source Tympanic, resp. rate 20, height 5' 7\" (1.702 m), weight 220 lb 6.4 oz (100 kg), not currently breastfeeding. Gen:  A and O x 3, NAD, well nourished  Eyes:  Sclera non icterus, PERRL  Head:  Normocephalic, non-tender  Neck:  Supple, no adenopathy, thyroid non tender and no masses,no carotid bruits  Lungs:  CTA, symmetrical  Chest:  RRR, no murmurs  Abd:  Soft, NT, ND, no HSM, no hernias, no bruits  Ext:  No edema, no cyanosis  Psych: reveals appropriate mood, memory and judgment,  Neuro:  Reveals no gross motor or sensory deficits,   Msk:  5/5 strength all 4 extremities, no joint tenderness    Lipoma: 1.5 to 2 cm lipoma type lesion right upper arm      No obvious distinct lesion right posterior knee, more just mild SQ lumpiness, may be lipoma or normal SQ tissue.         ASSESS MENT:    Lipoma right upper arm  4 small lipomas right posterior knee    All causing pain and getting larger      PLAN:    To OR for excision of mulitple lipomas , right upper arm and right leg

## 2023-01-19 NOTE — TELEPHONE ENCOUNTER
[facility]    Pre-Operative Evaluation/Consultation    Name:  Karissa Benavidez                                         Age:  71 y.o. MRN:  [id number]       :  1950   Date:  2023         Sex: female    There were no encounter diagnoses. Surgeon:  Dr. Quirino Castleman  Procedure (Planned):  excision of right upper arm (1) and right posterior knee (4) lipomas  Date Scheduled surgery: 2023 at [facility]    Attending : No att. providers found    Primary Physician:   Cardiologist: None    Type of Anesthesia Requested:  local MAC    Patient Medical history: Allergies   Allergen Reactions    Aspirin Other (See Comments)     Abdominal pain    Ibuprofen Other (See Comments)     Abdominal pain    Penicillin G Other (See Comments)    Sulfamethoxazole-Trimethoprim Nausea Only and Other (See Comments)    Penicillins Rash     Patient Active Problem List   Diagnosis    Hyperlipidemia    HTN (hypertension)    Osteoarthritis    Allergic rhinitis    GERD (gastroesophageal reflux disease)    Anxiety    Diabetes mellitus type II, controlled ([address]    Endometrial polyp    Postmenopausal bleeding    Polyneuropathy associated with underlying disease ([address]     Past Medical History:   Diagnosis Date    Allergic rhinitis     Anxiety     due to situational stress    Bursitis     Bursitis of hip, right     recurrent    Carpal tunnel syndrome, bilateral     GERD (gastroesophageal reflux disease)     Hyperlipidemia     Hypertension     Impaired fasting glucose     Labyrinthitis     causing continuted vertigo    Melanoma in situ ([facility] 10/2017     to bridge of nose; Dr Bob Jeter in [address] [place] [address] in situ of nose ([facility] 10/05/2017    biopsy confirmed on 10/05/2017 by Dr Melissa Ledesma at [facility]     Osteoarthritis     Palpitations      Past Surgical History:   Procedure Laterality Date    BREAST BIOPSY Right     Excisional Biospy    CARPAL TUNNEL RELEASE Right 10/2021    CARPAL TUNNEL RELEASE Left 2021 CATARACT REMOVAL WITH IMPLANT Right 07/29/2017    Dr Elias Chandler Left 08/05/2017    Dr Radha Renee    COLONOSCOPY  12/2008    normal, repeat in 5 years    COLONOSCOPY  1998    COLONOSCOPY  2003    COLONOSCOPY N/A 02/14/2019    few diverticuli, Dr. 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 Pa Neal  07/03/1983    [address] ENDOSCOPY N/A 02/14/2019    mild gastritis, mild esophagitis, Dr. Herb Hunt     Social History     Tobacco Use    Smoking status: Never     Passive exposure: Yes    Smokeless tobacco: Never    Tobacco comments:     very remotely would smoke a few cigarettes on weekends/college days   Vaping Use    Vaping Use: Never used   Substance Use Topics    Alcohol use: Yes     Comment: occasional use    Drug use: No     Medications:  Current Outpatient Medications   Medication Sig Dispense Refill    predniSONE (DELTASONE) 5 MG tablet TAKE 3 TABLETS BY MOUTH ONCE DAILY FOR 7 DAYS AND THEN 2 & 1/2 TABS ONCE DAILY FOR 7 DAYS, THEN 2 ONCE DAILY FOR 7 DAYS, THEN 1 & 1/2 (ONE & ONE-HALF) ONCE DAILY FOR 7 DAYS, THEN 1 ONCE DAILY FOR 7 DAYS AND THEN 1/2 ONCE DAILY FOR 7 DAYS      metFORMIN (GLUCOPHAGE-XR) 500 MG extended release tablet Take 1 tablet by mouth 2 times daily 180 tablet 1    meloxicam (MOBIC) 7.5 MG tablet TAKE 1 TABLET ONCE A DAY 90 tablet 1    atenolol (TENORMIN) 25 MG tablet Take 1 tablet by mouth daily 90 tablet 1    montelukast (SINGULAIR) 10 MG tablet Take 1 tablet by mouth daily 90 tablet 1    cetirizine (ZYRTEC ALLERGY) 10 MG tablet Take 1 tablet by mouth daily 90 tablet 1    blood glucose monitor strips Test 1 times a day 100 strip 3    omeprazole (PRILOSEC) 20 MG delayed release capsule Take 1 capsule by mouth 2 times daily 180 capsule 1    predniSONE (DELTASONE) 20 MG tablet 1 daily 30 tablet 0    ONE TOUCH LANCETS MISC Tests 1 x daily DX. E11.9 100 each 3    atorvastatin (LIPITOR) 20 MG tablet Take 1 tablet by mouth daily (Patient not taking: No sig reported) 90 tablet 1    VITAMIN D PO Take 2,000 Units by mouth daily      therapeutic multivitamin-minerals (THERAGRAN-M) tablet Take 1 tablet by mouth daily. Ascorbic Acid (VITAMIN C) 500 MG tablet Take 500 mg by mouth daily. No current facility-administered medications for this visit. Scheduled Meds:  Continuous Infusions:  PRN Meds:. Prior to Admission medications    Medication Sig Start Date End Date Taking?  Authorizing Provider   predniSONE (DELTASONE) 5 MG tablet TAKE 3 TABLETS BY MOUTH ONCE DAILY FOR 7 DAYS AND THEN 2 & 1/2 TABS ONCE DAILY FOR 7 DAYS, THEN 2 ONCE DAILY FOR 7 DAYS, THEN 1 & 1/2 (ONE & ONE-HALF) ONCE DAILY FOR 7 DAYS, THEN 1 ONCE DAILY FOR 7 DAYS AND THEN 1/2 ONCE DAILY FOR 7 DAYS 12/22/22   Historical Provider, MD   metFORMIN (GLUCOPHAGE-XR) 500 MG extended release tablet Take 1 tablet by mouth 2 times daily 12/6/22   Sarah Magaña DO   meloxicam (MOBIC) 7.5 MG tablet TAKE 1 TABLET ONCE A DAY 12/6/22   Sarah Magaña DO   atenolol (TENORMIN) 25 MG tablet Take 1 tablet by mouth daily 12/6/22   Sarah Magaña DO   montelukast (SINGULAIR) 10 MG tablet Take 1 tablet by mouth daily 12/6/22   Sarah Magaña DO   cetirizine (ZYRTEC ALLERGY) 10 MG tablet Take 1 tablet by mouth daily 12/6/22   Sarah Magaña DO   blood glucose monitor strips Test 1 times a day 12/6/22   Sarah Magaña DO   omeprazole (PRILOSEC) 20 MG delayed release capsule Take 1 capsule by mouth 2 times daily 12/6/22 Jhonny Smith DO   predniSONE (DELTASONE) 20 MG tablet 1 daily 11/15/22   Jhonny Smith DO   ONE TOUCH LANCETS MISC Tests 1 x daily DX. E11.9 2/22/22   Jhonny Smith DO   atorvastatin (LIPITOR) 20 MG tablet Take 1 tablet by mouth daily  Patient not taking: No sig reported 12/9/21   Jhonny Smith DO   VITAMIN D PO Take 2,000 Units by mouth daily    Historical Provider, MD   therapeutic multivitamin-minerals [facility]) tablet Take 1 tablet by mouth daily. Historical Provider, MD   Ascorbic Acid (VITAMIN C) 500 MG tablet Take 500 mg by mouth daily. Historical Provider, MD     Vital Signs (Current) [unfilled]    Weight:   Wt Readings from Last 1 Encounters:   01/19/23 220 lb 6.4 oz (100 kg)     Height:   Ht Readings from Last 1 Encounters:   01/19/23 5' 7\" (1.702 m)      BMI:  There is no height or weight on file to calculate BMI. Estimated body mass index is 34.52 kg/m² as calculated from the following:    Height as of an earlier encounter on 1/19/23: 5' 7\" (1.702 m). Weight as of an earlier encounter on 1/19/23: 220 lb 6.4 oz (100 kg). body mass index is unknown because there is no height or weight on file. Cardiac Clearance: None   Medical Clearance:None   Appointment for surgery Clearance scheduled for:None     Preoperative Testing:   These are the current and completed labs:  CBC:   Lab Results   Component Value Date/Time    WBC 10.7 11/29/2022 07:43 AM    RBC 4.54 11/29/2022 07:43 AM    HGB 12.9 11/29/2022 07:43 AM    HCT 40.2 11/29/2022 07:43 AM    MCV 88.5 11/29/2022 07:43 AM    RDW 15.9 11/29/2022 07:43 AM     11/29/2022 07:43 AM     CMP:   Lab Results   Component Value Date/Time     11/29/2022 07:43 AM    K 4.2 11/29/2022 07:43 AM     11/29/2022 07:43 AM    CO2 30 11/29/2022 07:43 AM    BUN 15 11/29/2022 07:43 AM    CREATININE 0.80 11/29/2022 07:43 AM    GFRAA >60 09/28/2022 11:22 AM    LABGLOM >60 11/29/2022 07:43 AM    GLUCOSE 105 11/29/2022 07:43 AM    PROT 6.8 11/29/2022 07:43 AM    CALCIUM 9.8 11/29/2022 07:43 AM    BILITOT 0.2 11/29/2022 07:43 AM    ALKPHOS 64 11/29/2022 07:43 AM    AST 17 11/29/2022 07:43 AM    ALT 15 11/29/2022 07:43 AM     POC Tests: No results for input(s): POCGLU, POCNA, POCK, POCCL, POCBUN, POCHEMO, POCHCT in the last 72 hours.   Coags  No results found for: PROTIME, INR, APTT  HCG (If Applicable) No results found for: PREGTESTUR, PREGSERUM, HCG, HCGQUANT   ABGs No results found for: PHART, PO2ART, GJX7YMK, LUL8HYR, BEART, P1ZSYYST   Type & Screen (If Applicable)  No results found for: Saravanan Silva    Additional ordered pre-operative testing:  TWYLA MERRILL [facility]  11/29   []ABG      [] BMP   []URINALYSIS   []CMP 11/29    []HCG   []COAGS PT/INR  []T&C  []LFTs   []TYPE AND SCREEN    [x] EKG  [x] Chest X-Ray  [] Other Radiology    [] Sent to Hospitalist None  [x] Sent to Anesthesia for your review:       [] Additional Orders: None     Comments:None   Requests: None    Signed: Victoria Sullivan RN 1/19/2023 1:39 PM

## 2023-01-19 NOTE — TELEPHONE ENCOUNTER
Patient was seen today and is scheduled for excision of lipomas on 2/6/2023. Patient concerned about sedation as when she has sedation even local MAC she will become nauseated after. I explained that before her procedure she will need to discuss this with the anesthesiologist and they will be able to give her medication to help with that. She reports it does not work and needs the patch behind her ear ordered for nausea as that is the only thing that will help. Are you able to order this or would you like us to send this to the PCP?

## 2023-01-20 NOTE — TELEPHONE ENCOUNTER
Patient's surgery has been rescheduled to 2/9/2023 instead of 2/6/2023 per surgery center's request.

## 2023-01-24 ENCOUNTER — HOSPITAL ENCOUNTER (OUTPATIENT)
Dept: NON INVASIVE DIAGNOSTICS | Age: 73
Discharge: HOME | End: 2023-01-24
Payer: MEDICARE

## 2023-01-24 ENCOUNTER — HOSPITAL ENCOUNTER (OUTPATIENT)
Dept: GENERAL RADIOLOGY | Age: 73
Discharge: HOME | End: 2023-01-26
Payer: MEDICARE

## 2023-01-24 DIAGNOSIS — Z01.818 PRE-OP TESTING: ICD-10-CM

## 2023-01-24 PROCEDURE — 71046 X-RAY EXAM CHEST 2 VIEWS: CPT

## 2023-01-24 PROCEDURE — 93005 ELECTROCARDIOGRAM TRACING: CPT

## 2023-01-25 LAB
EKG ATRIAL RATE: 67 BPM
EKG P AXIS: 63 DEGREES
EKG P-R INTERVAL: 166 MS
EKG Q-T INTERVAL: 404 MS
EKG QRS DURATION: 96 MS
EKG QTC CALCULATION (BAZETT): 426 MS
EKG R AXIS: 6 DEGREES
EKG T AXIS: 43 DEGREES
EKG VENTRICULAR RATE: 67 BPM

## 2023-01-30 ENCOUNTER — TELEPHONE (OUTPATIENT)
Dept: CARDIOLOGY | Age: 73
End: 2023-01-30

## 2023-01-30 ENCOUNTER — OFFICE VISIT (OUTPATIENT)
Dept: CARDIOLOGY | Age: 73
End: 2023-01-30
Payer: MEDICARE

## 2023-01-30 VITALS
BODY MASS INDEX: 35.31 KG/M2 | DIASTOLIC BLOOD PRESSURE: 70 MMHG | HEART RATE: 66 BPM | SYSTOLIC BLOOD PRESSURE: 140 MMHG | HEIGHT: 67 IN | RESPIRATION RATE: 17 BRPM | WEIGHT: 225 LBS | OXYGEN SATURATION: 97 %

## 2023-01-30 DIAGNOSIS — Z01.810 PREPROCEDURAL CARDIOVASCULAR EXAMINATION: ICD-10-CM

## 2023-01-30 DIAGNOSIS — I10 PRIMARY HYPERTENSION: Primary | ICD-10-CM

## 2023-01-30 DIAGNOSIS — R68.89 EXERCISE INTOLERANCE: ICD-10-CM

## 2023-01-30 DIAGNOSIS — E78.2 MIXED HYPERLIPIDEMIA: ICD-10-CM

## 2023-01-30 PROCEDURE — 1123F ACP DISCUSS/DSCN MKR DOCD: CPT | Performed by: INTERNAL MEDICINE

## 2023-01-30 PROCEDURE — G8417 CALC BMI ABV UP PARAM F/U: HCPCS | Performed by: INTERNAL MEDICINE

## 2023-01-30 PROCEDURE — 1036F TOBACCO NON-USER: CPT | Performed by: INTERNAL MEDICINE

## 2023-01-30 PROCEDURE — 3078F DIAST BP <80 MM HG: CPT | Performed by: INTERNAL MEDICINE

## 2023-01-30 PROCEDURE — 3077F SYST BP >= 140 MM HG: CPT | Performed by: INTERNAL MEDICINE

## 2023-01-30 PROCEDURE — 1090F PRES/ABSN URINE INCON ASSESS: CPT | Performed by: INTERNAL MEDICINE

## 2023-01-30 PROCEDURE — 99204 OFFICE O/P NEW MOD 45 MIN: CPT | Performed by: INTERNAL MEDICINE

## 2023-01-30 PROCEDURE — G8400 PT W/DXA NO RESULTS DOC: HCPCS | Performed by: INTERNAL MEDICINE

## 2023-01-30 PROCEDURE — 99205 OFFICE O/P NEW HI 60 MIN: CPT | Performed by: INTERNAL MEDICINE

## 2023-01-30 PROCEDURE — G8427 DOCREV CUR MEDS BY ELIG CLIN: HCPCS | Performed by: INTERNAL MEDICINE

## 2023-01-30 PROCEDURE — G8484 FLU IMMUNIZE NO ADMIN: HCPCS | Performed by: INTERNAL MEDICINE

## 2023-01-30 PROCEDURE — 3017F COLORECTAL CA SCREEN DOC REV: CPT | Performed by: INTERNAL MEDICINE

## 2023-01-30 NOTE — PROGRESS NOTES
Cardiology Consultation/Follow Up. [address] [facility]  1950  [id number]    Today: 1/30/23    CC: Patient is here for new consult preop risk. HPI:   Gabe Nelson is here for new consult preop risk. Needs preop risk for lypoma removal with Dr. Amanda Landry. Denies any or sob. States last march was walking 1 mile a day and swimming 1 mile a day, but then got sciatica. Seeing PMR and Rheum now. Not as active anymore. Has history of abnormal stress test in 2016, saw a cardiologist then, but didn't get heart cath. Past Medical:  Past Medical History:   Diagnosis Date    Allergic rhinitis     Anxiety     due to situational stress    Bursitis     Bursitis of hip, right     recurrent    Carpal tunnel syndrome, bilateral     GERD (gastroesophageal reflux disease)     Hyperlipidemia     Hypertension     Impaired fasting glucose     Labyrinthitis     causing continuted vertigo    Melanoma in situ ([facility] [address] 10/2017     to bridge of nose; Dr Halley Arevalo in [address] [place] [address] in situ of nose ([facility] [address] 10/05/2017    biopsy confirmed on 10/05/2017 by Dr Dennise [facility]     Osteoarthritis     Palpitations          Past Surgical:  Past Surgical History:   Procedure Laterality Date    BREAST BIOPSY Right 2003    Excisional Biospy    CARPAL TUNNEL RELEASE Right 10/2021    CARPAL TUNNEL RELEASE Left 11/2021    CATARACT REMOVAL WITH IMPLANT Right 07/29/2017    Dr Mamie Velazquez WITH IMPLANT Left 08/05/2017    Dr Zaira Reynolds    COLONOSCOPY  12/2008    normal, repeat in 5 years    COLONOSCOPY  1998    COLONOSCOPY  2003    COLONOSCOPY N/A 02/14/2019    few diverticuli, Dr. Latosha Capps N/A 08/07/2019    D & C Hysteroscopy & polypectomy performed by Michaela Salinas MD at [address] [place] [address] Right 10/2021    right middle finger; along with right carpal tunnel    HEEL SPUR SURGERY Bilateral     HEEL SPUR SURGERY Right 2003    HYSTEROSCOPY 08/07/2019    hysteroscopy with polypectomy and D and C    SOFT TISSUE BIOPSY Left 12/21/2017    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 Jason Franklin  07/03/1983    TUBAL LIGATION  1983    UPPER GASTROINTESTINAL ENDOSCOPY N/A 02/14/2019    mild gastritis, mild esophagitis, Dr. Anuj Brenner         Family History:  Family History   Problem Relation Age of Onset    Prostate Cancer Father     Cancer Sister         breast    Breast Cancer Sister 61    Cancer Sister         colorectal    Breast Cancer Sister 36    Other Mother         ulcerative colitis    Other Brother 79        coronary artery disease    Other Son         ulcerative colitis    Breast Cancer Paternal Aunt        Social History:  Social History     Socioeconomic History    Marital status:      Spouse name: Not on file    Number of children: Not on file    Years of education: Not on file    Highest education level: Not on file   Occupational History    Not on file   Tobacco Use    Smoking status: Never     Passive exposure: Yes    Smokeless tobacco: Never    Tobacco comments:     very remotely would smoke a few cigarettes on weekends/college days   Vaping Use    Vaping Use: Never used   Substance and Sexual Activity    Alcohol use: Yes     Comment: occasional use    Drug use: No    Sexual activity: Not on file   Other Topics Concern    Not on file   Social History Narrative    Not on file     Social Determinants of Health     Financial Resource Strain: Low Risk     Difficulty of Paying Living Expenses: Not very hard   Food Insecurity: No Food Insecurity    Worried About Running Out of Food in the Last Year: Never true    Ran Out of Food in the Last Year: Never true   Transportation Needs: Not on file   Physical Activity: Sufficiently Active    Days of Exercise per Week: 5 days    Minutes of Exercise per Session: 40 min   Stress: Not on file   Social Connections: Not on file   Intimate Partner Violence: Not on file   Housing Stability: Not on file        REVIEW OF SYSTEMS:    Constitutional: there has been no unanticipated weight loss. There's been No change in energy level, No change in activity level. Eyes: No visual changes or diplopia. No scleral icterus. ENT: No Headaches, hearing loss or vertigo. No mouth sores or sore throat. Cardiovascular: AS HPI  Respiratory: AS HPI  Gastrointestinal: No abdominal pain, appetite loss, blood in stools. No change in bowel or bladder habits. Genitourinary: No dysuria, trouble voiding, or hematuria. Musculoskeletal:  No gait disturbance, No weakness or joint complaints. Integumentary: No rash or pruritis. Neurological: No headache, diplopia, change in muscle strength, numbness or tingling. No change in gait, balance, coordination, mood, affect, memory, mentation, behavior. Psychiatric: No new anxiety or depression. Endocrine: No temperature intolerance. No excessive thirst, fluid intake, or urination. No tremor. Hematologic/Lymphatic: No abnormal bruising or bleeding, blood clots or swollen lymph nodes. Allergic/Immunologic: No nasal congestion or hives.     Medications:    Current Outpatient Medications:     predniSONE (DELTASONE) 5 MG tablet, TAKE 3 TABLETS BY MOUTH ONCE DAILY FOR 7 DAYS AND THEN 2 & 1/2 TABS ONCE DAILY FOR 7 DAYS, THEN 2 ONCE DAILY FOR 7 DAYS, THEN 1 & 1/2 (ONE & ONE-HALF) ONCE DAILY FOR 7 DAYS, THEN 1 ONCE DAILY FOR 7 DAYS AND THEN 1/2 ONCE DAILY FOR 7 DAYS, Disp: , Rfl:     metFORMIN (GLUCOPHAGE-XR) 500 MG extended release tablet, Take 1 tablet by mouth 2 times daily, Disp: 180 tablet, Rfl: 1    meloxicam (MOBIC) 7.5 MG tablet, TAKE 1 TABLET ONCE A DAY, Disp: 90 tablet, Rfl: 1    atenolol (TENORMIN) 25 MG tablet, Take 1 tablet by mouth daily, Disp: 90 tablet, Rfl: 1    montelukast (SINGULAIR) 10 MG tablet, Take 1 tablet by mouth daily, Disp: 90 tablet, Rfl: 1    cetirizine (ZYRTEC ALLERGY) 10 MG tablet, Take 1 tablet by mouth daily, Disp: 90 tablet, Rfl: 1    blood glucose monitor strips, Test 1 times a day, Disp: 100 strip, Rfl: 3    omeprazole (PRILOSEC) 20 MG delayed release capsule, Take 1 capsule by mouth 2 times daily, Disp: 180 capsule, Rfl: 1    predniSONE (DELTASONE) 20 MG tablet, 1 daily, Disp: 30 tablet, Rfl: 0    ONE TOUCH LANCETS MISC, Tests 1 x daily DX. E11.9, Disp: 100 each, Rfl: 3    atorvastatin (LIPITOR) 20 MG tablet, Take 1 tablet by mouth daily, Disp: 90 tablet, Rfl: 1    VITAMIN D PO, Take 2,000 Units by mouth daily, Disp: , Rfl:     therapeutic multivitamin-minerals (THERAGRAN-M) tablet, Take 1 tablet by mouth daily. , Disp: , Rfl:     Ascorbic Acid (VITAMIN C) 500 MG tablet, Take 500 mg by mouth daily. , Disp: , Rfl:      Physical Exam:   Vitals: BP (!) 144/74   Pulse 66   Resp 17   Ht 5' 7\" (1.702 m)   Wt 225 lb (102.1 kg)   LMP  (LMP Unknown)   SpO2 97%   BMI 35.24 kg/m²   General appearance: alert and cooperative with exam  HEENT: Head: Normocephalic, no lesions, without obvious abnormality. Neck: no carotid bruit, no JVD  Lungs: clear to auscultation bilaterally  Heart: regular rate and rhythm, S1, S2 normal, no Murmur  Abdomen: soft, non-tender; bowel sounds normal; no masses,  no organomegaly  Extremities: no site injection hematoma, extremities normal, atraumatic, no cyanosis.  no edema  Neurologic: Mental status: Alert, oriented, thought content appropriate    Labs:  Lab Results   Component Value Date    CHOL 194 11/29/2022    TRIG 261 (H) 11/29/2022    HDL 45 11/29/2022    LDLCHOLESTEROL 97 11/29/2022    VLDL NOT REPORTED (H) 12/07/2021    CHOLHDLRATIO 4.3 11/29/2022       Lab Results   Component Value Date     11/29/2022    K 4.2 11/29/2022     11/29/2022    CO2 30 11/29/2022    BUN 15 11/29/2022    CREATININE 0.80 11/29/2022    GLUCOSE 105 (H) 11/29/2022    CALCIUM 9.8 11/29/2022    PROT 6.8 11/29/2022    LABALBU 4.0 11/29/2022    BILITOT 0.2 (L) 11/29/2022    ALKPHOS 64 11/29/2022 AST 17 11/29/2022    ALT 15 11/29/2022    LABGLOM >60 11/29/2022    GFRAA >60 09/28/2022       EKG:   Results for orders placed or performed during the hospital encounter of 01/24/23   EKG 12 lead   Result Value Ref Range    Ventricular Rate 67 BPM    Atrial Rate 67 BPM    P-R Interval 166 ms    QRS Duration 96 ms    Q-T Interval 404 ms    QTc Calculation (Bazett) 426 ms    P Axis 63 degrees    R Axis 6 degrees    T Axis 43 degrees    Narrative    Normal sinus rhythm with sinus arrhythmia  Normal ECG     STRESS:     Past Medical and Surgical History, Problem List, Allergies, Medications, Labs, Imaging, all reviewed extensively in EMR and with the patient. Assessment:  - Preop risk for general anesthesia  - H/o Abnormal stress test on 2016 without further workup  - HTN  - DL  - Dm2  - Poor exercise tolerance due to joint issues    Plan:  - will check Lexiscan stress test to rule out ischemia/further risk stratify   - if stress test low risk, can proceed at moderate risk, and can follow up as needed. The patient is to continue heart healthy diet, weight loss and exercise as tolerated. Patient's medications and side effects were discussed. Medication refills were provided if needed. Follow up appointment timing was discussed. All questions and concerns were addressed to patient's satisfaction. Thank you for allowing me to participate in the care of this patient, please do not hesitate to call if you have any questions. Julien Aragon DO, [facility] [place], [address], [address] [facility]  [facility]. [facility]  [phone]

## 2023-01-31 NOTE — TELEPHONE ENCOUNTER
Spoke with Dr. Eva Salazar regarding patient's request. Dr. Eva Salazar recommends sending to PCP to see if they are able to order. Will forward encounter to PCP. Per chart patient had scopalamine transdermal patch ordered on 2/22/2022 by PCP. Please advise. Thank you!

## 2023-02-01 RX ORDER — SCOLOPAMINE TRANSDERMAL SYSTEM 1 MG/1
1 PATCH, EXTENDED RELEASE TRANSDERMAL
Qty: 2 PATCH | Refills: 0 | Status: SHIPPED | OUTPATIENT
Start: 2023-02-01

## 2023-02-06 ENCOUNTER — PATIENT MESSAGE (OUTPATIENT)
Dept: FAMILY MEDICINE CLINIC | Age: 73
End: 2023-02-06

## 2023-02-06 NOTE — TELEPHONE ENCOUNTER
From: Laura Johnson  To: Dr. Amanda Greenberg: 2/6/2023 4:01 PM EST  Subject: stress test    I am getting a stress test tomorrow. My morning pills are atenonol ,mobic, prilosec, metformin, and 10 mg of prednisone. Is there any of these that I shouldn't take?  I think I would like to skip the metformin, I can eat breakfast at 7 but I won't get to eat again to about 5

## 2023-02-07 ENCOUNTER — HOSPITAL ENCOUNTER (OUTPATIENT)
Dept: NON INVASIVE DIAGNOSTICS | Age: 73
Discharge: HOME | End: 2023-02-07
Payer: MEDICARE

## 2023-02-07 ENCOUNTER — HOSPITAL ENCOUNTER (OUTPATIENT)
Dept: NUCLEAR MEDICINE | Age: 73
Discharge: HOME | End: 2023-02-09
Payer: MEDICARE

## 2023-02-07 DIAGNOSIS — I10 PRIMARY HYPERTENSION: ICD-10-CM

## 2023-02-07 DIAGNOSIS — E78.2 MIXED HYPERLIPIDEMIA: ICD-10-CM

## 2023-02-07 DIAGNOSIS — R68.89 EXERCISE INTOLERANCE: ICD-10-CM

## 2023-02-07 DIAGNOSIS — Z01.810 PREPROCEDURAL CARDIOVASCULAR EXAMINATION: ICD-10-CM

## 2023-02-07 PROCEDURE — 3430000000 HC RX DIAGNOSTIC RADIOPHARMACEUTICAL: Performed by: INTERNAL MEDICINE

## 2023-02-07 PROCEDURE — A9500 TC99M SESTAMIBI: HCPCS | Performed by: INTERNAL MEDICINE

## 2023-02-07 PROCEDURE — 93017 CV STRESS TEST TRACING ONLY: CPT

## 2023-02-07 PROCEDURE — 6360000002 HC RX W HCPCS: Performed by: INTERNAL MEDICINE

## 2023-02-07 PROCEDURE — G1010 CDSM STANSON: HCPCS

## 2023-02-07 PROCEDURE — 93018 CV STRESS TEST I&R ONLY: CPT | Performed by: INTERNAL MEDICINE

## 2023-02-07 RX ORDER — TECHNETIUM TC-99M SESTAMIBI 1 MG/10ML
30 INJECTION INTRAVENOUS
Status: COMPLETED | OUTPATIENT
Start: 2023-02-07 | End: 2023-02-07

## 2023-02-07 RX ORDER — TECHNETIUM TC-99M SESTAMIBI 1 MG/10ML
10 INJECTION INTRAVENOUS
Status: COMPLETED | OUTPATIENT
Start: 2023-02-07 | End: 2023-02-07

## 2023-02-07 RX ADMIN — REGADENOSON 0.4 MG: 0.08 INJECTION, SOLUTION INTRAVENOUS at 14:20

## 2023-02-07 RX ADMIN — Medication 10 MILLICURIE: at 14:04

## 2023-02-07 RX ADMIN — Medication 30 MILLICURIE: at 14:04

## 2023-02-07 NOTE — PROGRESS NOTES
Patient Name:  Xiao Matson MRN:  [id number]   :  1950  Age:  71 y.o. Sex: female   Ordering Provider: Ilene Elizalde DO  Primary Care Provider:  Easton Marie DO     Indications: Pre-op cardiovascular exam, Hypertension, Hyperlipidemia, Exercise intolerance     Medications:     Current Outpatient Medications:     scopolamine (TRANSDERM-SCOP) transdermal patch, Place 1 patch onto the skin every 72 hours, Disp: 2 patch, Rfl: 0    predniSONE (DELTASONE) 5 MG tablet, TAKE 3 TABLETS BY MOUTH ONCE DAILY FOR 7 DAYS AND THEN 2 & 1/2 TABS ONCE DAILY FOR 7 DAYS, THEN 2 ONCE DAILY FOR 7 DAYS, THEN 1 & 1/2 (ONE & ONE-HALF) ONCE DAILY FOR 7 DAYS, THEN 1 ONCE DAILY FOR 7 DAYS AND THEN 1/2 ONCE DAILY FOR 7 DAYS, Disp: , Rfl:     metFORMIN (GLUCOPHAGE-XR) 500 MG extended release tablet, Take 1 tablet by mouth 2 times daily, Disp: 180 tablet, Rfl: 1    meloxicam (MOBIC) 7.5 MG tablet, TAKE 1 TABLET ONCE A DAY, Disp: 90 tablet, Rfl: 1    atenolol (TENORMIN) 25 MG tablet, Take 1 tablet by mouth daily, Disp: 90 tablet, Rfl: 1    montelukast (SINGULAIR) 10 MG tablet, Take 1 tablet by mouth daily, Disp: 90 tablet, Rfl: 1    cetirizine (ZYRTEC ALLERGY) 10 MG tablet, Take 1 tablet by mouth daily, Disp: 90 tablet, Rfl: 1    blood glucose monitor strips, Test 1 times a day, Disp: 100 strip, Rfl: 3    omeprazole (PRILOSEC) 20 MG delayed release capsule, Take 1 capsule by mouth 2 times daily, Disp: 180 capsule, Rfl: 1    predniSONE (DELTASONE) 20 MG tablet, 1 daily, Disp: 30 tablet, Rfl: 0    ONE TOUCH LANCETS MISC, Tests 1 x daily DX. E11.9, Disp: 100 each, Rfl: 3    atorvastatin (LIPITOR) 20 MG tablet, Take 1 tablet by mouth daily, Disp: 90 tablet, Rfl: 1    VITAMIN D PO, Take 2,000 Units by mouth daily, Disp: , Rfl:     therapeutic multivitamin-minerals (THERAGRAN-M) tablet, Take 1 tablet by mouth daily. , Disp: , Rfl:     Ascorbic Acid (VITAMIN C) 500 MG tablet, Take 500 mg by mouth daily. , Disp: , Rfl:     Current Facility-Administered Medications:     regadenoson (LEXISCAN) injection 0.4 mg, 0.4 mg, IntraVENous, Once, Angelica Main MD      ----------------------------------------------------------------------------------------------------------                Lexiscan 0.4 mg injected over 10 seconds. IV Cardiolite injected 20 seconds post Lexiscan injection. Heart Rate:  66  Resting Blood Pressure:  197/90   ----------------------------------------------------------------------------------------------------------     HR BP   1 min 99 132/66   2 min     3 min 95 144/69   4 min     5 min 89 148/65   6 min     7 min 84 163/73   8 min     9 min 86 160/70   10 min       Symptoms:  Chest Pain:  Yes      Nausea:  No     Headache:  Yes    Shortness of Breath:  Yes     Other:  No      Electronically signed by Selina Palma RN on 2/7/23 at 2:16 PM EST    ----------------------------------------------------------------------------------------------------------  Resting EKG: Normal sinus rhythm within normal limits    Arrhythmias: None    EKG Changes: None    Maximum Changes: N/A    Leads with maximum changes: N/A    EKG returned to baseline N/A minutes in recovery. Interpretation:    1- No EKG changes to indicate ischemia. 2- Nuclear report to follow.           Supervising Physician:        Electronically signed by Angelica Main MD on 2/7/2023 at 3:20 PM      [facility]  [phone]

## 2023-02-08 ENCOUNTER — TELEPHONE (OUTPATIENT)
Dept: CARDIOLOGY | Age: 73
End: 2023-02-08

## 2023-02-08 NOTE — TELEPHONE ENCOUNTER
Cardiology Consultation  [facility] [address], [address] [place]rielle Maya [place] At[place]  [phone]      02/08/23       Regarding: Shania Han  1950      To Whom It May Concern,      Patient is Intermediate Cardiac Risk for planned surgery. Special instructions:    Please continue other meds.         Thank you,      Severo Ortiz DO, FACC, RPVI, COL[place], [facility]  Cardiology  [facility]    Electronically signed by Severo Ortiz DO

## 2023-02-08 NOTE — TELEPHONE ENCOUNTER
I need the nuclear portion results. It has not been resulted or it is not available for me to see  Please let me know when its resulted.
[facility] is calling to ask if the pt has been cleared for the procedure tomorrow with Dr Wilton Chandler.  Please advise
Dayanara Cordova Legal Sex   Female    1950 N   [id number]       Additional Information    Associated Reports   View Encounter   Priority and Order Details   Collection Information     Click to Print Result

## 2023-02-08 NOTE — PROCEDURES
[address] [place], [place] [address]                              CARDIAC STRESS TEST    PATIENT NAME: Yuliana Subramanian                     :        1950  MED REC NO:   [id number]                             ROOM:  ACCOUNT NO:   [de-identified]                           ADMIT DATE: 2023  PROVIDER:     Rekha Shepard MD    DATE OF STUDY:  2023    STRESS TEST    Ordering Provider:  Troy Nj DO    Primary Care Provider:  Charmayne Birchwood, DO    Patient's Age:  71       Height:  5 feet 7 inches      Weight:  225  pounds    INDICATION:  Pre-op, hypertension. Lexiscan 0.4 mg injected over 10 seconds. IV Cardiolite injected 20 seconds post Lexiscan injection. Heart Rate:  66     Resting blood pressure:  197/90              HR   BP  1 min          99   132/66  2 min  3 min          95   144/69  4 min  5 min          89   148/65  6 min  7 min          84   163/73  8 min  9 min          86   160/70  10 min    Symptoms:  Chest Pain:  Yes  Nausea:  No  Headache:  Yes  Shortness of  breath:  Yes  Other:  No    Resting EKG:  Normal sinus rhythm within normal limits. Arrhythmias:  None. EKG changes:  None. INTERPRETATION:  1. No EKG changes to indicate ischemia. 2.  Nuclear report to follow. Nuclear Myocardial Perfusion Imaging (SPECT)    TESTING METHOD  STRESS:   Lexiscan  AGENT:    Cardiolite  REST:          Injection Date:  2023  Time:  1320  Amount:  9.91  mCi  STRESS:   Injection Date:  2023  Time:  1424  Amount:  31.9 mCi  IMAGE TIME:    Rest:  1355  Stress:  1500    EF:  55%  TID:  1.04  LHR:  0.69    FINDINGS:  Ischemia (Reversible Defect):           No  Infarction (Irreversible Defect):       No  Normal Ejection Fraction:               Yes  Normal Segmental Wall Motion:           Yes    Low risk study. IMPRESSION:  1. No ischemia. 2.  No infarction. 3.  Normal LV systolic function.   LVEF 55%.        Johanne Monge MD    D: 02/08/2023 14:59:30       T: 02/08/2023 15:00:32     /[email or web address]  Job#: [id number]     Doc#: Unknown    CC:  Sinan Alo, DO Daved Sicard

## 2023-02-08 NOTE — TELEPHONE ENCOUNTER
Patient's stress test was yesterday. Patient is scheduled for procedure tomorrow. Needing clearance. Thanks!

## 2023-02-09 ENCOUNTER — APPOINTMENT (OUTPATIENT)
Dept: GENERAL RADIOLOGY | Age: 73
End: 2023-02-09
Attending: SURGERY
Payer: MEDICARE

## 2023-02-09 ENCOUNTER — ANESTHESIA (OUTPATIENT)
Dept: OPERATING ROOM | Age: 73
End: 2023-02-09
Payer: MEDICARE

## 2023-02-09 ENCOUNTER — HOSPITAL ENCOUNTER (OUTPATIENT)
Age: 73
Setting detail: OUTPATIENT SURGERY
Discharge: HOME | End: 2023-02-09
Attending: SURGERY | Admitting: SURGERY
Payer: MEDICARE

## 2023-02-09 ENCOUNTER — ANESTHESIA EVENT (OUTPATIENT)
Dept: OPERATING ROOM | Age: 73
End: 2023-02-09
Payer: MEDICARE

## 2023-02-09 VITALS
DIASTOLIC BLOOD PRESSURE: 81 MMHG | BODY MASS INDEX: 35.65 KG/M2 | RESPIRATION RATE: 16 BRPM | HEIGHT: 66 IN | WEIGHT: 221.8 LBS | TEMPERATURE: 97 F | HEART RATE: 59 BPM | SYSTOLIC BLOOD PRESSURE: 171 MMHG | OXYGEN SATURATION: 95 %

## 2023-02-09 DIAGNOSIS — D17.23 LIPOMA OF RIGHT LOWER EXTREMITY: ICD-10-CM

## 2023-02-09 DIAGNOSIS — D17.21 BENIGN LIPOMATOUS NEOPLASM OF SKIN AND SUBCUTANEOUS TISSUE OF RIGHT ARM: ICD-10-CM

## 2023-02-09 DIAGNOSIS — G89.18 POSTOPERATIVE PAIN: Primary | ICD-10-CM

## 2023-02-09 LAB — GLUCOSE BLD-MCNC: 124 MG/DL (ref 65–105)

## 2023-02-09 PROCEDURE — 71045 X-RAY EXAM CHEST 1 VIEW: CPT

## 2023-02-09 PROCEDURE — 3600000002 HC SURGERY LEVEL 2 BASE: Performed by: SURGERY

## 2023-02-09 PROCEDURE — 2580000003 HC RX 258: Performed by: SURGERY

## 2023-02-09 PROCEDURE — 11402 EXC TR-EXT B9+MARG 1.1-2 CM: CPT | Performed by: SURGERY

## 2023-02-09 PROCEDURE — 3700000001 HC ADD 15 MINUTES (ANESTHESIA): Performed by: SURGERY

## 2023-02-09 PROCEDURE — 24075 EXC ARM/ELBOW LES SC < 3 CM: CPT | Performed by: SURGERY

## 2023-02-09 PROCEDURE — 3600000012 HC SURGERY LEVEL 2 ADDTL 15MIN: Performed by: SURGERY

## 2023-02-09 PROCEDURE — 7100000011 HC PHASE II RECOVERY - ADDTL 15 MIN: Performed by: SURGERY

## 2023-02-09 PROCEDURE — 88304 TISSUE EXAM BY PATHOLOGIST: CPT

## 2023-02-09 PROCEDURE — 2709999900 HC NON-CHARGEABLE SUPPLY: Performed by: SURGERY

## 2023-02-09 PROCEDURE — 3700000000 HC ANESTHESIA ATTENDED CARE: Performed by: SURGERY

## 2023-02-09 PROCEDURE — 6360000002 HC RX W HCPCS: Performed by: NURSE ANESTHETIST, CERTIFIED REGISTERED

## 2023-02-09 PROCEDURE — 82947 ASSAY GLUCOSE BLOOD QUANT: CPT

## 2023-02-09 PROCEDURE — 7100000010 HC PHASE II RECOVERY - FIRST 15 MIN: Performed by: SURGERY

## 2023-02-09 PROCEDURE — 6360000002 HC RX W HCPCS: Performed by: SURGERY

## 2023-02-09 PROCEDURE — 2500000003 HC RX 250 WO HCPCS: Performed by: NURSE ANESTHETIST, CERTIFIED REGISTERED

## 2023-02-09 RX ORDER — LIDOCAINE HYDROCHLORIDE 20 MG/ML
INJECTION, SOLUTION EPIDURAL; INFILTRATION; INTRACAUDAL; PERINEURAL CONTINUOUS PRN
Status: DISCONTINUED | OUTPATIENT
Start: 2023-02-09 | End: 2023-02-09 | Stop reason: SDUPTHER

## 2023-02-09 RX ORDER — PROPOFOL 10 MG/ML
INJECTION, EMULSION INTRAVENOUS CONTINUOUS PRN
Status: DISCONTINUED | OUTPATIENT
Start: 2023-02-09 | End: 2023-02-09 | Stop reason: SDUPTHER

## 2023-02-09 RX ORDER — KETOROLAC TROMETHAMINE 30 MG/ML
INJECTION, SOLUTION INTRAMUSCULAR; INTRAVENOUS PRN
Status: DISCONTINUED | OUTPATIENT
Start: 2023-02-09 | End: 2023-02-09 | Stop reason: SDUPTHER

## 2023-02-09 RX ORDER — FENTANYL CITRATE 50 UG/ML
INJECTION, SOLUTION INTRAMUSCULAR; INTRAVENOUS PRN
Status: DISCONTINUED | OUTPATIENT
Start: 2023-02-09 | End: 2023-02-09 | Stop reason: SDUPTHER

## 2023-02-09 RX ORDER — SODIUM CHLORIDE, SODIUM LACTATE, POTASSIUM CHLORIDE, CALCIUM CHLORIDE 600; 310; 30; 20 MG/100ML; MG/100ML; MG/100ML; MG/100ML
INJECTION, SOLUTION INTRAVENOUS CONTINUOUS
Status: DISCONTINUED | OUTPATIENT
Start: 2023-02-09 | End: 2023-02-09 | Stop reason: HOSPADM

## 2023-02-09 RX ORDER — SODIUM CHLORIDE 9 MG/ML
INJECTION, SOLUTION INTRAVENOUS PRN
Status: DISCONTINUED | OUTPATIENT
Start: 2023-02-09 | End: 2023-02-09 | Stop reason: HOSPADM

## 2023-02-09 RX ORDER — DEXAMETHASONE SODIUM PHOSPHATE 10 MG/ML
INJECTION INTRAMUSCULAR; INTRAVENOUS PRN
Status: DISCONTINUED | OUTPATIENT
Start: 2023-02-09 | End: 2023-02-09 | Stop reason: SDUPTHER

## 2023-02-09 RX ORDER — HYDROCODONE BITARTRATE AND ACETAMINOPHEN 5; 325 MG/1; MG/1
1 TABLET ORAL EVERY 6 HOURS PRN
Qty: 10 TABLET | Refills: 0 | Status: SHIPPED | OUTPATIENT
Start: 2023-02-09 | End: 2023-02-12

## 2023-02-09 RX ORDER — CIPROFLOXACIN 2 MG/ML
400 INJECTION, SOLUTION INTRAVENOUS ONCE
Status: COMPLETED | OUTPATIENT
Start: 2023-02-09 | End: 2023-02-09

## 2023-02-09 RX ORDER — GLYCOPYRROLATE 0.2 MG/ML
INJECTION INTRAMUSCULAR; INTRAVENOUS PRN
Status: DISCONTINUED | OUTPATIENT
Start: 2023-02-09 | End: 2023-02-09 | Stop reason: SDUPTHER

## 2023-02-09 RX ORDER — MIDAZOLAM HYDROCHLORIDE 1 MG/ML
INJECTION INTRAMUSCULAR; INTRAVENOUS PRN
Status: DISCONTINUED | OUTPATIENT
Start: 2023-02-09 | End: 2023-02-09 | Stop reason: SDUPTHER

## 2023-02-09 RX ORDER — SODIUM CHLORIDE 0.9 % (FLUSH) 0.9 %
5-40 SYRINGE (ML) INJECTION PRN
Status: DISCONTINUED | OUTPATIENT
Start: 2023-02-09 | End: 2023-02-09 | Stop reason: HOSPADM

## 2023-02-09 RX ORDER — SODIUM CHLORIDE 0.9 % (FLUSH) 0.9 %
5-40 SYRINGE (ML) INJECTION EVERY 12 HOURS SCHEDULED
Status: DISCONTINUED | OUTPATIENT
Start: 2023-02-09 | End: 2023-02-09 | Stop reason: HOSPADM

## 2023-02-09 RX ORDER — ONDANSETRON 2 MG/ML
INJECTION INTRAMUSCULAR; INTRAVENOUS PRN
Status: DISCONTINUED | OUTPATIENT
Start: 2023-02-09 | End: 2023-02-09 | Stop reason: SDUPTHER

## 2023-02-09 RX ADMIN — ONDANSETRON 4 MG: 2 INJECTION INTRAMUSCULAR; INTRAVENOUS at 08:57

## 2023-02-09 RX ADMIN — MIDAZOLAM HYDROCHLORIDE 2 MG: 1 INJECTION, SOLUTION INTRAMUSCULAR; INTRAVENOUS at 08:58

## 2023-02-09 RX ADMIN — SODIUM CHLORIDE, POTASSIUM CHLORIDE, SODIUM LACTATE AND CALCIUM CHLORIDE: 600; 310; 30; 20 INJECTION, SOLUTION INTRAVENOUS at 08:30

## 2023-02-09 RX ADMIN — DEXAMETHASONE SODIUM PHOSPHATE 10 MG: 10 INJECTION INTRAMUSCULAR; INTRAVENOUS at 08:57

## 2023-02-09 RX ADMIN — CIPROFLOXACIN 400 MG: 2 INJECTION, SOLUTION INTRAVENOUS at 09:06

## 2023-02-09 RX ADMIN — KETOROLAC TROMETHAMINE 15 MG: 30 INJECTION, SOLUTION INTRAMUSCULAR; INTRAVENOUS at 09:50

## 2023-02-09 RX ADMIN — PROPOFOL 150 MCG/KG/MIN: 10 INJECTION, EMULSION INTRAVENOUS at 09:05

## 2023-02-09 RX ADMIN — GLYCOPYRROLATE 0.2 MG: 0.2 INJECTION INTRAMUSCULAR; INTRAVENOUS at 08:58

## 2023-02-09 RX ADMIN — LIDOCAINE HYDROCHLORIDE 1.5 MG/KG/HR: 20 INJECTION, SOLUTION EPIDURAL; INFILTRATION; INTRACAUDAL; PERINEURAL at 09:05

## 2023-02-09 RX ADMIN — FENTANYL CITRATE 100 MCG: 50 INJECTION, SOLUTION INTRAMUSCULAR; INTRAVENOUS at 09:03

## 2023-02-09 ASSESSMENT — PAIN - FUNCTIONAL ASSESSMENT: PAIN_FUNCTIONAL_ASSESSMENT: 0-10

## 2023-02-09 ASSESSMENT — PAIN DESCRIPTION - DESCRIPTORS: DESCRIPTORS: DISCOMFORT

## 2023-02-09 NOTE — ANESTHESIA PRE PROCEDURE
Department of Anesthesiology  Preprocedure Note       Name:  Dewayne Key   Age:  71 y.o.  :  1950                                          MRN:  [id number]         Date:  2023      Surgeon: Ty Lezama):  Awa Gregory MD    Procedure: Procedure(s):  Excision Lipoma Right Posterior Knee x 4 and Right Upper Arm (diabetic, keep early)    Medications prior to admission:   Prior to Admission medications    Medication Sig Start Date End Date Taking?  Authorizing Provider   scopolamine (TRANSDERM-SCOP) transdermal patch Place 1 patch onto the skin every 72 hours 23   Lars Fish, DO   predniSONE (DELTASONE) 5 MG tablet TAKE 3 TABLETS BY MOUTH ONCE DAILY FOR 7 DAYS AND THEN 2 & 1/2 TABS ONCE DAILY FOR 7 DAYS, THEN 2 ONCE DAILY FOR 7 DAYS, THEN 1 & 1/2 (ONE & ONE-HALF) ONCE DAILY FOR 7 DAYS, THEN 1 ONCE DAILY FOR 7 DAYS AND THEN 1/2 ONCE DAILY FOR 7 DAYS 22   Historical Provider, MD   metFORMIN (GLUCOPHAGE-XR) 500 MG extended release tablet Take 1 tablet by mouth 2 times daily 22   Lars Fish, DO   meloxicam (MOBIC) 7.5 MG tablet TAKE 1 TABLET ONCE A DAY 22   Lars Fish, DO   atenolol (TENORMIN) 25 MG tablet Take 1 tablet by mouth daily 22   Lars Fish, DO   montelukast (SINGULAIR) 10 MG tablet Take 1 tablet by mouth daily 22   Lars Fish, DO   cetirizine (ZYRTEC ALLERGY) 10 MG tablet Take 1 tablet by mouth daily 22   Lars Fish, DO   blood glucose monitor strips Test 1 times a day 22   Lars Fish, DO   omeprazole (PRILOSEC) 20 MG delayed release capsule Take 1 capsule by mouth 2 times daily 22   Lars Fish, DO   predniSONE (DELTASONE) 20 MG tablet 1 daily 11/15/22   Lars Fish, DO   ONE TOUCH LANCETS MISC Tests 1 x daily DX. E11.9 22   Lars Fish, DO   atorvastatin (LIPITOR) 20 MG tablet Take 1 tablet by mouth daily 21   Lars Fish, DO   VITAMIN D PO Take 2,000 Units by mouth daily Historical Provider, MD   therapeutic multivitamin-minerals (THERAGRAN-M) tablet Take 1 tablet by mouth daily. Historical Provider, MD   Ascorbic Acid (VITAMIN C) 500 MG tablet Take 500 mg by mouth daily. Historical Provider, MD       Current medications:    Current Facility-Administered Medications   Medication Dose Route Frequency Provider Last Rate Last Admin    lactated ringers IV soln infusion   IntraVENous Continuous Shadia Elkins  mL/hr at 02/09/23 0830 New Bag at 02/09/23 0830    sodium chloride flush 0.9 % injection 5-40 mL  5-40 mL IntraVENous 2 times per day Shadia Elkins MD        sodium chloride flush 0.9 % injection 5-40 mL  5-40 mL IntraVENous PRN Shadia Elkins MD        0.9 % sodium chloride infusion   IntraVENous PRN Shadia Elkins MD           Allergies:     Allergies   Allergen Reactions    Aspirin Other (See Comments)     Abdominal pain    Ibuprofen Other (See Comments)     Abdominal pain    Penicillin G Other (See Comments)    Sulfamethoxazole-Trimethoprim Nausea Only and Other (See Comments)    Penicillins Rash       Problem List:    Patient Active Problem List   Diagnosis Code    Hyperlipidemia E78.5    HTN (hypertension) I10    Osteoarthritis M19.90    Allergic rhinitis J30.9    GERD (gastroesophageal reflux disease) K21.9    Anxiety F41.9    Diabetes mellitus type II, controlled ([facility] E11.9    Endometrial polyp N84.0    Postmenopausal bleeding N95.0    Polyneuropathy associated with underlying disease ([address] G63    Exercise intolerance R68.89       Past Medical History:        Diagnosis Date    Allergic rhinitis     Anxiety     due to situational stress    Bursitis     Bursitis of hip, right     recurrent    Carpal tunnel syndrome, bilateral     GERD (gastroesophageal reflux disease)     Hyperlipidemia     Hypertension     Impaired fasting glucose     Labyrinthitis     causing continuted vertigo    Melanoma in situ ([facility] [address] 10/2017     to bridge of nose;  Kandice Double in [address] in situ of nose ([address] 10/05/2017    biopsy confirmed on 10/05/2017 by [facility]  Osteoarthritis     Palpitations        Past Surgical History:        Procedure Laterality Date    BREAST BIOPSY Right 2003    Excisional Biospy    CARPAL TUNNEL RELEASE Right 10/2021    CARPAL TUNNEL RELEASE Left 11/2021    CATARACT REMOVAL WITH IMPLANT Right 07/29/2017    Dr Pippa Lara Left 08/05/2017    Dr Caitlyn Graf COLONOSCOPY  12/2008    normal, repeat in 5 years   [email or web address] [place]  2003    COLONOSCOPY N/A 02/14/2019    few diverticuli, Dr. 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 Alba Pickup  07/03/1983    [address] ENDOSCOPY N/A 02/14/2019    mild gastritis, mild esophagitis, Dr. Buddy Masters       Social History:    Social History     Tobacco Use    Smoking status: Never     Passive exposure:  Yes    Smokeless tobacco: Never    Tobacco comments:     very remotely would smoke a few cigarettes on weekends/college days   Substance Use Topics    Alcohol use: Yes     Comment: occasional use                                Counseling given: Not Answered  Tobacco comments: very remotely would smoke a few cigarettes on weekends/college days      Vital Signs (Current):   Vitals:    02/09/23 0824 02/09/23 0831   BP: (!) 218/93 (!) 204/91   Pulse: 71    Resp: 16    Temp: 36.7 °C (98.1 °F)    TempSrc: Oral    SpO2: 98%    Weight: 221 lb 12.8 oz (100.6 kg)    Height: 5' 6\" (1.676 m)                                               BP Readings from Last 3 Encounters:   02/09/23 (!) 204/91   01/30/23 (!) 140/70   01/19/23 134/86       NPO Status: Time of last liquid consumption: 2300                        Time of last solid consumption: 1900                        Date of last liquid consumption: 02/08/23                        Date of last solid food consumption: 02/08/23    BMI:   Wt Readings from Last 3 Encounters:   02/09/23 221 lb 12.8 oz (100.6 kg)   01/30/23 225 lb (102.1 kg)   01/19/23 220 lb 6.4 oz (100 kg)     Body mass index is 35.8 kg/m².     CBC:   Lab Results   Component Value Date/Time    WBC 10.7 11/29/2022 07:43 AM    RBC 4.54 11/29/2022 07:43 AM    HGB 12.9 11/29/2022 07:43 AM    HCT 40.2 11/29/2022 07:43 AM    MCV 88.5 11/29/2022 07:43 AM    RDW 15.9 11/29/2022 07:43 AM     11/29/2022 07:43 AM       CMP:   Lab Results   Component Value Date/Time     11/29/2022 07:43 AM    K 4.2 11/29/2022 07:43 AM     11/29/2022 07:43 AM    CO2 30 11/29/2022 07:43 AM    BUN 15 11/29/2022 07:43 AM    CREATININE 0.80 11/29/2022 07:43 AM    GFRAA >60 09/28/2022 11:22 AM    LABGLOM >60 11/29/2022 07:43 AM    GLUCOSE 105 11/29/2022 07:43 AM    PROT 6.8 11/29/2022 07:43 AM    CALCIUM 9.8 11/29/2022 07:43 AM    BILITOT 0.2 11/29/2022 07:43 AM    ALKPHOS 64 11/29/2022 07:43 AM    AST 17 11/29/2022 07:43 AM    ALT 15 11/29/2022 07:43 AM       POC Tests:   Recent Labs     02/09/23  0828   POCGLU 124*       Coags: No results found for: PROTIME, INR, APTT    HCG (If Applicable): No results found for: PREGTESTUR, PREGSERUM, HCG, HCGQUANT     ABGs: No results found for: PHART, PO2ART, UZD3SGM, QKI5DCU, BEART, K8TLCHTA     Type & Screen (If Applicable):  No results found for: LABABO, LABRH    Drug/Infectious Status (If Applicable):  Lab Results   Component Value Date/Time    HEPCAB NONREACTIVE 04/13/2017 07:59 AM       COVID-19 Screening (If Applicable):   Lab Results   Component Value Date/Time    COVID19 Not Detected 07/27/2021 12:15 PM    COVID19 Not Detected 12/09/2020 05:01 PM           Anesthesia Evaluation  Patient summary reviewed and Nursing notes reviewed no history of anesthetic complications:   Airway: Mallampati: II  TM distance: >3 FB   Neck ROM: full  Mouth opening: > = 3 FB   Dental: normal exam         Pulmonary: breath sounds clear to auscultation  (+) recent URI:                            ROS comment: High risk cindy   Cardiovascular:  Exercise tolerance: good (>4 METS),   (+) hypertension:, hyperlipidemia    (-)  angina    ECG reviewed  Rhythm: regular  Rate: normal      Cleared by cardiology     Beta Blocker:  Dose within 24 Hrs         Neuro/Psych:   Negative Neuro/Psych ROS              GI/Hepatic/Renal:   (+) GERD:,           Endo/Other:    (+) Diabetes, . Abdominal:   (+) obese,           Vascular: negative vascular ROS. Other Findings:           Anesthesia Plan      general and TIVA     ASA 3       Induction: intravenous. MIPS: Prophylactic antiemetics administered. Anesthetic plan and risks discussed with patient.       Plan discussed with surgical team.                    Valerio Lozoya, APRN - CRNA   2/9/2023

## 2023-02-09 NOTE — OP NOTE
Operative Note      Patient: Silvia Calabrese  YOB: 1950  MRN: [id number]    Date of Procedure: 2/9/2023    Pre-Op Diagnosis: Benign lipomatous neoplasm of skin and subcutaneous tissue of right arm [D17.21]  Lipoma of right lower extremity [D17.23] x4    Post-Op Diagnosis:   Lipoma right upper arm, 2.1 cm  Lipoma inferior posterior right lateral leg, 1.2 cm  Lipoma middle posterior right lateral leg, 1.1 cm  Lipoma superior posterior right lateral leg, 1.2 cm  Lipoma medial posterior right leg, 2.1 cm       Procedure(s):  Excision Lipoma Right Posterior Knee x 4 and Right Upper Arm    Surgeon(s):  Merline Mix, MD    Assistant:   * No surgical staff found *    Anesthesia: Monitor Anesthesia Care    Estimated Blood Loss (mL): Minimal    Complications: None    Specimens:   ID Type Source Tests Collected by Time Destination   A : Inferior posterior right lower leg Tissue Leg SURGICAL PATHOLOGY Merline Mix, MD 2/9/2023 4216    B : Middle posterior right lateral leg Tissue Leg SURGICAL PATHOLOGY Merline Mix, MD 2/9/2023 9206    C : Superior posterior right lateral leg Tissue Leg SURGICAL PATHOLOGY Merline Mix, MD 2/9/2023 0935    D : Medial posterior right leg Tissue Leg SURGICAL PATHOLOGY Merline Mix, MD 2/9/2023 0940    E : Right posterior arm Tissue Arm SURGICAL PATHOLOGY Merline Mix, MD 2/9/2023 9584        Implants:  * No implants in log *      Drains: * No LDAs found *    Findings: all lesions consistent with lipoma    Detailed Description of Procedure:   Patient was taken to the OR and placed in a prone position. The posterior right leg was prepped and draped in the usual sterile fashion. The right upper arm was prepped and draped in the usual sterile fashion. The lesions were marked with the patient preoperatively. We made transverse incisions on the leg lesions with a 15 blade scalpel after injecting local anesthetic. We used 1% lidocaine with epinephrine and 0.25% Marcaine 50-50 mixture.     We then excised down to the subcutaneous tissue the lipoma popped up and we were able to remove them fairly easily. The most of them were encapsulated. We had 4 lipomas on the lower right leg or posterior knee area. They range from 1.1 to 2.1 cm in size. We closed the incisions with 4-0 Monocryl subcuticular sutures. We then addressed the lipoma on the right upper arm we made a transverse incision over this mass after injecting local.  We removed it in a similar fashion and closed it in a similar fashion to the legs incisions.   Steri-Strips were applied patient was seen by covering in stable condition    Electronically signed by Marcelle Wilson MD on 2/9/2023 at 10:16 AM

## 2023-02-09 NOTE — PROGRESS NOTES
02/09/23 1228   Encounter Summary   Encounter Overview/Reason  Initial Encounter   Service Provided For: Patient and family together   Referral/Consult From: Rounding   Support System Spouse   Last Encounter  02/09/23   Complexity of Encounter Low   Begin Time 1120   End Time  1128   Total Time Calculated 8 min   Encounter    Type Post-Procedural   Assessment/Intervention/Outcome   Assessment Calm   Intervention Prayer (assurance of)/[place];Sustaining Presence/Ministry of presence   Outcome Engaged in conversation;Expressed Gratitude

## 2023-02-09 NOTE — PROGRESS NOTES
02/09/23 1228   Encounter Summary   Encounter Overview/Reason  Initial Encounter   Service Provided For: Patient and family together   Referral/Consult From: Rounding   Support System Spouse   Last Encounter  02/09/23   Complexity of Encounter Low   Begin Time 1120   End Time  1128   Total Time Calculated 8 min   Encounter    Type Post-Procedural   Assessment/Intervention/Outcome   Assessment Calm   Intervention Prayer (assurance of)/[place];Sustaining Presence/Ministry of presence   Outcome Engaged in conversation;Expressed Gratitude

## 2023-02-09 NOTE — PROGRESS NOTES
rounding on Surgery    Assessment: Patient is waiting for discharge. Her  present for support. Intervention: Engaged in conversation. Patient expressed appreciation for visit and offer of continued prayer. Plan: Chaplains are available on site or on call 24/7 for spiritual and emotional support.

## 2023-02-09 NOTE — H&P
Gale Humphries is a 71 y.o. female      CC:    Lipoma     HISTORY OF PRESENT ILLNESS:    Pt is 72 yo F with multiple lipomas that she thinks are causing pain. Right upper arm and right posterior knee. Have been there for last year and now are getting very painful. She thinks they are pushing on nerves causing pain.           Review of Systems:    General:  Fever: Negative  Weight Change:Negative  Night Sweats: Negative    Eye:  Blurry Vision:Negative  Double Vision: Negative    Ent:  Headaches: Negative  Sore throat: Negative    Allergy/Immunology:  Hives: Negative  Latex allergy: Negative    Hematology/Lymphatic:  Bleeding Problems: Negative  Blood Clots: Negative  Swollen Lymph Nodes: Negative    Lungs:  Cough: Negative  SOB: Negative  Wheezing:Negative    Cardiovascular:  Chest Pain: Negative  Palpitations:Negative    GI:   Decreased Appetite: Negative  Heartburn: Negative  Dysphagia: Negative  Nausea/Vomiting: Negative  Abdominal Pain: Negative  Change in Bowels:Negative  Constipation: Negative  Diarrhea: Negative  Rectal Bleeding: Negative    :   Dysuria: Negative  Increase Urinary Frequency/Urgency: Negative    Neuro:  Seizures: Negative  Confusion: Negative        PAST MEDICAL HISTORY:      Family History   Problem Relation Age of Onset    Prostate Cancer Father     Cancer Sister         breast    Breast Cancer Sister 61    Cancer Sister         colorectal    Breast Cancer Sister 36    Other Mother         ulcerative colitis    Other Brother 79        coronary artery disease    Other Son         ulcerative colitis    Breast Cancer Paternal Aunt      Social History     Socioeconomic History    Marital status:      Spouse name: Not on file    Number of children: Not on file    Years of education: Not on file    Highest education level: Not on file   Occupational History    Not on file   Tobacco Use    Smoking status: Never     Passive exposure: Yes    Smokeless tobacco: Never    Tobacco comments: very remotely would smoke a few cigarettes on weekends/college days   Vaping Use    Vaping Use: Never used   Substance and Sexual Activity    Alcohol use: Yes     Comment: occasional use    Drug use: No    Sexual activity: Not on file   Other Topics Concern    Not on file   Social History Narrative    Not on file     Social Determinants of Health     Financial Resource Strain: Low Risk     Difficulty of Paying Living Expenses: Not very hard   Food Insecurity: No Food Insecurity    Worried About Running Out of Food in the Last Year: Never true    Ran Out of Food in the Last Year: Never true   Transportation Needs: Not on file   Physical Activity: Sufficiently Active    Days of Exercise per Week: 5 days    Minutes of Exercise per Session: 40 min   Stress: Not on file   Social Connections: Not on file   Intimate Partner Violence: Not on file   Housing Stability: Not on file     Past Surgical History:   Procedure Laterality Date    BREAST BIOPSY Right 2003    Excisional Biospy    CARPAL TUNNEL RELEASE Right 10/2021    CARPAL TUNNEL RELEASE Left 11/2021    CATARACT REMOVAL WITH IMPLANT Right 07/29/2017    Dr Abhi Oliva Left 08/05/2017    Dr Benitez Cary    COLONOSCOPY  12/2008    normal, repeat in 5 years    [address]  2003    COLONOSCOPY N/A 02/14/2019    few diverticuli, Dr. 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 Narendra Tilley  07/03/1983    [facility] GASTRO[facility]OSCOPY N/A 02/14/2019    mild gastritis, mild esophagitis, Dr. Rosales Bradshaw     Past Medical History:   Diagnosis Date    Allergic rhinitis     Anxiety     due to situational stress    Bursitis     Bursitis of hip, right     recurrent    Carpal tunnel syndrome, bilateral     GERD (gastroesophageal reflux disease)     Hyperlipidemia     Hypertension     Impaired fasting glucose     Labyrinthitis     causing continuted vertigo    Melanoma in situ ([facility] [address] 10/2017     to bridge of nose; Dr William Timmons in [facility] in situ of nose ([facility] 10/05/2017    biopsy confirmed on 10/05/2017 by Dr Rivera Henderson at [facility] Osteoarthritis     Palpitations      No current facility-administered medications on file prior to encounter.      Current Outpatient Medications on File Prior to Encounter   Medication Sig Dispense Refill    scopolamine (TRANSDERM-SCOP) transdermal patch Place 1 patch onto the skin every 72 hours 2 patch 0    predniSONE (DELTASONE) 5 MG tablet TAKE 3 TABLETS BY MOUTH ONCE DAILY FOR 7 DAYS AND THEN 2 & 1/2 TABS ONCE DAILY FOR 7 DAYS, THEN 2 ONCE DAILY FOR 7 DAYS, THEN 1 & 1/2 (ONE & ONE-HALF) ONCE DAILY FOR 7 DAYS, THEN 1 ONCE DAILY FOR 7 DAYS AND THEN 1/2 ONCE DAILY FOR 7 DAYS      metFORMIN (GLUCOPHAGE-XR) 500 MG extended release tablet Take 1 tablet by mouth 2 times daily 180 tablet 1    meloxicam (MOBIC) 7.5 MG tablet TAKE 1 TABLET ONCE A DAY 90 tablet 1    atenolol (TENORMIN) 25 MG tablet Take 1 tablet by mouth daily 90 tablet 1    montelukast (SINGULAIR) 10 MG tablet Take 1 tablet by mouth daily 90 tablet 1    cetirizine (ZYRTEC ALLERGY) 10 MG tablet Take 1 tablet by mouth daily 90 tablet 1    blood glucose monitor strips Test 1 times a day 100 strip 3    omeprazole (PRILOSEC) 20 MG delayed release capsule Take 1 capsule by mouth 2 times daily 180 capsule 1    predniSONE (DELTASONE) 20 MG tablet 1 daily 30 tablet 0    ONE TOUCH LANCETS MISC Tests 1 x daily DX. E11.9 100 each 3 atorvastatin (LIPITOR) 20 MG tablet Take 1 tablet by mouth daily 90 tablet 1    VITAMIN D PO Take 2,000 Units by mouth daily      therapeutic multivitamin-minerals (THERAGRAN-M) tablet Take 1 tablet by mouth daily. Ascorbic Acid (VITAMIN C) 500 MG tablet Take 500 mg by mouth daily. Allergies as of 01/19/2023 - Fully Reviewed 01/19/2023   Allergen Reaction Noted    Aspirin Other (See Comments) 02/23/2006    Ibuprofen Other (See Comments) 07/24/2013    Penicillin g Other (See Comments) 10/05/2020    Sulfamethoxazole-trimethoprim Nausea Only and Other (See Comments) 10/28/2016    Penicillins Rash 07/24/2013         PHYSICAL EXAM:    Blood pressure (!) 204/91, pulse 71, temperature 98.1 °F (36.7 °C), temperature source Oral, resp. rate 16, height 5' 6\" (1.676 m), weight 221 lb 12.8 oz (100.6 kg), SpO2 98 %, not currently breastfeeding. Gen:  A and O x 3, NAD, well nourished  Eyes:  Sclera non icterus, PERRL  Head:  Normocephalic, non-tender  Neck:  Supple, no adenopathy, thyroid non tender and no masses,no carotid bruits  Lungs:  CTA, symmetrical  Chest:  RRR, no murmurs  Abd:  Soft, NT, ND, no HSM, no hernias, no bruits  Ext:  No edema, no cyanosis  Psych: reveals appropriate mood, memory and judgment,  Neuro:  Reveals no gross motor or sensory deficits,   Msk:  5/5 strength all 4 extremities, no joint tenderness    Lipoma: 1.5 to 2 cm lipoma type lesion right upper arm      No obvious distinct lesion right posterior knee, more just mild SQ lumpiness, may be lipoma or normal SQ tissue.         ASSESS MENT:    Lipoma right upper arm  4 small lipomas right posterior knee    All causing pain and getting larger      PLAN:    To OR for excision of mulitple lipomas , right upper arm and right leg

## 2023-02-09 NOTE — DISCHARGE INSTRUCTIONS
Norco for pain, # 10 tabs escribed  May remove the dressings and may shower  Regular diet and regular activity  Will call pt with results,   May follow up PRN with me. If any questions about the incisions , please call our office. Discharge Instructions following lesion removal    Activity - In general, you should plan on resting today, but you may resume any activity that is reasonably comfortable for you. You received sedative medication today. DO NOT DRIVE A CAR, OPERATE MACHINERY OR PARTICIPATE IN ANY ACTIVITY WHICH HAS A SIGNIFICANT RISK FOR INJURY for at least 24 hours following surgery or while taking narcotic medication. Most people are able to return to work the day following surgery. Get specific instructions from your surgeon if your job requires heavy lifting/strenuous activity. Diet - You may resume your usual diet (including any previous restrictions) unless otherwise instructed. AVOID ALCOHOL WHILE TAKING PAIN MEDICATIONS. Medications - Unless otherwise instructed, resume all of your usual medications. In addition, you will be given a prescription for pain medication which should be taken only as labeled. Keep in mind that narcotic medications can cause constipation. You may wish to take an OTC stool softener. Wound Care - You may have stitches, staples and/or paper tapes over your incision(s). In general these need no special care, but you should do the following:  Leave your bandages intact for 24 hours. After that time they may be removed and left off if desired. If your bandages get wet, replace them. Avoid getting your incisions and bandages wet for 24 hours after surgery. After 24 hours you may shower or sponge bathe, do not soak in a tub. Do not scrub your incisions or apply ointments or lotions, unless otherwise instructed. If you have paper tapes on your incision (steri-strips, butterfly bandages, etc.), you may get them wet.   Typically they will loosen and fall off in 5-7 days. You may apply bandages to your incision(s) if you wish. No soaking in bath tub, hot tub or swimming until cleared by your surgeon. It is not unusual to have a small amount of clear or bloody drainage for the first day or two following surgery. You should call if your incisions drain pus or creamy, foul-smelling fluid; or if the drainage persists in significant amounts longer than 48 hours. You should also call if your incision(s) become red, hot or painful. Follow-up - As scheduled. Notify your physician if you have any of the following problems:  Increased redness, swelling or warmth at your incision sites. Foul-smelling creamy/pus draining from incision sites. Relentless pain, nausea, vomiting, bleeding or drainage.   Temperature >100.5    Problems / Questions - Call [phone] [facility]) or after hours call [phone] [facility])

## 2023-02-09 NOTE — ANESTHESIA POSTPROCEDURE EVALUATION
Department of Anesthesiology  Postprocedure Note    Patient: Huyen Mojica  MRN: [id number]  YOB: 1950  Date of evaluation: 2/9/2023      Procedure Summary     Date: 02/09/23 Room / Location: [address]    Anesthesia Start: 0857 Anesthesia Stop: 1012    Procedure: Excision Lipoma Right Posterior Knee x 4 and Right Upper Arm (Right: Knee) Diagnosis:       Benign lipomatous neoplasm of skin and subcutaneous tissue of right arm      Lipoma of right lower extremity      (Benign lipomatous neoplasm of skin and subcutaneous tissue of right arm [D17.21])      (Lipoma of right lower extremity [D17.23])    Surgeons: Homar Montano MD Responsible Provider: JUDE Linton CRNA    Anesthesia Type: general, TIVA ASA Status: 3          Anesthesia Type: No value filed.    Lissette Phase I:      Lissette Phase II:        Anesthesia Post Evaluation    Patient location during evaluation: PACU  Patient participation: complete - patient participated  Level of consciousness: awake and alert  Pain score: 0  Airway patency: patent  Nausea & Vomiting: no nausea and no vomiting  Complications: no  Cardiovascular status: blood pressure returned to baseline and hemodynamically stable  Respiratory status: acceptable, spontaneous ventilation and room air  Hydration status: euvolemic

## 2023-02-10 LAB — DERMATOLOGY PATHOLOGY REPORT: NORMAL

## 2023-02-13 NOTE — TELEPHONE ENCOUNTER
This was for her procedure that was done on 2/9/2023 (last Thursday) with Dr. Santos Lopez. We got this clearance late last Wednesday.

## 2023-04-06 DIAGNOSIS — E11.9 DIABETES MELLITUS TYPE II, CONTROLLED (HCC): ICD-10-CM

## 2023-04-06 RX ORDER — LANCETS 33 GAUGE
EACH MISCELLANEOUS
Qty: 100 EACH | Refills: 3 | Status: SHIPPED | OUTPATIENT
Start: 2023-04-06

## 2023-04-06 NOTE — TELEPHONE ENCOUNTER
Rose Mary Mathis called requesting a refill of the below medication which has been pended for you:     Requested Prescriptions     Pending Prescriptions Disp Refills    Lancets ([address]) [address] [facility] [Pharmacy Med Name: Levi KENT [address] [facility]] 245 each 0     Sig: TEST 1 TIME DAILY       Last Appointment Date: 12/6/2022  Next Appointment Date: 6/6/2023    Allergies   Allergen Reactions    Aspirin Other (See Comments)     Abdominal pain    Ibuprofen Other (See Comments)     Abdominal pain    Penicillin G Other (See Comments)    Sulfamethoxazole-Trimethoprim Nausea Only and Other (See Comments)    Penicillins Rash

## 2023-05-15 ENCOUNTER — TELEPHONE (OUTPATIENT)
Dept: FAMILY MEDICINE CLINIC | Age: 73
End: 2023-05-15

## 2023-05-15 ENCOUNTER — OFFICE VISIT (OUTPATIENT)
Dept: FAMILY MEDICINE CLINIC | Age: 73
End: 2023-05-15
Payer: MEDICARE

## 2023-05-15 DIAGNOSIS — M35.3 POLYMYALGIA RHEUMATICA (HCC): Primary | ICD-10-CM

## 2023-05-15 DIAGNOSIS — E78.2 MIXED HYPERLIPIDEMIA: ICD-10-CM

## 2023-05-15 DIAGNOSIS — I10 ESSENTIAL HYPERTENSION: ICD-10-CM

## 2023-05-15 DIAGNOSIS — E11.42 TYPE 2 DIABETES MELLITUS WITH DIABETIC POLYNEUROPATHY, WITHOUT LONG-TERM CURRENT USE OF INSULIN (HCC): ICD-10-CM

## 2023-05-15 PROCEDURE — 99212 OFFICE O/P EST SF 10 MIN: CPT | Performed by: FAMILY MEDICINE

## 2023-05-15 PROCEDURE — 99214 OFFICE O/P EST MOD 30 MIN: CPT | Performed by: FAMILY MEDICINE

## 2023-05-15 PROCEDURE — 3017F COLORECTAL CA SCREEN DOC REV: CPT | Performed by: FAMILY MEDICINE

## 2023-05-15 PROCEDURE — 1036F TOBACCO NON-USER: CPT | Performed by: FAMILY MEDICINE

## 2023-05-15 PROCEDURE — G8417 CALC BMI ABV UP PARAM F/U: HCPCS | Performed by: FAMILY MEDICINE

## 2023-05-15 PROCEDURE — G8427 DOCREV CUR MEDS BY ELIG CLIN: HCPCS | Performed by: FAMILY MEDICINE

## 2023-05-15 PROCEDURE — 3046F HEMOGLOBIN A1C LEVEL >9.0%: CPT | Performed by: FAMILY MEDICINE

## 2023-05-15 PROCEDURE — 1123F ACP DISCUSS/DSCN MKR DOCD: CPT | Performed by: FAMILY MEDICINE

## 2023-05-15 PROCEDURE — 1090F PRES/ABSN URINE INCON ASSESS: CPT | Performed by: FAMILY MEDICINE

## 2023-05-15 PROCEDURE — 2022F DILAT RTA XM EVC RTNOPTHY: CPT | Performed by: FAMILY MEDICINE

## 2023-05-15 PROCEDURE — G8400 PT W/DXA NO RESULTS DOC: HCPCS | Performed by: FAMILY MEDICINE

## 2023-05-15 RX ORDER — LACTOBACILLUS RHAMNOSUS GG 10B CELL
1 CAPSULE ORAL DAILY
COMMUNITY

## 2023-05-15 RX ORDER — LANOLIN ALCOHOL/MO/W.PET/CERES
1000 CREAM (GRAM) TOPICAL DAILY
COMMUNITY

## 2023-05-15 RX ORDER — CAL/D3/MAG11/ZINC/COP/MANG/BOR 600 MG-800
1 TABLET ORAL DAILY
COMMUNITY

## 2023-05-15 RX ORDER — ACETAMINOPHEN 500 MG
1000 TABLET ORAL 3 TIMES DAILY
COMMUNITY

## 2023-05-15 RX ORDER — PREDNISONE 1 MG/1
5 TABLET ORAL DAILY
Qty: 90 TABLET | Refills: 0 | Status: SHIPPED | OUTPATIENT
Start: 2023-05-15

## 2023-05-15 SDOH — ECONOMIC STABILITY: FOOD INSECURITY: WITHIN THE PAST 12 MONTHS, THE FOOD YOU BOUGHT JUST DIDN'T LAST AND YOU DIDN'T HAVE MONEY TO GET MORE.: NEVER TRUE

## 2023-05-15 SDOH — ECONOMIC STABILITY: HOUSING INSECURITY
IN THE LAST 12 MONTHS, WAS THERE A TIME WHEN YOU DID NOT HAVE A STEADY PLACE TO SLEEP OR SLEPT IN A SHELTER (INCLUDING NOW)?: NO

## 2023-05-15 SDOH — ECONOMIC STABILITY: INCOME INSECURITY: HOW HARD IS IT FOR YOU TO PAY FOR THE VERY BASICS LIKE FOOD, HOUSING, MEDICAL CARE, AND HEATING?: NOT HARD AT ALL

## 2023-05-15 SDOH — ECONOMIC STABILITY: FOOD INSECURITY: WITHIN THE PAST 12 MONTHS, YOU WORRIED THAT YOUR FOOD WOULD RUN OUT BEFORE YOU GOT MONEY TO BUY MORE.: NEVER TRUE

## 2023-05-15 ASSESSMENT — PATIENT HEALTH QUESTIONNAIRE - PHQ9
SUM OF ALL RESPONSES TO PHQ QUESTIONS 1-9: 0
SUM OF ALL RESPONSES TO PHQ QUESTIONS 1-9: 0
1. LITTLE INTEREST OR PLEASURE IN DOING THINGS: 0
SUM OF ALL RESPONSES TO PHQ9 QUESTIONS 1 & 2: 0
SUM OF ALL RESPONSES TO PHQ QUESTIONS 1-9: 0
2. FEELING DOWN, DEPRESSED OR HOPELESS: 0
SUM OF ALL RESPONSES TO PHQ QUESTIONS 1-9: 0

## 2023-05-15 ASSESSMENT — ENCOUNTER SYMPTOMS: BACK PAIN: 1

## 2023-05-15 NOTE — TELEPHONE ENCOUNTER
Pharmacy called to clarify Prednisone script.  Advised them she is to take Prednisone 5 mg 3 tabs daily x 1 mo

## 2023-05-15 NOTE — PROGRESS NOTES
5/15/2023     Ashli Clinton (:  1950) is a 68 y.o. female, here for evaluation of the following medical concerns:    HPI    Patient comes in today to discuss ongoing symptoms and concerns regarding her diagnosed polymyalgia rheumatica. Patient has been working with the rheumatologist and is just not pleased with her overall results. She states that she did better when she was on the higher dose steroid but her issue is that she feels like he tries to taper her off too quickly. He does not seem to want to keep her on the higher dose long enough to get her pain controlled and then will titrate her down to abruptly. Once she gets down to the 7.5 mg dose that she is on now she just has such severe pain at times that she is not able to really function. Most of her pain is in her shoulders and up into her neck and she does get severe headaches as a result. She has been watching her blood sugars and she notes that she really works on her diet and her blood sugars have been actually been 2 high. Does have lab and follow-up scheduled with me at the beginning of . She has gained about 10 to 15 pounds since being on the prednisone but just feels like she needs a higher dose longer and a slower taper in order to help with her symptoms. She would like to follow her C-reactive protein and sed rate closely to make adjustments and she just feels that she is not getting good results overall with the taper as scheduled. I did advise her that as she has seen the specialist and there is a diagnosis of known PMR we can take over the prednisone taper but she is aware of the chronic long-term effects of steroid use. Ultimately the goal would be to titrate her off of the steroid altogether but we can start her back on 15 and titrate monthly over a slower titration schedule and she seems agreeable with this plan.   She has a known history of hypertension and her blood pressure is elevated today but unsure if this

## 2023-05-24 ENCOUNTER — TELEMEDICINE (OUTPATIENT)
Dept: FAMILY MEDICINE CLINIC | Age: 73
End: 2023-05-24
Payer: MEDICARE

## 2023-05-24 DIAGNOSIS — Z00.00 MEDICARE ANNUAL WELLNESS VISIT, SUBSEQUENT: Primary | ICD-10-CM

## 2023-05-24 PROCEDURE — G0439 PPPS, SUBSEQ VISIT: HCPCS | Performed by: FAMILY MEDICINE

## 2023-05-24 PROCEDURE — 3017F COLORECTAL CA SCREEN DOC REV: CPT | Performed by: FAMILY MEDICINE

## 2023-05-24 PROCEDURE — 1123F ACP DISCUSS/DSCN MKR DOCD: CPT | Performed by: FAMILY MEDICINE

## 2023-05-24 ASSESSMENT — PATIENT HEALTH QUESTIONNAIRE - PHQ9
SUM OF ALL RESPONSES TO PHQ9 QUESTIONS 1 & 2: 0
2. FEELING DOWN, DEPRESSED OR HOPELESS: 0
SUM OF ALL RESPONSES TO PHQ QUESTIONS 1-9: 0
1. LITTLE INTEREST OR PLEASURE IN DOING THINGS: 0
SUM OF ALL RESPONSES TO PHQ QUESTIONS 1-9: 0

## 2023-05-24 ASSESSMENT — LIFESTYLE VARIABLES
HOW MANY STANDARD DRINKS CONTAINING ALCOHOL DO YOU HAVE ON A TYPICAL DAY: PATIENT DOES NOT DRINK
HOW OFTEN DO YOU HAVE A DRINK CONTAINING ALCOHOL: NEVER

## 2023-05-24 NOTE — PROGRESS NOTES
Medicare Annual Wellness Visit    Mechele Schwab is here for Medicare AWV (Subsequent; last 4/26/2022)    Assessment & Plan    Recommendations for Preventive Services Due: see orders and patient instructions/AVS.  Recommended screening schedule for the next 5-10 years is provided to the patient in written form: see Patient Instructions/AVS.     No follow-ups on file. Subjective     Patient's complete Health Risk Assessment and screening values have been reviewed and are found in Flowsheets. The following problems were reviewed today and where indicated follow up appointments were made and/or referrals ordered. Positive Risk Factor Screenings with Interventions:                 Weight and Activity:  Physical Activity: Sufficiently Active    Days of Exercise per Week: 3 days    Minutes of Exercise per Session: 50 min     On average, how many days per week do you engage in moderate to strenuous exercise (like a brisk walk)?: 3 days  Have you lost any weight without trying in the past 3 months?: No  There is no height or weight on file to calculate BMI. (!) Abnormal    Obesity Interventions:  See AVS for additional education material          Objective      Patient-Reported Vitals  Patient-Reported Weight: 228lb  Patient-Reported Height: 5'6\"            Allergies   Allergen Reactions    Aspirin Other (See Comments)     Abdominal pain    Ibuprofen Other (See Comments)     Abdominal pain    Penicillin G Other (See Comments)    Sulfamethoxazole-Trimethoprim Nausea Only and Other (See Comments)    Penicillins Rash     Prior to Visit Medications    Medication Sig Taking?  Authorizing Provider   acetaminophen (TYLENOL) 500 MG tablet Take 2 tablets by mouth 3 times daily Yes Historical Provider, MD   lactobacillus (CULTURELLE) capsule Take 1 capsule by mouth daily Yes Historical Provider, MD   vitamin B-12 (CYANOCOBALAMIN) 1000 MCG tablet Take 1 tablet by mouth daily Yes Historical Provider, MD   Caltrate 600+D Plus

## 2023-05-24 NOTE — PATIENT INSTRUCTIONS
Personalized Preventive Plan for Jose Carolina - 5/24/2023  Medicare offers a range of preventive health benefits. Some of the tests and screenings are paid in full while other may be subject to a deductible, co-insurance, and/or copay. Some of these benefits include a comprehensive review of your medical history including lifestyle, illnesses that may run in your family, and various assessments and screenings as appropriate. After reviewing your medical record and screening and assessments performed today your provider may have ordered immunizations, labs, imaging, and/or referrals for you. A list of these orders (if applicable) as well as your Preventive Care list are included within your After Visit Summary for your review. Other Preventive Recommendations:    A preventive eye exam performed by an eye specialist is recommended every 1-2 years to screen for glaucoma; cataracts, macular degeneration, and other eye disorders. A preventive dental visit is recommended every 6 months. Try to get at least 150 minutes of exercise per week or 10,000 steps per day on a pedometer . Order or download the FREE \"Exercise & Physical Activity: Your Everyday Guide\" from [employer]. Call 0-487.466.3780 or search [employer] online. You need 3579-4750 mg of calcium and 8563-5588 IU of vitamin D per day. It is possible to meet your calcium requirement with diet alone, but a vitamin D supplement is usually necessary to meet this goal.  When exposed to the sun, use a sunscreen that protects against both UVA and UVB radiation with an SPF of 30 or greater. Reapply every 2 to 3 hours or after sweating, drying off with a towel, or swimming. Always wear a seat belt when traveling in a car. Always wear a helmet when riding a bicycle or motorcycle. Heart-Healthy Diet   Sodium, Fat, and Cholesterol Controlled Diet       What Is a Heart Healthy Diet?    A heart-healthy diet is one that

## 2023-05-30 ENCOUNTER — HOSPITAL ENCOUNTER (OUTPATIENT)
Dept: MAMMOGRAPHY | Age: 73
Discharge: HOME | End: 2023-06-01
Payer: MEDICARE

## 2023-05-30 DIAGNOSIS — Z12.31 VISIT FOR SCREENING MAMMOGRAM: ICD-10-CM

## 2023-05-30 PROCEDURE — 77063 BREAST TOMOSYNTHESIS BI: CPT

## 2023-05-31 ENCOUNTER — HOSPITAL ENCOUNTER (OUTPATIENT)
Age: 73
Discharge: HOME | End: 2023-05-31
Payer: MEDICARE

## 2023-05-31 DIAGNOSIS — I10 ESSENTIAL HYPERTENSION: ICD-10-CM

## 2023-05-31 DIAGNOSIS — E78.2 MIXED HYPERLIPIDEMIA: ICD-10-CM

## 2023-05-31 DIAGNOSIS — M35.3 POLYMYALGIA RHEUMATICA (HCC): ICD-10-CM

## 2023-05-31 DIAGNOSIS — E11.42 TYPE 2 DIABETES MELLITUS WITH DIABETIC POLYNEUROPATHY, WITHOUT LONG-TERM CURRENT USE OF INSULIN (HCC): ICD-10-CM

## 2023-05-31 LAB
ALBUMIN SERPL-MCNC: 4.1 G/DL (ref 3.5–5.2)
ALBUMIN/GLOB SERPL: 1.5 {RATIO} (ref 1–2.5)
ALP SERPL-CCNC: 59 U/L (ref 35–104)
ALT SERPL-CCNC: 15 U/L (ref 5–33)
ANION GAP SERPL CALCULATED.3IONS-SCNC: 10 MMOL/L (ref 9–17)
AST SERPL-CCNC: 20 U/L
BASOPHILS # BLD: 0.04 K/UL (ref 0–0.2)
BASOPHILS NFR BLD: 0 % (ref 0–2)
BILIRUB SERPL-MCNC: 0.3 MG/DL (ref 0.3–1.2)
BUN SERPL-MCNC: 16 MG/DL (ref 8–23)
BUN/CREAT SERPL: 18 (ref 9–20)
CALCIUM SERPL-MCNC: 9.9 MG/DL (ref 8.6–10.4)
CHLORIDE SERPL-SCNC: 103 MMOL/L (ref 98–107)
CHOLEST SERPL-MCNC: 197 MG/DL
CHOLESTEROL/HDL RATIO: 4.5
CO2 SERPL-SCNC: 30 MMOL/L (ref 20–31)
CREAT SERPL-MCNC: 0.88 MG/DL (ref 0.5–0.9)
CRP SERPL HS-MCNC: 8.2 MG/L (ref 0–5)
EOSINOPHIL # BLD: 0.18 K/UL (ref 0–0.44)
EOSINOPHILS RELATIVE PERCENT: 2 % (ref 1–4)
ERYTHROCYTE [DISTWIDTH] IN BLOOD BY AUTOMATED COUNT: 14.2 % (ref 11.8–14.4)
ERYTHROCYTE [SEDIMENTATION RATE] IN BLOOD: 23 MM/HR (ref 0–30)
EST. AVERAGE GLUCOSE BLD GHB EST-MCNC: 140 MG/DL
GFR SERPL CREATININE-BSD FRML MDRD: >60 ML/MIN/1.73M2
GLUCOSE SERPL-MCNC: 108 MG/DL (ref 70–99)
HBA1C MFR BLD: 6.5 % (ref 4–6)
HCT VFR BLD AUTO: 40.9 % (ref 36.3–47.1)
HDLC SERPL-MCNC: 44 MG/DL
HGB BLD-MCNC: 13 G/DL (ref 11.9–15.1)
IMM GRANULOCYTES # BLD AUTO: 0.03 K/UL (ref 0–0.3)
IMM GRANULOCYTES NFR BLD: 0 %
LDLC SERPL CALC-MCNC: 112 MG/DL (ref 0–130)
LYMPHOCYTES # BLD: 32 % (ref 24–43)
LYMPHOCYTES NFR BLD: 2.98 K/UL (ref 1.1–3.7)
MCH RBC QN AUTO: 29 PG (ref 25.2–33.5)
MCHC RBC AUTO-ENTMCNC: 31.8 G/DL (ref 25.2–33.5)
MCV RBC AUTO: 91.3 FL (ref 82.6–102.9)
MONOCYTES NFR BLD: 0.77 K/UL (ref 0.1–1.2)
MONOCYTES NFR BLD: 8 % (ref 3–12)
NEUTROPHILS NFR BLD: 58 % (ref 36–65)
NEUTS SEG NFR BLD: 5.26 K/UL (ref 1.5–8.1)
NRBC AUTOMATED: 0 PER 100 WBC
PLATELET # BLD AUTO: 253 K/UL (ref 138–453)
PMV BLD AUTO: 9.5 FL (ref 8.1–13.5)
POTASSIUM SERPL-SCNC: 4.1 MMOL/L (ref 3.7–5.3)
PROT SERPL-MCNC: 6.9 G/DL (ref 6.4–8.3)
RBC # BLD AUTO: 4.48 M/UL (ref 3.95–5.11)
SODIUM SERPL-SCNC: 143 MMOL/L (ref 135–144)
TRIGL SERPL-MCNC: 203 MG/DL
WBC OTHER # BLD: 9.3 K/UL (ref 3.5–11.3)

## 2023-05-31 PROCEDURE — 80053 COMPREHEN METABOLIC PANEL: CPT

## 2023-05-31 PROCEDURE — 85025 COMPLETE CBC W/AUTO DIFF WBC: CPT

## 2023-05-31 PROCEDURE — 83036 HEMOGLOBIN GLYCOSYLATED A1C: CPT

## 2023-05-31 PROCEDURE — 80061 LIPID PANEL: CPT

## 2023-05-31 PROCEDURE — 86140 C-REACTIVE PROTEIN: CPT

## 2023-05-31 PROCEDURE — 85652 RBC SED RATE AUTOMATED: CPT

## 2023-05-31 PROCEDURE — 36415 COLL VENOUS BLD VENIPUNCTURE: CPT

## 2023-06-07 ENCOUNTER — OFFICE VISIT (OUTPATIENT)
Dept: FAMILY MEDICINE CLINIC | Age: 73
End: 2023-06-07
Payer: MEDICARE

## 2023-06-07 VITALS
BODY MASS INDEX: 36.16 KG/M2 | DIASTOLIC BLOOD PRESSURE: 80 MMHG | HEIGHT: 66 IN | SYSTOLIC BLOOD PRESSURE: 130 MMHG | OXYGEN SATURATION: 93 % | TEMPERATURE: 97 F | RESPIRATION RATE: 18 BRPM | WEIGHT: 225 LBS | HEART RATE: 68 BPM

## 2023-06-07 DIAGNOSIS — I10 ESSENTIAL HYPERTENSION: ICD-10-CM

## 2023-06-07 DIAGNOSIS — M54.2 NECK PAIN: ICD-10-CM

## 2023-06-07 DIAGNOSIS — M35.3 POLYMYALGIA RHEUMATICA (HCC): ICD-10-CM

## 2023-06-07 DIAGNOSIS — M79.10 MYALGIA: ICD-10-CM

## 2023-06-07 DIAGNOSIS — E78.2 MIXED HYPERLIPIDEMIA: ICD-10-CM

## 2023-06-07 DIAGNOSIS — G63 POLYNEUROPATHY ASSOCIATED WITH UNDERLYING DISEASE (HCC): ICD-10-CM

## 2023-06-07 DIAGNOSIS — E11.42 TYPE 2 DIABETES MELLITUS WITH DIABETIC POLYNEUROPATHY, WITHOUT LONG-TERM CURRENT USE OF INSULIN (HCC): Primary | ICD-10-CM

## 2023-06-07 PROCEDURE — 99213 OFFICE O/P EST LOW 20 MIN: CPT | Performed by: FAMILY MEDICINE

## 2023-06-07 PROCEDURE — 20552 NJX 1/MLT TRIGGER POINT 1/2: CPT | Performed by: FAMILY MEDICINE

## 2023-06-07 RX ORDER — TRIAMCINOLONE ACETONIDE 40 MG/ML
40 INJECTION, SUSPENSION INTRA-ARTICULAR; INTRAMUSCULAR ONCE
Status: COMPLETED | OUTPATIENT
Start: 2023-06-07 | End: 2023-06-07

## 2023-06-07 RX ORDER — PREDNISONE 5 MG/1
10 TABLET ORAL DAILY
Qty: 60 TABLET | Refills: 1 | Status: SHIPPED | OUTPATIENT
Start: 2023-06-07

## 2023-06-07 RX ORDER — ATORVASTATIN CALCIUM 10 MG/1
10 TABLET, FILM COATED ORAL DAILY
Qty: 90 TABLET | Refills: 1 | Status: SHIPPED | OUTPATIENT
Start: 2023-06-07

## 2023-06-07 RX ORDER — PREDNISONE 2.5 MG/1
2.5 TABLET ORAL DAILY
Qty: 30 TABLET | Refills: 0 | Status: SHIPPED | OUTPATIENT
Start: 2023-06-07 | End: 2023-07-07

## 2023-06-07 RX ADMIN — TRIAMCINOLONE ACETONIDE 40 MG: 40 INJECTION, SUSPENSION INTRA-ARTICULAR; INTRAMUSCULAR at 08:43

## 2023-06-07 ASSESSMENT — ENCOUNTER SYMPTOMS
WHEEZING: 0
DIARRHEA: 0
EYE DISCHARGE: 0
COUGH: 0
VOMITING: 0
TROUBLE SWALLOWING: 0
SINUS PRESSURE: 0
SORE THROAT: 0
EYE REDNESS: 0
ABDOMINAL PAIN: 0
RHINORRHEA: 0
NAUSEA: 0
SHORTNESS OF BREATH: 0
CONSTIPATION: 0
BACK PAIN: 1

## 2023-06-07 NOTE — PROGRESS NOTES
Referral Reason:   Specialty Services Required     Requested Specialty:   Massage Therapy     Number of Visits Requested:   1    HM DIABETES FOOT EXAM    NE INJECTION SINGLE/MLT TRIGGER POINT 1/2 MUSCLES       Trigger Point Injection Procedure Note  Indication: Severe pain and pain control    Procedure: The patient was placed in the appropriate position and the area over the trigger point was prepped with alcohol. Injection was performed into the trigger point area using 1 cc of Kenalog (triamcinalone 40mg/ml), mixed with 2 cc of 1% Lidocaine without epinephrine. The injection site was covered with a bandage. The patient tolerated the procedure well. Complications: None    We will titrate her prednisone dose down to 12.5 mg daily for a month and then recheck her C-reactive protein and sed rate. If doing well at that time we will then titrate her down to 10 mg daily for an additional month. After that plan will be to decrease by 1 mg dosing per month until we titrate her down to the lowest effective dose. Did give her prescription for massage treatment. Patient is to continue to follow a low-carb/low sugar/low-fat diet and increase exercise for optimal blood sugar and cholesterol control. Patient is given a low-dose of Lipitor therapy to restart. Patient is to continue on the rest of her current medical therapy. No additional changes are made at this time. Patient is to return to my office in 6 months duration or sooner if any further problems or symptoms arise. (Please note that portions of this note were completed with a voice recognition program. Efforts were made to edit the dictations but occasionally words are mis-transcribed.)        No follow-ups on file. An electronic signature was used to authenticate this note.     --Reggie Rodriges DO on 6/7/2023 at 6:41 AM

## 2023-06-07 NOTE — PATIENT INSTRUCTIONS
05/31/2023 0.3  0.3 - 1.2 mg/dL Final    Total Protein 05/31/2023 6.9  6.4 - 8.3 g/dL Final    Albumin 05/31/2023 4.1  3.5 - 5.2 g/dL Final    Albumin/Globulin Ratio 05/31/2023 1.5  1.0 - 2.5 Final

## 2023-06-21 RX ORDER — OMEPRAZOLE 20 MG/1
20 CAPSULE, DELAYED RELEASE ORAL 2 TIMES DAILY
Qty: 180 CAPSULE | Refills: 1 | Status: SHIPPED | OUTPATIENT
Start: 2023-06-21

## 2023-06-21 RX ORDER — MONTELUKAST SODIUM 10 MG/1
10 TABLET ORAL DAILY
Qty: 90 TABLET | Refills: 1 | Status: SHIPPED | OUTPATIENT
Start: 2023-06-21

## 2023-06-21 NOTE — TELEPHONE ENCOUNTER
Jose Elam called requesting a refill of the below medication which has been pended for you:     Requested Prescriptions     Pending Prescriptions Disp Refills    montelukast (SINGULAIR) 10 MG tablet 90 tablet 1     Sig: Take 1 tablet by mouth daily       Last Appointment Date: 6/7/2023  Next Appointment Date: 6/21/2023    Allergies   Allergen Reactions    Aspirin Other (See Comments)     Abdominal pain    Ibuprofen Other (See Comments)     Abdominal pain    Penicillin G Other (See Comments)    Sulfamethoxazole-Trimethoprim Nausea Only and Other (See Comments)    Penicillins Rash

## 2023-07-05 RX ORDER — MELOXICAM 7.5 MG/1
TABLET ORAL
Qty: 90 TABLET | Refills: 1 | Status: SHIPPED | OUTPATIENT
Start: 2023-07-05

## 2023-07-05 RX ORDER — ATENOLOL 25 MG/1
25 TABLET ORAL DAILY
Qty: 90 TABLET | Refills: 1 | Status: SHIPPED | OUTPATIENT
Start: 2023-07-05

## 2023-07-05 RX ORDER — METFORMIN HYDROCHLORIDE 500 MG/1
500 TABLET, EXTENDED RELEASE ORAL 2 TIMES DAILY
Qty: 180 TABLET | Refills: 1 | Status: SHIPPED | OUTPATIENT
Start: 2023-07-05

## 2023-07-05 NOTE — TELEPHONE ENCOUNTER
Lilly Records called requesting a refill of the below medication which has been pended for you:     Requested Prescriptions     Pending Prescriptions Disp Refills    metFORMIN (GLUCOPHAGE-XR) 500 MG extended release tablet 180 tablet 1     Sig: Take 1 tablet by mouth 2 times daily    meloxicam (MOBIC) 7.5 MG tablet 90 tablet 1     Sig: TAKE 1 TABLET ONCE A DAY    atenolol (TENORMIN) 25 MG tablet 90 tablet 1     Sig: Take 1 tablet by mouth daily       Last Appointment Date: 6/7/2023  Next Appointment Date: 12/7/2023    Allergies   Allergen Reactions    Aspirin Other (See Comments)     Abdominal pain    Ibuprofen Other (See Comments)     Abdominal pain    Penicillin G Other (See Comments)    Sulfamethoxazole-Trimethoprim Nausea Only and Other (See Comments)    Penicillins Rash

## 2023-07-11 ENCOUNTER — HOSPITAL ENCOUNTER (OUTPATIENT)
Age: 73
Discharge: HOME | End: 2023-07-11
Payer: MEDICARE

## 2023-07-11 DIAGNOSIS — M35.3 POLYMYALGIA RHEUMATICA (HCC): Primary | ICD-10-CM

## 2023-07-11 DIAGNOSIS — M35.3 POLYMYALGIA RHEUMATICA (HCC): ICD-10-CM

## 2023-07-11 LAB
CRP SERPL HS-MCNC: 7.3 MG/L (ref 0–5)
ERYTHROCYTE [SEDIMENTATION RATE] IN BLOOD: 10 MM/HR (ref 0–30)

## 2023-07-11 PROCEDURE — 86140 C-REACTIVE PROTEIN: CPT

## 2023-07-11 PROCEDURE — 85652 RBC SED RATE AUTOMATED: CPT

## 2023-07-11 PROCEDURE — 36415 COLL VENOUS BLD VENIPUNCTURE: CPT

## 2023-08-08 DIAGNOSIS — M35.3 POLYMYALGIA RHEUMATICA (HCC): Primary | ICD-10-CM

## 2023-08-09 NOTE — TELEPHONE ENCOUNTER
Rafael Iniguez called requesting a refill of the below medication which has been pended for you:     Requested Prescriptions     Pending Prescriptions Disp Refills    predniSONE (DELTASONE) 5 MG tablet 60 tablet 1     Sig: Take 2 tablets by mouth daily       Last Appointment Date: 6/7/2023  Next Appointment Date: 12/7/2023    Allergies   Allergen Reactions    Aspirin Other (See Comments)     Abdominal pain    Ibuprofen Other (See Comments)     Abdominal pain    Penicillin G Other (See Comments)    Sulfamethoxazole-Trimethoprim Nausea Only and Other (See Comments)    Penicillins Rash

## 2023-08-09 NOTE — TELEPHONE ENCOUNTER
Patient was to have a sed rate and creactive protein level checked and then was going to decide if we should titrate her down to 9mg prednisone daily. Check with patient to see if she has enough to get by until she can get her lab work done and we can get results.

## 2023-08-09 NOTE — TELEPHONE ENCOUNTER
Patient made aware to come in and get labs completed and then we would call with the Prednisone dosage we wanted to continue.

## 2023-08-10 ENCOUNTER — HOSPITAL ENCOUNTER (OUTPATIENT)
Age: 73
Discharge: HOME | End: 2023-08-10
Payer: MEDICARE

## 2023-08-10 DIAGNOSIS — M35.3 POLYMYALGIA RHEUMATICA (HCC): ICD-10-CM

## 2023-08-10 LAB
CRP SERPL HS-MCNC: 9.3 MG/L (ref 0–5)
ERYTHROCYTE [SEDIMENTATION RATE] IN BLOOD BY PHOTOMETRIC METHOD: 7 MM/HR (ref 0–30)

## 2023-08-10 PROCEDURE — 86140 C-REACTIVE PROTEIN: CPT

## 2023-08-10 PROCEDURE — 85652 RBC SED RATE AUTOMATED: CPT

## 2023-08-10 PROCEDURE — 36415 COLL VENOUS BLD VENIPUNCTURE: CPT

## 2023-08-10 RX ORDER — PREDNISONE 5 MG/1
10 TABLET ORAL DAILY
Qty: 60 TABLET | Refills: 0 | Status: SHIPPED | OUTPATIENT
Start: 2023-08-10

## 2023-08-10 NOTE — TELEPHONE ENCOUNTER
Patient completed labs and would like to stick with Prednisone 10 mg for another rmonth due to some increased pain.  Will plan to repeat bloodwork in 1 mo

## 2023-08-25 ENCOUNTER — OFFICE VISIT (OUTPATIENT)
Dept: PRIMARY CARE CLINIC | Age: 73
End: 2023-08-25
Payer: MEDICARE

## 2023-08-25 VITALS
OXYGEN SATURATION: 95 % | RESPIRATION RATE: 14 BRPM | HEART RATE: 70 BPM | WEIGHT: 224 LBS | TEMPERATURE: 97.2 F | BODY MASS INDEX: 35.16 KG/M2 | HEIGHT: 67 IN | SYSTOLIC BLOOD PRESSURE: 110 MMHG | DIASTOLIC BLOOD PRESSURE: 78 MMHG

## 2023-08-25 DIAGNOSIS — J01.40 ACUTE NON-RECURRENT PANSINUSITIS: Primary | ICD-10-CM

## 2023-08-25 DIAGNOSIS — R05.9 COUGH, UNSPECIFIED TYPE: ICD-10-CM

## 2023-08-25 DIAGNOSIS — Z20.822 EXPOSURE TO COVID-19 VIRUS: ICD-10-CM

## 2023-08-25 LAB
Lab: NORMAL
QC PASS/FAIL: NORMAL
SARS-COV-2 RDRP RESP QL NAA+PROBE: NEGATIVE

## 2023-08-25 PROCEDURE — PBSHW POCT COVID-19 RAPID, NAAT: Performed by: FAMILY MEDICINE

## 2023-08-25 PROCEDURE — 3017F COLORECTAL CA SCREEN DOC REV: CPT | Performed by: FAMILY MEDICINE

## 2023-08-25 PROCEDURE — 3078F DIAST BP <80 MM HG: CPT | Performed by: FAMILY MEDICINE

## 2023-08-25 PROCEDURE — 87635 SARS-COV-2 COVID-19 AMP PRB: CPT | Performed by: FAMILY MEDICINE

## 2023-08-25 PROCEDURE — 1123F ACP DISCUSS/DSCN MKR DOCD: CPT | Performed by: FAMILY MEDICINE

## 2023-08-25 PROCEDURE — 1090F PRES/ABSN URINE INCON ASSESS: CPT | Performed by: FAMILY MEDICINE

## 2023-08-25 PROCEDURE — 1036F TOBACCO NON-USER: CPT | Performed by: FAMILY MEDICINE

## 2023-08-25 PROCEDURE — G8427 DOCREV CUR MEDS BY ELIG CLIN: HCPCS | Performed by: FAMILY MEDICINE

## 2023-08-25 PROCEDURE — G8417 CALC BMI ABV UP PARAM F/U: HCPCS | Performed by: FAMILY MEDICINE

## 2023-08-25 PROCEDURE — 99214 OFFICE O/P EST MOD 30 MIN: CPT | Performed by: FAMILY MEDICINE

## 2023-08-25 PROCEDURE — 3074F SYST BP LT 130 MM HG: CPT | Performed by: FAMILY MEDICINE

## 2023-08-25 PROCEDURE — 99213 OFFICE O/P EST LOW 20 MIN: CPT | Performed by: FAMILY MEDICINE

## 2023-08-25 PROCEDURE — G8400 PT W/DXA NO RESULTS DOC: HCPCS | Performed by: FAMILY MEDICINE

## 2023-08-25 RX ORDER — AZITHROMYCIN 250 MG/1
TABLET, FILM COATED ORAL
Qty: 11 TABLET | Refills: 0 | Status: SHIPPED | OUTPATIENT
Start: 2023-08-25 | End: 2023-09-04

## 2023-08-25 ASSESSMENT — ENCOUNTER SYMPTOMS
EYE REDNESS: 0
DIARRHEA: 0
CONSTIPATION: 0
EYE DISCHARGE: 0
RHINORRHEA: 1
SHORTNESS OF BREATH: 0
SINUS PRESSURE: 1
TROUBLE SWALLOWING: 0
NAUSEA: 0
WHEEZING: 0
SINUS COMPLAINT: 1
COUGH: 1
SORE THROAT: 1
ABDOMINAL PAIN: 0
VOMITING: 0
SINUS PAIN: 1

## 2023-08-25 NOTE — PROGRESS NOTES
2023     Prudence Gamble (:  1950) is a 68 y.o. female, here for evaluation of the following medical concerns:    Sinus Problem  This is a new problem. The current episode started in the past 7 days. The problem has been gradually worsening since onset. There has been no fever. Associated symptoms include congestion, coughing (only from post nasal drainage), headaches, sinus pressure and a sore throat (scratchy). Pertinent negatives include no chills, ear pain, neck pain or shortness of breath. (Feels burning in bronchial region with pain. Has a \"sore stomach\". Not hungry. Just has generalized stomach upset) Past treatments include acetaminophen and oral decongestants. The treatment provided no relief. Did review patient's med list, allergies, social history,pmhx and pshx today as noted in the record. Review of Systems   Constitutional:  Negative for chills, fatigue and fever. HENT:  Positive for congestion, postnasal drip, rhinorrhea, sinus pressure, sinus pain and sore throat (scratchy). Negative for ear pain and trouble swallowing. Eyes:  Negative for discharge and redness. Respiratory:  Positive for cough (only from post nasal drainage). Negative for shortness of breath and wheezing. Cardiovascular:  Negative for chest pain. Gastrointestinal:  Negative for abdominal pain, constipation, diarrhea, nausea and vomiting. Genitourinary:  Negative for dysuria, flank pain, frequency and urgency. Musculoskeletal:  Negative for arthralgias, myalgias and neck pain. Skin:  Negative for rash and wound. Allergic/Immunologic: Negative for environmental allergies. Neurological:  Positive for headaches. Negative for dizziness, weakness and light-headedness. Hematological:  Negative for adenopathy. Psychiatric/Behavioral: Negative. Prior to Visit Medications    Medication Sig Taking?  Authorizing Provider   predniSONE (DELTASONE) 5 MG tablet Take 2 tablets by mouth daily

## 2023-08-30 ENCOUNTER — PATIENT MESSAGE (OUTPATIENT)
Dept: PRIMARY CARE CLINIC | Age: 73
End: 2023-08-30

## 2023-08-31 RX ORDER — PREDNISONE 20 MG/1
TABLET ORAL
Qty: 10 TABLET | Refills: 0 | Status: SHIPPED | OUTPATIENT
Start: 2023-08-31

## 2023-08-31 NOTE — TELEPHONE ENCOUNTER
From: Makenzie Laura  To: Dr. Tash Davila: 8/30/2023 1:58 PM EDT  Subject: pmr    I think I may be having a flare-up of my pmr. I am still getting headaches, stomachaches, chills and just not feeling well. This started on the 23rd. I have four days left of antibiotic but not really noticing a difference in how I feel. These are the same symptoms I got last year in August when this pmr started. Should I go up in the prednisone for a few days? Thanks.  Jere Lora

## 2023-09-14 ENCOUNTER — PATIENT MESSAGE (OUTPATIENT)
Dept: PRIMARY CARE CLINIC | Age: 73
End: 2023-09-14

## 2023-09-14 NOTE — TELEPHONE ENCOUNTER
Keanu Adelita, [place] 9/14/2023 4:02 PM EDT      ----- Message -----  From: Deborah Veloz  Sent: 9/14/2023 3:47 PM EDT  To: [facility]  Subject: vaccines     Hi I am feeling much better, I am back to the 10 mg of prednisone. I have about ten days left of the 5mg ,so I will need a refill on them. I am thinking of getting the flu , rsv , and the covid booster. Are there any I should be concerned about getting?   Thanks ,Elliot Bunch

## 2023-09-15 RX ORDER — PREDNISONE 5 MG/1
10 TABLET ORAL DAILY
Qty: 60 TABLET | Refills: 0 | Status: SHIPPED | OUTPATIENT
Start: 2023-09-15

## 2023-10-24 ENCOUNTER — PATIENT MESSAGE (OUTPATIENT)
Dept: PRIMARY CARE CLINIC | Age: 73
End: 2023-10-24

## 2023-10-24 RX ORDER — PREDNISONE 5 MG/1
5 TABLET ORAL DAILY
Qty: 30 TABLET | Refills: 0 | Status: SHIPPED | OUTPATIENT
Start: 2023-10-24 | End: 2023-11-23

## 2023-10-24 RX ORDER — PREDNISONE 1 MG/1
4 TABLET ORAL DAILY
Qty: 120 TABLET | Refills: 0 | Status: SHIPPED | OUTPATIENT
Start: 2023-10-24

## 2023-10-24 NOTE — TELEPHONE ENCOUNTER
Aris Harmon, [place] 10/24/2023 10:47 AM EDT      ----- Message -----  From: Kim Cervantes  Sent: 10/24/2023 6:58 AM EDT  To: [facility]  Subject: prednisone     I need a new prescription for prednisone. I would like to slowly decrease the dosage. However you want to do it. It has been a little better lately. I started taking my mobic at supper and it seems to make a difference. I'm not taking as much tylenol. I do however have more lipomas behind my knees that I would like to have removed. Dr. Alis Palacios said he could remove more in his office next time. Should I just call his office?

## 2023-11-21 RX ORDER — ATORVASTATIN CALCIUM 10 MG/1
10 TABLET, FILM COATED ORAL DAILY
Qty: 90 TABLET | Refills: 1 | Status: SHIPPED | OUTPATIENT
Start: 2023-11-21

## 2023-11-21 NOTE — TELEPHONE ENCOUNTER
Antonio Gee called requesting a refill of the below medication which has been pended for you:     Requested Prescriptions     Pending Prescriptions Disp Refills    atorvastatin (LIPITOR) 10 MG tablet 90 tablet 1     Sig: Take 1 tablet by mouth daily       Last Appointment Date: 8/25/2023  Next Appointment Date: 12/7/2023    Allergies   Allergen Reactions    Aspirin Other (See Comments)     Abdominal pain    Ibuprofen Other (See Comments)     Abdominal pain    Penicillin G Other (See Comments)    Sulfamethoxazole-Trimethoprim Nausea Only and Other (See Comments)    Penicillins Rash

## 2023-11-30 ENCOUNTER — HOSPITAL ENCOUNTER (OUTPATIENT)
Age: 73
Discharge: HOME | End: 2023-11-30
Payer: MEDICARE

## 2023-11-30 DIAGNOSIS — M35.3 POLYMYALGIA RHEUMATICA (HCC): ICD-10-CM

## 2023-11-30 DIAGNOSIS — E11.42 TYPE 2 DIABETES MELLITUS WITH DIABETIC POLYNEUROPATHY, WITHOUT LONG-TERM CURRENT USE OF INSULIN (HCC): ICD-10-CM

## 2023-11-30 DIAGNOSIS — E78.2 MIXED HYPERLIPIDEMIA: ICD-10-CM

## 2023-11-30 DIAGNOSIS — I10 ESSENTIAL HYPERTENSION: ICD-10-CM

## 2023-11-30 LAB
ALBUMIN SERPL-MCNC: 3.9 G/DL (ref 3.5–5.2)
ALBUMIN/GLOB SERPL: 1.4 {RATIO} (ref 1–2.5)
ALP SERPL-CCNC: 66 U/L (ref 35–104)
ALT SERPL-CCNC: 17 U/L (ref 5–33)
ANION GAP SERPL CALCULATED.3IONS-SCNC: 7 MMOL/L (ref 9–17)
AST SERPL-CCNC: 21 U/L
BASOPHILS # BLD: 0.05 K/UL (ref 0–0.2)
BASOPHILS NFR BLD: 1 % (ref 0–2)
BILIRUB SERPL-MCNC: 0.4 MG/DL (ref 0.3–1.2)
BUN SERPL-MCNC: 13 MG/DL (ref 8–23)
BUN/CREAT SERPL: 16 (ref 9–20)
CALCIUM SERPL-MCNC: 9.6 MG/DL (ref 8.6–10.4)
CHLORIDE SERPL-SCNC: 106 MMOL/L (ref 98–107)
CHOLEST SERPL-MCNC: 149 MG/DL (ref 0–199)
CHOLESTEROL/HDL RATIO: 3
CO2 SERPL-SCNC: 32 MMOL/L (ref 20–31)
CREAT SERPL-MCNC: 0.8 MG/DL (ref 0.5–0.9)
CREAT UR-MCNC: 110.6 MG/DL (ref 28–217)
CRP SERPL HS-MCNC: 13.2 MG/L (ref 0–5)
EOSINOPHIL # BLD: 0.22 K/UL (ref 0–0.44)
EOSINOPHILS RELATIVE PERCENT: 2 % (ref 1–4)
ERYTHROCYTE [DISTWIDTH] IN BLOOD BY AUTOMATED COUNT: 13.3 % (ref 11.8–14.4)
ERYTHROCYTE [SEDIMENTATION RATE] IN BLOOD BY PHOTOMETRIC METHOD: 9 MM/HR (ref 0–30)
EST. AVERAGE GLUCOSE BLD GHB EST-MCNC: 148 MG/DL
GFR SERPL CREATININE-BSD FRML MDRD: >60 ML/MIN/1.73M2
GLUCOSE SERPL-MCNC: 136 MG/DL (ref 70–99)
HBA1C MFR BLD: 6.8 % (ref 4–6)
HCT VFR BLD AUTO: 39.5 % (ref 36.3–47.1)
HDLC SERPL-MCNC: 45 MG/DL
HGB BLD-MCNC: 12.7 G/DL (ref 11.9–15.1)
IMM GRANULOCYTES # BLD AUTO: 0.04 K/UL (ref 0–0.3)
IMM GRANULOCYTES NFR BLD: 0 %
LDLC SERPL CALC-MCNC: 74 MG/DL (ref 0–100)
LYMPHOCYTES NFR BLD: 2.32 K/UL (ref 1.1–3.7)
LYMPHOCYTES RELATIVE PERCENT: 25 % (ref 24–43)
MCH RBC QN AUTO: 29.7 PG (ref 25.2–33.5)
MCHC RBC AUTO-ENTMCNC: 32.2 G/DL (ref 25.2–33.5)
MCV RBC AUTO: 92.3 FL (ref 82.6–102.9)
MICROALBUMIN UR-MCNC: <12 MG/L
MICROALBUMIN/CREAT UR-RTO: NORMAL MCG/MG CREAT
MONOCYTES NFR BLD: 0.76 K/UL (ref 0.1–1.2)
MONOCYTES NFR BLD: 8 % (ref 3–12)
NEUTROPHILS NFR BLD: 64 % (ref 36–65)
NEUTS SEG NFR BLD: 5.9 K/UL (ref 1.5–8.1)
NRBC BLD-RTO: 0 PER 100 WBC
PLATELET # BLD AUTO: 233 K/UL (ref 138–453)
PMV BLD AUTO: 9.1 FL (ref 8.1–13.5)
POTASSIUM SERPL-SCNC: 4.1 MMOL/L (ref 3.7–5.3)
PROT SERPL-MCNC: 6.7 G/DL (ref 6.4–8.3)
RBC # BLD AUTO: 4.28 M/UL (ref 3.95–5.11)
SODIUM SERPL-SCNC: 145 MMOL/L (ref 135–144)
TRIGL SERPL-MCNC: 152 MG/DL (ref 0–149)
VLDLC SERPL CALC-MCNC: 30 MG/DL
WBC OTHER # BLD: 9.3 K/UL (ref 3.5–11.3)

## 2023-11-30 PROCEDURE — 80061 LIPID PANEL: CPT

## 2023-11-30 PROCEDURE — 83036 HEMOGLOBIN GLYCOSYLATED A1C: CPT

## 2023-11-30 PROCEDURE — 82570 ASSAY OF URINE CREATININE: CPT

## 2023-11-30 PROCEDURE — 86140 C-REACTIVE PROTEIN: CPT

## 2023-11-30 PROCEDURE — 85025 COMPLETE CBC W/AUTO DIFF WBC: CPT

## 2023-11-30 PROCEDURE — 85652 RBC SED RATE AUTOMATED: CPT

## 2023-11-30 PROCEDURE — 80053 COMPREHEN METABOLIC PANEL: CPT

## 2023-11-30 PROCEDURE — 82043 UR ALBUMIN QUANTITATIVE: CPT

## 2023-11-30 PROCEDURE — 36415 COLL VENOUS BLD VENIPUNCTURE: CPT

## 2023-12-07 ENCOUNTER — OFFICE VISIT (OUTPATIENT)
Dept: FAMILY MEDICINE CLINIC | Age: 73
End: 2023-12-07
Payer: MEDICARE

## 2023-12-07 VITALS
WEIGHT: 222 LBS | DIASTOLIC BLOOD PRESSURE: 84 MMHG | RESPIRATION RATE: 18 BRPM | OXYGEN SATURATION: 93 % | TEMPERATURE: 97 F | HEART RATE: 64 BPM | SYSTOLIC BLOOD PRESSURE: 136 MMHG | BODY MASS INDEX: 34.84 KG/M2 | HEIGHT: 67 IN

## 2023-12-07 DIAGNOSIS — R01.1 HEART MURMUR: ICD-10-CM

## 2023-12-07 DIAGNOSIS — M35.3 POLYMYALGIA RHEUMATICA (HCC): Primary | ICD-10-CM

## 2023-12-07 DIAGNOSIS — Z12.31 ENCOUNTER FOR SCREENING MAMMOGRAM FOR MALIGNANT NEOPLASM OF BREAST: ICD-10-CM

## 2023-12-07 DIAGNOSIS — E11.42 TYPE 2 DIABETES MELLITUS WITH DIABETIC POLYNEUROPATHY, WITHOUT LONG-TERM CURRENT USE OF INSULIN (HCC): ICD-10-CM

## 2023-12-07 DIAGNOSIS — E78.2 MIXED HYPERLIPIDEMIA: ICD-10-CM

## 2023-12-07 DIAGNOSIS — I10 ESSENTIAL HYPERTENSION: ICD-10-CM

## 2023-12-07 PROCEDURE — 3079F DIAST BP 80-89 MM HG: CPT | Performed by: FAMILY MEDICINE

## 2023-12-07 PROCEDURE — G8417 CALC BMI ABV UP PARAM F/U: HCPCS | Performed by: FAMILY MEDICINE

## 2023-12-07 PROCEDURE — 3075F SYST BP GE 130 - 139MM HG: CPT | Performed by: FAMILY MEDICINE

## 2023-12-07 PROCEDURE — 99214 OFFICE O/P EST MOD 30 MIN: CPT | Performed by: FAMILY MEDICINE

## 2023-12-07 PROCEDURE — 3044F HG A1C LEVEL LT 7.0%: CPT | Performed by: FAMILY MEDICINE

## 2023-12-07 PROCEDURE — 2022F DILAT RTA XM EVC RTNOPTHY: CPT | Performed by: FAMILY MEDICINE

## 2023-12-07 PROCEDURE — 1036F TOBACCO NON-USER: CPT | Performed by: FAMILY MEDICINE

## 2023-12-07 PROCEDURE — 1090F PRES/ABSN URINE INCON ASSESS: CPT | Performed by: FAMILY MEDICINE

## 2023-12-07 PROCEDURE — 1123F ACP DISCUSS/DSCN MKR DOCD: CPT | Performed by: FAMILY MEDICINE

## 2023-12-07 PROCEDURE — 3017F COLORECTAL CA SCREEN DOC REV: CPT | Performed by: FAMILY MEDICINE

## 2023-12-07 PROCEDURE — G8400 PT W/DXA NO RESULTS DOC: HCPCS | Performed by: FAMILY MEDICINE

## 2023-12-07 PROCEDURE — G8484 FLU IMMUNIZE NO ADMIN: HCPCS | Performed by: FAMILY MEDICINE

## 2023-12-07 PROCEDURE — G8427 DOCREV CUR MEDS BY ELIG CLIN: HCPCS | Performed by: FAMILY MEDICINE

## 2023-12-07 RX ORDER — PREDNISONE 5 MG/1
5 TABLET ORAL DAILY
Qty: 30 TABLET | Refills: 2 | Status: SHIPPED | OUTPATIENT
Start: 2023-12-07 | End: 2024-01-06

## 2023-12-07 RX ORDER — PREDNISONE 1 MG/1
2 TABLET ORAL DAILY
Qty: 60 TABLET | Refills: 1 | Status: SHIPPED | OUTPATIENT
Start: 2023-12-07

## 2023-12-07 ASSESSMENT — ENCOUNTER SYMPTOMS
WHEEZING: 0
SHORTNESS OF BREATH: 0
EYE DISCHARGE: 0
DIARRHEA: 0
SINUS PRESSURE: 0
CONSTIPATION: 0
TROUBLE SWALLOWING: 0
ABDOMINAL PAIN: 0
BACK PAIN: 1
RHINORRHEA: 0
NAUSEA: 0
EYE REDNESS: 0
SORE THROAT: 0
VOMITING: 0
COUGH: 0

## 2023-12-07 NOTE — PATIENT INSTRUCTIONS
Monocytes Absolute 11/30/2023 0.76  0.10 - 1.20 k/uL Final    Eosinophils Absolute 11/30/2023 0.22  0.00 - 0.44 k/uL Final    Basophils Absolute 11/30/2023 0.05  0.00 - 0.20 k/uL Final    Absolute Immature Granulocyte 11/30/2023 0.04  0.00 - 0.30 k/uL Final    Microalb, Ur 11/30/2023 <12  <21 mg/L Final    Creatinine, Ur 11/30/2023 110.6  28.0 - 217.0 mg/dL Final    Microalb/Crt.  Ratio 11/30/2023 Can not be calculated  <25 mcg/mg creat Final

## 2023-12-07 NOTE — PROGRESS NOTES
Patient declines hep b vaccine at this time.  Reminded she would be due for 5 year follow up colonoscopy in Feb.

## 2023-12-07 NOTE — PROGRESS NOTES
2023     Kayli Rios (:  1950) is a 68 y.o. female, here for evaluation of the following medical concerns:    HPI  Patient comes in today for follow up of chronic health issues Patient continues to have ongoing pain issues but is relatively stable. Having some radicular pain down into the lower extremity but this has been somewhat chronic but just more noticeable recently. Did advise her to follow-up with pain management for potential procedural intervention as she had injection previously that did provide her with at least a couple months of pain relief from the sciatica type pain. She also has known history of multiple lipomas that are very painful to her and does have an appointment scheduled with Dr. Phani Quezada in January to have more of these removed. She thinks that these may cause some compression of the nerve as well. She has known polymyalgia rheumatica and her symptoms seem to be relatively stable at this time but she is getting more bilateral hip pain unclear if this may be related to this. Nonetheless she is interested in continuing to titrate down the prednisone dose and is currently on 8 mg daily soon with her next fill we will decrease her back to 7 mg daily. Her sed rate remains normal but her C-reactive protein still is slightly elevated. She has a known history of diabetes mellitus type 2 which is stable and controlled with her current medical therapy. Encourage continued low-carb/low sugar diet in order to keep this optimally controlled. Has a known history of hypertension and her blood pressure is stable and controlled on her current medical regimen. Has a known history of hyperlipidemia and her cholesterol levels remain stable and controlled with her current statin dose. Patient otherwise has no other acute medical concerns. .  Patient's recent lab reports are as follows:    Results for orders placed or performed during the hospital encounter of 23   Sedimentation

## 2023-12-12 ENCOUNTER — HOSPITAL ENCOUNTER (OUTPATIENT)
Dept: GENERAL RADIOLOGY | Age: 73
Discharge: HOME | End: 2023-12-14
Payer: MEDICARE

## 2023-12-12 ENCOUNTER — HOSPITAL ENCOUNTER (OUTPATIENT)
Age: 73
Discharge: HOME | End: 2023-12-14
Payer: MEDICARE

## 2023-12-12 ENCOUNTER — OFFICE VISIT (OUTPATIENT)
Dept: PRIMARY CARE CLINIC | Age: 73
End: 2023-12-12
Payer: MEDICARE

## 2023-12-12 VITALS
HEART RATE: 74 BPM | BODY MASS INDEX: 35.08 KG/M2 | DIASTOLIC BLOOD PRESSURE: 88 MMHG | OXYGEN SATURATION: 99 % | TEMPERATURE: 98 F | SYSTOLIC BLOOD PRESSURE: 138 MMHG | WEIGHT: 224 LBS

## 2023-12-12 DIAGNOSIS — M25.531 RIGHT WRIST PAIN: ICD-10-CM

## 2023-12-12 DIAGNOSIS — S63.501A SPRAIN OF RIGHT WRIST, INITIAL ENCOUNTER: Primary | ICD-10-CM

## 2023-12-12 DIAGNOSIS — S63.501A SPRAIN OF RIGHT FOREARM, INITIAL ENCOUNTER: ICD-10-CM

## 2023-12-12 DIAGNOSIS — M79.601 PAIN, ARM, RIGHT: ICD-10-CM

## 2023-12-12 PROCEDURE — 73110 X-RAY EXAM OF WRIST: CPT

## 2023-12-12 PROCEDURE — 73090 X-RAY EXAM OF FOREARM: CPT

## 2023-12-12 PROCEDURE — 99213 OFFICE O/P EST LOW 20 MIN: CPT | Performed by: FAMILY MEDICINE

## 2023-12-12 ASSESSMENT — PATIENT HEALTH QUESTIONNAIRE - PHQ9
SUM OF ALL RESPONSES TO PHQ QUESTIONS 1-9: 0
2. FEELING DOWN, DEPRESSED OR HOPELESS: 0
1. LITTLE INTEREST OR PLEASURE IN DOING THINGS: 0
SUM OF ALL RESPONSES TO PHQ QUESTIONS 1-9: 0
SUM OF ALL RESPONSES TO PHQ QUESTIONS 1-9: 0
SUM OF ALL RESPONSES TO PHQ9 QUESTIONS 1 & 2: 0
SUM OF ALL RESPONSES TO PHQ QUESTIONS 1-9: 0

## 2023-12-13 ENCOUNTER — HOSPITAL ENCOUNTER (OUTPATIENT)
Age: 73
Discharge: HOME | End: 2023-12-15
Attending: FAMILY MEDICINE
Payer: MEDICARE

## 2023-12-13 VITALS
BODY MASS INDEX: 35.16 KG/M2 | DIASTOLIC BLOOD PRESSURE: 66 MMHG | HEIGHT: 67 IN | HEART RATE: 70 BPM | SYSTOLIC BLOOD PRESSURE: 149 MMHG | WEIGHT: 224 LBS

## 2023-12-13 DIAGNOSIS — R01.1 HEART MURMUR: ICD-10-CM

## 2023-12-13 LAB
ECHO AO ASC DIAM: 3.3 CM
ECHO AO ASCENDING AORTA INDEX: 1.56 CM/M2
ECHO AO ROOT DIAM: 3.4 CM
ECHO AO ROOT INDEX: 1.6 CM/M2
ECHO AV AREA PEAK VELOCITY: 1.8 CM2
ECHO AV AREA/BSA PEAK VELOCITY: 0.8 CM2/M2
ECHO AV PEAK GRADIENT: 15 MMHG
ECHO AV PEAK VELOCITY: 1.9 M/S
ECHO AV VELOCITY RATIO: 0.58
ECHO BSA: 2.19 M2
ECHO EST RA PRESSURE: 3 MMHG
ECHO LA AREA 4C: 19.3 CM2
ECHO LA DIAMETER INDEX: 1.98 CM/M2
ECHO LA DIAMETER: 4.2 CM
ECHO LA MAJOR AXIS: 5 CM
ECHO LA TO AORTIC ROOT RATIO: 1.24
ECHO LA VOL MOD A4C: 56 ML (ref 22–52)
ECHO LA VOLUME INDEX MOD A4C: 26 ML/M2 (ref 16–34)
ECHO LV E' LATERAL VELOCITY: 10 CM/S
ECHO LV E' SEPTAL VELOCITY: 7 CM/S
ECHO LV EDV A2C: 88 ML
ECHO LV EDV A4C: 84 ML
ECHO LV EDV INDEX A4C: 40 ML/M2
ECHO LV EDV NDEX A2C: 42 ML/M2
ECHO LV EJECTION FRACTION A2C: 65 %
ECHO LV EJECTION FRACTION A4C: 66 %
ECHO LV EJECTION FRACTION BIPLANE: 66 % (ref 55–100)
ECHO LV ESV A2C: 31 ML
ECHO LV ESV A4C: 29 ML
ECHO LV ESV INDEX A2C: 15 ML/M2
ECHO LV ESV INDEX A4C: 14 ML/M2
ECHO LV FRACTIONAL SHORTENING: 36 % (ref 28–44)
ECHO LV INTERNAL DIMENSION DIASTOLE INDEX: 2.36 CM/M2
ECHO LV INTERNAL DIMENSION DIASTOLIC: 5 CM (ref 3.9–5.3)
ECHO LV INTERNAL DIMENSION SYSTOLIC INDEX: 1.51 CM/M2
ECHO LV INTERNAL DIMENSION SYSTOLIC: 3.2 CM
ECHO LV ISOVOLUMETRIC RELAXATION TIME (IVRT): 95 MS
ECHO LV IVSD: 0.8 CM (ref 0.6–0.9)
ECHO LV MASS 2D: 146.8 G (ref 67–162)
ECHO LV MASS INDEX 2D: 69.3 G/M2 (ref 43–95)
ECHO LV POSTERIOR WALL DIASTOLIC: 0.9 CM (ref 0.6–0.9)
ECHO LV RELATIVE WALL THICKNESS RATIO: 0.36
ECHO LVOT AREA: 3.1 CM2
ECHO LVOT DIAM: 2 CM
ECHO LVOT PEAK GRADIENT: 5 MMHG
ECHO LVOT PEAK VELOCITY: 1.1 M/S
ECHO MV A VELOCITY: 1.07 M/S
ECHO MV E DECELERATION TIME (DT): 239 MS
ECHO MV E VELOCITY: 0.79 M/S
ECHO MV E/A RATIO: 0.74
ECHO MV E/E' LATERAL: 7.9
ECHO MV E/E' RATIO (AVERAGED): 9.59
ECHO MV MAX VELOCITY: 1.1 M/S
ECHO MV PEAK GRADIENT: 5 MMHG
ECHO PV MAX VELOCITY: 1.1 M/S
ECHO PV PEAK GRADIENT: 5 MMHG
ECHO RIGHT VENTRICULAR SYSTOLIC PRESSURE (RVSP): 26 MMHG
ECHO TV PEAK GRADIENT: 3 MMHG
ECHO TV REGURGITANT MAX VELOCITY: 2.39 M/S
ECHO TV REGURGITANT PEAK GRADIENT: 23 MMHG

## 2023-12-13 PROCEDURE — 93306 TTE W/DOPPLER COMPLETE: CPT | Performed by: INTERNAL MEDICINE

## 2023-12-13 PROCEDURE — 93306 TTE W/DOPPLER COMPLETE: CPT

## 2023-12-14 RX ORDER — CETIRIZINE HYDROCHLORIDE 10 MG/1
10 TABLET ORAL DAILY
Qty: 90 TABLET | Refills: 1 | Status: SHIPPED | OUTPATIENT
Start: 2023-12-14

## 2023-12-14 NOTE — TELEPHONE ENCOUNTER
[facility] called requesting a refill of the below medication which has been pended for you:     Requested Prescriptions     Pending Prescriptions Disp Refills    cetirizine (ZYRTEC ALLERGY) 10 MG tablet 90 tablet 1     Sig: Take 1 tablet by mouth daily       Last Appointment Date: 12/12/2023  Next Appointment Date: 6/11/2024    Allergies   Allergen Reactions    Aspirin Other (See Comments)     Abdominal pain    Ibuprofen Other (See Comments)     Abdominal pain    Penicillin G Other (See Comments)    Sulfamethoxazole-Trimethoprim Nausea Only and Other (See Comments)    Penicillins Rash

## 2024-01-04 ENCOUNTER — HOSPITAL ENCOUNTER (OUTPATIENT)
Age: 74
Setting detail: SPECIMEN
Discharge: HOME | End: 2024-01-04

## 2024-01-04 ENCOUNTER — PROCEDURE VISIT (OUTPATIENT)
Dept: SURGERY | Age: 74
End: 2024-01-04
Payer: MEDICARE

## 2024-01-04 VITALS
HEIGHT: 67 IN | SYSTOLIC BLOOD PRESSURE: 134 MMHG | HEART RATE: 68 BPM | BODY MASS INDEX: 34.5 KG/M2 | TEMPERATURE: 98 F | DIASTOLIC BLOOD PRESSURE: 88 MMHG | WEIGHT: 219.8 LBS

## 2024-01-04 DIAGNOSIS — D17.24 LIPOMA OF LEFT LOWER EXTREMITY: Primary | ICD-10-CM

## 2024-01-04 DIAGNOSIS — D17.24 LIPOMA OF LEFT LOWER EXTREMITY: ICD-10-CM

## 2024-01-04 PROCEDURE — 27327 EXC THIGH/KNEE LES SC < 3 CM: CPT | Performed by: SURGERY

## 2024-01-04 NOTE — PROGRESS NOTES
PROCEDURE NOTE:    Patient is a 73 y.o. female here to have  several lipomas posterior left knee removed and/or evaluated.    Noted for 6 months.  Symptoms include getting bigger and painful    EXAM:     Pt had three , 1 cm lipomatous type lesions left posterior knee,      PROCEDURE:    The area was prepped and draped in a sterile fashion. 1 % lidocaine with epi was used for local anesthetic.  A #15 blade scalpel was used to excise the lesion.  The length was 1.2cm, and the width was 0.5cm.  The wound was closed with 4-0 monocryl subcuticular sutures.  Incision was covered with dressing. All 3 lipomas were removed in this manner.        IMPRESSION:    1.  Lipoma x 3 , each 1.1 cm diameter, posterior left knee      PLAN:    1. Lesions excised today  2.  RTC prn,   3.  Will call pt with pathology report          Electronically signed by MEDINA MCCOY MD on 1/4/2024 at 4:53 PM   
No assistance needed

## 2024-01-04 NOTE — PATIENT INSTRUCTIONS
SIGNS OF INFECTION  - Redness, swelling, skin hot  - Wound bed turns black or stringy yellow  - Foul odor  - Increased drainage or pus  - Increased pain  - Fever greater than 100F    CALL YOUR DOCTOR OR SEEK MEDICAL ATTENTION IF SIGNS OF INFECTION.  DO NOT WAIT UNTIL YOUR NEXT APPOINTMENT    Call the Wound Care Nurse with any other questions or concerns- [phone]

## 2024-01-05 RX ORDER — METFORMIN HYDROCHLORIDE 500 MG/1
500 TABLET, EXTENDED RELEASE ORAL 2 TIMES DAILY
Qty: 180 TABLET | Refills: 1 | Status: SHIPPED | OUTPATIENT
Start: 2024-01-05

## 2024-01-05 RX ORDER — MELOXICAM 7.5 MG/1
TABLET ORAL
Qty: 90 TABLET | Refills: 1 | Status: SHIPPED | OUTPATIENT
Start: 2024-01-05

## 2024-01-05 RX ORDER — ATENOLOL 25 MG/1
25 TABLET ORAL DAILY
Qty: 90 TABLET | Refills: 1 | Status: SHIPPED | OUTPATIENT
Start: 2024-01-05

## 2024-01-05 RX ORDER — MELOXICAM 7.5 MG/1
TABLET ORAL
Qty: 90 TABLET | Refills: 1 | Status: SHIPPED | OUTPATIENT
Start: 2024-01-05 | End: 2024-01-05 | Stop reason: SDUPTHER

## 2024-01-05 RX ORDER — ATENOLOL 25 MG/1
25 TABLET ORAL DAILY
Qty: 90 TABLET | Refills: 1 | Status: SHIPPED | OUTPATIENT
Start: 2024-01-05 | End: 2024-01-05 | Stop reason: SDUPTHER

## 2024-01-05 RX ORDER — METFORMIN HYDROCHLORIDE 500 MG/1
500 TABLET, EXTENDED RELEASE ORAL 2 TIMES DAILY
Qty: 180 TABLET | Refills: 1 | Status: SHIPPED | OUTPATIENT
Start: 2024-01-05 | End: 2024-01-05 | Stop reason: SDUPTHER

## 2024-01-05 NOTE — TELEPHONE ENCOUNTER
Huyen called requesting a refill of the below medication which has been pended for you:     Requested Prescriptions     Pending Prescriptions Disp Refills    metFORMIN (GLUCOPHAGE-XR) 500 MG extended release tablet 180 tablet 1     Sig: Take 1 tablet by mouth 2 times daily    meloxicam (MOBIC) 7.5 MG tablet 90 tablet 1     Sig: TAKE 1 TABLET ONCE A DAY    atenolol (TENORMIN) 25 MG tablet 90 tablet 1     Sig: Take 1 tablet by mouth daily       Last Appointment Date: 12/12/2023  Next Appointment Date: 6/11/2024    Allergies   Allergen Reactions    Aspirin Other (See Comments)     Abdominal pain    Ibuprofen Other (See Comments)     Abdominal pain    Penicillin G Other (See Comments)    Sulfamethoxazole-Trimethoprim Nausea Only and Other (See Comments)    Penicillins Rash

## 2024-01-08 LAB — SURGICAL PATHOLOGY REPORT: NORMAL

## 2024-01-23 ENCOUNTER — PATIENT MESSAGE (OUTPATIENT)
Dept: PRIMARY CARE CLINIC | Age: 74
End: 2024-01-23

## 2024-01-23 RX ORDER — PREDNISONE 5 MG/1
5 TABLET ORAL DAILY
Qty: 30 TABLET | Refills: 0 | Status: SHIPPED | OUTPATIENT
Start: 2024-01-23

## 2024-01-23 RX ORDER — PREDNISONE 1 MG/1
1 TABLET ORAL DAILY
Qty: 30 TABLET | Refills: 0 | Status: SHIPPED | OUTPATIENT
Start: 2024-01-23

## 2024-01-23 NOTE — TELEPHONE ENCOUNTER
Huyen called requesting a refill of the below medication which has been pended for you:     Requested Prescriptions     Pending Prescriptions Disp Refills    predniSONE (DELTASONE) 1 MG tablet 30 tablet      Sig: Take 1 tablet by mouth daily With a 5 mg dose.  Total daily dose is 6 mg for 1 month duration    predniSONE (DELTASONE) 5 MG tablet 30 tablet      Sig: Take 1 tablet by mouth daily With a 1 mg dose. Total daily dose is 6 mg for 1 month duration       Last Appointment Date: 12/12/2023  Next Appointment Date: 6/11/2024    Allergies   Allergen Reactions    Aspirin Other (See Comments)     Abdominal pain    Ibuprofen Other (See Comments)     Abdominal pain    Penicillin G Other (See Comments)    Sulfamethoxazole-Trimethoprim Nausea Only and Other (See Comments)    Penicillins Rash

## 2024-01-23 NOTE — TELEPHONE ENCOUNTER
Anu Pond MA 1/23/2024 10:42 AM EST      ----- Message -----  From: Huyen Mojica  Sent: 1/23/2024 10:29 AM EST  To: [facility]  Subject: predisone     I took the last of the 7 mg of prednisone today. Would you please send a prescription for 6mg to [facility] in [place].    Thanks, Huyen Mojica

## 2024-01-29 ENCOUNTER — PATIENT MESSAGE (OUTPATIENT)
Dept: PRIMARY CARE CLINIC | Age: 74
End: 2024-01-29

## 2024-01-30 NOTE — TELEPHONE ENCOUNTER
Steff Arias MA 1/29/2024 4:48 PM EST      ----- Message -----  From: Huyen Mojica  Sent: 1/29/2024 4:33 PM EST  To: [facility]  Subject: sciatica pain     Hi, I am having pain radiating down my right leg again. From the rear down the back of the right thigh. Probably the sciatica. I am having trouble sleeping because of the pain. I was wondering if I could come in and get a cortisone shot. We are driving to [place] on Saturday. I am worrying about sitting that long if I'm already having pain.   Thanks Huyen Mojica

## 2024-01-31 ENCOUNTER — OFFICE VISIT (OUTPATIENT)
Dept: FAMILY MEDICINE CLINIC | Age: 74
End: 2024-01-31
Payer: MEDICARE

## 2024-01-31 VITALS
WEIGHT: 221 LBS | DIASTOLIC BLOOD PRESSURE: 84 MMHG | HEART RATE: 72 BPM | BODY MASS INDEX: 34.69 KG/M2 | OXYGEN SATURATION: 96 % | RESPIRATION RATE: 18 BRPM | SYSTOLIC BLOOD PRESSURE: 130 MMHG | HEIGHT: 67 IN | TEMPERATURE: 97.4 F

## 2024-01-31 DIAGNOSIS — M62.838 SPASM OF RIGHT PIRIFORMIS MUSCLE: ICD-10-CM

## 2024-01-31 DIAGNOSIS — M79.10 MYALGIA: ICD-10-CM

## 2024-01-31 PROCEDURE — PBSHW PBB SHADOW CHARGE: Performed by: FAMILY MEDICINE

## 2024-01-31 PROCEDURE — 1123F ACP DISCUSS/DSCN MKR DOCD: CPT | Performed by: FAMILY MEDICINE

## 2024-01-31 PROCEDURE — G8417 CALC BMI ABV UP PARAM F/U: HCPCS | Performed by: FAMILY MEDICINE

## 2024-01-31 PROCEDURE — 1036F TOBACCO NON-USER: CPT | Performed by: FAMILY MEDICINE

## 2024-01-31 PROCEDURE — 99213 OFFICE O/P EST LOW 20 MIN: CPT | Performed by: FAMILY MEDICINE

## 2024-01-31 PROCEDURE — G8400 PT W/DXA NO RESULTS DOC: HCPCS | Performed by: FAMILY MEDICINE

## 2024-01-31 PROCEDURE — G8484 FLU IMMUNIZE NO ADMIN: HCPCS | Performed by: FAMILY MEDICINE

## 2024-01-31 PROCEDURE — 3075F SYST BP GE 130 - 139MM HG: CPT | Performed by: FAMILY MEDICINE

## 2024-01-31 PROCEDURE — 3079F DIAST BP 80-89 MM HG: CPT | Performed by: FAMILY MEDICINE

## 2024-01-31 PROCEDURE — 1090F PRES/ABSN URINE INCON ASSESS: CPT | Performed by: FAMILY MEDICINE

## 2024-01-31 PROCEDURE — 3017F COLORECTAL CA SCREEN DOC REV: CPT | Performed by: FAMILY MEDICINE

## 2024-01-31 PROCEDURE — G8427 DOCREV CUR MEDS BY ELIG CLIN: HCPCS | Performed by: FAMILY MEDICINE

## 2024-01-31 PROCEDURE — 20552 NJX 1/MLT TRIGGER POINT 1/2: CPT | Performed by: FAMILY MEDICINE

## 2024-01-31 RX ORDER — TRIAMCINOLONE ACETONIDE 40 MG/ML
40 INJECTION, SUSPENSION INTRA-ARTICULAR; INTRAMUSCULAR ONCE
Status: COMPLETED | OUTPATIENT
Start: 2024-01-31 | End: 2024-01-31

## 2024-01-31 RX ADMIN — TRIAMCINOLONE ACETONIDE 40 MG: 40 INJECTION, SUSPENSION INTRA-ARTICULAR; INTRAMUSCULAR at 14:19

## 2024-01-31 ASSESSMENT — PATIENT HEALTH QUESTIONNAIRE - PHQ9
2. FEELING DOWN, DEPRESSED OR HOPELESS: 0
SUM OF ALL RESPONSES TO PHQ QUESTIONS 1-9: 0
1. LITTLE INTEREST OR PLEASURE IN DOING THINGS: 0
SUM OF ALL RESPONSES TO PHQ9 QUESTIONS 1 & 2: 0

## 2024-01-31 ASSESSMENT — ENCOUNTER SYMPTOMS: BACK PAIN: 1

## 2024-01-31 NOTE — PROGRESS NOTES
2024     Huyen Mojica (:  1950) is a 73 y.o. female, here for evaluation of the following medical concerns:    Back Pain  This is a new problem. The current episode started in the past 7 days (patient states that she had walked a half a mile, then rode the bike for a little while.  Then subsequently went shopping.  After that she felt a sharp spasm in her right gluteal region with radicular pain down into the right lower extremity). The problem occurs constantly. The problem is unchanged. The pain is present in the gluteal. The quality of the pain is described as stabbing and shooting. Associated symptoms include leg pain. Pertinent negatives include no fever, numbness, tingling or weakness. She has tried walking and NSAIDs (is on chronic prednisone for PMR) for the symptoms. The treatment provided no relief.     Did review patient's med list, allergies, social history,pmhx and pshx today as noted in the record.      Review of Systems   Constitutional:  Negative for fever.   Musculoskeletal:  Positive for back pain and myalgias.   Neurological:  Negative for tingling, weakness and numbness.       Prior to Visit Medications    Medication Sig Taking? Authorizing Provider   predniSONE (DELTASONE) 1 MG tablet Take 1 tablet by mouth daily With a 5 mg dose.  Total daily dose is 6 mg for 1 month duration Yes Fabby Lima DO   predniSONE (DELTASONE) 5 MG tablet Take 1 tablet by mouth daily With a 1 mg dose. Total daily dose is 6 mg for 1 month duration Yes Fabby Lima DO   atenolol (TENORMIN) 25 MG tablet Take 1 tablet by mouth daily Yes Fabby Lima DO   meloxicam (MOBIC) 7.5 MG tablet TAKE 1 TABLET ONCE A DAY Yes Fabby Lima DO   metFORMIN (GLUCOPHAGE-XR) 500 MG extended release tablet Take 1 tablet by mouth 2 times daily Yes Fabby Lima DO   montelukast (SINGULAIR) 10 MG tablet Take 1 tablet by mouth daily Yes Fabby Lima DO   cetirizine (ZYRTEC ALLERGY) 10

## 2024-02-01 RX ORDER — OMEPRAZOLE 20 MG/1
20 CAPSULE, DELAYED RELEASE ORAL 2 TIMES DAILY
Qty: 180 CAPSULE | Refills: 1 | Status: SHIPPED | OUTPATIENT
Start: 2024-02-01

## 2024-02-01 NOTE — TELEPHONE ENCOUNTER
Huyen called requesting a refill of the below medication which has been pended for you:     Requested Prescriptions     Pending Prescriptions Disp Refills    omeprazole (PRILOSEC) 20 MG delayed release capsule 180 capsule 1     Sig: Take 1 capsule by mouth 2 times daily       Last Appointment Date: 1/31/2024  Next Appointment Date: 6/11/2024    Allergies   Allergen Reactions    Aspirin Other (See Comments)     Abdominal pain    Ibuprofen Other (See Comments)     Abdominal pain    Penicillin G Other (See Comments)    Sulfamethoxazole-Trimethoprim Nausea Only and Other (See Comments)    Penicillins Rash

## 2024-02-21 RX ORDER — PREDNISONE 1 MG/1
1 TABLET ORAL DAILY
Qty: 30 TABLET | Refills: 0 | Status: SHIPPED | OUTPATIENT
Start: 2024-02-21 | End: 2024-02-24

## 2024-02-21 NOTE — TELEPHONE ENCOUNTER
Huyen called requesting a refill of the below medication which has been pended for you:     Requested Prescriptions     Pending Prescriptions Disp Refills    predniSONE (DELTASONE) 1 MG tablet 30 tablet 0     Sig: Take 1 tablet by mouth daily With a 5 mg dose.  Total daily dose is 6 mg for 1 month duration       Last Appointment Date: 1/31/2024  Next Appointment Date: 6/11/2024    Allergies   Allergen Reactions    Aspirin Other (See Comments)     Abdominal pain    Ibuprofen Other (See Comments)     Abdominal pain    Penicillin G Other (See Comments)    Sulfamethoxazole-Trimethoprim Nausea Only and Other (See Comments)    Penicillins Rash

## 2024-02-22 RX ORDER — PREDNISONE 5 MG/1
5 TABLET ORAL DAILY
Qty: 30 TABLET | Refills: 0 | Status: SHIPPED | OUTPATIENT
Start: 2024-02-22

## 2024-02-22 NOTE — TELEPHONE ENCOUNTER
Huyen called requesting a refill of the below medication which has been pended for you: patient accidentally requested 1mg previously, she is going down to 5mg tomorrow.     Requested Prescriptions     Pending Prescriptions Disp Refills    predniSONE (DELTASONE) 5 MG tablet 30 tablet 0     Sig: Take 1 tablet by mouth daily     Signed Prescriptions Disp Refills    predniSONE (DELTASONE) 1 MG tablet 30 tablet 0     Sig: Take 1 tablet by mouth daily With a 5 mg dose.  Total daily dose is 6 mg for 1 month duration     Authorizing Provider: ARIADNA PERRY       Last Appointment Date: 1/31/2024  Next Appointment Date: 6/11/2024    Allergies   Allergen Reactions    Aspirin Other (See Comments)     Abdominal pain    Ibuprofen Other (See Comments)     Abdominal pain    Penicillin G Other (See Comments)    Sulfamethoxazole-Trimethoprim Nausea Only and Other (See Comments)    Penicillins Rash

## 2024-02-22 NOTE — TELEPHONE ENCOUNTER
Pt calling regarding this script as she thought it was going to be a 5mg and not a 1mg as she just finished a 6mg, please advise.

## 2024-02-24 ENCOUNTER — OFFICE VISIT (OUTPATIENT)
Dept: PRIMARY CARE CLINIC | Age: 74
End: 2024-02-24
Payer: MEDICARE

## 2024-02-24 VITALS
DIASTOLIC BLOOD PRESSURE: 86 MMHG | OXYGEN SATURATION: 93 % | TEMPERATURE: 96.6 F | WEIGHT: 211 LBS | HEIGHT: 67 IN | HEART RATE: 62 BPM | SYSTOLIC BLOOD PRESSURE: 130 MMHG | BODY MASS INDEX: 33.12 KG/M2 | RESPIRATION RATE: 18 BRPM

## 2024-02-24 DIAGNOSIS — J01.40 ACUTE PANSINUSITIS, RECURRENCE NOT SPECIFIED: Primary | ICD-10-CM

## 2024-02-24 DIAGNOSIS — R53.83 FATIGUE, UNSPECIFIED TYPE: ICD-10-CM

## 2024-02-24 DIAGNOSIS — R42 DIZZINESS: ICD-10-CM

## 2024-02-24 DIAGNOSIS — R50.9 FEVER, UNSPECIFIED FEVER CAUSE: ICD-10-CM

## 2024-02-24 LAB
INFLUENZA A ANTIGEN, POC: NEGATIVE
INFLUENZA B ANTIGEN, POC: NEGATIVE
LOT EXPIRE DATE: NORMAL
LOT KIT NUMBER: NORMAL
SARS-COV-2, POC: NORMAL
VALID INTERNAL CONTROL: NORMAL
VENDOR AND KIT NAME POC: NORMAL

## 2024-02-24 PROCEDURE — G8427 DOCREV CUR MEDS BY ELIG CLIN: HCPCS | Performed by: FAMILY MEDICINE

## 2024-02-24 PROCEDURE — G8484 FLU IMMUNIZE NO ADMIN: HCPCS | Performed by: FAMILY MEDICINE

## 2024-02-24 PROCEDURE — 3017F COLORECTAL CA SCREEN DOC REV: CPT | Performed by: FAMILY MEDICINE

## 2024-02-24 PROCEDURE — 99212 OFFICE O/P EST SF 10 MIN: CPT | Performed by: FAMILY MEDICINE

## 2024-02-24 PROCEDURE — PBSHW POCT COVID-19 & INFLUENZA A/B: Performed by: FAMILY MEDICINE

## 2024-02-24 PROCEDURE — 1036F TOBACCO NON-USER: CPT | Performed by: FAMILY MEDICINE

## 2024-02-24 PROCEDURE — 87428 SARSCOV & INF VIR A&B AG IA: CPT | Performed by: FAMILY MEDICINE

## 2024-02-24 PROCEDURE — G8417 CALC BMI ABV UP PARAM F/U: HCPCS | Performed by: FAMILY MEDICINE

## 2024-02-24 PROCEDURE — 99213 OFFICE O/P EST LOW 20 MIN: CPT | Performed by: FAMILY MEDICINE

## 2024-02-24 PROCEDURE — G8400 PT W/DXA NO RESULTS DOC: HCPCS | Performed by: FAMILY MEDICINE

## 2024-02-24 PROCEDURE — 1123F ACP DISCUSS/DSCN MKR DOCD: CPT | Performed by: FAMILY MEDICINE

## 2024-02-24 PROCEDURE — 1090F PRES/ABSN URINE INCON ASSESS: CPT | Performed by: FAMILY MEDICINE

## 2024-02-24 PROCEDURE — 3075F SYST BP GE 130 - 139MM HG: CPT | Performed by: FAMILY MEDICINE

## 2024-02-24 PROCEDURE — 3079F DIAST BP 80-89 MM HG: CPT | Performed by: FAMILY MEDICINE

## 2024-02-24 RX ORDER — DOXYCYCLINE HYCLATE 100 MG
100 TABLET ORAL 2 TIMES DAILY
Qty: 20 TABLET | Refills: 0 | Status: SHIPPED | OUTPATIENT
Start: 2024-02-24 | End: 2024-03-05

## 2024-02-24 RX ORDER — MECLIZINE HYDROCHLORIDE 25 MG/1
25 TABLET ORAL 3 TIMES DAILY PRN
Qty: 15 TABLET | Refills: 0 | Status: SHIPPED | OUTPATIENT
Start: 2024-02-24 | End: 2024-03-05

## 2024-02-24 NOTE — PROGRESS NOTES
[facility]             [address] [address]                        Telephone [phone]             Fax [phone]       Huyen Mojica  :  1950  Age:  73 y.o.   MRN:  [id number]  Date of visit:  2024       Assessment & Plan:    1. Acute pansinusitis, recurrence not specified  2. Dizziness  I reviewed the results of testing done today with the patient.  Doxycycline and Meclizine were prescribed:  - doxycycline hyclate (VIBRA-TABS) 100 MG tablet; Take 1 tablet by mouth 2 times daily for 10 days  Dispense: 20 tablet; Refill: 0     - meclizine (ANTIVERT) 25 MG tablet; Take 1 tablet by mouth 3 times daily as needed for Dizziness or Nausea  Dispense: 15 tablet; Refill: 0    She was advised to follow up if symptoms worsen or do not resolve.           Subjective:    Huyen Mojica is a 73 y.o. female who presents to [facility] today (2024) for evaluation of:  Nausea & Vomiting (Started feeling ill Tuesday- ( the patient has recently traveled from [place] in airplane last Saturday) - is experiencing nausea, vomiting, dizziness, runny nose, sore throat, nasal congestion, occasional cough.) and Otalgia (When traveling in airplane their ears \"popped\" and have been hurting since.)      She states that she flew to [place] from [place] one week ago.   Her left ear \"popped\" while she was on the plane, and she has had pressure in that ear since.  She has had nasal drainage and cough.   She denies fever.  Last night she began having intermittent episodes of vertigo and nausea.         Current medications are:  Current Outpatient Medications   Medication Sig Dispense Refill    predniSONE (DELTASONE) 5 MG tablet Take 1 tablet by mouth daily 30 tablet 0    omeprazole (PRILOSEC) 20 MG delayed release capsule Take 1 capsule by mouth 2 times daily 180 capsule 1    atenolol (TENORMIN) 25 MG tablet Take 1 tablet by mouth daily

## 2024-02-24 NOTE — PATIENT INSTRUCTIONS
Warning:  One or more of the medications that you have been prescribed may cause drowsiness and impair your ability to operate vehicles or machinery.  Do not drive or operate machinery while taking Zofran.  Do not use in combination with alcohol.

## 2024-03-05 ENCOUNTER — OFFICE VISIT (OUTPATIENT)
Dept: PODIATRY | Age: 74
End: 2024-03-05
Payer: MEDICARE

## 2024-03-05 VITALS
HEART RATE: 64 BPM | WEIGHT: 211 LBS | HEIGHT: 67 IN | SYSTOLIC BLOOD PRESSURE: 126 MMHG | BODY MASS INDEX: 33.12 KG/M2 | DIASTOLIC BLOOD PRESSURE: 78 MMHG

## 2024-03-05 DIAGNOSIS — L60.8 DEFORMITY OF NAIL BED: Primary | ICD-10-CM

## 2024-03-05 DIAGNOSIS — M79.675 PAIN IN TOES OF BOTH FEET: ICD-10-CM

## 2024-03-05 DIAGNOSIS — M79.674 PAIN IN TOES OF BOTH FEET: ICD-10-CM

## 2024-03-05 PROCEDURE — 3074F SYST BP LT 130 MM HG: CPT | Performed by: PODIATRIST

## 2024-03-05 PROCEDURE — 99203 OFFICE O/P NEW LOW 30 MIN: CPT | Performed by: PODIATRIST

## 2024-03-05 PROCEDURE — 3078F DIAST BP <80 MM HG: CPT | Performed by: PODIATRIST

## 2024-03-05 PROCEDURE — G8427 DOCREV CUR MEDS BY ELIG CLIN: HCPCS | Performed by: PODIATRIST

## 2024-03-05 PROCEDURE — 3017F COLORECTAL CA SCREEN DOC REV: CPT | Performed by: PODIATRIST

## 2024-03-05 PROCEDURE — 1090F PRES/ABSN URINE INCON ASSESS: CPT | Performed by: PODIATRIST

## 2024-03-05 PROCEDURE — G8484 FLU IMMUNIZE NO ADMIN: HCPCS | Performed by: PODIATRIST

## 2024-03-05 PROCEDURE — G8400 PT W/DXA NO RESULTS DOC: HCPCS | Performed by: PODIATRIST

## 2024-03-05 PROCEDURE — G8417 CALC BMI ABV UP PARAM F/U: HCPCS | Performed by: PODIATRIST

## 2024-03-05 PROCEDURE — 1036F TOBACCO NON-USER: CPT | Performed by: PODIATRIST

## 2024-03-05 PROCEDURE — 1123F ACP DISCUSS/DSCN MKR DOCD: CPT | Performed by: PODIATRIST

## 2024-03-05 NOTE — PATIENT INSTRUCTIONS
Urea 40% cream -- [employer]           You may receive a survey that will ask about your recent patient visit experience.   We value your feedback, so we kindly request that you take a few moments to complete our survey. Your valuable insights will help us ensure that we provide top-notch care, and A+ quality to you and your loved ones.     We strive for 5's!    [facility]

## 2024-03-05 NOTE — PROGRESS NOTES
new testing or prescription.  Low risk morbidity  Contact office with any questions/problems/concerns.  RTC in 3month(s).

## 2024-03-16 ENCOUNTER — HOSPITAL ENCOUNTER (OUTPATIENT)
Age: 74
Discharge: HOME | End: 2024-03-16
Payer: MEDICARE

## 2024-03-16 ENCOUNTER — OFFICE VISIT (OUTPATIENT)
Dept: PRIMARY CARE CLINIC | Age: 74
End: 2024-03-16
Payer: MEDICARE

## 2024-03-16 VITALS
HEART RATE: 71 BPM | TEMPERATURE: 97.9 F | OXYGEN SATURATION: 93 % | WEIGHT: 217.6 LBS | SYSTOLIC BLOOD PRESSURE: 134 MMHG | DIASTOLIC BLOOD PRESSURE: 88 MMHG | BODY MASS INDEX: 34.08 KG/M2

## 2024-03-16 DIAGNOSIS — N30.00 ACUTE CYSTITIS WITHOUT HEMATURIA: Primary | ICD-10-CM

## 2024-03-16 DIAGNOSIS — R30.0 BURNING WITH URINATION: ICD-10-CM

## 2024-03-16 DIAGNOSIS — H92.02 LEFT EAR PAIN: ICD-10-CM

## 2024-03-16 LAB
BACTERIA URNS QL MICRO: ABNORMAL
BILIRUB UR QL STRIP: NEGATIVE
CHARACTER UR: ABNORMAL
CLARITY UR: ABNORMAL
COLOR UR: YELLOW
EPI CELLS #/AREA URNS HPF: ABNORMAL /HPF (ref 0–5)
GLUCOSE UR STRIP-MCNC: NEGATIVE MG/DL
HGB UR QL STRIP.AUTO: ABNORMAL
KETONES UR STRIP-MCNC: NEGATIVE MG/DL
LEUKOCYTE ESTERASE UR QL STRIP: ABNORMAL
MUCOUS THREADS URNS QL MICRO: ABNORMAL
NITRITE UR QL STRIP: NEGATIVE
PH UR STRIP: 6 [PH] (ref 5–6)
PROT UR STRIP-MCNC: NEGATIVE MG/DL
RBC #/AREA URNS HPF: ABNORMAL /HPF (ref 0–4)
SP GR UR STRIP: 1.02 (ref 1.01–1.02)
UROBILINOGEN UR STRIP-ACNC: NORMAL EU/DL (ref 0–1)
WBC #/AREA URNS HPF: ABNORMAL /HPF (ref 0–4)

## 2024-03-16 PROCEDURE — 3075F SYST BP GE 130 - 139MM HG: CPT | Performed by: FAMILY MEDICINE

## 2024-03-16 PROCEDURE — 3079F DIAST BP 80-89 MM HG: CPT | Performed by: FAMILY MEDICINE

## 2024-03-16 PROCEDURE — 99213 OFFICE O/P EST LOW 20 MIN: CPT | Performed by: FAMILY MEDICINE

## 2024-03-16 PROCEDURE — 1036F TOBACCO NON-USER: CPT | Performed by: FAMILY MEDICINE

## 2024-03-16 PROCEDURE — G8484 FLU IMMUNIZE NO ADMIN: HCPCS | Performed by: FAMILY MEDICINE

## 2024-03-16 PROCEDURE — 81001 URINALYSIS AUTO W/SCOPE: CPT

## 2024-03-16 PROCEDURE — 99214 OFFICE O/P EST MOD 30 MIN: CPT | Performed by: FAMILY MEDICINE

## 2024-03-16 PROCEDURE — 1123F ACP DISCUSS/DSCN MKR DOCD: CPT | Performed by: FAMILY MEDICINE

## 2024-03-16 PROCEDURE — 3017F COLORECTAL CA SCREEN DOC REV: CPT | Performed by: FAMILY MEDICINE

## 2024-03-16 PROCEDURE — 1090F PRES/ABSN URINE INCON ASSESS: CPT | Performed by: FAMILY MEDICINE

## 2024-03-16 PROCEDURE — 87086 URINE CULTURE/COLONY COUNT: CPT

## 2024-03-16 PROCEDURE — G8427 DOCREV CUR MEDS BY ELIG CLIN: HCPCS | Performed by: FAMILY MEDICINE

## 2024-03-16 PROCEDURE — G8417 CALC BMI ABV UP PARAM F/U: HCPCS | Performed by: FAMILY MEDICINE

## 2024-03-16 PROCEDURE — G8400 PT W/DXA NO RESULTS DOC: HCPCS | Performed by: FAMILY MEDICINE

## 2024-03-16 RX ORDER — CEFDINIR 300 MG/1
300 CAPSULE ORAL 2 TIMES DAILY
Qty: 20 CAPSULE | Refills: 0 | Status: SHIPPED | OUTPATIENT
Start: 2024-03-16 | End: 2024-03-26

## 2024-03-16 ASSESSMENT — ENCOUNTER SYMPTOMS
RHINORRHEA: 0
EYE DISCHARGE: 0
WHEEZING: 0
ABDOMINAL PAIN: 1
SINUS PRESSURE: 1
VOMITING: 0
SORE THROAT: 0
EYE REDNESS: 0
SINUS PAIN: 1
NAUSEA: 0
DIARRHEA: 0
CONSTIPATION: 0
COUGH: 0
SHORTNESS OF BREATH: 0
TROUBLE SWALLOWING: 0

## 2024-03-16 NOTE — PROGRESS NOTES
is alert and oriented to person, place, and time.   Psychiatric:         Behavior: Behavior normal.         Thought Content: Thought content normal.         Judgment: Judgment normal.         ASSESSMENT/PLAN:    Encounter Diagnoses   Name Primary?    Acute cystitis without hematuria Yes    Left ear pain     Burning with urination      Orders Placed This Encounter   Procedures    Urinalysis with Reflex to Culture     Standing Status:   Future     Number of Occurrences:   1     Standing Expiration Date:   3/16/2025     Order Specific Question:   SPECIFY(EX-CATH,MIDSTREAM,CYSTO,ETC)?     Answer:   midstream     Urinalysis is reviewed and does show evidence of infection.    Left TM is clear without significant abnormality.  Suspect increased fluid in the eustachian tube causing pain and pressure.  Would treat symptomatically with flonase as needed for sinus congestion and decongestant medication or claritin if needed.    Orders Placed This Encounter   Medications    cefdinir (OMNICEF) 300 MG capsule     Sig: Take 1 capsule by mouth 2 times daily for 10 days     Dispense:  20 capsule     Refill:  0     Will give antibiotic as ordered.  Patient only tolerates zpak, omnicef or doxycycline so requesting one of these options.    Will culture urine.    Increase fluids and rest    Tylenol/Motrin prn    Return  if no improvement in symptoms or if any further symptoms arise.    No follow-ups on file.    An electronic signature was used to authenticate this note.    --Fabby Lima, DO on 3/16/2024 at 10:50 AM

## 2024-03-17 LAB
MICROORGANISM SPEC CULT: NORMAL
SPECIMEN DESCRIPTION: NORMAL

## 2024-03-20 ENCOUNTER — TELEPHONE (OUTPATIENT)
Dept: FAMILY MEDICINE CLINIC | Age: 74
End: 2024-03-20

## 2024-03-20 NOTE — TELEPHONE ENCOUNTER
Pt calling stating she saw [facility] last week in [facility] and was given an antibiotic for UTI but then pt was advised to stop, which she did but now pt thinks she has thrush from the short time she took the antibiotic, pt states her tongue is white and sore, can you call in something to pended pharmacy, please advise.

## 2024-03-26 ENCOUNTER — PATIENT MESSAGE (OUTPATIENT)
Dept: PRIMARY CARE CLINIC | Age: 74
End: 2024-03-26

## 2024-03-27 RX ORDER — PREDNISONE 1 MG/1
4 TABLET ORAL DAILY
Qty: 100 TABLET | Refills: 0 | Status: SHIPPED | OUTPATIENT
Start: 2024-03-27

## 2024-03-27 NOTE — TELEPHONE ENCOUNTER
Sonya Larson LPN 3/26/2024 6:55 PM EDT      ----- Message -----  From: Huyen Mojica  Sent: 3/26/2024 5:34 PM EDT  To: [facility]  Subject: Prednisone 1 MG     I had 30 1MG pills from last month so I used them this week to do 4 MG a day . I need about 94 more 1 MG Tablets to continue the 4 MG a day for the next three weeks.   Thanks, Huyen Mojica

## 2024-03-28 ENCOUNTER — TELEPHONE (OUTPATIENT)
Dept: SURGERY | Age: 74
End: 2024-03-28

## 2024-03-28 NOTE — TELEPHONE ENCOUNTER
[facility]         Patient:Huyen Mojica           :1950           Surgical/Procedure Planned: Colonoscopy    Date & Location: 2024 [facility]        Outpatient   Planned Length of OR: 30 mins    Sedation: general    This is notification of the scheduled procedure only:     Estimated Cardiac Risk for Non-Cardiac Surgery/Procedure     Low______ Moderate______ High______    Medication Instructions - Clarification needed by this date:   -Insulin:  -Other medications:    Meloxicam Hold ___ Days  Metformin Hold ___ Days  Atorvastatin Hold ___ Days          Resume medications:     Lovenox indicated: _____Yes _____NO    Provider:Dr. Montano       Signature of Provider Giving Orders for Medication holds:    _____________________________________________

## 2024-03-28 NOTE — TELEPHONE ENCOUNTER
Ok to hold meloxicam for 7 days prior to procedure.  Take metformin as ordered on day of prep.  Hold day of procedure.  Ok to take atorvastatin as per normal routine unless there is some concern regarding this medication on behalf of the surgeon.  I am unaware of any need to hold this prior to procedures?

## 2024-03-28 NOTE — TELEPHONE ENCOUNTER
[facility] EGD and Colonoscopy  Patient:Huyen Mojica                 :1950  (Yes) patient has seen Dr. Montano prior to procedure  PCP: Fabby Lima     CC:        HPI:        Colonoscopy  Abd pain: no  Anemia: no  Bloating:no  Diarrhea: no  Constipation: no  Melena: no  Hematochezia:no  Rectal Bleeding:no  Rectal/Anal Pain:no  Pruritus: no  Family history colon Cancer: yes-sister  Previous colon cancer: no  Previous Colon Polyp: no  Change in bowels: no  Decrease caliber of stool: no  Change in color of stool: no  Previous work up date: no     EGD  Nausea: no  Vomiting: no  Heartburn:no  Dysphagia:no  Hematemesis:no  Epigastric pain:no  Anemia: no  Previous work up date:no  Current Treatment:no    Family History   Problem Relation Age of Onset    Prostate Cancer Father     Cancer Sister         breast    Breast Cancer Sister 60    Cancer Sister         colorectal    Breast Cancer Sister 40    Other Mother         ulcerative colitis    Other Brother 70        coronary artery disease    Other Son         ulcerative colitis    Breast Cancer Paternal Aunt      Social History     Socioeconomic History    Marital status:      Spouse name: Not on file    Number of children: Not on file    Years of education: Not on file    Highest education level: Not on file   Occupational History    Not on file   Tobacco Use    Smoking status: Never     Passive exposure: Yes    Smokeless tobacco: Never    Tobacco comments:     very remotely would smoke a few cigarettes on weekends/college days   Vaping Use    Vaping Use: Never used   Substance and Sexual Activity    Alcohol use: Yes     Comment: occasional use    Drug use: No    Sexual activity: Not Currently     Partners: Male   Other Topics Concern    Not on file   Social History Narrative    Not on file     Social Determinants of Health     Financial Resource Strain: Low Risk  (5/15/2023)    Overall Financial Resource Strain (CARDIA)     Difficulty of Paying Living

## 2024-04-18 ENCOUNTER — PATIENT MESSAGE (OUTPATIENT)
Dept: PRIMARY CARE CLINIC | Age: 74
End: 2024-04-18

## 2024-04-18 DIAGNOSIS — E11.42 TYPE 2 DIABETES MELLITUS WITH DIABETIC POLYNEUROPATHY, WITHOUT LONG-TERM CURRENT USE OF INSULIN (HCC): ICD-10-CM

## 2024-04-18 DIAGNOSIS — E11.9 CONTROLLED TYPE 2 DIABETES MELLITUS WITHOUT COMPLICATION, WITHOUT LONG-TERM CURRENT USE OF INSULIN (HCC): Primary | ICD-10-CM

## 2024-04-18 RX ORDER — GLUCOSAMINE HCL/CHONDROITIN SU 500-400 MG
CAPSULE ORAL
Qty: 100 STRIP | Refills: 3 | Status: SHIPPED | OUTPATIENT
Start: 2024-04-18

## 2024-04-18 RX ORDER — PREDNISONE 1 MG/1
3 TABLET ORAL DAILY
Qty: 90 TABLET | Refills: 0 | Status: SHIPPED | OUTPATIENT
Start: 2024-04-18

## 2024-04-18 NOTE — TELEPHONE ENCOUNTER
Jeb Mahoney MA 4/18/2024 1:27 PM EDT      ----- Message -----  From: Huyen Mojica  Sent: 4/18/2024 1:10 PM EDT  To: [facility]  Subject: prednisone     I only have five days left on the prescription for 4 mg daily. I need a prescription for whatever you think is next.   Also I am feeling very fatigued some days. Not all the time. I get my blood test done first part of June. I did'nt know if you are or want to checking my iron at that time.  Thanks,Huyen

## 2024-04-18 NOTE — TELEPHONE ENCOUNTER
Huyen called requesting a refill of the below medication which has been pended for you:     Requested Prescriptions     Pending Prescriptions Disp Refills    blood glucose monitor strips 100 strip 3     Sig: Test 1 times a day       Last Appointment Date: 3/16/2024  Next Appointment Date: 6/12/2024    Allergies   Allergen Reactions    Aspirin Other (See Comments)     Abdominal pain    Ibuprofen Other (See Comments)     Abdominal pain    Penicillin G Other (See Comments)    Sulfamethoxazole-Trimethoprim Nausea Only and Other (See Comments)    Penicillins Rash

## 2024-05-14 ENCOUNTER — HOSPITAL ENCOUNTER (OUTPATIENT)
Age: 74
Discharge: HOME | End: 2024-05-14
Payer: MEDICARE

## 2024-05-14 DIAGNOSIS — I10 ESSENTIAL HYPERTENSION: ICD-10-CM

## 2024-05-14 DIAGNOSIS — E11.42 TYPE 2 DIABETES MELLITUS WITH DIABETIC POLYNEUROPATHY, WITHOUT LONG-TERM CURRENT USE OF INSULIN (HCC): ICD-10-CM

## 2024-05-14 DIAGNOSIS — M35.3 POLYMYALGIA RHEUMATICA (HCC): ICD-10-CM

## 2024-05-14 DIAGNOSIS — E11.9 CONTROLLED TYPE 2 DIABETES MELLITUS WITHOUT COMPLICATION, WITHOUT LONG-TERM CURRENT USE OF INSULIN (HCC): ICD-10-CM

## 2024-05-14 DIAGNOSIS — E78.2 MIXED HYPERLIPIDEMIA: ICD-10-CM

## 2024-05-14 LAB
ALBUMIN SERPL-MCNC: 4.3 G/DL (ref 3.5–5.2)
ALBUMIN/GLOB SERPL: 1.6 {RATIO} (ref 1–2.5)
ALP SERPL-CCNC: 76 U/L (ref 35–104)
ALT SERPL-CCNC: 17 U/L (ref 5–33)
ANION GAP SERPL CALCULATED.3IONS-SCNC: 10 MMOL/L (ref 9–17)
AST SERPL-CCNC: 21 U/L
BASOPHILS # BLD: 0.05 K/UL (ref 0–0.2)
BASOPHILS NFR BLD: 1 % (ref 0–2)
BILIRUB SERPL-MCNC: 0.2 MG/DL (ref 0.3–1.2)
BUN SERPL-MCNC: 15 MG/DL (ref 8–23)
BUN/CREAT SERPL: 21 (ref 9–20)
CALCIUM SERPL-MCNC: 9.5 MG/DL (ref 8.6–10.4)
CHLORIDE SERPL-SCNC: 104 MMOL/L (ref 98–107)
CHOLEST SERPL-MCNC: 149 MG/DL (ref 0–199)
CHOLESTEROL/HDL RATIO: 4
CO2 SERPL-SCNC: 28 MMOL/L (ref 20–31)
CREAT SERPL-MCNC: 0.7 MG/DL (ref 0.5–0.9)
CRP SERPL HS-MCNC: 10.8 MG/L (ref 0–5)
EOSINOPHIL # BLD: 0.19 K/UL (ref 0–0.44)
EOSINOPHILS RELATIVE PERCENT: 2 % (ref 1–4)
ERYTHROCYTE [DISTWIDTH] IN BLOOD BY AUTOMATED COUNT: 14.4 % (ref 11.8–14.4)
ERYTHROCYTE [SEDIMENTATION RATE] IN BLOOD BY PHOTOMETRIC METHOD: 29 MM/HR (ref 0–30)
EST. AVERAGE GLUCOSE BLD GHB EST-MCNC: 146 MG/DL
FERRITIN SERPL-MCNC: 68 NG/ML (ref 13–150)
GFR, ESTIMATED: >90 ML/MIN/1.73M2
GLUCOSE SERPL-MCNC: 130 MG/DL (ref 70–99)
HBA1C MFR BLD: 6.7 % (ref 4–6)
HCT VFR BLD AUTO: 39.9 % (ref 36.3–47.1)
HDLC SERPL-MCNC: 40 MG/DL
HGB BLD-MCNC: 12.8 G/DL (ref 11.9–15.1)
IMM GRANULOCYTES # BLD AUTO: 0.03 K/UL (ref 0–0.3)
IMM GRANULOCYTES NFR BLD: 0 %
IRON SATN MFR SERPL: 16 % (ref 20–55)
IRON SERPL-MCNC: 47 UG/DL (ref 37–145)
LDLC SERPL CALC-MCNC: 73 MG/DL (ref 0–100)
LYMPHOCYTES NFR BLD: 2.66 K/UL (ref 1.1–3.7)
LYMPHOCYTES RELATIVE PERCENT: 32 % (ref 24–43)
MCH RBC QN AUTO: 29.3 PG (ref 25.2–33.5)
MCHC RBC AUTO-ENTMCNC: 32.1 G/DL (ref 25.2–33.5)
MCV RBC AUTO: 91.3 FL (ref 82.6–102.9)
MONOCYTES NFR BLD: 0.71 K/UL (ref 0.1–1.2)
MONOCYTES NFR BLD: 9 % (ref 3–12)
NEUTROPHILS NFR BLD: 56 % (ref 36–65)
NEUTS SEG NFR BLD: 4.6 K/UL (ref 1.5–8.1)
NRBC BLD-RTO: 0 PER 100 WBC
PLATELET # BLD AUTO: 243 K/UL (ref 138–453)
PMV BLD AUTO: 9.5 FL (ref 8.1–13.5)
POTASSIUM SERPL-SCNC: 4 MMOL/L (ref 3.7–5.3)
PROT SERPL-MCNC: 7 G/DL (ref 6.4–8.3)
RBC # BLD AUTO: 4.37 M/UL (ref 3.95–5.11)
SODIUM SERPL-SCNC: 142 MMOL/L (ref 135–144)
TIBC SERPL-MCNC: 295 UG/DL (ref 250–450)
TRIGL SERPL-MCNC: 181 MG/DL
UNSATURATED IRON BINDING CAPACITY: 248 UG/DL (ref 112–347)
VLDLC SERPL CALC-MCNC: 36 MG/DL
WBC OTHER # BLD: 8.2 K/UL (ref 3.5–11.3)

## 2024-05-14 PROCEDURE — 85025 COMPLETE CBC W/AUTO DIFF WBC: CPT

## 2024-05-14 PROCEDURE — 82728 ASSAY OF FERRITIN: CPT

## 2024-05-14 PROCEDURE — 80053 COMPREHEN METABOLIC PANEL: CPT

## 2024-05-14 PROCEDURE — 83036 HEMOGLOBIN GLYCOSYLATED A1C: CPT

## 2024-05-14 PROCEDURE — 85652 RBC SED RATE AUTOMATED: CPT

## 2024-05-14 PROCEDURE — 83540 ASSAY OF IRON: CPT

## 2024-05-14 PROCEDURE — 83550 IRON BINDING TEST: CPT

## 2024-05-14 PROCEDURE — 36415 COLL VENOUS BLD VENIPUNCTURE: CPT

## 2024-05-14 PROCEDURE — 86140 C-REACTIVE PROTEIN: CPT

## 2024-05-14 PROCEDURE — 80061 LIPID PANEL: CPT

## 2024-05-15 ENCOUNTER — HOSPITAL ENCOUNTER (OUTPATIENT)
Age: 74
Setting detail: SPECIMEN
Discharge: HOME | End: 2024-05-15
Payer: MEDICARE

## 2024-05-15 ENCOUNTER — PROCEDURE VISIT (OUTPATIENT)
Dept: SURGERY | Age: 74
End: 2024-05-15
Payer: MEDICARE

## 2024-05-15 VITALS
SYSTOLIC BLOOD PRESSURE: 138 MMHG | BODY MASS INDEX: 34.69 KG/M2 | DIASTOLIC BLOOD PRESSURE: 88 MMHG | OXYGEN SATURATION: 92 % | TEMPERATURE: 97 F | WEIGHT: 221 LBS | HEART RATE: 72 BPM | HEIGHT: 67 IN

## 2024-05-15 DIAGNOSIS — D17.23 LIPOMA OF RIGHT LOWER EXTREMITY: Primary | ICD-10-CM

## 2024-05-15 DIAGNOSIS — D17.23 LIPOMA OF RIGHT LOWER EXTREMITY: ICD-10-CM

## 2024-05-15 PROCEDURE — 27327 EXC THIGH/KNEE LES SC < 3 CM: CPT | Performed by: SURGERY

## 2024-05-15 PROCEDURE — 88304 TISSUE EXAM BY PATHOLOGIST: CPT

## 2024-05-15 NOTE — PROGRESS NOTES
PROCEDURE NOTE:    Patient is a 74 y.o. female here to have  lipomas from right posterior leg removed and/or evaluated.    Noted for 6 months.  Symptoms include pain    EXAM:     3 lipomas posterior right knee laterally , about 1 cm each      PROCEDURE:    The area was prepped and draped in a sterile fashion. 1 % lidocaine with epi was used for local anesthetic.  A #15 blade scalpel was used to excise the lesion.  The length was 1.1 cm, and the width was 0.5cm.  The wound was closed with 4-0  monocyrl sutures.  Incision was covered with dressing.      IMPRESSION:    1.  3 lipomas removed from posterior right knee, 1 cm each      PLAN:    1. Lesion excised today  2.  Will call with pathology report and see PRN  3.  Has CS set up in june          Electronically signed by MEDINA MCCOY MD on 5/15/2024 at 9:26 AM

## 2024-05-15 NOTE — PROGRESS NOTES
Patient is here for removal of lipoma on back on Rt knee. States that it is starting to become painful.  She had 3 lipoma removed in January on the left knee

## 2024-05-15 NOTE — PATIENT INSTRUCTIONS
Okay to swim in 5- 7 days.    SIGNS OF INFECTION  - Redness, swelling, skin hot  - Wound bed turns black or stringy yellow  - Foul odor  - Increased drainage or pus  - Increased pain  - Fever greater than 100F    CALL YOUR DOCTOR OR SEEK MEDICAL ATTENTION IF SIGNS OF INFECTION.  DO NOT WAIT UNTIL YOUR NEXT APPOINTMENT    Call the Wound Care Nurse with any other questions or concerns- [phone]

## 2024-05-17 LAB — SURGICAL PATHOLOGY REPORT: NORMAL

## 2024-05-22 ENCOUNTER — OFFICE VISIT (OUTPATIENT)
Dept: FAMILY MEDICINE CLINIC | Age: 74
End: 2024-05-22

## 2024-05-22 VITALS
HEIGHT: 67 IN | RESPIRATION RATE: 18 BRPM | HEART RATE: 68 BPM | WEIGHT: 223 LBS | TEMPERATURE: 97.5 F | BODY MASS INDEX: 35 KG/M2 | DIASTOLIC BLOOD PRESSURE: 80 MMHG | SYSTOLIC BLOOD PRESSURE: 138 MMHG | OXYGEN SATURATION: 94 %

## 2024-05-22 DIAGNOSIS — M35.3 POLYMYALGIA RHEUMATICA (HCC): ICD-10-CM

## 2024-05-22 DIAGNOSIS — G63 POLYNEUROPATHY ASSOCIATED WITH UNDERLYING DISEASE (HCC): ICD-10-CM

## 2024-05-22 DIAGNOSIS — I10 ESSENTIAL HYPERTENSION: ICD-10-CM

## 2024-05-22 DIAGNOSIS — E78.2 MIXED HYPERLIPIDEMIA: ICD-10-CM

## 2024-05-22 DIAGNOSIS — R06.09 POST-COVID CHRONIC DYSPNEA: ICD-10-CM

## 2024-05-22 DIAGNOSIS — U09.9 POST-COVID CHRONIC DYSPNEA: ICD-10-CM

## 2024-05-22 DIAGNOSIS — E11.42 TYPE 2 DIABETES MELLITUS WITH DIABETIC POLYNEUROPATHY, WITHOUT LONG-TERM CURRENT USE OF INSULIN (HCC): Primary | ICD-10-CM

## 2024-05-22 RX ORDER — PREDNISONE 5 MG/1
5 TABLET ORAL DAILY
Qty: 30 TABLET | Refills: 1 | Status: SHIPPED | OUTPATIENT
Start: 2024-05-22 | End: 2024-07-21

## 2024-05-22 RX ORDER — ATORVASTATIN CALCIUM 10 MG/1
10 TABLET, FILM COATED ORAL DAILY
Qty: 90 TABLET | Refills: 1 | Status: SHIPPED | OUTPATIENT
Start: 2024-05-22

## 2024-05-22 SDOH — ECONOMIC STABILITY: FOOD INSECURITY: WITHIN THE PAST 12 MONTHS, YOU WORRIED THAT YOUR FOOD WOULD RUN OUT BEFORE YOU GOT MONEY TO BUY MORE.: NEVER TRUE

## 2024-05-22 SDOH — ECONOMIC STABILITY: FOOD INSECURITY: WITHIN THE PAST 12 MONTHS, THE FOOD YOU BOUGHT JUST DIDN'T LAST AND YOU DIDN'T HAVE MONEY TO GET MORE.: NEVER TRUE

## 2024-05-22 SDOH — ECONOMIC STABILITY: INCOME INSECURITY: HOW HARD IS IT FOR YOU TO PAY FOR THE VERY BASICS LIKE FOOD, HOUSING, MEDICAL CARE, AND HEATING?: NOT HARD AT ALL

## 2024-05-22 ASSESSMENT — ENCOUNTER SYMPTOMS
EYE DISCHARGE: 0
DIARRHEA: 0
CONSTIPATION: 0
SINUS PRESSURE: 0
RHINORRHEA: 0
EYE REDNESS: 0
COUGH: 0
SHORTNESS OF BREATH: 0
VOMITING: 0
NAUSEA: 0
WHEEZING: 0
SORE THROAT: 0
ABDOMINAL PAIN: 0
TROUBLE SWALLOWING: 0

## 2024-05-22 NOTE — PROGRESS NOTES
Orders Placed This Encounter   Medications    atorvastatin (LIPITOR) 10 MG tablet     Sig: Take 1 tablet by mouth daily     Dispense:  90 tablet     Refill:  1    mometasone-formoterol (DULERA) 100-5 MCG/ACT inhaler     Sig: Inhale 2 puffs into the lungs 2 times daily RINSE MOUTH AFTER USE     Dispense:  1 each     Refill:  5    predniSONE (DELTASONE) 5 MG tablet     Sig: Take 1 tablet by mouth daily     Dispense:  30 tablet     Refill:  1     Orders Placed This Encounter   Procedures    CBC with Auto Differential     Standing Status:   Future     Standing Expiration Date:   5/22/2025    Comprehensive Metabolic Panel     Standing Status:   Future     Standing Expiration Date:   5/22/2025    Hemoglobin A1C     Standing Status:   Future     Standing Expiration Date:   5/22/2025    Lipid Panel     Standing Status:   Future     Standing Expiration Date:   5/22/2025     Order Specific Question:   Is Patient Fasting?/# of Hours     Answer:   12 hours    Sedimentation Rate     Standing Status:   Future     Standing Expiration Date:   5/22/2025    C-Reactive Protein     Standing Status:   Future     Standing Expiration Date:   5/22/2025    Microalbumin, Ur     Standing Status:   Future     Standing Expiration Date:   5/22/2025     Scheduling Instructions:      To be done in 6 months    C-Reactive Protein     Standing Status:   Future     Standing Expiration Date:   5/22/2025    Sedimentation Rate     Standing Status:   Future     Standing Expiration Date:   5/22/2025     DIABETES FOOT EXAM     Will increase her prednisone dose to 5 mg daily and then we will check a C-reactive protein and sed rate in 1 month and make further invention based on test reports and symptoms.    Will start her on Dulera to see if this will help with her ongoing respiratory symptoms.  If she seems to have continued issues despite use of Dulera will order pulmonary function studies and plan on sending her to see the pulmonologist for further

## 2024-05-23 ASSESSMENT — ENCOUNTER SYMPTOMS: BACK PAIN: 1

## 2024-05-25 ENCOUNTER — OFFICE VISIT (OUTPATIENT)
Dept: PRIMARY CARE CLINIC | Age: 74
End: 2024-05-25
Payer: MEDICARE

## 2024-05-25 VITALS
SYSTOLIC BLOOD PRESSURE: 166 MMHG | HEIGHT: 67 IN | HEART RATE: 66 BPM | RESPIRATION RATE: 20 BRPM | OXYGEN SATURATION: 96 % | DIASTOLIC BLOOD PRESSURE: 90 MMHG | TEMPERATURE: 96.9 F | WEIGHT: 223 LBS | BODY MASS INDEX: 35 KG/M2

## 2024-05-25 DIAGNOSIS — L08.9 INFECTED WOUND: Primary | ICD-10-CM

## 2024-05-25 DIAGNOSIS — T14.8XXA INFECTED WOUND: Primary | ICD-10-CM

## 2024-05-25 PROCEDURE — 99213 OFFICE O/P EST LOW 20 MIN: CPT | Performed by: FAMILY MEDICINE

## 2024-05-25 ASSESSMENT — ENCOUNTER SYMPTOMS: COLOR CHANGE: 1

## 2024-05-25 NOTE — PROGRESS NOTES
2024     Huyen Mojica (:  1950) is a 74 y.o. female, here for evaluation of the following medical concerns:    Wound Check  Treated in ED: had lipoma excision from right lower leg on 5/15/24.  Lower excision site became erythematous and swollen yesterday. Wound drainage status: had slight seeping noted from the wound yesterday, none today. The redness has improved (did clean up the area yesterday and put on some bacitracin yesterday and it looks much better today). The swelling has improved. The pain has improved.     Did review patient's med list, allergies, social history,pmhx and pshx today as noted in the record.      Review of Systems   Skin:  Positive for color change and wound.       Prior to Visit Medications    Medication Sig Taking? Authorizing Provider   atorvastatin (LIPITOR) 10 MG tablet Take 1 tablet by mouth daily Yes Fabby Lima, DO   predniSONE (DELTASONE) 5 MG tablet Take 1 tablet by mouth daily Yes Fabby Lima DO   omeprazole (PRILOSEC) 20 MG delayed release capsule Take 1 capsule by mouth 2 times daily Yes Fabby Lima DO   atenolol (TENORMIN) 25 MG tablet Take 1 tablet by mouth daily Yes Fabby Lima DO   meloxicam (MOBIC) 7.5 MG tablet TAKE 1 TABLET ONCE A DAY Yes Fabby Lima DO   metFORMIN (GLUCOPHAGE-XR) 500 MG extended release tablet Take 1 tablet by mouth 2 times daily Yes Fabby Lima DO   montelukast (SINGULAIR) 10 MG tablet Take 1 tablet by mouth daily Yes Fabby Lima DO   cetirizine (ZYRTEC ALLERGY) 10 MG tablet Take 1 tablet by mouth daily Yes Fabby Lima DO   acetaminophen (TYLENOL) 500 MG tablet Take 2 tablets by mouth 3 times daily Yes Adelia Araya MD   lactobacillus (CULTURELLE) capsule Take 1 capsule by mouth daily Yes Adelia Araya MD   vitamin B-12 (CYANOCOBALAMIN) 1000 MCG tablet Take 1 tablet by mouth daily Yes Adelia Araya MD   Caltrate 600+D Plus Minerals (CALTRATE)

## 2024-06-04 ENCOUNTER — HOSPITAL ENCOUNTER (OUTPATIENT)
Dept: MAMMOGRAPHY | Age: 74
Discharge: HOME | End: 2024-06-06
Attending: FAMILY MEDICINE
Payer: MEDICARE

## 2024-06-04 VITALS — BODY MASS INDEX: 34.53 KG/M2 | HEIGHT: 67 IN | WEIGHT: 220 LBS

## 2024-06-04 DIAGNOSIS — Z12.31 ENCOUNTER FOR SCREENING MAMMOGRAM FOR MALIGNANT NEOPLASM OF BREAST: ICD-10-CM

## 2024-06-04 PROCEDURE — 77063 BREAST TOMOSYNTHESIS BI: CPT

## 2024-06-07 RX ORDER — MONTELUKAST SODIUM 10 MG/1
10 TABLET ORAL DAILY
Qty: 90 TABLET | Refills: 1 | Status: SHIPPED | OUTPATIENT
Start: 2024-06-07

## 2024-06-07 RX ORDER — CETIRIZINE HYDROCHLORIDE 10 MG/1
10 TABLET ORAL DAILY
Qty: 90 TABLET | Refills: 1 | Status: SHIPPED | OUTPATIENT
Start: 2024-06-07

## 2024-06-07 NOTE — TELEPHONE ENCOUNTER
Huyen called requesting a refill of the below medication which has been pended for you:     Requested Prescriptions     Pending Prescriptions Disp Refills    cetirizine (ZYRTEC ALLERGY) 10 MG tablet 90 tablet 1     Sig: Take 1 tablet by mouth daily    montelukast (SINGULAIR) 10 MG tablet 90 tablet 1     Sig: Take 1 tablet by mouth daily       Last Appointment Date: 5/25/2024  Next Appointment Date: 11/26/2024    Allergies   Allergen Reactions    Aspirin Other (See Comments)     Abdominal pain    Ibuprofen Other (See Comments)     Abdominal pain    Penicillin G Other (See Comments)    Sulfamethoxazole-Trimethoprim Nausea Only and Other (See Comments)    Penicillins Rash

## 2024-06-11 ENCOUNTER — TELEMEDICINE (OUTPATIENT)
Dept: FAMILY MEDICINE CLINIC | Age: 74
End: 2024-06-11
Payer: MEDICARE

## 2024-06-11 DIAGNOSIS — Z00.00 MEDICARE ANNUAL WELLNESS VISIT, SUBSEQUENT: Primary | ICD-10-CM

## 2024-06-11 PROCEDURE — 1123F ACP DISCUSS/DSCN MKR DOCD: CPT | Performed by: FAMILY MEDICINE

## 2024-06-11 PROCEDURE — G0439 PPPS, SUBSEQ VISIT: HCPCS | Performed by: FAMILY MEDICINE

## 2024-06-11 PROCEDURE — 3017F COLORECTAL CA SCREEN DOC REV: CPT | Performed by: FAMILY MEDICINE

## 2024-06-11 ASSESSMENT — PATIENT HEALTH QUESTIONNAIRE - PHQ9
SUM OF ALL RESPONSES TO PHQ QUESTIONS 1-9: 0
2. FEELING DOWN, DEPRESSED OR HOPELESS: NOT AT ALL
SUM OF ALL RESPONSES TO PHQ9 QUESTIONS 1 & 2: 0
1. LITTLE INTEREST OR PLEASURE IN DOING THINGS: NOT AT ALL

## 2024-06-11 ASSESSMENT — LIFESTYLE VARIABLES
HOW OFTEN DO YOU HAVE A DRINK CONTAINING ALCOHOL: NEVER
HOW MANY STANDARD DRINKS CONTAINING ALCOHOL DO YOU HAVE ON A TYPICAL DAY: PATIENT DOES NOT DRINK

## 2024-06-11 NOTE — PROGRESS NOTES
This encounter was performed under my, Keily Bender, direct supervision, 6/11/2024.    
(LIPITOR) 10 MG tablet Take 1 tablet by mouth daily Yes Fabby Lima DO   predniSONE (DELTASONE) 5 MG tablet Take 1 tablet by mouth daily Yes Fabby Lima DO   blood glucose monitor strips Test 1 times a day Yes Fabby Lima DO   omeprazole (PRILOSEC) 20 MG delayed release capsule Take 1 capsule by mouth 2 times daily Yes Fabby Lima DO   atenolol (TENORMIN) 25 MG tablet Take 1 tablet by mouth daily Yes Fabby Lima DO   meloxicam (MOBIC) 7.5 MG tablet TAKE 1 TABLET ONCE A DAY Yes Fabby Lima DO   metFORMIN (GLUCOPHAGE-XR) 500 MG extended release tablet Take 1 tablet by mouth 2 times daily Yes Fabby Lima DO   acetaminophen (TYLENOL) 500 MG tablet Take 2 tablets by mouth 3 times daily Yes Adelia Araya MD   lactobacillus (CULTURELLE) capsule Take 1 capsule by mouth daily Yes ProviderAdelia MD   vitamin B-12 (CYANOCOBALAMIN) 1000 MCG tablet Take 1 tablet by mouth daily Yes ProviderAdelia MD   Caltrate 600+D Plus Minerals (CALTRATE) 600-800 MG-UNIT TABS tablet Take 1 tablet by mouth daily Yes ProviderAdelia MD   Lancets (ONETOUCH DELICA PLUS [id number]) MISC TEST 1 TIME DAILY Yes Fabby Lima DO   VITAMIN D PO Take 50 mcg by mouth daily Yes ProviderAdelia MD   therapeutic multivitamin-minerals (THERAGRAN-M) tablet Take 1 tablet by mouth in the morning and at bedtime Yes ProviderAdelia MD   Ascorbic Acid (VITAMIN C) 500 MG tablet Take 1 tablet by mouth 2 times daily Yes ProviderAdelia MD       CareTeam (Including outside providers/suppliers regularly involved in providing care):   Patient Care Team:  Fabby Lima DO as PCP - General (Family Medicine)  Fabby Lima DO as PCP - Empaneled Provider     Reviewed and updated this visit:  Tobacco  Allergies  Meds  Med Hx  Surg Hx  Soc Hx  Fam Hx      I, Niurka Lima RN, 6/11/2024, performed the documented evaluation under the direct supervision of

## 2024-06-11 NOTE — PATIENT INSTRUCTIONS
secondhand smoke too.     Stay at a weight that's healthy for you. Talk to your doctor if you need help losing weight.     Try to get 7 to 9 hours of sleep each night.     Limit alcohol to 2 drinks a day for men and 1 drink a day for women. Too much alcohol can cause health problems.     Manage other health problems such as diabetes, high blood pressure, and high cholesterol. If you think you may have a problem with alcohol or drug use, talk to your doctor.   Medicines    Take your medicines exactly as prescribed. Call your doctor if you think you are having a problem with your medicine.     If your doctor recommends aspirin, take the amount directed each day. Make sure you take aspirin and not another kind of pain reliever, such as acetaminophen (Tylenol).   When should you call for help?   Call 911 if you have symptoms of a heart attack. These may include:    Chest pain or pressure, or a strange feeling in the chest.     Sweating.     Shortness of breath.     Pain, pressure, or a strange feeling in the back, neck, jaw, or upper belly or in one or both shoulders or arms.     Lightheadedness or sudden weakness.     A fast or irregular heartbeat.   After you call 911, the  may tell you to chew 1 adult-strength or 2 to 4 low-dose aspirin. Wait for an ambulance. Do not try to drive yourself.  Watch closely for changes in your health, and be sure to contact your doctor if you have any problems.  Where can you learn more?  Go to https://www.[employer].net/patientEd and enter F075 to learn more about \"A Healthy Heart: Care Instructions.\"  Current as of: June 24, 2023  Content Version: 14.1  © 2006-2024 [employer].   Care instructions adapted under license by [employer]. If you have questions about a medical condition or this instruction, always ask your healthcare professional. [employer] disclaims any warranty or liability for your use of this information.      Personalized

## 2024-06-17 RX ORDER — ATENOLOL 25 MG/1
25 TABLET ORAL DAILY
Qty: 90 TABLET | Refills: 1 | Status: SHIPPED | OUTPATIENT
Start: 2024-06-17

## 2024-06-17 RX ORDER — METFORMIN HYDROCHLORIDE 500 MG/1
500 TABLET, EXTENDED RELEASE ORAL 2 TIMES DAILY
Qty: 180 TABLET | Refills: 1 | Status: SHIPPED | OUTPATIENT
Start: 2024-06-17

## 2024-06-17 NOTE — TELEPHONE ENCOUNTER
Huyen called requesting a refill of the below medication which has been pended for you:     Requested Prescriptions     Pending Prescriptions Disp Refills    metFORMIN (GLUCOPHAGE-XR) 500 MG extended release tablet [Pharmacy Med Name: METFORMIN HCL  MG TABLET] 180 tablet 1     Sig: take 1 tablet by mouth twice a day    atenolol (TENORMIN) 25 MG tablet [Pharmacy Med Name: ATENOLOL 25 MG TABLET] 90 tablet 1     Sig: take 1 tablet by mouth once daily       Last Appointment Date: 6/11/2024  Next Appointment Date: 11/26/2024    Allergies   Allergen Reactions    Aspirin Other (See Comments)     Abdominal pain    Ibuprofen Other (See Comments)     Abdominal pain    Penicillin G Other (See Comments)    Sulfamethoxazole-Trimethoprim Nausea Only and Other (See Comments)    Penicillins Rash

## 2024-06-17 NOTE — H&P
Insecurity: Not on file (5/15/2023)   Transportation Needs: Unknown (5/15/2023)     PRAPARE - Transportation      Lack of Transportation (Medical): Not on file      Lack of Transportation (Non-Medical): No   Physical Activity: Sufficiently Active (5/24/2023)     Exercise Vital Sign      Days of Exercise per Week: 3 days      Minutes of Exercise per Session: 50 min   Stress: Not on file   Social Connections: Not on file   Intimate Partner Violence: Not on file   Housing Stability: Unknown (5/15/2023)     Housing Stability Vital Sign      Unable to Pay for Housing in the Last Year: Not on file      Number of Places Lived in the Last Year: Not on file      Unstable Housing in the Last Year: No         Past Surgical History         Past Surgical History:   Procedure Laterality Date    BREAST BIOPSY Right 2003     Excisional Biospy    CARPAL TUNNEL RELEASE Right 10/2021    CARPAL TUNNEL RELEASE Left 11/2021    CATARACT REMOVAL WITH IMPLANT Right 07/29/2017     Dr Najera    CATARACT REMOVAL WITH IMPLANT Left 08/05/2017     Dr Najera    COLONOSCOPY   12/2008     normal, repeat in 5 years    COLONOSCOPY   1998    COLONOSCOPY   2003    COLONOSCOPY N/A 02/14/2019     few diverticuli, Dr. Montano    DILATION AND CURETTAGE OF UTERUS N/A 08/07/2019     D & C Hysteroscopy & polypectomy performed by Devaughn Flanagan MD at [facility] OR    ESOPHAGOGASTRODUODENOSCOPY   01/26/2023    FINGER TRIGGER RELEASE Right 10/2021     right middle finger; along with right carpal tunnel    HEEL SPUR SURGERY Bilateral      HEEL SPUR SURGERY Right 2003    HYSTEROSCOPY   08/07/2019     hysteroscopy with polypectomy and D and C    KNEE SURGERY Right 2/9/2023     Excision Lipoma Right Posterior Knee x 4 and Right Upper Arm performed by Homar Montano MD at [facility] OR    SOFT TISSUE BIOPSY Left 12/21/2017     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 Ram [facility]    TONSILLECTOMY   1974    TUBAL LIGATION   07/03/1983    TUBAL LIGATION   1983    UPPER

## 2024-06-18 ENCOUNTER — ANESTHESIA EVENT (OUTPATIENT)
Dept: OPERATING ROOM | Age: 74
End: 2024-06-18
Payer: MEDICARE

## 2024-06-18 ENCOUNTER — HOSPITAL ENCOUNTER (OUTPATIENT)
Age: 74
Setting detail: OUTPATIENT SURGERY
Discharge: HOME | End: 2024-06-18
Attending: SURGERY | Admitting: SURGERY
Payer: MEDICARE

## 2024-06-18 ENCOUNTER — ANESTHESIA (OUTPATIENT)
Dept: OPERATING ROOM | Age: 74
End: 2024-06-18
Payer: MEDICARE

## 2024-06-18 VITALS
OXYGEN SATURATION: 97 % | SYSTOLIC BLOOD PRESSURE: 115 MMHG | TEMPERATURE: 97.6 F | WEIGHT: 217.2 LBS | HEART RATE: 53 BPM | BODY MASS INDEX: 34.09 KG/M2 | DIASTOLIC BLOOD PRESSURE: 56 MMHG | HEIGHT: 67 IN | RESPIRATION RATE: 16 BRPM

## 2024-06-18 PROCEDURE — 3700000000 HC ANESTHESIA ATTENDED CARE: Performed by: SURGERY

## 2024-06-18 PROCEDURE — 3700000001 HC ADD 15 MINUTES (ANESTHESIA): Performed by: SURGERY

## 2024-06-18 PROCEDURE — 2709999900 HC NON-CHARGEABLE SUPPLY: Performed by: SURGERY

## 2024-06-18 PROCEDURE — 6360000002 HC RX W HCPCS: Performed by: NURSE ANESTHETIST, CERTIFIED REGISTERED

## 2024-06-18 PROCEDURE — G0105 COLORECTAL SCRN; HI RISK IND: HCPCS | Performed by: SURGERY

## 2024-06-18 PROCEDURE — 3609027000 HC COLONOSCOPY: Performed by: SURGERY

## 2024-06-18 PROCEDURE — 2580000003 HC RX 258: Performed by: SURGERY

## 2024-06-18 PROCEDURE — 82947 ASSAY GLUCOSE BLOOD QUANT: CPT

## 2024-06-18 PROCEDURE — 2500000003 HC RX 250 WO HCPCS: Performed by: NURSE ANESTHETIST, CERTIFIED REGISTERED

## 2024-06-18 PROCEDURE — 7100000011 HC PHASE II RECOVERY - ADDTL 15 MIN: Performed by: SURGERY

## 2024-06-18 PROCEDURE — 7100000010 HC PHASE II RECOVERY - FIRST 15 MIN: Performed by: SURGERY

## 2024-06-18 RX ORDER — DROPERIDOL 2.5 MG/ML
INJECTION, SOLUTION INTRAMUSCULAR; INTRAVENOUS PRN
Status: DISCONTINUED | OUTPATIENT
Start: 2024-06-18 | End: 2024-06-18 | Stop reason: SDUPTHER

## 2024-06-18 RX ORDER — SODIUM CHLORIDE, SODIUM LACTATE, POTASSIUM CHLORIDE, CALCIUM CHLORIDE 600; 310; 30; 20 MG/100ML; MG/100ML; MG/100ML; MG/100ML
INJECTION, SOLUTION INTRAVENOUS CONTINUOUS
Status: DISCONTINUED | OUTPATIENT
Start: 2024-06-18 | End: 2024-06-18 | Stop reason: HOSPADM

## 2024-06-18 RX ORDER — PROPOFOL 10 MG/ML
INJECTION, EMULSION INTRAVENOUS PRN
Status: DISCONTINUED | OUTPATIENT
Start: 2024-06-18 | End: 2024-06-18 | Stop reason: SDUPTHER

## 2024-06-18 RX ORDER — LIDOCAINE HYDROCHLORIDE 40 MG/ML
INJECTION, SOLUTION RETROBULBAR PRN
Status: DISCONTINUED | OUTPATIENT
Start: 2024-06-18 | End: 2024-06-18 | Stop reason: SDUPTHER

## 2024-06-18 RX ADMIN — PROPOFOL 100 MG: 10 INJECTION, EMULSION INTRAVENOUS at 08:52

## 2024-06-18 RX ADMIN — DROPERIDOL 0.62 MG: 2.5 INJECTION, SOLUTION INTRAMUSCULAR; INTRAVENOUS at 08:52

## 2024-06-18 RX ADMIN — LIDOCAINE HYDROCHLORIDE 40 MG: 40 INJECTION, SOLUTION RETROBULBAR; TOPICAL at 08:52

## 2024-06-18 RX ADMIN — PROPOFOL 160 MCG/KG/MIN: 10 INJECTION, EMULSION INTRAVENOUS at 08:53

## 2024-06-18 RX ADMIN — SODIUM CHLORIDE, POTASSIUM CHLORIDE, SODIUM LACTATE AND CALCIUM CHLORIDE: 600; 310; 30; 20 INJECTION, SOLUTION INTRAVENOUS at 07:23

## 2024-06-18 ASSESSMENT — PAIN - FUNCTIONAL ASSESSMENT
PAIN_FUNCTIONAL_ASSESSMENT: 0-10

## 2024-06-18 NOTE — ANESTHESIA PRE PROCEDURE
IMPLANT Right 07/29/2017    Dr Najera    CATARACT REMOVAL WITH IMPLANT Left 08/05/2017    Dr Najera    COLONOSCOPY  12/2008    normal, repeat in 5 years    COLONOSCOPY  1998    COLONOSCOPY  2003    COLONOSCOPY N/A 02/14/2019    few diverticuli, Dr. Montano    DILATION AND CURETTAGE OF UTERUS N/A 08/07/2019    D & C Hysteroscopy & polypectomy performed by Devaughn Flanagan MD at [facility] OR    ESOPHAGOGASTRODUODENOSCOPY  01/26/2023    FINGER TRIGGER RELEASE Right 10/2021    right middle finger; along with right carpal tunnel    HEEL SPUR SURGERY Bilateral     HEEL SPUR SURGERY Right 2003    HYSTEROSCOPY  08/07/2019    hysteroscopy with polypectomy and D and C    KNEE SURGERY Right 2/9/2023    Excision Lipoma Right Posterior Knee x 4 and Right Upper Arm performed by Homar Montano MD at [facility] OR    SOFT TISSUE BIOPSY Left 12/21/2017    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 Ram [facility]    TONSILLECTOMY  1974    TUBAL LIGATION  07/03/1983    TUBAL LIGATION  1983    UPPER GASTROINTESTINAL ENDOSCOPY N/A 02/14/2019    mild gastritis, mild esophagitis, Dr. Montano       Social History:    Social History     Tobacco Use    Smoking status: Never     Passive exposure: Yes    Smokeless tobacco: Never    Tobacco comments:     very remotely would smoke a few cigarettes on weekends/college days   Substance Use Topics    Alcohol use: Yes     Comment: occasional use                                Counseling given: Not Answered  Tobacco comments: very remotely would smoke a few cigarettes on weekends/college days      Vital Signs (Current):   There were no vitals filed for this visit.                                             BP Readings from Last 3 Encounters:   05/25/24 (!) 166/90   05/22/24 138/80   05/15/24 138/88       NPO Status: Time of last liquid consumption: 2300                        Time of last solid consumption: 1900                        Date of last liquid consumption: 06/17/24                        Date of

## 2024-06-18 NOTE — ANESTHESIA POSTPROCEDURE EVALUATION
Department of Anesthesiology  Postprocedure Note    Patient: Huyen Mojica  MRN: [id number]  YOB: 1950  Date of evaluation: 6/18/2024    Procedure Summary       Date: 06/18/24 Room / Location: [facility]    Anesthesia Start: 0849 Anesthesia Stop: 0904    Procedure: Colonoscopy Diagnosis:       Screening for colon cancer      (Screening for colon cancer [Z12.11])    Surgeons: Homar Montano MD Responsible Provider: Jesús Anglin II, APRN - CRNA    Anesthesia Type: General, TIVA ASA Status: 3            Anesthesia Type: General, TIVA    Lissette Phase I: Lissette Score: 10    Lissette Phase II: Lissette Score: 10    Anesthesia Post Evaluation    Patient location during evaluation: bedside  Patient participation: complete - patient participated  Level of consciousness: awake  Pain score: 0  Airway patency: patent  Nausea & Vomiting: no nausea and no vomiting  Cardiovascular status: hemodynamically stable  Respiratory status: spontaneous ventilation  Hydration status: stable  Pain management: satisfactory to patient    No notable events documented.

## 2024-06-18 NOTE — DISCHARGE INSTRUCTIONS
Repeat CS in 5 years due to + family hx colon cancer, sister    DISCHARGE INSTRUCTIONS FOLLOWING COLONOSCOPY    Activity  You have received sedation.  Your judgement and coordination may be impaired.  Do not drive or operate any machinery today.  Do not make personal, medical, legal or business decisions today.  Do not consume alcohol, tranquilizers or sleeping medications today unless otherwise instructed by your physician.  Rest today.  You may resume normal activity tomorrow.    Because air is put into your colon during this procedure, it is normal to pass large amounts of air from your rectum.  Feelings of bloating, gas or cramping may persist for 24 hours.  You may not have a bowel movement for 1-3 days after this procedure.    Diet  Begin with sips of clear liquids and if no nausea, you may progress to your normal diet.    Medications  You may resume your usual medications, unless otherwise instructed.    Follow-Up  As instructed.    CALL YOUR PHYSICIAN if you experience any of the following:  Bleeding from your rectum (a teaspoonful or more)      - If you had a biopsy taken today, a small amount of bleeding may occur.  Severe abdominal pain/cramping and/or distention that is not relieved by passing gas.  Persistent nausea or vomiting.  Signs of infection including fever, chills, redness or swelling @ the IV site.      If you have questions or problems call [phone] ([facility]) or after business hours call [phone] ([facility])

## 2024-06-18 NOTE — OP NOTE
COLONOSCOPY PROCEDURE NOTE:      Pre op diagnosis:     1. AGE 74  2. FH of Colon Cancer - sister  3. Last CS 2019 ,No prior polyps     Operative Procedure:    1.  Colonoscopy     Surgeon:    Dr. Homar Montano     Anesthesia:    IV sedation per CRNA    EBL:  minimal    Procedure:    Patient was taken to the endoscopy suite and placed in a left lateral decubitus position.  They were given IV sedation as mentioned above.  A digital rectal exam was performed.  There were no masses and anal tone was normal.  The colonoscope was inserted into the rectum and carefully manipulated up through the sigmoid colon, transverse colon, right colon and into the cecum.  The cecum was identified by the ileal cecal valve and transabdominal illumination.  Then the scope was slowly withdrawn.  The scope was retroflexed in the rectum and the dentate line was examined.  The scope was removed.  The patient tolerated the procedure well.  The following findings were noted.        Final Diagnosis:    Diffuse diverticulosis    Plan:    Repeat CS in 5 years due to + family hx colon cancer, sister

## 2024-06-21 ENCOUNTER — HOSPITAL ENCOUNTER (OUTPATIENT)
Age: 74
Discharge: HOME | End: 2024-06-21
Payer: MEDICARE

## 2024-06-21 DIAGNOSIS — M35.3 POLYMYALGIA RHEUMATICA (HCC): ICD-10-CM

## 2024-06-21 LAB
CRP SERPL HS-MCNC: 12 MG/L (ref 0–5)
ERYTHROCYTE [SEDIMENTATION RATE] IN BLOOD BY PHOTOMETRIC METHOD: 17 MM/HR (ref 0–30)

## 2024-06-21 PROCEDURE — 86140 C-REACTIVE PROTEIN: CPT

## 2024-06-21 PROCEDURE — 36415 COLL VENOUS BLD VENIPUNCTURE: CPT

## 2024-06-21 PROCEDURE — 85652 RBC SED RATE AUTOMATED: CPT

## 2024-07-05 ENCOUNTER — TELEPHONE (OUTPATIENT)
Dept: SURGERY | Age: 74
End: 2024-07-05

## 2024-07-05 NOTE — TELEPHONE ENCOUNTER
Letter created and mailed to patient with results from recent CS at [facility] with Dr. Montano on 6/18/2024. Updated history, health maintenance, and recall. Forwarded results to PCP.

## 2024-07-11 ENCOUNTER — PATIENT MESSAGE (OUTPATIENT)
Dept: FAMILY MEDICINE CLINIC | Age: 74
End: 2024-07-11

## 2024-07-11 RX ORDER — MELOXICAM 7.5 MG/1
TABLET ORAL
Qty: 90 TABLET | Refills: 1 | Status: SHIPPED | OUTPATIENT
Start: 2024-07-11

## 2024-07-11 RX ORDER — MELOXICAM 7.5 MG/1
TABLET ORAL
Qty: 90 TABLET | Refills: 1 | Status: CANCELLED | OUTPATIENT
Start: 2024-07-11

## 2024-07-11 RX ORDER — PREDNISONE 5 MG/1
5 TABLET ORAL DAILY
Qty: 30 TABLET | Refills: 1 | Status: CANCELLED | OUTPATIENT
Start: 2024-07-11 | End: 2024-09-09

## 2024-07-11 NOTE — TELEPHONE ENCOUNTER
From: Huyen Mojica  To: Dr. Fabby Lima  Sent: 7/11/2024 4:42 PM EDT  Subject: prednisone    I can try to go down to 4mg of prednisone again. I have not been doing very well. The sciatic nerve has been really bad again. I got an epidural which has helped the spasms but is still painful. If you would like me to come in to discuss these issues just let me know.  Thanks, Huyen

## 2024-07-12 RX ORDER — PREDNISONE 5 MG/1
5 TABLET ORAL DAILY
Qty: 30 TABLET | Refills: 1 | Status: SHIPPED | OUTPATIENT
Start: 2024-07-12

## 2024-07-15 RX ORDER — OMEPRAZOLE 20 MG/1
20 CAPSULE, DELAYED RELEASE ORAL 2 TIMES DAILY
Qty: 180 CAPSULE | Refills: 1 | Status: SHIPPED | OUTPATIENT
Start: 2024-07-15

## 2024-07-15 NOTE — TELEPHONE ENCOUNTER
Huyen called requesting a refill of the below medication which has been pended for you:     Requested Prescriptions     Pending Prescriptions Disp Refills    omeprazole (PRILOSEC) 20 MG delayed release capsule [Pharmacy Med Name: OMEPRAZOLE DR 20 MG CAPSULE] 180 capsule 1     Sig: take 1 capsule by mouth twice a day       Last Appointment Date: 6/11/2024  Next Appointment Date: 11/26/2024    Allergies   Allergen Reactions    Aspirin Other (See Comments)     Abdominal pain    Ibuprofen Other (See Comments)     Abdominal pain    Penicillin G Other (See Comments)    Sulfamethoxazole-Trimethoprim Nausea Only and Other (See Comments)    Penicillins Rash

## 2024-07-26 RX ORDER — OMEPRAZOLE 20 MG/1
20 CAPSULE, DELAYED RELEASE ORAL 2 TIMES DAILY
Qty: 180 CAPSULE | Refills: 1 | Status: SHIPPED | OUTPATIENT
Start: 2024-07-26

## 2024-07-29 DIAGNOSIS — E11.42 TYPE 2 DIABETES MELLITUS WITH DIABETIC POLYNEUROPATHY, WITHOUT LONG-TERM CURRENT USE OF INSULIN (HCC): ICD-10-CM

## 2024-07-29 RX ORDER — GLUCOSAMINE HCL/CHONDROITIN SU 500-400 MG
CAPSULE ORAL
Qty: 100 STRIP | Refills: 3 | Status: SHIPPED | OUTPATIENT
Start: 2024-07-29 | End: 2024-07-30 | Stop reason: SDUPTHER

## 2024-07-29 NOTE — TELEPHONE ENCOUNTER
Huyen called requesting a refill of the below medication which has been pended for you:     Requested Prescriptions     Pending Prescriptions Disp Refills    blood glucose monitor strips 100 strip 3     Sig: Test 1 times a day       Last Appointment Date: 6/11/2024  Next Appointment Date: 11/26/2024    Allergies   Allergen Reactions    Aspirin Other (See Comments)     Abdominal pain    Ibuprofen Other (See Comments)     Abdominal pain    Penicillin G Other (See Comments)    Sulfamethoxazole-Trimethoprim Nausea Only and Other (See Comments)    Penicillins Rash

## 2024-07-30 DIAGNOSIS — E11.9 DIABETES MELLITUS TYPE II, CONTROLLED (HCC): ICD-10-CM

## 2024-07-30 DIAGNOSIS — E11.42 TYPE 2 DIABETES MELLITUS WITH DIABETIC POLYNEUROPATHY, WITHOUT LONG-TERM CURRENT USE OF INSULIN (HCC): ICD-10-CM

## 2024-07-30 RX ORDER — GLUCOSAMINE HCL/CHONDROITIN SU 500-400 MG
CAPSULE ORAL
Qty: 100 STRIP | Refills: 3 | Status: SHIPPED | OUTPATIENT
Start: 2024-07-30

## 2024-07-30 RX ORDER — LANCETS 33 GAUGE
EACH MISCELLANEOUS
Qty: 100 EACH | Refills: 3 | Status: SHIPPED | OUTPATIENT
Start: 2024-07-30

## 2024-07-30 NOTE — TELEPHONE ENCOUNTER
Huyen called requesting a refill of the below medication which has been pended for you:     Requested Prescriptions     Pending Prescriptions Disp Refills    blood glucose monitor strips 100 strip 3     Sig: Test 1 times a day    Lancets (ONETOUCH DELICA PLUS [id number]) MISC 100 each 3     Sig: TEST 1 TIME DAILY       Last Appointment Date: 6/11/2024  Next Appointment Date: 11/26/2024    Allergies   Allergen Reactions    Aspirin Other (See Comments)     Abdominal pain    Ibuprofen Other (See Comments)     Abdominal pain    Penicillin G Other (See Comments)    Sulfamethoxazole-Trimethoprim Nausea Only and Other (See Comments)    Penicillins Rash

## 2024-08-20 ENCOUNTER — OFFICE VISIT (OUTPATIENT)
Dept: FAMILY MEDICINE CLINIC | Age: 74
End: 2024-08-20

## 2024-08-20 VITALS
HEART RATE: 60 BPM | HEIGHT: 67 IN | DIASTOLIC BLOOD PRESSURE: 80 MMHG | TEMPERATURE: 97.8 F | RESPIRATION RATE: 18 BRPM | BODY MASS INDEX: 35.31 KG/M2 | OXYGEN SATURATION: 93 % | SYSTOLIC BLOOD PRESSURE: 134 MMHG | WEIGHT: 225 LBS

## 2024-08-20 DIAGNOSIS — M54.50 LOW BACK PAIN POTENTIALLY ASSOCIATED WITH RADICULOPATHY: ICD-10-CM

## 2024-08-20 DIAGNOSIS — M54.31 SCIATICA OF RIGHT SIDE: Primary | ICD-10-CM

## 2024-08-20 DIAGNOSIS — R10.13 EPIGASTRIC PAIN: ICD-10-CM

## 2024-08-20 RX ORDER — SIMETHICONE 125 MG
125 TABLET,CHEWABLE ORAL EVERY 6 HOURS PRN
COMMUNITY

## 2024-08-20 RX ORDER — CALCIUM CARBONATE 500 MG/1
1 TABLET, CHEWABLE ORAL DAILY PRN
COMMUNITY

## 2024-08-20 RX ORDER — SUCRALFATE 1 G/1
1 TABLET ORAL 3 TIMES DAILY
Qty: 90 TABLET | Refills: 2 | Status: SHIPPED | OUTPATIENT
Start: 2024-08-20

## 2024-08-20 RX ORDER — GABAPENTIN 100 MG/1
100 CAPSULE ORAL 3 TIMES DAILY
Qty: 90 CAPSULE | Refills: 2 | Status: SHIPPED | OUTPATIENT
Start: 2024-08-20 | End: 2024-11-18

## 2024-08-20 NOTE — PROGRESS NOTES
2024     Huyen Mojica (:  1950) is a 74 y.o. female, here for evaluation of the following medical concerns:    HPI    Patient comes in today secondary to having increased issues with back pain and right-sided sciatica.  States that her sciatica is just becoming more and more problematic.  She did get an injection by the pain management specialist recently and did not feel that it helped so they are going to do another type of injection per her report later this week.  She states that it has been so flared that now she has been taking prednisone primarily for the flare of the sciatica.  She has been on chronic prednisone for polymyalgia rheumatica and she feels that this is under better control so she is just down to 5 mg daily on the prednisone and we have slowly titrated her off.  She states that the sciatica got so severe that she had taken 15 mg of her prednisone 1 day and then took 10 mg the next to try to calm it down which she did help it get under better control.  I did advise her it is not ideal to continue to take increased doses of prednisone chronically as it just is going to cause other secondary symptoms and side effects.  I did question about perhaps giving her gabapentin to see if this would help and she states that she had been prescribed this before but was afraid to take it due to side effects.  I do think this would be a better option for the sciatic nerve pain versus the prednisone.  She questions if perhaps she should to stop the prednisone altogether so that she can take it acutely when needed.  I did advise her ultimately we plan to wean her off of the prednisone but as she has been on it for a long time would need to wean this slowly.  With her next prescription I would recommend going down to 4 mg daily and then down to 3 mg daily and then slowly titrate to get her off of it completely.  She does states she has been having some increased stomach aches as well and is not

## 2024-08-23 ASSESSMENT — ENCOUNTER SYMPTOMS
NAUSEA: 1
ABDOMINAL PAIN: 1
RHINORRHEA: 0
VOMITING: 0
SORE THROAT: 0
SHORTNESS OF BREATH: 0
SINUS PRESSURE: 0
COUGH: 0
EYE REDNESS: 0
EYE DISCHARGE: 0
CONSTIPATION: 0
DIARRHEA: 0
TROUBLE SWALLOWING: 0
BACK PAIN: 1
WHEEZING: 0

## 2024-09-24 ENCOUNTER — PATIENT MESSAGE (OUTPATIENT)
Dept: FAMILY MEDICINE CLINIC | Age: 74
End: 2024-09-24

## 2024-09-24 RX ORDER — PREDNISONE 1 MG/1
4 TABLET ORAL DAILY
Qty: 120 TABLET | Refills: 0 | Status: SHIPPED | OUTPATIENT
Start: 2024-09-24 | End: 2024-10-24

## 2024-09-30 ENCOUNTER — PATIENT MESSAGE (OUTPATIENT)
Dept: FAMILY MEDICINE CLINIC | Age: 74
End: 2024-09-30

## 2024-10-07 RX ORDER — MELOXICAM 7.5 MG/1
TABLET ORAL
Qty: 90 TABLET | Refills: 1 | Status: SHIPPED | OUTPATIENT
Start: 2024-10-07

## 2024-10-08 ENCOUNTER — PATIENT MESSAGE (OUTPATIENT)
Dept: FAMILY MEDICINE CLINIC | Age: 74
End: 2024-10-08

## 2024-10-08 RX ORDER — NYSTATIN 100000 [USP'U]/ML
SUSPENSION ORAL
Qty: 140 ML | Refills: 0 | Status: SHIPPED | OUTPATIENT
Start: 2024-10-08

## 2024-10-15 ENCOUNTER — OFFICE VISIT (OUTPATIENT)
Dept: FAMILY MEDICINE CLINIC | Age: 74
End: 2024-10-15

## 2024-10-15 VITALS
BODY MASS INDEX: 34.94 KG/M2 | WEIGHT: 222.6 LBS | HEART RATE: 68 BPM | SYSTOLIC BLOOD PRESSURE: 130 MMHG | TEMPERATURE: 97 F | RESPIRATION RATE: 18 BRPM | OXYGEN SATURATION: 93 % | DIASTOLIC BLOOD PRESSURE: 76 MMHG | HEIGHT: 67 IN

## 2024-10-15 DIAGNOSIS — E78.2 MIXED HYPERLIPIDEMIA: ICD-10-CM

## 2024-10-15 DIAGNOSIS — M35.3 POLYMYALGIA RHEUMATICA (HCC): ICD-10-CM

## 2024-10-15 DIAGNOSIS — M48.062 SPINAL STENOSIS OF LUMBAR REGION WITH NEUROGENIC CLAUDICATION: Primary | ICD-10-CM

## 2024-10-15 DIAGNOSIS — I10 ESSENTIAL HYPERTENSION: ICD-10-CM

## 2024-10-15 DIAGNOSIS — E11.42 TYPE 2 DIABETES MELLITUS WITH DIABETIC POLYNEUROPATHY, WITHOUT LONG-TERM CURRENT USE OF INSULIN (HCC): ICD-10-CM

## 2024-10-15 DIAGNOSIS — R01.1 HEART MURMUR: ICD-10-CM

## 2024-10-15 ASSESSMENT — ENCOUNTER SYMPTOMS
VOMITING: 0
EYE REDNESS: 0
SINUS PRESSURE: 0
EYE DISCHARGE: 0
SHORTNESS OF BREATH: 0
CONSTIPATION: 0
DIARRHEA: 0
COUGH: 0
NAUSEA: 0
WHEEZING: 0
SORE THROAT: 0
TROUBLE SWALLOWING: 0
ABDOMINAL PAIN: 0
RHINORRHEA: 0

## 2024-10-15 NOTE — PROGRESS NOTES
10/15/2024     Huyen Mojica (:  1950) is a 74 y.o. female, here for evaluation of the following medical concerns:    HPI  Patient comes in today for preoperative clearance for upcoming proposed  L2-S1 lumbar laminectomy.      History of Present Illness  The patient is a 74-year-old female who is being seen for preoperative evaluation for L2 to S1 lumbar laminectomy.    She is scheduled to undergo a laminectomy at the L2 to S1 level due to three bulges and spinal stenosis. She experiences pain in her right leg but denies any chest pain, shortness of breath, nausea, vomiting, lightheadedness, dizziness, or headaches. She has developed thrush after receiving two  vaccines (covid and influenza) and has been using nystatin solution. She has discontinued gabapentin due to severe nausea. Sucralfate has been effective for her stomach issues. She has a history of chronic stomach issues and has diverticulitis, but no significant acute flare of symptoms to suggest acute infection.  . She had a heart echocardiogram last year due to a heart murmur.  That did not show any concerning abnormality.  With regards to her chronic health conditions she has a known history of diabetes mellitus type 2 which is stable and controlled on her current medical regimen.  Has a known history of polymyalgia rheumatica and her symptoms are reasonably well-controlled and we have continued to slowly titrate down her steroid dose.  She is now on 4 mg daily and after 1 month we will plan to drop back to 3 mg daily.  Previously when she dropped down to 3 mg she had increased flare of symptoms primarily around her shoulder region and neck.  Will see how she does with this titration.  She has a known history of hyperlipidemia and her cholesterol levels have remained stable and controlled with her current statin dose.  Has a known history of hypertension and her blood pressure is stable and controlled on her current medical

## 2024-10-24 RX ORDER — PREDNISONE 1 MG/1
3 TABLET ORAL DAILY
Qty: 90 TABLET | Refills: 0 | Status: SHIPPED | OUTPATIENT
Start: 2024-10-24 | End: 2024-11-23

## 2024-10-24 RX ORDER — NYSTATIN 100000 [USP'U]/ML
SUSPENSION ORAL
Qty: 140 ML | Refills: 0 | Status: SHIPPED | OUTPATIENT
Start: 2024-10-24

## 2024-10-24 NOTE — TELEPHONE ENCOUNTER
Huyen called requesting a refill of the below medication which has been pended for you:     Requested Prescriptions     Pending Prescriptions Disp Refills    nystatin (MYCOSTATIN) 232025 UNIT/ML suspension 140 mL 0     Sig: Swish and swallow 5 mL qid.       Last Appointment Date: 10/15/2024  Next Appointment Date: 12/4/2024    Allergies   Allergen Reactions    Aspirin Other (See Comments)     Abdominal pain    Ibuprofen Other (See Comments)     Abdominal pain    Penicillin G Other (See Comments)    Sulfamethoxazole-Trimethoprim Nausea Only and Other (See Comments)    Penicillins Rash

## 2024-11-21 ENCOUNTER — PATIENT MESSAGE (OUTPATIENT)
Dept: FAMILY MEDICINE CLINIC | Age: 74
End: 2024-11-21

## 2024-11-21 RX ORDER — ATORVASTATIN CALCIUM 10 MG/1
10 TABLET, FILM COATED ORAL DAILY
Qty: 90 TABLET | Refills: 0 | Status: SHIPPED | OUTPATIENT
Start: 2024-11-21

## 2024-11-21 RX ORDER — PREDNISONE 1 MG/1
2 TABLET ORAL DAILY
Qty: 60 TABLET | Refills: 0 | Status: SHIPPED | OUTPATIENT
Start: 2024-11-21 | End: 2024-12-21

## 2024-11-21 NOTE — TELEPHONE ENCOUNTER
Huyen called requesting a refill of the below medication which has been pended for you:     Requested Prescriptions     Pending Prescriptions Disp Refills    atorvastatin (LIPITOR) 10 MG tablet [Pharmacy Med Name: Atorvastatin Calcium 10 MG Oral Tablet] 90 tablet 0     Sig: Take 1 tablet by mouth once daily       Last Appointment Date: 10/15/2024  Next Appointment Date: 12/4/2024    Allergies   Allergen Reactions    Aspirin Other (See Comments)     Abdominal pain    Ibuprofen Other (See Comments)     Abdominal pain    Penicillin G Other (See Comments)    Sulfamethoxazole-Trimethoprim Nausea Only and Other (See Comments)    Penicillins Rash

## 2024-11-25 ENCOUNTER — PATIENT MESSAGE (OUTPATIENT)
Dept: FAMILY MEDICINE CLINIC | Age: 74
End: 2024-11-25

## 2024-11-25 RX ORDER — NYSTATIN 100000 [USP'U]/ML
SUSPENSION ORAL
Qty: 140 ML | Refills: 0 | Status: SHIPPED | OUTPATIENT
Start: 2024-11-25

## 2024-11-26 ENCOUNTER — HOSPITAL ENCOUNTER (OUTPATIENT)
Age: 74
Discharge: HOME | End: 2024-11-26
Payer: MEDICARE

## 2024-11-26 DIAGNOSIS — I10 ESSENTIAL HYPERTENSION: ICD-10-CM

## 2024-11-26 DIAGNOSIS — M35.3 POLYMYALGIA RHEUMATICA (HCC): ICD-10-CM

## 2024-11-26 DIAGNOSIS — E11.42 TYPE 2 DIABETES MELLITUS WITH DIABETIC POLYNEUROPATHY, WITHOUT LONG-TERM CURRENT USE OF INSULIN (HCC): ICD-10-CM

## 2024-11-26 DIAGNOSIS — E78.2 MIXED HYPERLIPIDEMIA: ICD-10-CM

## 2024-11-26 LAB
ALBUMIN SERPL-MCNC: 4.3 G/DL (ref 3.5–5.2)
ALBUMIN/GLOB SERPL: 1.4 {RATIO} (ref 1–2.5)
ALP SERPL-CCNC: 83 U/L (ref 35–104)
ALT SERPL-CCNC: 13 U/L (ref 5–33)
ANION GAP SERPL CALCULATED.3IONS-SCNC: 10 MMOL/L (ref 9–17)
AST SERPL-CCNC: 19 U/L
BASOPHILS # BLD: 0.03 K/UL (ref 0–0.2)
BASOPHILS NFR BLD: 0 % (ref 0–2)
BILIRUB SERPL-MCNC: 0.3 MG/DL (ref 0.3–1.2)
BUN SERPL-MCNC: 13 MG/DL (ref 8–23)
BUN/CREAT SERPL: 19 (ref 9–20)
CALCIUM SERPL-MCNC: 10.2 MG/DL (ref 8.6–10.4)
CHLORIDE SERPL-SCNC: 105 MMOL/L (ref 98–107)
CHOLEST SERPL-MCNC: 141 MG/DL (ref 0–199)
CHOLESTEROL/HDL RATIO: 3.5
CO2 SERPL-SCNC: 30 MMOL/L (ref 20–31)
CREAT SERPL-MCNC: 0.7 MG/DL (ref 0.5–0.9)
CREAT UR-MCNC: 91.6 MG/DL (ref 28–217)
CRP SERPL HS-MCNC: 11.5 MG/L (ref 0–5)
EOSINOPHIL # BLD: 0.31 K/UL (ref 0–0.44)
EOSINOPHILS RELATIVE PERCENT: 4 % (ref 1–4)
ERYTHROCYTE [DISTWIDTH] IN BLOOD BY AUTOMATED COUNT: 13.9 % (ref 11.8–14.4)
ERYTHROCYTE [SEDIMENTATION RATE] IN BLOOD BY PHOTOMETRIC METHOD: 18 MM/HR (ref 0–30)
EST. AVERAGE GLUCOSE BLD GHB EST-MCNC: 140 MG/DL
GFR, ESTIMATED: >90 ML/MIN/1.73M2
GLUCOSE SERPL-MCNC: 114 MG/DL (ref 70–99)
HBA1C MFR BLD: 6.5 % (ref 4–6)
HCT VFR BLD AUTO: 37.3 % (ref 36.3–47.1)
HDLC SERPL-MCNC: 40 MG/DL
HGB BLD-MCNC: 12 G/DL (ref 11.9–15.1)
IMM GRANULOCYTES # BLD AUTO: <0.03 K/UL (ref 0–0.3)
IMM GRANULOCYTES NFR BLD: 0 %
LDLC SERPL CALC-MCNC: 62 MG/DL (ref 0–100)
LYMPHOCYTES NFR BLD: 2.18 K/UL (ref 1.1–3.7)
LYMPHOCYTES RELATIVE PERCENT: 27 % (ref 24–43)
MCH RBC QN AUTO: 29.5 PG (ref 25.2–33.5)
MCHC RBC AUTO-ENTMCNC: 32.2 G/DL (ref 25.2–33.5)
MCV RBC AUTO: 91.6 FL (ref 82.6–102.9)
MICROALBUMIN UR-MCNC: <12 MG/L (ref 0–20)
MICROALBUMIN/CREAT UR-RTO: NORMAL MCG/MG CREAT (ref 0–25)
MONOCYTES NFR BLD: 0.71 K/UL (ref 0.1–1.2)
MONOCYTES NFR BLD: 9 % (ref 3–12)
NEUTROPHILS NFR BLD: 60 % (ref 36–65)
NEUTS SEG NFR BLD: 4.87 K/UL (ref 1.5–8.1)
NRBC BLD-RTO: 0 PER 100 WBC
PLATELET # BLD AUTO: 229 K/UL (ref 138–453)
PMV BLD AUTO: 9 FL (ref 8.1–13.5)
POTASSIUM SERPL-SCNC: 4.1 MMOL/L (ref 3.7–5.3)
PROT SERPL-MCNC: 7.4 G/DL (ref 6.4–8.3)
RBC # BLD AUTO: 4.07 M/UL (ref 3.95–5.11)
SODIUM SERPL-SCNC: 145 MMOL/L (ref 135–144)
TRIGL SERPL-MCNC: 195 MG/DL
VLDLC SERPL CALC-MCNC: 39 MG/DL (ref 1–30)
WBC OTHER # BLD: 8.1 K/UL (ref 3.5–11.3)

## 2024-11-26 PROCEDURE — 83036 HEMOGLOBIN GLYCOSYLATED A1C: CPT

## 2024-11-26 PROCEDURE — 82570 ASSAY OF URINE CREATININE: CPT

## 2024-11-26 PROCEDURE — 80061 LIPID PANEL: CPT

## 2024-11-26 PROCEDURE — 85025 COMPLETE CBC W/AUTO DIFF WBC: CPT

## 2024-11-26 PROCEDURE — 36415 COLL VENOUS BLD VENIPUNCTURE: CPT

## 2024-11-26 PROCEDURE — 85652 RBC SED RATE AUTOMATED: CPT

## 2024-11-26 PROCEDURE — 82043 UR ALBUMIN QUANTITATIVE: CPT

## 2024-11-26 PROCEDURE — 80053 COMPREHEN METABOLIC PANEL: CPT

## 2024-11-26 PROCEDURE — 86140 C-REACTIVE PROTEIN: CPT

## 2024-11-29 NOTE — TELEPHONE ENCOUNTER
From: Anju Miles  To: Kennedy RuizDO  Sent: 1/8/2021 8:32 AM EST  Subject: Non-Urgent Medical Question    My  tested positive for the virus. He got symptoms on the thursday the 31. I started coughing on Sunday the 3rd. but I also had sinus headache starting on the 31. I coughed for two days and had a 100 degree temperature for three days. The worst problem I am having is my sinuses are completely plugged. It feels like my sinuses are so plugged they are taking the air out of my lungs. I have been pacing the floors for two days trying to breathe. I have tried everything humidifier,nettiepot,sensimist,sudefed, even nose strips nothing is giving me any relief. Please is there anything you can do to help me?   Anju Miles Advanced care planning/counseling discussion

## 2024-12-04 ENCOUNTER — OFFICE VISIT (OUTPATIENT)
Dept: FAMILY MEDICINE CLINIC | Age: 74
End: 2024-12-04

## 2024-12-04 VITALS
SYSTOLIC BLOOD PRESSURE: 136 MMHG | HEIGHT: 67 IN | WEIGHT: 220 LBS | OXYGEN SATURATION: 93 % | HEART RATE: 68 BPM | BODY MASS INDEX: 34.53 KG/M2 | TEMPERATURE: 98.1 F | RESPIRATION RATE: 18 BRPM | DIASTOLIC BLOOD PRESSURE: 82 MMHG

## 2024-12-04 DIAGNOSIS — E78.2 MIXED HYPERLIPIDEMIA: ICD-10-CM

## 2024-12-04 DIAGNOSIS — M35.3 POLYMYALGIA RHEUMATICA (HCC): ICD-10-CM

## 2024-12-04 DIAGNOSIS — K21.9 GASTROESOPHAGEAL REFLUX DISEASE WITHOUT ESOPHAGITIS: ICD-10-CM

## 2024-12-04 DIAGNOSIS — I10 ESSENTIAL HYPERTENSION: ICD-10-CM

## 2024-12-04 DIAGNOSIS — E11.42 TYPE 2 DIABETES MELLITUS WITH DIABETIC POLYNEUROPATHY, WITHOUT LONG-TERM CURRENT USE OF INSULIN (HCC): Primary | ICD-10-CM

## 2024-12-04 RX ORDER — FLUCONAZOLE 100 MG/1
100 TABLET ORAL DAILY
Qty: 7 TABLET | Refills: 0 | Status: SHIPPED | OUTPATIENT
Start: 2024-12-04 | End: 2024-12-11

## 2024-12-04 RX ORDER — NYSTATIN 100000 [USP'U]/ML
SUSPENSION ORAL
Qty: 140 ML | Refills: 0 | Status: CANCELLED | OUTPATIENT
Start: 2024-12-04

## 2024-12-04 ASSESSMENT — ENCOUNTER SYMPTOMS
DIARRHEA: 0
COUGH: 0
WHEEZING: 0
ABDOMINAL PAIN: 0
RHINORRHEA: 0
SORE THROAT: 0
TROUBLE SWALLOWING: 0
SINUS PRESSURE: 0
EYE REDNESS: 0
CONSTIPATION: 0
VOMITING: 0
EYE DISCHARGE: 0
SHORTNESS OF BREATH: 0

## 2024-12-04 NOTE — PROGRESS NOTES
2024     Huyen Mojica (:  1950) is a 74 y.o. female, here for evaluation of the following medical concerns:    HPI  History of Present Illness  The patient is a 74-year-old female here for a 6-month follow-up of diabetes mellitus type 2, hyperlipidemia, hypertension, polymyalgia rheumatica, and GERD. We have to discuss thrush and left ear fullness.    She underwent back surgery 6 weeks ago, which was complicated by an unexpected increase in incision size from 2 to 6 inches. She was discharged the following day without any home assistance. Post-surgery, she experienced significant leg weakness, necessitating the use of a walker. She was prescribed Tylenol No.3 for pain management, which she initially resisted but eventually took one dose before bedtime. This resulted in auditory hallucinations and urinary incontinence that lasted for about 24 hours. An incident occurred where she fell from her bed due to the effects of the pain medication, resulting in a hip contusion. Her  assisted her back into bed, and she has not experienced urinary incontinence since. She is currently attempting to ambulate without the aid of a cane but experiences pain with prolonged walking or standing. She reports radiating pain down her legs, which she attributes to her PMR. She also reports pain across her back when lifting heavy objects. She has a history of three bulging discs causing nerve impingement and spinal stenosis. She expresses a desire to lose weight to alleviate her back and leg pain. She reports that her blood sugar levels have decreased slightly. She admits to consuming crackers and pretzels to manage stomach upset, which she acknowledges is not beneficial for her blood sugar control.    She has been struggling with persistent thrush, which she believes started after receiving influenza and COVID-19 vaccines concurrently. She has been using nystatin, which she discontinued on Saturday due to

## 2024-12-04 NOTE — PATIENT INSTRUCTIONS
Hospital Outpatient Visit on 11/26/2024   Component Date Value Ref Range Status    CRP 11/26/2024 11.5 (H)  0.0 - 5.0 mg/L Final    Sed Rate, Automated 11/26/2024 18  0 - 30 mm/Hr Final    Comment: Note new reference ranges.  This automated method replaces the manual method, results may   vary from previously resulted.  If following the patient ESR, this result should be the new   baseline.      Cholesterol, Total 11/26/2024 141  0 - 199 mg/dL Final    Comment:    Cholesterol Guidelines:      <200  Desirable   200-240  Borderline      >240  Undesirable         HDL 11/26/2024 40 (L)  >40 mg/dL Final    Comment:    HDL Guidelines:    <40     Undesirable   40-59    Borderline    >59     Desirable         LDL Cholesterol 11/26/2024 62  0 - 100 mg/dL Final    Comment:    LDL Guidelines:     <100    Desirable   100-129   Near to/above Desirable   130-159   Borderline      >159   Undesirable     Direct (measured) LDL and calculated LDL are not interchangeable tests.      Chol/HDL Ratio 11/26/2024 3.5   Final    Triglycerides 11/26/2024 195 (H)  <150 mg/dL Final    Comment:    Triglyceride Guidelines:     <150   Desirable   150-199  Borderline   200-499  High     >499   Very high   Based on AHA Guidelines for fasting triglyceride, October 2012.         VLDL 11/26/2024 39 (H)  1 - 30 mg/dL Final    Hemoglobin A1C 11/26/2024 6.5 (H)  4.0 - 6.0 % Final    Estimated Avg Glucose 11/26/2024 140  mg/dL Final    Comment: The ADA and AACC recommend providing the estimated average glucose result to permit better   patient understanding of their HBA1c result.      Sodium 11/26/2024 145 (H)  135 - 144 mmol/L Final    Potassium 11/26/2024 4.1  3.7 - 5.3 mmol/L Final    Chloride 11/26/2024 105  98 - 107 mmol/L Final    CO2 11/26/2024 30  20 - 31 mmol/L Final    Anion Gap 11/26/2024 10  9 - 17 mmol/L Final    Glucose 11/26/2024 114 (H)  70 - 99 mg/dL Final    BUN 11/26/2024 13  8 - 23 mg/dL Final    Creatinine 11/26/2024 0.7  0.5 - 0.9

## 2024-12-06 ASSESSMENT — ENCOUNTER SYMPTOMS
NAUSEA: 1
BACK PAIN: 1

## 2024-12-12 RX ORDER — METFORMIN HYDROCHLORIDE 500 MG/1
500 TABLET, EXTENDED RELEASE ORAL 2 TIMES DAILY
Qty: 180 TABLET | Refills: 0 | Status: SHIPPED | OUTPATIENT
Start: 2024-12-12

## 2024-12-12 RX ORDER — CETIRIZINE HYDROCHLORIDE 10 MG/1
10 TABLET ORAL DAILY
Qty: 90 TABLET | Refills: 0 | Status: SHIPPED | OUTPATIENT
Start: 2024-12-12

## 2024-12-12 NOTE — TELEPHONE ENCOUNTER
Huyen called requesting a refill of the below medication which has been pended for you:     Requested Prescriptions     Pending Prescriptions Disp Refills    cetirizine (ZYRTEC ALLERGY) 10 MG tablet 90 tablet 1     Sig: Take 1 tablet by mouth daily    metFORMIN (GLUCOPHAGE-XR) 500 MG extended release tablet 180 tablet 1     Sig: Take 1 tablet by mouth 2 times daily       Last Appointment Date: 12/4/2024  Next Appointment Date: 6/4/2025    Allergies   Allergen Reactions    Aspirin Other (See Comments)     Abdominal pain    Ibuprofen Other (See Comments)     Abdominal pain    Penicillin G Other (See Comments)    Sulfamethoxazole-Trimethoprim Nausea Only and Other (See Comments)    Penicillins Rash

## 2024-12-16 NOTE — TELEPHONE ENCOUNTER
Huyen called requesting a refill of the below medication which has been pended for you:     Requested Prescriptions     Pending Prescriptions Disp Refills    montelukast (SINGULAIR) 10 MG tablet [Pharmacy Med Name: Montelukast Sodium 10 MG Oral Tablet] 90 tablet 1     Sig: Take 1 tablet by mouth once daily       Last Appointment Date: 12/4/2024  Next Appointment Date: 6/4/2025    Allergies   Allergen Reactions    Aspirin Other (See Comments)     Abdominal pain    Ibuprofen Other (See Comments)     Abdominal pain    Penicillin G Other (See Comments)    Sulfamethoxazole-Trimethoprim Nausea Only and Other (See Comments)    Penicillins Rash

## 2024-12-17 RX ORDER — MONTELUKAST SODIUM 10 MG/1
10 TABLET ORAL DAILY
Qty: 90 TABLET | Refills: 1 | Status: SHIPPED | OUTPATIENT
Start: 2024-12-17

## 2024-12-26 ENCOUNTER — PATIENT MESSAGE (OUTPATIENT)
Dept: FAMILY MEDICINE CLINIC | Age: 74
End: 2024-12-26

## 2024-12-27 RX ORDER — ATENOLOL 25 MG/1
25 TABLET ORAL DAILY
Qty: 90 TABLET | Refills: 1 | Status: SHIPPED | OUTPATIENT
Start: 2024-12-27

## 2024-12-27 RX ORDER — METFORMIN HYDROCHLORIDE 500 MG/1
500 TABLET, EXTENDED RELEASE ORAL 2 TIMES DAILY
Qty: 180 TABLET | Refills: 0 | OUTPATIENT
Start: 2024-12-27

## 2024-12-27 RX ORDER — PREDNISONE 1 MG/1
1 TABLET ORAL DAILY
Qty: 30 TABLET | Refills: 0 | Status: SHIPPED | OUTPATIENT
Start: 2024-12-27 | End: 2025-01-26

## 2024-12-27 NOTE — TELEPHONE ENCOUNTER
Huyen called requesting a refill of the below medication which has been pended for you:     Requested Prescriptions     Pending Prescriptions Disp Refills    atenolol (TENORMIN) 25 MG tablet 90 tablet 1     Sig: Take 1 tablet by mouth daily       Last Appointment Date: 12/4/2024  Next Appointment Date: 6/4/2025    Allergies   Allergen Reactions    Aspirin Other (See Comments)     Abdominal pain    Ibuprofen Other (See Comments)     Abdominal pain    Penicillin G Other (See Comments)    Sulfamethoxazole-Trimethoprim Nausea Only and Other (See Comments)    Penicillins Rash

## 2024-12-27 NOTE — TELEPHONE ENCOUNTER
Huyen called requesting a refill of the below medication which has been pended for you:   [employer] message sent to patient   Requested Prescriptions     Pending Prescriptions Disp Refills    metFORMIN (GLUCOPHAGE-XR) 500 MG extended release tablet 180 tablet 0     Sig: Take 1 tablet by mouth 2 times daily       Last Appointment Date: 12/4/2024  Next Appointment Date: 6/4/2025    Allergies   Allergen Reactions    Aspirin Other (See Comments)     Abdominal pain    Ibuprofen Other (See Comments)     Abdominal pain    Penicillin G Other (See Comments)    Sulfamethoxazole-Trimethoprim Nausea Only and Other (See Comments)    Penicillins Rash

## 2025-01-20 NOTE — ADDENDUM NOTE
Addended by: ARIADNA PERRY on: 1/20/2025 09:45 AM     Modules accepted: Orders     Team Note Due:  Monday    Assessment/Intervention/Current Symtoms and Care Coordination:  Chart review and met with team, discussed pt progress, symptomology, and response to treatment.  Discussed the discharge plan and any potential impediments to discharge.    Received call form [facility] wanting to complete a phone interview with pt. Provided unit phone number to call and talk to pt directly.    Received update from [facility] there are no current male beds but director will follow up next week.    Met with pt to provide an update.    Discharge Plan or Goal:  Pending stabilization of symptoms and safe disposition planning.  [facility] - referrals made  Psychiatry - appt pending  Subutex - scheduled     Barriers to Discharge:  Patient requires further psychiatric stabilization due to current symptomology, medication management with changes subject to provider, coordination with outside supports, and aftercare planning.     Referral Status:  [facility] - referral sent 5/23. Interviewed 5/30  [facility] - referral sent 5/24, no openings as of 5/31  [facility] - referral sent 5/24, no openings as of 5/30  [facility] - referral faxed 5/24, on wait list 5/24. Estimated 1-2 week wait     Legal Status:  Voluntary     Contacts:  Ileana (Sister): [phone]       Upcoming Meetings and Dates/Important Information and next steps:  Follow up on [facility] referrals

## 2025-02-13 RX ORDER — ATORVASTATIN CALCIUM 10 MG/1
10 TABLET, FILM COATED ORAL DAILY
Qty: 90 TABLET | Refills: 0 | Status: SHIPPED | OUTPATIENT
Start: 2025-02-13

## 2025-02-13 NOTE — TELEPHONE ENCOUNTER
Huyen called requesting a refill of the below medication which has been pended for you:     Requested Prescriptions     Pending Prescriptions Disp Refills    atorvastatin (LIPITOR) 10 MG tablet 90 tablet 0     Sig: Take 1 tablet by mouth daily       Last Appointment Date: 12/4/2024  Next Appointment Date: 6/4/2025    Allergies   Allergen Reactions    Aspirin Other (See Comments)     Abdominal pain    Ibuprofen Other (See Comments)     Abdominal pain    Penicillin G Other (See Comments)    Sulfamethoxazole-Trimethoprim Nausea Only and Other (See Comments)    Penicillins Rash

## 2025-02-20 NOTE — TELEPHONE ENCOUNTER
From: Priya Bowman  To: Cindy Henriquez DO  Sent: 3/1/2021 1:14 PM EST  Subject: Non-Urgent Medical Question    Hi, I think I have Thrush back again. My  is taking an antibiotic and has it and a day later my tongue and throat started getting sore. Today it is getting white and swollen. I still have a pretty bad croupy cough and chest hurts off and on. Some days pretty bad some days not quite as bad. I am still using my inhaler four times a day. I am half way through second one and have no more refills. Don't know if you would like to see me again or just send me nystantin for the thrush.     Priya Bowman Thank you   Let her know I will be following her notes and to call us if she is able to go home   Thanks   AMALIA

## 2025-03-06 RX ORDER — CETIRIZINE HYDROCHLORIDE 10 MG/1
10 TABLET ORAL DAILY
Qty: 90 TABLET | Refills: 1 | Status: SHIPPED | OUTPATIENT
Start: 2025-03-06

## 2025-03-06 NOTE — TELEPHONE ENCOUNTER
Huyen called requesting a refill of the below medication which has been pended for you:     Requested Prescriptions     Pending Prescriptions Disp Refills    cetirizine (ZYRTEC ALLERGY) 10 MG tablet 90 tablet 0     Sig: Take 1 tablet by mouth daily       Last Appointment Date: 12/4/2024  Next Appointment Date: 6/4/2025    Allergies   Allergen Reactions    Aspirin Other (See Comments)     Abdominal pain    Ibuprofen Other (See Comments)     Abdominal pain    Penicillin G Other (See Comments)    Sulfamethoxazole-Trimethoprim Nausea Only and Other (See Comments)    Penicillins Rash

## 2025-03-13 NOTE — TELEPHONE ENCOUNTER
From: Boston Bell  Sent: 10/11/2018 3:33 PM EDT  Subject: Medication Renewal Request    Boston Bell would like a refill of the following medications:     atenolol (TENORMIN) 25 MG tablet DANO Bai    [facility] [address] [place] [address] [facility] [phone]
Donna

## 2025-04-03 RX ORDER — METFORMIN HYDROCHLORIDE 500 MG/1
500 TABLET, EXTENDED RELEASE ORAL 2 TIMES DAILY
Qty: 180 TABLET | Refills: 1 | Status: SHIPPED | OUTPATIENT
Start: 2025-04-03

## 2025-04-03 NOTE — TELEPHONE ENCOUNTER
Huyen called requesting a refill of the below medication which has been pended for you:     Requested Prescriptions     Pending Prescriptions Disp Refills    metFORMIN (GLUCOPHAGE-XR) 500 MG extended release tablet 180 tablet 0     Sig: Take 1 tablet by mouth 2 times daily       Last Appointment Date: 12/4/2024  Next Appointment Date: 6/4/2025    Allergies   Allergen Reactions    Aspirin Other (See Comments)     Abdominal pain    Ibuprofen Other (See Comments)     Abdominal pain    Penicillin G Other (See Comments)    Sulfamethoxazole-Trimethoprim Nausea Only and Other (See Comments)    Penicillins Rash

## 2025-04-09 RX ORDER — MELOXICAM 7.5 MG/1
TABLET ORAL
Qty: 90 TABLET | Refills: 1 | Status: SHIPPED | OUTPATIENT
Start: 2025-04-09

## 2025-04-09 NOTE — TELEPHONE ENCOUNTER
Huyen called requesting a refill of the below medication which has been pended for you:     Requested Prescriptions     Pending Prescriptions Disp Refills    meloxicam (MOBIC) 7.5 MG tablet 90 tablet 1     Sig: Take 1 tablet by mouth once daily       Last Appointment Date: 12/4/2024  Next Appointment Date: 6/4/2025    Allergies   Allergen Reactions    Aspirin Other (See Comments)     Abdominal pain    Ibuprofen Other (See Comments)     Abdominal pain    Penicillin G Other (See Comments)    Sulfamethoxazole-Trimethoprim Nausea Only and Other (See Comments)    Penicillins Rash

## 2025-04-18 ENCOUNTER — OFFICE VISIT (OUTPATIENT)
Dept: PRIMARY CARE CLINIC | Age: 75
End: 2025-04-18
Payer: MEDICARE

## 2025-04-18 VITALS
SYSTOLIC BLOOD PRESSURE: 150 MMHG | DIASTOLIC BLOOD PRESSURE: 100 MMHG | RESPIRATION RATE: 20 BRPM | HEART RATE: 68 BPM | HEIGHT: 67 IN | BODY MASS INDEX: 33.27 KG/M2 | TEMPERATURE: 97.8 F | WEIGHT: 212 LBS | OXYGEN SATURATION: 93 %

## 2025-04-18 DIAGNOSIS — M25.50 ARTHRALGIA, UNSPECIFIED JOINT: ICD-10-CM

## 2025-04-18 DIAGNOSIS — M35.3 POLYMYALGIA RHEUMATICA: Primary | ICD-10-CM

## 2025-04-18 PROCEDURE — 99212 OFFICE O/P EST SF 10 MIN: CPT

## 2025-04-18 RX ORDER — PREDNISONE 20 MG/1
20 TABLET ORAL DAILY
Qty: 5 TABLET | Refills: 0 | Status: SHIPPED | OUTPATIENT
Start: 2025-04-18 | End: 2025-04-23

## 2025-04-18 SDOH — ECONOMIC STABILITY: FOOD INSECURITY: WITHIN THE PAST 12 MONTHS, THE FOOD YOU BOUGHT JUST DIDN'T LAST AND YOU DIDN'T HAVE MONEY TO GET MORE.: NEVER TRUE

## 2025-04-18 SDOH — ECONOMIC STABILITY: FOOD INSECURITY: WITHIN THE PAST 12 MONTHS, YOU WORRIED THAT YOUR FOOD WOULD RUN OUT BEFORE YOU GOT MONEY TO BUY MORE.: NEVER TRUE

## 2025-04-18 ASSESSMENT — ENCOUNTER SYMPTOMS
ABDOMINAL PAIN: 0
DIARRHEA: 0
NAUSEA: 0
VOMITING: 0
COUGH: 0
CONSTIPATION: 0
COLOR CHANGE: 0
SHORTNESS OF BREATH: 0

## 2025-04-18 ASSESSMENT — PATIENT HEALTH QUESTIONNAIRE - PHQ9
2. FEELING DOWN, DEPRESSED OR HOPELESS: NOT AT ALL
1. LITTLE INTEREST OR PLEASURE IN DOING THINGS: NOT AT ALL
SUM OF ALL RESPONSES TO PHQ QUESTIONS 1-9: 0

## 2025-04-18 NOTE — PROGRESS NOTES
tablet Take 1 tablet by mouth 2 times daily Yes Provider, MD Adelia   nystatin (MYCOSTATIN) 216556 UNIT/ML suspension Swish and swallow 5 mL qid.  Patient not taking: Reported on 4/18/2025  Fabby Lima DO   sucralfate (CARAFATE) 1 GM tablet Take 1 tablet by mouth in the morning, at noon, and at bedtime  Patient not taking: Reported on 4/18/2025  Fabby Lima DO     Allergies   Allergen Reactions    Aspirin Other (See Comments)     Abdominal pain    Ibuprofen Other (See Comments)     Abdominal pain    Penicillin G Other (See Comments)    Sulfamethoxazole-Trimethoprim Nausea Only and Other (See Comments)    Penicillins Rash       Subjective:      Review of Systems   Constitutional:  Positive for fatigue. Negative for appetite change, chills, diaphoresis and fever.   HENT:  Negative for congestion.    Respiratory:  Negative for cough and shortness of breath.    Cardiovascular:  Negative for chest pain.   Gastrointestinal:  Negative for abdominal pain, constipation, diarrhea, nausea and vomiting.   Musculoskeletal:  Positive for arthralgias and myalgias.   Skin:  Negative for color change.   Neurological:  Negative for dizziness and headaches.   Hematological:  Negative for adenopathy.   Psychiatric/Behavioral:  Negative for agitation.        Objective:     Physical Exam  Vitals and nursing note reviewed.   Constitutional:       General: She is awake. She is not in acute distress.     Appearance: She is not ill-appearing, toxic-appearing or diaphoretic.   HENT:      Head: Normocephalic.      Right Ear: External ear normal.      Left Ear: External ear normal.      Nose: Nose normal.   Cardiovascular:      Rate and Rhythm: Normal rate and regular rhythm.      Heart sounds: Normal heart sounds.   Pulmonary:      Effort: Pulmonary effort is normal. No respiratory distress.      Breath sounds: Normal breath sounds. No stridor. No wheezing, rhonchi or rales.   Chest:      Chest wall: No tenderness.

## 2025-04-18 NOTE — PATIENT INSTRUCTIONS
Take steroid in morning with food- avoid taking with NSAIDs  Ice  Tylenol/Icy hot as needed  Follow up with PCP or return to walk in clinic as needed

## 2025-04-21 ENCOUNTER — TELEPHONE (OUTPATIENT)
Dept: PRIMARY CARE CLINIC | Age: 75
End: 2025-04-21

## 2025-04-21 NOTE — TELEPHONE ENCOUNTER
Patient called she has done myralax, dulcolax, and enemas and has had nor relief. She has had some BM but they are very small amounts and she feels like she is still very backed up. Is there something different she should try or can something be called in for her.

## 2025-04-23 ENCOUNTER — RESULTS FOLLOW-UP (OUTPATIENT)
Dept: PRIMARY CARE CLINIC | Age: 75
End: 2025-04-23

## 2025-04-23 ENCOUNTER — OFFICE VISIT (OUTPATIENT)
Dept: PRIMARY CARE CLINIC | Age: 75
End: 2025-04-23
Payer: MEDICARE

## 2025-04-23 ENCOUNTER — PATIENT MESSAGE (OUTPATIENT)
Dept: FAMILY MEDICINE CLINIC | Age: 75
End: 2025-04-23

## 2025-04-23 ENCOUNTER — HOSPITAL ENCOUNTER (OUTPATIENT)
Dept: CT IMAGING | Age: 75
Discharge: HOME | End: 2025-04-25
Payer: MEDICARE

## 2025-04-23 ENCOUNTER — HOSPITAL ENCOUNTER (OUTPATIENT)
Age: 75
Discharge: HOME | End: 2025-04-23
Payer: MEDICARE

## 2025-04-23 VITALS
RESPIRATION RATE: 20 BRPM | BODY MASS INDEX: 33.05 KG/M2 | DIASTOLIC BLOOD PRESSURE: 80 MMHG | OXYGEN SATURATION: 96 % | HEART RATE: 57 BPM | SYSTOLIC BLOOD PRESSURE: 132 MMHG | WEIGHT: 211 LBS | TEMPERATURE: 97 F

## 2025-04-23 DIAGNOSIS — K80.70 CALCULUS OF GALLBLADDER AND BILE DUCT WITHOUT CHOLECYSTITIS OR OBSTRUCTION: ICD-10-CM

## 2025-04-23 DIAGNOSIS — R10.32 LLQ PAIN: ICD-10-CM

## 2025-04-23 DIAGNOSIS — R10.9 ABDOMINAL CRAMPING: Primary | ICD-10-CM

## 2025-04-23 DIAGNOSIS — R10.31 RLQ ABDOMINAL PAIN: ICD-10-CM

## 2025-04-23 LAB
ALBUMIN SERPL-MCNC: 4.2 G/DL (ref 3.5–5.2)
ALBUMIN/GLOB SERPL: 1.6 {RATIO} (ref 1–2.5)
ALP SERPL-CCNC: 94 U/L (ref 35–104)
ALT SERPL-CCNC: 12 U/L (ref 10–35)
ANION GAP SERPL CALCULATED.3IONS-SCNC: 12 MMOL/L (ref 9–16)
AST SERPL-CCNC: 18 U/L (ref 10–35)
BASOPHILS # BLD: 0.05 K/UL (ref 0–0.2)
BASOPHILS NFR BLD: 1 % (ref 0–2)
BILIRUB SERPL-MCNC: <0.2 MG/DL (ref 0–1.2)
BUN SERPL-MCNC: 17 MG/DL (ref 8–23)
BUN/CREAT SERPL: 21 (ref 9–20)
CALCIUM SERPL-MCNC: 9.5 MG/DL (ref 8.6–10.4)
CHLORIDE SERPL-SCNC: 105 MMOL/L (ref 98–107)
CO2 SERPL-SCNC: 25 MMOL/L (ref 20–31)
CREAT SERPL-MCNC: 0.8 MG/DL (ref 0.6–0.9)
EOSINOPHIL # BLD: 0.17 K/UL (ref 0–0.44)
EOSINOPHILS RELATIVE PERCENT: 2 % (ref 1–4)
ERYTHROCYTE [DISTWIDTH] IN BLOOD BY AUTOMATED COUNT: 14.9 % (ref 11.8–14.4)
GFR, ESTIMATED: 77 ML/MIN/1.73M2
GLUCOSE SERPL-MCNC: 123 MG/DL (ref 74–99)
HCT VFR BLD AUTO: 36.3 % (ref 36.3–47.1)
HGB BLD-MCNC: 11.6 G/DL (ref 11.9–15.1)
IMM GRANULOCYTES # BLD AUTO: 0.05 K/UL (ref 0–0.3)
IMM GRANULOCYTES NFR BLD: 1 %
LIPASE SERPL-CCNC: 37 U/L (ref 13–60)
LYMPHOCYTES NFR BLD: 2.88 K/UL (ref 1.1–3.7)
LYMPHOCYTES RELATIVE PERCENT: 27 % (ref 24–43)
MCH RBC QN AUTO: 28.2 PG (ref 25.2–33.5)
MCHC RBC AUTO-ENTMCNC: 32 G/DL (ref 25.2–33.5)
MCV RBC AUTO: 88.1 FL (ref 82.6–102.9)
MONOCYTES NFR BLD: 0.81 K/UL (ref 0.1–1.2)
MONOCYTES NFR BLD: 8 % (ref 3–12)
NEUTROPHILS NFR BLD: 61 % (ref 36–65)
NEUTS SEG NFR BLD: 6.69 K/UL (ref 1.5–8.1)
NRBC BLD-RTO: 0 PER 100 WBC
PLATELET # BLD AUTO: 245 K/UL (ref 138–453)
PMV BLD AUTO: 9 FL (ref 8.1–13.5)
POTASSIUM SERPL-SCNC: 4.2 MMOL/L (ref 3.7–5.3)
PROT SERPL-MCNC: 6.9 G/DL (ref 6.6–8.7)
RBC # BLD AUTO: 4.12 M/UL (ref 3.95–5.11)
RBC # BLD: ABNORMAL 10*6/UL
SODIUM SERPL-SCNC: 142 MMOL/L (ref 136–145)
WBC OTHER # BLD: 10.7 K/UL (ref 3.5–11.3)

## 2025-04-23 PROCEDURE — 99212 OFFICE O/P EST SF 10 MIN: CPT

## 2025-04-23 PROCEDURE — 83690 ASSAY OF LIPASE: CPT

## 2025-04-23 PROCEDURE — 74176 CT ABD & PELVIS W/O CONTRAST: CPT

## 2025-04-23 PROCEDURE — 36415 COLL VENOUS BLD VENIPUNCTURE: CPT

## 2025-04-23 PROCEDURE — 80053 COMPREHEN METABOLIC PANEL: CPT

## 2025-04-23 PROCEDURE — 85025 COMPLETE CBC W/AUTO DIFF WBC: CPT

## 2025-04-23 RX ORDER — DICYCLOMINE HYDROCHLORIDE 10 MG/1
10 CAPSULE ORAL
Qty: 120 CAPSULE | Refills: 0 | Status: SHIPPED | OUTPATIENT
Start: 2025-04-23

## 2025-04-23 ASSESSMENT — ENCOUNTER SYMPTOMS
DIARRHEA: 1
BACK PAIN: 1
NAUSEA: 1
SHORTNESS OF BREATH: 0
CRAMPS: 1
FLATUS: 1
VOMITING: 0
COUGH: 0
COLOR CHANGE: 0
ABDOMINAL PAIN: 1

## 2025-04-23 NOTE — PATIENT INSTRUCTIONS
Continue taking daily Miralax for constipation  Start taking Bentyl as needed for bloating/cramping up to 4x a day  Pushing fluids  Any worsening abdominal pain- ER  Follow up with general surgery

## 2025-04-23 NOTE — PROGRESS NOTES
[facility]  [address]  [facility] [address]  Dept: [phone]  Dept Fax: [phone]    Huyen Mojica is a 75 y.o. female who presents today for her medical conditions/complaints as noted below. Huyen Mojica is c/o of   Chief Complaint   Patient presents with    Abdominal Cramping     Since last week Tuesday    .    HPI:     Patient presents due to ongoing abdominal cramping for over a week. She was seen in office 5 days ago and states that pain has not improved since then. She took Magnesium Citrate with success stating she had diarrhea, but feels like she still may be partially blocked. She ate [employer] soup and peaches this afternoon but is having 5/10 discomfort of lower abdomen. Abdominal pain has gotten up to 10/10 in the evenings when she lays down. Last BM was yesterday morning with diarrhea. She has not had any blood in her stool. Denies any urinary symptoms such as dysuria, frequency, or urgency. She does have a history of gallstones, but states this pain is not the same.    Abdominal Cramping  This is a new problem. Episode onset: 8 days ago. The problem has been gradually worsening. The pain is located in the LLQ and RLQ. The pain is at a severity of 5/10. The pain is moderate. The quality of the pain is aching. The abdominal pain does not radiate. Associated symptoms include diarrhea, flatus and nausea. Pertinent negatives include no fever, frequency, headaches or vomiting. The pain is aggravated by certain positions. Her past medical history is significant for gallstones.       Past Medical History:   Diagnosis Date    Allergic rhinitis     Anxiety     due to situational stress    Bursitis     Bursitis of hip, right     recurrent    Carpal tunnel syndrome, bilateral     GERD (gastroesophageal reflux disease)     Hyperlipidemia     Hypertension     Impaired

## 2025-05-06 ENCOUNTER — APPOINTMENT (OUTPATIENT)
Dept: GENERAL RADIOLOGY | Age: 75
End: 2025-05-06
Payer: MEDICARE

## 2025-05-06 ENCOUNTER — HOSPITAL ENCOUNTER (EMERGENCY)
Age: 75
Discharge: HOME | End: 2025-05-06
Attending: EMERGENCY MEDICINE
Payer: MEDICARE

## 2025-05-06 VITALS
RESPIRATION RATE: 18 BRPM | OXYGEN SATURATION: 92 % | SYSTOLIC BLOOD PRESSURE: 158 MMHG | WEIGHT: 212 LBS | HEART RATE: 89 BPM | DIASTOLIC BLOOD PRESSURE: 65 MMHG | HEIGHT: 67 IN | BODY MASS INDEX: 33.27 KG/M2 | TEMPERATURE: 97.4 F

## 2025-05-06 DIAGNOSIS — R10.30 LOWER ABDOMINAL PAIN: Primary | ICD-10-CM

## 2025-05-06 DIAGNOSIS — K59.00 CONSTIPATION, UNSPECIFIED CONSTIPATION TYPE: ICD-10-CM

## 2025-05-06 LAB
ALBUMIN SERPL-MCNC: 4.1 G/DL (ref 3.5–5.2)
ALBUMIN/GLOB SERPL: 1.5 {RATIO} (ref 1–2.5)
ALP SERPL-CCNC: 91 U/L (ref 35–104)
ALT SERPL-CCNC: 11 U/L (ref 10–35)
ANION GAP SERPL CALCULATED.3IONS-SCNC: 13 MMOL/L (ref 9–16)
AST SERPL-CCNC: 17 U/L (ref 10–35)
BASOPHILS # BLD: 0.04 K/UL (ref 0–0.2)
BASOPHILS NFR BLD: 1 % (ref 0–2)
BILIRUB DIRECT SERPL-MCNC: 0.1 MG/DL (ref 0–0.2)
BILIRUB INDIRECT SERPL-MCNC: 0.1 MG/DL (ref 0–1)
BILIRUB SERPL-MCNC: 0.2 MG/DL (ref 0–1.2)
BUN SERPL-MCNC: 13 MG/DL (ref 8–23)
BUN/CREAT SERPL: 19 (ref 9–20)
CALCIUM SERPL-MCNC: 9.8 MG/DL (ref 8.6–10.4)
CHLORIDE SERPL-SCNC: 104 MMOL/L (ref 98–107)
CO2 SERPL-SCNC: 25 MMOL/L (ref 20–31)
CREAT SERPL-MCNC: 0.7 MG/DL (ref 0.6–0.9)
EOSINOPHIL # BLD: 0.15 K/UL (ref 0–0.44)
EOSINOPHILS RELATIVE PERCENT: 2 % (ref 1–4)
ERYTHROCYTE [DISTWIDTH] IN BLOOD BY AUTOMATED COUNT: 14.7 % (ref 11.8–14.4)
GFR, ESTIMATED: >90 ML/MIN/1.73M2
GLUCOSE SERPL-MCNC: 125 MG/DL (ref 74–99)
HCT VFR BLD AUTO: 37.1 % (ref 36.3–47.1)
HGB BLD-MCNC: 12.1 G/DL (ref 11.9–15.1)
IMM GRANULOCYTES # BLD AUTO: <0.03 K/UL (ref 0–0.3)
IMM GRANULOCYTES NFR BLD: 0 %
LIPASE SERPL-CCNC: 30 U/L (ref 13–60)
LYMPHOCYTES NFR BLD: 1.52 K/UL (ref 1.1–3.7)
LYMPHOCYTES RELATIVE PERCENT: 21 % (ref 24–43)
MCH RBC QN AUTO: 28.7 PG (ref 25.2–33.5)
MCHC RBC AUTO-ENTMCNC: 32.6 G/DL (ref 25.2–33.5)
MCV RBC AUTO: 88.1 FL (ref 82.6–102.9)
MONOCYTES NFR BLD: 0.57 K/UL (ref 0.1–1.2)
MONOCYTES NFR BLD: 8 % (ref 3–12)
NEUTROPHILS NFR BLD: 68 % (ref 36–65)
NEUTS SEG NFR BLD: 5.03 K/UL (ref 1.5–8.1)
NRBC BLD-RTO: 0 PER 100 WBC
PLATELET # BLD AUTO: 231 K/UL (ref 138–453)
PMV BLD AUTO: 9.4 FL (ref 8.1–13.5)
POTASSIUM SERPL-SCNC: 3.9 MMOL/L (ref 3.7–5.3)
PROT SERPL-MCNC: 6.9 G/DL (ref 6.6–8.7)
RBC # BLD AUTO: 4.21 M/UL (ref 3.95–5.11)
RBC # BLD: ABNORMAL 10*6/UL
SODIUM SERPL-SCNC: 142 MMOL/L (ref 136–145)
WBC OTHER # BLD: 7.3 K/UL (ref 3.5–11.3)

## 2025-05-06 PROCEDURE — 85025 COMPLETE CBC W/AUTO DIFF WBC: CPT

## 2025-05-06 PROCEDURE — 80076 HEPATIC FUNCTION PANEL: CPT

## 2025-05-06 PROCEDURE — 99284 EMERGENCY DEPT VISIT MOD MDM: CPT

## 2025-05-06 PROCEDURE — 80048 BASIC METABOLIC PNL TOTAL CA: CPT

## 2025-05-06 PROCEDURE — 74018 RADEX ABDOMEN 1 VIEW: CPT

## 2025-05-06 PROCEDURE — 83690 ASSAY OF LIPASE: CPT

## 2025-05-06 PROCEDURE — 6370000000 HC RX 637 (ALT 250 FOR IP): Performed by: EMERGENCY MEDICINE

## 2025-05-06 RX ADMIN — MAJOR MAGNESIUM CITRATE ORAL SOLUTION - LEMON 296 ML: 1.75 LIQUID ORAL at 09:49

## 2025-05-06 ASSESSMENT — PAIN - FUNCTIONAL ASSESSMENT: PAIN_FUNCTIONAL_ASSESSMENT: 0-10

## 2025-05-06 ASSESSMENT — PAIN DESCRIPTION - ORIENTATION: ORIENTATION: RIGHT

## 2025-05-06 ASSESSMENT — PAIN DESCRIPTION - LOCATION: LOCATION: ABDOMEN

## 2025-05-06 ASSESSMENT — PAIN DESCRIPTION - PAIN TYPE: TYPE: ACUTE PAIN

## 2025-05-06 NOTE — DISCHARGE INSTRUCTIONS
Take Miralax twice a day for 3 days and then go back to taking it once a day.  Drink extra fluids.  Return for worsening symptoms.

## 2025-05-06 NOTE — ED PROVIDER NOTES
[facility] EMERGENCY DEPARTMENT  EMERGENCY DEPARTMENT ENCOUNTER      Pt Name: Huyen Mojica  MRN: [id number]  Birthdate 1950  Date of evaluation: 5/6/2025  Provider: Mitchel Jean Baptiste MD    CHIEF COMPLAINT     Chief Complaint   Patient presents with    Abdominal Pain     States she has been constipated since Good Friday         HISTORY OF PRESENT ILLNESS   (Location/Symptom, Timing/Onset, Context/Setting,Quality, Duration, Modifying Factors, Severity)  Note limiting factors.   Huyen Mojica is a 75 y.o. female who presents to the emergency department with a chief complaint of constipation for about 3 weeks.  She has been taking stool softeners and laxatives intermittently and each time she has a small bowel movement when she takes something but her bigger concern is the lower abdominal discomfort that she keeps experiencing which is more in the right lower quadrant.  She has nausea but no vomiting and she denies chills or fever.  Patient had CT scan of the abdomen pelvis performed April 27 which showed cholelithiasis and choledocholithiasis.  She has an appointment scheduled to see Dr. Montano tomorrow.    The history is provided by the patient and medical records.       Nursing Notes werereviewed.    REVIEW OF SYSTEMS    (2-9 systems for level 4, 10 or more for level 5)     Review of Systems   All other systems reviewed and are negative.      Except as noted above the remainder of the review of systems was reviewed and negative.       PAST MEDICAL HISTORY     Past Medical History:   Diagnosis Date    Allergic rhinitis     Anxiety     due to situational stress    Bursitis     Bursitis of hip, right     recurrent    Carpal tunnel syndrome, bilateral     GERD (gastroesophageal reflux disease)     Hyperlipidemia     Hypertension     Impaired fasting glucose     Labyrinthitis     causing continuted vertigo    Melanoma in situ (HCC) 10/2017     to bridge of nose; Dr Baeza in [place]    Melanoma in situ of nose

## 2025-05-07 ENCOUNTER — INITIAL CONSULT (OUTPATIENT)
Dept: SURGERY | Age: 75
End: 2025-05-07
Payer: MEDICARE

## 2025-05-07 VITALS
RESPIRATION RATE: 16 BRPM | HEART RATE: 64 BPM | DIASTOLIC BLOOD PRESSURE: 76 MMHG | BODY MASS INDEX: 33.18 KG/M2 | WEIGHT: 211.4 LBS | HEIGHT: 67 IN | SYSTOLIC BLOOD PRESSURE: 126 MMHG

## 2025-05-07 DIAGNOSIS — K80.60 CALCULUS OF GALLBLADDER AND BILE DUCT WITH CHOLECYSTITIS WITHOUT OBSTRUCTION, UNSPECIFIED CHOLECYSTITIS ACUITY: Primary | ICD-10-CM

## 2025-05-07 PROCEDURE — 3074F SYST BP LT 130 MM HG: CPT | Performed by: SURGERY

## 2025-05-07 PROCEDURE — 1125F AMNT PAIN NOTED PAIN PRSNT: CPT | Performed by: SURGERY

## 2025-05-07 PROCEDURE — 99214 OFFICE O/P EST MOD 30 MIN: CPT | Performed by: SURGERY

## 2025-05-07 PROCEDURE — 1123F ACP DISCUSS/DSCN MKR DOCD: CPT | Performed by: SURGERY

## 2025-05-07 PROCEDURE — G8400 PT W/DXA NO RESULTS DOC: HCPCS | Performed by: SURGERY

## 2025-05-07 PROCEDURE — G8417 CALC BMI ABV UP PARAM F/U: HCPCS | Performed by: SURGERY

## 2025-05-07 PROCEDURE — 3017F COLORECTAL CA SCREEN DOC REV: CPT | Performed by: SURGERY

## 2025-05-07 PROCEDURE — 1090F PRES/ABSN URINE INCON ASSESS: CPT | Performed by: SURGERY

## 2025-05-07 PROCEDURE — 1159F MED LIST DOCD IN RCRD: CPT | Performed by: SURGERY

## 2025-05-07 PROCEDURE — 3078F DIAST BP <80 MM HG: CPT | Performed by: SURGERY

## 2025-05-07 PROCEDURE — 1036F TOBACCO NON-USER: CPT | Performed by: SURGERY

## 2025-05-07 PROCEDURE — G8427 DOCREV CUR MEDS BY ELIG CLIN: HCPCS | Performed by: SURGERY

## 2025-05-07 RX ORDER — CIPROFLOXACIN 500 MG/1
500 TABLET, FILM COATED ORAL 2 TIMES DAILY
Qty: 14 TABLET | Refills: 0 | Status: SHIPPED | OUTPATIENT
Start: 2025-05-07 | End: 2025-05-14

## 2025-05-07 RX ORDER — METRONIDAZOLE 500 MG/1
500 TABLET ORAL 3 TIMES DAILY
Qty: 21 TABLET | Refills: 0 | Status: SHIPPED | OUTPATIENT
Start: 2025-05-07 | End: 2025-05-14

## 2025-05-07 NOTE — PROGRESS NOTES
Patient here for gallbladder consult. Patient reports she has been having lower abdomen cramping, which also radiates to the back and constipation since 4/16/2025.    She is currently taking Bentyl 4xdaily, reports will wake up with severe cramping. Cramping is constant, now with Bentyl it will calm the cramping. Will radiate to the lower back but worse on the right side. Reports nausea. Does take sucralfate Constipation- needs to take laxatives- miralax daily. The laxatives do make her have a bowel movement. Noticed rectal bleeding, BRB, for a few days.       Most recent colonoscopy was done on 6/18/2024 with Dr. Montano at [facility]= diffuse diverticulosis    Patient was seen in  on 4/23/2025 for abdominal pain.   4/23/2025 CT abd and pelvis done:  IMPRESSION:  1. Choledocholithiasis with mild to moderate intrahepatic and extrahepatic  bile duct dilatation.  There is a common duct stone or stones in region of  the ampulla.  MRCP/ERCP would be helpful.  2. Cholelithiasis with moderate gallbladder dilatation and mild gallbladder  wall thickening. Correlate clinically for acute cholecystitis.  3. Minimal pneumobilia.  4. Mild to moderate stool load in the right and transverse colon. No bowel  obstruction.  5. Normal appendix.  6. Small fat containing left inguinal hernia.  7. Tiny low-attenuation left adrenal gland nodule likely an adenoma.    Lab work was done also which was normal.    Patient also was in the ER yesterday for lower abd pain.  
thickening. Correlate clinically for acute cholecystitis.  3. Minimal pneumobilia.  4. Mild to moderate stool load in the right and transverse colon. No bowel  obstruction.  5. Normal appendix.  6. Small fat containing left inguinal hernia.  7. Tiny low-attenuation left adrenal gland nodule likely an adenoma.     Lab work was done also which was normal.     Patient also was in the ER yesterday for lower abd pain.    Review of Systems:    General:  Fever: Negative  Weight Change:Negative  Night Sweats: Negative    Eye:  Blurry Vision:Negative  Double Vision: Negative    Ent:  Headaches: Negative  Sore throat: Negative    Allergy/Immunology:  Hives: Negative  Latex allergy: Negative    Hematology/Lymphatic:  Bleeding Problems: Negative  Blood Clots: Negative  Swollen Lymph Nodes: Negative    Lungs:  Cough: Negative  SOB: Negative  Wheezing:Negative    Cardiovascular:  Chest Pain: Negative  Palpitations:Negative    GI:   Decreased Appetite: Negative  Heartburn: Negative  Dysphagia: Negative  Nausea/Vomiting: Negative  Abdominal Pain: Negative  Change in Bowels:Negative  Constipation: Negative  Diarrhea: Negative  Rectal Bleeding: Negative    :   Dysuria: Negative  Increase Urinary Frequency/Urgency: Negative    Neuro:  Seizures: Negative  Confusion: Negative        PAST MEDICAL HISTORY:      Family History   Problem Relation Age of Onset    Other Mother         ulcerative colitis    Prostate Cancer Father     Cancer Sister         breast    Breast Cancer Sister 60    Breast Cancer Sister 40    Colon Cancer Sister     Rectal Cancer Sister     Other Brother 70        coronary artery disease    Other Son         ulcerative colitis    Breast Cancer Paternal Aunt      Social History     Socioeconomic History    Marital status:      Spouse name: Not on file    Number of children: Not on file    Years of education: Not on file    Highest education level: Not on file   Occupational History    Not on file   Tobacco Use

## 2025-05-07 NOTE — PATIENT INSTRUCTIONS
Antibiotics have been sent to your pharmacy. Please complete antibiotics to see if they help with the abdominal pain.    MRI is scheduled on: Tuesday May 20th at 11:30 AM  Arrive to radiology:11:15 AM  Nothing to eat or drink 4 hours prior.  If you need to reschedule, please contact radiology at [phone],option 1.    GI referral has been sent, their office will contact you to schedule.     Follow up with Dr. Montano as scheduled on Wednesday June 18th, 2025 at 1 PM.    If you have any questions or concerns, please contact our office at [phone].

## 2025-05-19 ENCOUNTER — OFFICE VISIT (OUTPATIENT)
Dept: GASTROENTEROLOGY | Age: 75
End: 2025-05-19
Payer: MEDICARE

## 2025-05-19 ENCOUNTER — PREP FOR PROCEDURE (OUTPATIENT)
Dept: GASTROENTEROLOGY | Age: 75
End: 2025-05-19

## 2025-05-19 VITALS
SYSTOLIC BLOOD PRESSURE: 132 MMHG | TEMPERATURE: 97.3 F | OXYGEN SATURATION: 94 % | BODY MASS INDEX: 32.73 KG/M2 | HEART RATE: 71 BPM | DIASTOLIC BLOOD PRESSURE: 77 MMHG | WEIGHT: 209 LBS | RESPIRATION RATE: 16 BRPM

## 2025-05-19 DIAGNOSIS — K21.9 GASTROESOPHAGEAL REFLUX DISEASE, UNSPECIFIED WHETHER ESOPHAGITIS PRESENT: ICD-10-CM

## 2025-05-19 DIAGNOSIS — R13.19 OTHER DYSPHAGIA: ICD-10-CM

## 2025-05-19 DIAGNOSIS — R10.11 RUQ ABDOMINAL PAIN: ICD-10-CM

## 2025-05-19 DIAGNOSIS — K80.20 GALLBLADDER STONE WITHOUT CHOLECYSTITIS OR OBSTRUCTION: ICD-10-CM

## 2025-05-19 DIAGNOSIS — K80.20 GALL BLADDER STONES: Primary | ICD-10-CM

## 2025-05-19 DIAGNOSIS — K59.09 OTHER CONSTIPATION: ICD-10-CM

## 2025-05-19 DIAGNOSIS — R10.11 ABDOMINAL PAIN, RIGHT UPPER QUADRANT: ICD-10-CM

## 2025-05-19 PROBLEM — R13.10 DYSPHAGIA: Status: ACTIVE | Noted: 2025-05-19

## 2025-05-19 PROCEDURE — 3017F COLORECTAL CA SCREEN DOC REV: CPT | Performed by: INTERNAL MEDICINE

## 2025-05-19 PROCEDURE — 1159F MED LIST DOCD IN RCRD: CPT | Performed by: INTERNAL MEDICINE

## 2025-05-19 PROCEDURE — G8400 PT W/DXA NO RESULTS DOC: HCPCS | Performed by: INTERNAL MEDICINE

## 2025-05-19 PROCEDURE — 1036F TOBACCO NON-USER: CPT | Performed by: INTERNAL MEDICINE

## 2025-05-19 PROCEDURE — 1126F AMNT PAIN NOTED NONE PRSNT: CPT | Performed by: INTERNAL MEDICINE

## 2025-05-19 PROCEDURE — 1090F PRES/ABSN URINE INCON ASSESS: CPT | Performed by: INTERNAL MEDICINE

## 2025-05-19 PROCEDURE — G8417 CALC BMI ABV UP PARAM F/U: HCPCS | Performed by: INTERNAL MEDICINE

## 2025-05-19 PROCEDURE — 3075F SYST BP GE 130 - 139MM HG: CPT | Performed by: INTERNAL MEDICINE

## 2025-05-19 PROCEDURE — 3078F DIAST BP <80 MM HG: CPT | Performed by: INTERNAL MEDICINE

## 2025-05-19 PROCEDURE — 1123F ACP DISCUSS/DSCN MKR DOCD: CPT | Performed by: INTERNAL MEDICINE

## 2025-05-19 PROCEDURE — G8427 DOCREV CUR MEDS BY ELIG CLIN: HCPCS | Performed by: INTERNAL MEDICINE

## 2025-05-19 PROCEDURE — 99204 OFFICE O/P NEW MOD 45 MIN: CPT | Performed by: INTERNAL MEDICINE

## 2025-05-19 ASSESSMENT — ENCOUNTER SYMPTOMS
ANAL BLEEDING: 0
ABDOMINAL PAIN: 1
NAUSEA: 0
BLOOD IN STOOL: 0
WHEEZING: 0
VOICE CHANGE: 0
DIARRHEA: 0
VOMITING: 0
CONSTIPATION: 1
COUGH: 0
TROUBLE SWALLOWING: 0
ABDOMINAL DISTENTION: 1
RECTAL PAIN: 0
CHOKING: 0
SORE THROAT: 0

## 2025-05-19 NOTE — PROGRESS NOTES
unexpected weight change.   HENT:  Negative for sore throat, trouble swallowing and voice change.    Respiratory:  Negative for cough, choking and wheezing.    Cardiovascular:  Negative for chest pain, palpitations and leg swelling.   Gastrointestinal:  Positive for abdominal distention, abdominal pain and constipation. Negative for anal bleeding, blood in stool, diarrhea, nausea, rectal pain and vomiting.   Genitourinary:  Negative for flank pain.   Allergic/Immunologic: Negative for food allergies and immunocompromised state.   Neurological:  Negative for dizziness, speech difficulty, weakness, light-headedness and headaches.   Hematological:  Does not bruise/bleed easily.   Psychiatric/Behavioral:  Negative for sleep disturbance.            LABORATORY DATA: Reviewed  Lab Results   Component Value Date    WBC 7.3 05/06/2025    HGB 12.1 05/06/2025    HCT 37.1 05/06/2025    MCV 88.1 05/06/2025     05/06/2025     05/06/2025    K 3.9 05/06/2025     05/06/2025    CO2 25 05/06/2025    BUN 13 05/06/2025    CREATININE 0.7 05/06/2025    BILITOT 0.2 05/06/2025    ALKPHOS 91 05/06/2025    AST 17 05/06/2025    ALT 11 05/06/2025         Lab Results   Component Value Date    RBC 4.21 05/06/2025    HGB 12.1 05/06/2025    MCV 88.1 05/06/2025    MCH 28.7 05/06/2025    MCHC 32.6 05/06/2025    RDW 14.7 (H) 05/06/2025    MPV 9.4 05/06/2025    BASOPCT 1 05/06/2025    LYMPHSABS 1.52 05/06/2025    MONOSABS 0.57 05/06/2025    NEUTROABS 5.03 05/06/2025    EOSABS 0.15 05/06/2025    BASOSABS 0.04 05/06/2025         DIAGNOSTIC TESTING:     XR ABDOMEN (KUB) (SINGLE AP VIEW)  Result Date: 5/6/2025  EXAMINATION: ONE SUPINE XRAY VIEW(S) OF THE ABDOMEN 5/6/2025 8:37 am COMPARISON: 04/23/2025. HISTORY: ORDERING SYSTEM PROVIDED HISTORY: lower abd pain TECHNOLOGIST PROVIDED HISTORY: lower abd pain Reason for Exam: Lower abdomen pain into back, constipation x 3 weeks, history of diverticulosis FINDINGS: There is a normal bowel gas

## 2025-05-20 ENCOUNTER — HOSPITAL ENCOUNTER (OUTPATIENT)
Dept: MRI IMAGING | Age: 75
Discharge: HOME | End: 2025-05-22
Attending: SURGERY
Payer: MEDICARE

## 2025-05-20 ENCOUNTER — TELEPHONE (OUTPATIENT)
Dept: GASTROENTEROLOGY | Age: 75
End: 2025-05-20

## 2025-05-20 DIAGNOSIS — K80.60 CALCULUS OF GALLBLADDER AND BILE DUCT WITH CHOLECYSTITIS WITHOUT OBSTRUCTION, UNSPECIFIED CHOLECYSTITIS ACUITY: ICD-10-CM

## 2025-05-20 PROCEDURE — 2709999900 MRI ABDOMEN W WO CONTRAST MRCP

## 2025-05-20 PROCEDURE — A9577 INJ MULTIHANCE: HCPCS | Performed by: SURGERY

## 2025-05-20 PROCEDURE — 6360000004 HC RX CONTRAST MEDICATION: Performed by: SURGERY

## 2025-05-20 RX ADMIN — GADOBENATE DIMEGLUMINE 15 ML: 529 INJECTION, SOLUTION INTRAVENOUS at 12:03

## 2025-05-20 NOTE — TELEPHONE ENCOUNTER
Procedure scheduled/Dr Buchanan  Procedure: EGD/ERCP  Dx:Gall bladder stones -Abdominal pain, right upper quadrant   Date:06/6/25  Time: 7:45 am arriving at 6:00 am  Hospital:[facility] phone call:tbd  Bowel Prep instructions given: EGD/ERCP  In office/via phone: office  Clearance needed:none          GLP-1: none

## 2025-05-21 RX ORDER — ATORVASTATIN CALCIUM 10 MG/1
10 TABLET, FILM COATED ORAL DAILY
Qty: 90 TABLET | Refills: 1 | Status: SHIPPED | OUTPATIENT
Start: 2025-05-21

## 2025-05-29 ENCOUNTER — RESULTS FOLLOW-UP (OUTPATIENT)
Dept: FAMILY MEDICINE CLINIC | Age: 75
End: 2025-05-29

## 2025-05-29 ENCOUNTER — HOSPITAL ENCOUNTER (OUTPATIENT)
Age: 75
Discharge: HOME | End: 2025-05-29
Payer: MEDICARE

## 2025-05-29 DIAGNOSIS — E11.42 TYPE 2 DIABETES MELLITUS WITH DIABETIC POLYNEUROPATHY, WITHOUT LONG-TERM CURRENT USE OF INSULIN (HCC): ICD-10-CM

## 2025-05-29 DIAGNOSIS — E78.2 MIXED HYPERLIPIDEMIA: ICD-10-CM

## 2025-05-29 DIAGNOSIS — M35.3 POLYMYALGIA RHEUMATICA: ICD-10-CM

## 2025-05-29 DIAGNOSIS — I10 ESSENTIAL HYPERTENSION: ICD-10-CM

## 2025-05-29 LAB
ALBUMIN SERPL-MCNC: 4.3 G/DL (ref 3.5–5.2)
ALBUMIN/GLOB SERPL: 1.5 {RATIO} (ref 1–2.5)
ALP SERPL-CCNC: 90 U/L (ref 35–104)
ALT SERPL-CCNC: 13 U/L (ref 10–35)
ANION GAP SERPL CALCULATED.3IONS-SCNC: 10 MMOL/L (ref 9–16)
AST SERPL-CCNC: 21 U/L (ref 10–35)
BASOPHILS # BLD: 0.04 K/UL (ref 0–0.2)
BASOPHILS NFR BLD: 1 % (ref 0–2)
BILIRUB SERPL-MCNC: 0.2 MG/DL (ref 0–1.2)
BUN SERPL-MCNC: 14 MG/DL (ref 8–23)
BUN/CREAT SERPL: 20 (ref 9–20)
CALCIUM SERPL-MCNC: 10 MG/DL (ref 8.6–10.4)
CHLORIDE SERPL-SCNC: 105 MMOL/L (ref 98–107)
CHOLEST SERPL-MCNC: 148 MG/DL (ref 0–199)
CHOLESTEROL/HDL RATIO: 4.1
CO2 SERPL-SCNC: 27 MMOL/L (ref 20–31)
CREAT SERPL-MCNC: 0.7 MG/DL (ref 0.6–0.9)
CRP SERPL HS-MCNC: 16 MG/L (ref 0–5)
EOSINOPHIL # BLD: 0.2 K/UL (ref 0–0.44)
EOSINOPHILS RELATIVE PERCENT: 3 % (ref 1–4)
ERYTHROCYTE [DISTWIDTH] IN BLOOD BY AUTOMATED COUNT: 14.2 % (ref 11.8–14.4)
ERYTHROCYTE [SEDIMENTATION RATE] IN BLOOD BY PHOTOMETRIC METHOD: 29 MM/HR (ref 0–30)
EST. AVERAGE GLUCOSE BLD GHB EST-MCNC: 140 MG/DL
GFR, ESTIMATED: >90 ML/MIN/1.73M2
GLUCOSE SERPL-MCNC: 110 MG/DL (ref 74–99)
HBA1C MFR BLD: 6.5 % (ref 4–6)
HCT VFR BLD AUTO: 38.3 % (ref 36.3–47.1)
HDLC SERPL-MCNC: 36 MG/DL
HGB BLD-MCNC: 12.4 G/DL (ref 11.9–15.1)
IMM GRANULOCYTES # BLD AUTO: <0.03 K/UL (ref 0–0.3)
IMM GRANULOCYTES NFR BLD: 0 %
LDLC SERPL CALC-MCNC: 72 MG/DL (ref 0–100)
LYMPHOCYTES NFR BLD: 2.31 K/UL (ref 1.1–3.7)
LYMPHOCYTES RELATIVE PERCENT: 32 % (ref 24–43)
MCH RBC QN AUTO: 28.6 PG (ref 25.2–33.5)
MCHC RBC AUTO-ENTMCNC: 32.4 G/DL (ref 25.2–33.5)
MCV RBC AUTO: 88.5 FL (ref 82.6–102.9)
MONOCYTES NFR BLD: 0.57 K/UL (ref 0.1–1.2)
MONOCYTES NFR BLD: 8 % (ref 3–12)
NEUTROPHILS NFR BLD: 56 % (ref 36–65)
NEUTS SEG NFR BLD: 4.13 K/UL (ref 1.5–8.1)
NRBC BLD-RTO: 0 PER 100 WBC
PLATELET # BLD AUTO: 249 K/UL (ref 138–453)
PMV BLD AUTO: 9.1 FL (ref 8.1–13.5)
POTASSIUM SERPL-SCNC: 4.4 MMOL/L (ref 3.7–5.3)
PROT SERPL-MCNC: 7.1 G/DL (ref 6.6–8.7)
RBC # BLD AUTO: 4.33 M/UL (ref 3.95–5.11)
SODIUM SERPL-SCNC: 142 MMOL/L (ref 136–145)
TRIGL SERPL-MCNC: 199 MG/DL
VLDLC SERPL CALC-MCNC: 40 MG/DL (ref 1–30)
WBC OTHER # BLD: 7.3 K/UL (ref 3.5–11.3)

## 2025-05-29 PROCEDURE — 83036 HEMOGLOBIN GLYCOSYLATED A1C: CPT

## 2025-05-29 PROCEDURE — 80053 COMPREHEN METABOLIC PANEL: CPT

## 2025-05-29 PROCEDURE — 36415 COLL VENOUS BLD VENIPUNCTURE: CPT

## 2025-05-29 PROCEDURE — 85025 COMPLETE CBC W/AUTO DIFF WBC: CPT

## 2025-05-29 PROCEDURE — 85652 RBC SED RATE AUTOMATED: CPT

## 2025-05-29 PROCEDURE — 80061 LIPID PANEL: CPT

## 2025-05-29 PROCEDURE — 86140 C-REACTIVE PROTEIN: CPT

## 2025-06-04 ENCOUNTER — OFFICE VISIT (OUTPATIENT)
Dept: FAMILY MEDICINE CLINIC | Age: 75
End: 2025-06-04
Payer: MEDICARE

## 2025-06-04 VITALS
BODY MASS INDEX: 32.33 KG/M2 | HEIGHT: 67 IN | TEMPERATURE: 97.8 F | HEART RATE: 64 BPM | OXYGEN SATURATION: 94 % | DIASTOLIC BLOOD PRESSURE: 80 MMHG | WEIGHT: 206 LBS | RESPIRATION RATE: 16 BRPM | SYSTOLIC BLOOD PRESSURE: 126 MMHG

## 2025-06-04 DIAGNOSIS — E11.42 TYPE 2 DIABETES MELLITUS WITH DIABETIC POLYNEUROPATHY, WITHOUT LONG-TERM CURRENT USE OF INSULIN (HCC): Primary | ICD-10-CM

## 2025-06-04 DIAGNOSIS — E78.2 MIXED HYPERLIPIDEMIA: ICD-10-CM

## 2025-06-04 DIAGNOSIS — M35.3 POLYMYALGIA RHEUMATICA: ICD-10-CM

## 2025-06-04 DIAGNOSIS — I10 ESSENTIAL HYPERTENSION: ICD-10-CM

## 2025-06-04 DIAGNOSIS — M65.30 TRIGGER FINGER, UNSPECIFIED FINGER, UNSPECIFIED LATERALITY: ICD-10-CM

## 2025-06-04 DIAGNOSIS — Z12.31 ENCOUNTER FOR SCREENING MAMMOGRAM FOR MALIGNANT NEOPLASM OF BREAST: ICD-10-CM

## 2025-06-04 PROCEDURE — G8427 DOCREV CUR MEDS BY ELIG CLIN: HCPCS | Performed by: FAMILY MEDICINE

## 2025-06-04 PROCEDURE — 1123F ACP DISCUSS/DSCN MKR DOCD: CPT | Performed by: FAMILY MEDICINE

## 2025-06-04 PROCEDURE — 3044F HG A1C LEVEL LT 7.0%: CPT | Performed by: FAMILY MEDICINE

## 2025-06-04 PROCEDURE — 1090F PRES/ABSN URINE INCON ASSESS: CPT | Performed by: FAMILY MEDICINE

## 2025-06-04 PROCEDURE — 99214 OFFICE O/P EST MOD 30 MIN: CPT | Performed by: FAMILY MEDICINE

## 2025-06-04 PROCEDURE — G8417 CALC BMI ABV UP PARAM F/U: HCPCS | Performed by: FAMILY MEDICINE

## 2025-06-04 PROCEDURE — 3017F COLORECTAL CA SCREEN DOC REV: CPT | Performed by: FAMILY MEDICINE

## 2025-06-04 PROCEDURE — 3074F SYST BP LT 130 MM HG: CPT | Performed by: FAMILY MEDICINE

## 2025-06-04 PROCEDURE — 3079F DIAST BP 80-89 MM HG: CPT | Performed by: FAMILY MEDICINE

## 2025-06-04 PROCEDURE — 1036F TOBACCO NON-USER: CPT | Performed by: FAMILY MEDICINE

## 2025-06-04 PROCEDURE — 2022F DILAT RTA XM EVC RTNOPTHY: CPT | Performed by: FAMILY MEDICINE

## 2025-06-04 PROCEDURE — G8400 PT W/DXA NO RESULTS DOC: HCPCS | Performed by: FAMILY MEDICINE

## 2025-06-04 PROCEDURE — G2211 COMPLEX E/M VISIT ADD ON: HCPCS | Performed by: FAMILY MEDICINE

## 2025-06-04 NOTE — PROGRESS NOTES
Patient declines covid vaccine at this time. Mammogram order placed. Scheduled [facility]WV.   
seems to be helping.  - Monitor skin reaction and adjust treatment as necessary.    8. Health Maintenance.  - Blood count within normal limits, ruling out infection, anemia, or platelet abnormalities.  - Review of recent lab results indicating normal blood count.  - Continue regular health maintenance and monitoring.    Patient is to return to my office in 6 months duration or sooner if any further problems or symptoms arise.    The patient (or guardian, if applicable) and other individuals in attendance with the patient were advised that Artificial Intelligence will be utilized during this visit to record, process the conversation to generate a clinical note, and support improvement of the AI technology. The patient (or guardian, if applicable) and other individuals in attendance at the appointment consented to the use of AI, including the recording.        (Please note that portions of this note were completed with a voice recognition program. Efforts were made to edit the dictations but occasionally words are mis-transcribed.)    No follow-ups on file.    An electronic signature was used to authenticate this note.    --Fabby Lima,  on 6/4/2025 at 6:41 AM

## 2025-06-04 NOTE — PATIENT INSTRUCTIONS
05/29/2025 2.31  1.10 - 3.70 k/uL Final    Monocytes Absolute 05/29/2025 0.57  0.10 - 1.20 k/uL Final    Eosinophils Absolute 05/29/2025 0.20  0.00 - 0.44 k/uL Final    Basophils Absolute 05/29/2025 0.04  0.00 - 0.20 k/uL Final    Immature Granulocytes Absolute 05/29/2025 <0.03  0.00 - 0.30 k/uL Final

## 2025-06-06 ENCOUNTER — HOSPITAL ENCOUNTER (OUTPATIENT)
Age: 75
Setting detail: OUTPATIENT SURGERY
Discharge: HOME | End: 2025-06-06
Attending: INTERNAL MEDICINE | Admitting: INTERNAL MEDICINE
Payer: MEDICARE

## 2025-06-06 ENCOUNTER — ANESTHESIA EVENT (OUTPATIENT)
Dept: OPERATING ROOM | Age: 75
End: 2025-06-06
Payer: MEDICARE

## 2025-06-06 ENCOUNTER — ANESTHESIA (OUTPATIENT)
Dept: OPERATING ROOM | Age: 75
End: 2025-06-06
Payer: MEDICARE

## 2025-06-06 ENCOUNTER — APPOINTMENT (OUTPATIENT)
Dept: GENERAL RADIOLOGY | Age: 75
End: 2025-06-06
Attending: INTERNAL MEDICINE
Payer: MEDICARE

## 2025-06-06 VITALS
SYSTOLIC BLOOD PRESSURE: 185 MMHG | DIASTOLIC BLOOD PRESSURE: 80 MMHG | RESPIRATION RATE: 16 BRPM | HEART RATE: 58 BPM | WEIGHT: 204.4 LBS | BODY MASS INDEX: 32.85 KG/M2 | HEIGHT: 66 IN | OXYGEN SATURATION: 91 % | TEMPERATURE: 97.3 F

## 2025-06-06 DIAGNOSIS — K21.9 GERD (GASTROESOPHAGEAL REFLUX DISEASE): ICD-10-CM

## 2025-06-06 DIAGNOSIS — R13.10 DYSPHAGIA: ICD-10-CM

## 2025-06-06 DIAGNOSIS — K80.20 GALLBLADDER STONE WITHOUT CHOLECYSTITIS OR OBSTRUCTION: ICD-10-CM

## 2025-06-06 DIAGNOSIS — R10.11 RUQ ABDOMINAL PAIN: ICD-10-CM

## 2025-06-06 PROBLEM — K80.50 CHOLEDOCHOLITHIASIS: Status: ACTIVE | Noted: 2025-06-06

## 2025-06-06 LAB
GLUCOSE BLD-MCNC: 103 MG/DL (ref 65–105)
GLUCOSE BLD-MCNC: 114 MG/DL (ref 65–105)

## 2025-06-06 PROCEDURE — 6360000002 HC RX W HCPCS: Performed by: NURSE ANESTHETIST, CERTIFIED REGISTERED

## 2025-06-06 PROCEDURE — 3609012400 HC EGD TRANSORAL BIOPSY SINGLE/MULTIPLE: Performed by: INTERNAL MEDICINE

## 2025-06-06 PROCEDURE — 7100000010 HC PHASE II RECOVERY - FIRST 15 MIN: Performed by: INTERNAL MEDICINE

## 2025-06-06 PROCEDURE — 43239 EGD BIOPSY SINGLE/MULTIPLE: CPT | Performed by: INTERNAL MEDICINE

## 2025-06-06 PROCEDURE — 6360000004 HC RX CONTRAST MEDICATION: Performed by: INTERNAL MEDICINE

## 2025-06-06 PROCEDURE — 2500000003 HC RX 250 WO HCPCS: Performed by: NURSE ANESTHETIST, CERTIFIED REGISTERED

## 2025-06-06 PROCEDURE — 2720000010 HC SURG SUPPLY STERILE: Performed by: INTERNAL MEDICINE

## 2025-06-06 PROCEDURE — C1769 GUIDE WIRE: HCPCS | Performed by: INTERNAL MEDICINE

## 2025-06-06 PROCEDURE — 3609015200 HC ERCP REMOVE CALCULI/DEBRIS BILIARY/PANCREAS DUCT: Performed by: INTERNAL MEDICINE

## 2025-06-06 PROCEDURE — 2709999900 HC NON-CHARGEABLE SUPPLY: Performed by: INTERNAL MEDICINE

## 2025-06-06 PROCEDURE — 7100000011 HC PHASE II RECOVERY - ADDTL 15 MIN: Performed by: INTERNAL MEDICINE

## 2025-06-06 PROCEDURE — 6360000002 HC RX W HCPCS: Performed by: ANESTHESIOLOGY

## 2025-06-06 PROCEDURE — 7100000000 HC PACU RECOVERY - FIRST 15 MIN: Performed by: INTERNAL MEDICINE

## 2025-06-06 PROCEDURE — 6370000000 HC RX 637 (ALT 250 FOR IP): Performed by: INTERNAL MEDICINE

## 2025-06-06 PROCEDURE — 3700000000 HC ANESTHESIA ATTENDED CARE: Performed by: INTERNAL MEDICINE

## 2025-06-06 PROCEDURE — 43264 ERCP REMOVE DUCT CALCULI: CPT | Performed by: INTERNAL MEDICINE

## 2025-06-06 PROCEDURE — 2580000003 HC RX 258: Performed by: ANESTHESIOLOGY

## 2025-06-06 PROCEDURE — 6370000000 HC RX 637 (ALT 250 FOR IP): Performed by: ANESTHESIOLOGY

## 2025-06-06 PROCEDURE — 7100000001 HC PACU RECOVERY - ADDTL 15 MIN: Performed by: INTERNAL MEDICINE

## 2025-06-06 PROCEDURE — 3609015100 HC ERCP STENT PLACEMENT BILIARY/PANCREATIC DUCT: Performed by: INTERNAL MEDICINE

## 2025-06-06 PROCEDURE — 43274 ERCP DUCT STENT PLACEMENT: CPT | Performed by: INTERNAL MEDICINE

## 2025-06-06 PROCEDURE — C2625 STENT, NON-COR, TEM W/DEL SY: HCPCS | Performed by: INTERNAL MEDICINE

## 2025-06-06 PROCEDURE — 88305 TISSUE EXAM BY PATHOLOGIST: CPT

## 2025-06-06 PROCEDURE — 3700000001 HC ADD 15 MINUTES (ANESTHESIA): Performed by: INTERNAL MEDICINE

## 2025-06-06 PROCEDURE — 82947 ASSAY GLUCOSE BLOOD QUANT: CPT

## 2025-06-06 DEVICE — BILIARY STENT WITH NAVIFLEXTM RX DELIVERY SYSTEM
Type: IMPLANTABLE DEVICE | Site: BILE DUCT | Status: FUNCTIONAL
Brand: ADVANIX™ BILIARY

## 2025-06-06 RX ORDER — APREPITANT 40 MG/1
80 CAPSULE ORAL ONCE
Status: COMPLETED | OUTPATIENT
Start: 2025-06-06 | End: 2025-06-06

## 2025-06-06 RX ORDER — SCOPOLAMINE 1 MG/3D
1 PATCH, EXTENDED RELEASE TRANSDERMAL
Status: DISCONTINUED | OUTPATIENT
Start: 2025-06-06 | End: 2025-06-06 | Stop reason: HOSPADM

## 2025-06-06 RX ORDER — CEFAZOLIN SODIUM 1 G/3ML
INJECTION, POWDER, FOR SOLUTION INTRAMUSCULAR; INTRAVENOUS
Status: DISCONTINUED | OUTPATIENT
Start: 2025-06-06 | End: 2025-06-06 | Stop reason: SDUPTHER

## 2025-06-06 RX ORDER — LIDOCAINE HYDROCHLORIDE 20 MG/ML
INJECTION, SOLUTION EPIDURAL; INFILTRATION; INTRACAUDAL; PERINEURAL
Status: DISCONTINUED | OUTPATIENT
Start: 2025-06-06 | End: 2025-06-06 | Stop reason: SDUPTHER

## 2025-06-06 RX ORDER — FAMOTIDINE 10 MG/ML
INJECTION, SOLUTION INTRAVENOUS
Status: DISCONTINUED | OUTPATIENT
Start: 2025-06-06 | End: 2025-06-06 | Stop reason: SDUPTHER

## 2025-06-06 RX ORDER — SODIUM CHLORIDE 0.9 % (FLUSH) 0.9 %
5-40 SYRINGE (ML) INJECTION PRN
Status: DISCONTINUED | OUTPATIENT
Start: 2025-06-06 | End: 2025-06-06 | Stop reason: HOSPADM

## 2025-06-06 RX ORDER — INDOMETHACIN 50 MG/1
SUPPOSITORY RECTAL PRN
Status: DISCONTINUED | OUTPATIENT
Start: 2025-06-06 | End: 2025-06-06 | Stop reason: ALTCHOICE

## 2025-06-06 RX ORDER — PROPOFOL 10 MG/ML
INJECTION, EMULSION INTRAVENOUS
Status: DISCONTINUED | OUTPATIENT
Start: 2025-06-06 | End: 2025-06-06 | Stop reason: SDUPTHER

## 2025-06-06 RX ORDER — LIDOCAINE HYDROCHLORIDE 10 MG/ML
1 INJECTION, SOLUTION EPIDURAL; INFILTRATION; INTRACAUDAL; PERINEURAL
Status: DISCONTINUED | OUTPATIENT
Start: 2025-06-06 | End: 2025-06-06 | Stop reason: HOSPADM

## 2025-06-06 RX ORDER — DIPHENHYDRAMINE HYDROCHLORIDE 50 MG/ML
INJECTION, SOLUTION INTRAMUSCULAR; INTRAVENOUS
Status: DISCONTINUED | OUTPATIENT
Start: 2025-06-06 | End: 2025-06-06 | Stop reason: SDUPTHER

## 2025-06-06 RX ORDER — SODIUM CHLORIDE, SODIUM LACTATE, POTASSIUM CHLORIDE, CALCIUM CHLORIDE 600; 310; 30; 20 MG/100ML; MG/100ML; MG/100ML; MG/100ML
INJECTION, SOLUTION INTRAVENOUS CONTINUOUS
Status: DISCONTINUED | OUTPATIENT
Start: 2025-06-06 | End: 2025-06-06 | Stop reason: HOSPADM

## 2025-06-06 RX ORDER — ONDANSETRON 2 MG/ML
INJECTION INTRAMUSCULAR; INTRAVENOUS
Status: DISCONTINUED | OUTPATIENT
Start: 2025-06-06 | End: 2025-06-06 | Stop reason: SDUPTHER

## 2025-06-06 RX ORDER — FENTANYL CITRATE 50 UG/ML
INJECTION, SOLUTION INTRAMUSCULAR; INTRAVENOUS
Status: DISCONTINUED | OUTPATIENT
Start: 2025-06-06 | End: 2025-06-06 | Stop reason: SDUPTHER

## 2025-06-06 RX ORDER — SODIUM CHLORIDE 0.9 % (FLUSH) 0.9 %
5-40 SYRINGE (ML) INJECTION EVERY 12 HOURS SCHEDULED
Status: DISCONTINUED | OUTPATIENT
Start: 2025-06-06 | End: 2025-06-06 | Stop reason: HOSPADM

## 2025-06-06 RX ORDER — DEXAMETHASONE SODIUM PHOSPHATE 10 MG/ML
INJECTION, SOLUTION INTRAMUSCULAR; INTRAVENOUS
Status: DISCONTINUED | OUTPATIENT
Start: 2025-06-06 | End: 2025-06-06 | Stop reason: SDUPTHER

## 2025-06-06 RX ORDER — PHENYLEPHRINE HCL IN 0.9% NACL 1 MG/10 ML
SYRINGE (ML) INTRAVENOUS
Status: DISCONTINUED | OUTPATIENT
Start: 2025-06-06 | End: 2025-06-06 | Stop reason: SDUPTHER

## 2025-06-06 RX ORDER — SODIUM CHLORIDE 9 MG/ML
INJECTION, SOLUTION INTRAVENOUS PRN
Status: DISCONTINUED | OUTPATIENT
Start: 2025-06-06 | End: 2025-06-06 | Stop reason: HOSPADM

## 2025-06-06 RX ORDER — SODIUM CHLORIDE 9 MG/ML
INJECTION, SOLUTION INTRAVENOUS CONTINUOUS
Status: DISCONTINUED | OUTPATIENT
Start: 2025-06-06 | End: 2025-06-06 | Stop reason: HOSPADM

## 2025-06-06 RX ORDER — ROCURONIUM BROMIDE 10 MG/ML
INJECTION, SOLUTION INTRAVENOUS
Status: DISCONTINUED | OUTPATIENT
Start: 2025-06-06 | End: 2025-06-06 | Stop reason: SDUPTHER

## 2025-06-06 RX ADMIN — PROPOFOL 200 MG: 10 INJECTION, EMULSION INTRAVENOUS at 13:26

## 2025-06-06 RX ADMIN — DEXAMETHASONE SODIUM PHOSPHATE 10 MG: 10 INJECTION, SOLUTION INTRAMUSCULAR; INTRAVENOUS at 13:31

## 2025-06-06 RX ADMIN — Medication 100 MCG: at 13:44

## 2025-06-06 RX ADMIN — CEFAZOLIN 2 G: 1 INJECTION, POWDER, FOR SOLUTION INTRAMUSCULAR; INTRAVENOUS at 13:36

## 2025-06-06 RX ADMIN — Medication 200 MCG: at 14:09

## 2025-06-06 RX ADMIN — LIDOCAINE HYDROCHLORIDE 60 MG: 20 INJECTION, SOLUTION EPIDURAL; INFILTRATION; INTRACAUDAL; PERINEURAL at 13:26

## 2025-06-06 RX ADMIN — APREPITANT 80 MG: 40 CAPSULE ORAL at 12:27

## 2025-06-06 RX ADMIN — ROCURONIUM BROMIDE 50 MG: 10 INJECTION, SOLUTION INTRAVENOUS at 13:26

## 2025-06-06 RX ADMIN — FENTANYL CITRATE 100 MCG: 50 INJECTION INTRAMUSCULAR; INTRAVENOUS at 13:26

## 2025-06-06 RX ADMIN — ONDANSETRON 4 MG: 2 INJECTION, SOLUTION INTRAMUSCULAR; INTRAVENOUS at 13:41

## 2025-06-06 RX ADMIN — SODIUM CHLORIDE, SODIUM LACTATE, POTASSIUM CHLORIDE, AND CALCIUM CHLORIDE: .6; .31; .03; .02 INJECTION, SOLUTION INTRAVENOUS at 11:39

## 2025-06-06 RX ADMIN — Medication 100 MCG: at 13:52

## 2025-06-06 RX ADMIN — FAMOTIDINE 20 MG: 10 INJECTION, SOLUTION INTRAVENOUS at 13:31

## 2025-06-06 RX ADMIN — DIPHENHYDRAMINE HYDROCHLORIDE 12.5 MG: 50 INJECTION, SOLUTION INTRAMUSCULAR; INTRAVENOUS at 13:31

## 2025-06-06 ASSESSMENT — PAIN DESCRIPTION - DESCRIPTORS: DESCRIPTORS: ACHING;CRAMPING

## 2025-06-06 ASSESSMENT — PAIN - FUNCTIONAL ASSESSMENT: PAIN_FUNCTIONAL_ASSESSMENT: 0-10

## 2025-06-06 NOTE — PROGRESS NOTES
Pt has small reddened area to right wrist/ forearm area, she isn't sure what caused it. She saw PCP on 6/4/25, no further evaluation per MD.

## 2025-06-06 NOTE — OP NOTE
ERCP      Patient:   Huyen Mojica   :    1950  Acc#:    [id number]   Referring/PCP: Fabby Lima, DO  Date:     2025   Facility:                       [facility]  Endoscopist: Chidi Buchanan M.D.FACP,FACG  Procedure: ERCP with  cholangiogram  ,, sphincterotomy  , Balloon extractions of CBD stones, stent placement  , Cholangiogram, fluoroscopy        Indication: CBD stone seen on imaging      pre op diagnosis: see indication  Post op diagnosis: see findings  Complications: none, no blood loss   Estimated blood loss: less than 50 CC       Anesthesia:  General   Pathology specimen: None    Description of Procedure:  Prior to the procedure, a history and physical exam was performed and informed consent was obtained. The risks were discussed including pancreatitis, bleeding, and perforation.        The patient was counseled at length about the risks of ke Covid-19 during their perioperative period and any recovery window from their procedure.  The patient was made aware that ke Covid-19  may worsen their prognosis for recovering from their procedure  and lend to a higher morbidity and/or mortality risk.  All material risks, benefits, and reasonable alternatives including postponing the procedure were discussed. The patient does wish to proceed with the procedure at this time.          After the patient was placed in the prone position eved, the therapeutic duodenoscope scope was inserted into the mouth and advanced to the second portion of the duodenum allowing the papilla to be visualized.            Using the a wire guided approach with the sphincterotome, the CBD was cannulated and a cholangiogram was performed.        Findings:  The papilla was located inside a small diverticulum, with a large diverticulum also next to the papilla but I was able to cannulate quickly with a wire, but unfortunately the papilla is inside the diverticulum with a large sphincterotomy was  was given Indocin 100 mg (after the procedure the nurse found out that the patient has some allergy to ibuprofen but only some abdominal pain)  2 g of Ancef also were given during the procedure    Will follow clinically and with LFTs  Patient will need her gallbladder removed      Electronically signed by   Chidi Buchanan M.D.,FACP,FACG   on 6/6/2025 at 2:14 PM

## 2025-06-06 NOTE — H&P
Interval H&P Note    Pt Name: Huyen Mojica  MRN: [id number]  YOB: 1950  Date of evaluation: 6/6/2025      [x] I have reviewed in EPIC the progress note by Dr. Buchanan dated 05/19/2025 for an Interval History and Physical note.     [x] I have examined  Huyen Mojica, a 75 y.o. female.There are no changes to the patient who is scheduled for ESOPHAGOGASTRODUODENOSCOPY, ENDOSCOPIC RETROGRADE CHOLANGIOPANCREATOGRAPHY by Chidi Buchanan MD for RUQ abdominal pain; Gallbladder stone without cholecystitis or obstruction*. Patient reports constipation, bilateral lower and right upper quadrant pain that radiates to her back, appetite change, and nausea. States she did vomit Tuesday after eating. The patient denies new health changes, fever, chills, wheezing, cough, increased SOB, chest pain, open sores or wounds. Known diabetes, POC . Denies any current blood thinning medications.     Vital signs: BP (!) 156/76   Pulse 65   Temp 97.2 °F (36.2 °C)   Resp 14   Ht 1.67 m (5' 5.75\")   Wt 92.7 kg (204 lb 6.4 oz)   LMP  (LMP Unknown)   SpO2 94%   BMI 33.24 kg/m²     Allergies:  Aspirin, Ibuprofen, Penicillin g, Sulfamethoxazole-trimethoprim, and Penicillins    Medications:    Prior to Admission medications    Medication Sig Start Date End Date Taking? Authorizing Provider   atorvastatin (LIPITOR) 10 MG tablet Take 1 tablet by mouth daily 5/21/25  Yes Fabby Lima DO   dicyclomine (BENTYL) 10 MG capsule Take 1 capsule by mouth 4 times daily (before meals and nightly) 4/23/25  Yes Grace Burgos APRN - CNP   meloxicam (MOBIC) 7.5 MG tablet Take 1 tablet by mouth once daily 4/9/25  Yes Fabby Lima, DO   metFORMIN (GLUCOPHAGE-XR) 500 MG extended release tablet Take 1 tablet by mouth 2 times daily 4/3/25  Yes Fabby Lima, DO   cetirizine (ZYRTEC ALLERGY) 10 MG tablet Take 1 tablet by mouth daily 3/6/25  Yes Fabby Lima, DO   omeprazole (PRILOSEC) 20 MG delayed release capsule Take 1

## 2025-06-06 NOTE — ANESTHESIA PRE PROCEDURE
Department of Anesthesiology  Preprocedure Note       Name:  Huyen Mojica   Age:  75 y.o.  :  1950                                          MRN:  [id number]         Date:  2025      Surgeon: Surgeon(s):  Chidi Buchanan MD    Procedure: Procedure(s):  ESOPHAGOGASTRODUODENOSCOPY  ENDOSCOPIC RETROGRADE CHOLANGIOPANCREATOGRAPHY    Medications prior to admission:   Prior to Admission medications    Medication Sig Start Date End Date Taking? Authorizing Provider   atorvastatin (LIPITOR) 10 MG tablet Take 1 tablet by mouth daily 25  Yes Fabby Lima DO   dicyclomine (BENTYL) 10 MG capsule Take 1 capsule by mouth 4 times daily (before meals and nightly) 25  Yes Grace Burgos APRN - CNP   meloxicam (MOBIC) 7.5 MG tablet Take 1 tablet by mouth once daily 25  Yes Fabby Lima DO   metFORMIN (GLUCOPHAGE-XR) 500 MG extended release tablet Take 1 tablet by mouth 2 times daily 4/3/25  Yes Fabby Lima DO   cetirizine (ZYRTEC ALLERGY) 10 MG tablet Take 1 tablet by mouth daily 3/6/25  Yes Fabby Lima DO   omeprazole (PRILOSEC) 20 MG delayed release capsule Take 1 capsule by mouth 2 times daily 25  Yes Fabby Lima DO   atenolol (TENORMIN) 25 MG tablet Take 1 tablet by mouth daily 24  Yes Fabby Lima DO   montelukast (SINGULAIR) 10 MG tablet Take 1 tablet by mouth once daily 24  Yes Fabby Lima DO   simethicone (MYLICON) 125 MG chewable tablet Take 1 tablet by mouth every 6 hours as needed for Flatulence   Yes Adelia Araya MD   sucralfate (CARAFATE) 1 GM tablet Take 1 tablet by mouth in the morning, at noon, and at bedtime 24  Yes Fabby Lima DO   acetaminophen (TYLENOL) 500 MG tablet Take 2 tablets by mouth 3 times daily   Yes Adelia Araya MD   lactobacillus (CULTURELLE) capsule Take 1 capsule by mouth daily   Yes Adelia Araya MD   vitamin B-12 (CYANOCOBALAMIN) 1000 MCG tablet Take 1 tablet by mouth

## 2025-06-06 NOTE — ANESTHESIA POSTPROCEDURE EVALUATION
Department of Anesthesiology  Postprocedure Note    Patient: Huyen Mojica  MRN: [id number]  YOB: 1950  Date of evaluation: 6/6/2025    Procedure Summary       Date: 06/06/25 Room / Location: [address]    Anesthesia Start: 1321 Anesthesia Stop: 1431    Procedures:       ESOPHAGOGASTRODUODENOSCOPY BIOPSIES      ENDOSCOPIC RETROGRADE CHOLANGIOPANCREATOGRAPHY SPHINCTEROTOMY, CHOLANGIOGRAM,  BALLOON SWEEP, STONE EXTRACTION, PLASTIC STENT [id number] PLACEMENT Diagnosis:       RUQ abdominal pain      Gallbladder stone without cholecystitis or obstruction      Dysphagia      GERD (gastroesophageal reflux disease)      (RUQ abdominal pain [R10.11])      (Gallbladder stone without cholecystitis or obstruction [K80.20])      (Dysphagia [R13.10])      (GERD (gastroesophageal reflux disease) [K21.9])    Surgeons: Chidi Buchanan MD Responsible Provider: Ellen Kellogg MD    Anesthesia Type: general ASA Status: 3            Anesthesia Type: No value filed.    Lissette Phase I: Lissette Score: 10    Lissette Phase II: Lissette Score: 8    Anesthesia Post Evaluation    Patient location during evaluation: PACU  Patient participation: complete - patient participated  Level of consciousness: awake and alert  Airway patency: patent  Nausea & Vomiting: no nausea and no vomiting  Cardiovascular status: hemodynamically stable  Respiratory status: acceptable  Hydration status: euvolemic  Pain management: adequate    No notable events documented.

## 2025-06-06 NOTE — OP NOTE
PROCEDURE NOTE    DATE OF PROCEDURE: 6/6/2025     SURGEON: Chidi Buchanan MD  Facility: [facility]  ASSISTANT: None  Anesthesia: MAC  PREOPERATIVE DIAGNOSIS:   Dysphagia patient will need ERCP this is to explore any obstruction or narrowing before inserting the side-viewing scope    Diagnosis:  There is some muscular narrowing of the distal esophagus which could indicate some motility issue but no definite stricture or ring was seen    Mild gastritis, biopsies were taken        POSTOPERATIVE DIAGNOSIS: As described below    OPERATION: Upper GI endoscopy with Biopsy    ANESTHESIA: Moderate Sedation     ESTIMATED BLOOD LOSS: Less than 50 ml    COMPLICATIONS: None.     SPECIMENS:  Was Obtained: As above    HISTORY: The patient is a 75 y.o. year old female with history of above preop diagnosis.  I recommended esophagogastroduodenoscopy with possible biopsy and I explained the risk, benefits, expected outcome, and alternatives to the procedure.  Risks included but are not limited to bleeding, infection, respiratory distress, hypotension, and perforation of the esophagus, stomach, or duodenum.  Patient understands and is in agreement.      The patient was counseled at length about the risks of ke Covid-19 during their perioperative period and any recovery window from their procedure.  The patient was made aware that ke Covid-19  may worsen their prognosis for recovering from their procedure  and lend to a higher morbidity and/or mortality risk.  All material risks, benefits, and reasonable alternatives including postponing the procedure were discussed. The patient does wish to proceed with the procedure at this time.         PROCEDURE: The patient was given IV conscious sedation.  The patient's SPO2 remained above 90% throughout the procedure.The gastroscope was inserted orally and advanced under direct vision through the esophagus, through the stomach, through the pylorus, and into the descending  duodenum.      Post sedation note :The patient's SPO2 remained above 90% throughout the procedure.the vital signs remained stable , and no immediate complication form the procedure noted, patient will be ready for d/c when criteria is met .      Findings:    Retropharyngeal area was grossly normal appearing  There is some muscular narrowing of the distal esophagus which could indicate some motility issue but no definite stricture or ring was seen    Mild gastritis, biopsies were taken    Normal duodenum        The scope was removed and the patient tolerated the procedure well.     Recommendations/Plan:   Will proceed with the ERCP very carefully  Follow biopsies  PPI    Electronically signed by Chidi Buchanan MD  on 6/6/2025 at 3:44 PM

## 2025-06-06 NOTE — DISCHARGE INSTRUCTIONS
-- Scope patch used for post-op nausea and vomiting is good for 72 hours from the point of placement.  Remove any time within the 72 hour time frame when you're nausea is under control.  When removing, use a glove or a napkin and then dispose.    What are the possible side effects of scopolamine transdermal?  Get emergency medical help if you have signs of an allergic reaction: hives; difficulty breathing; swelling of your face, lips, tongue, or throat.  Remove the skin patch and call your doctor at once if you have:  eye pain or redness, blurred vision, dilated pupils;  decreased urination, painful or difficult urination;  stomach pain, nausea, vomiting;  a seizure; or  confusion, agitation, extreme fear, hallucinations, unusual thoughts or behavior.  Common side effects may include:  drowsiness, dizziness;  dry mouth;  eye redness, dryness, or itching;  feeling restless;  memory problems; or  mild itching or skin rash.  This is not a complete list of side effects and others may occur. Call your doctor for medical advice about side effects. You may report side effects to FDA at 1-600-FDA-9841.    What other drugs will affect scopolamine transdermal?  Scopolamine slows the digestive tract, which can make it harder for your body to absorb other medicines you take by mouth. Tell your doctor if any of your oral medications do not seem to work as well while you are using scopolamine transdermal.  Using scopolamine with other drugs that make you sleepy can worsen this effect. Ask your doctor before taking a sleeping pill, narcotic medication, muscle relaxer, or medicine for anxiety, depression, or seizures.  Tell your doctor about all your current medicines and any you start or stop using, especially:  atropine;  cold or allergy medicine that contains an antihistamine (Benadryl and others);  medicine to treat Parkinson's disease;  medicine to treat excess stomach acid, stomach ulcer, motion sickness, or irritable bowel  syndrome;  bladder or urinary medicines --darifenacin, fesoterodine, oxybutynin, tolterodine, solifenacin; or  bronchodilators --aclidinium, ipratropium, or tiotropium.  This list is not complete. Other drugs may interact with scopolamine, including prescription and over-the-counter medicines, vitamins, and herbal products. Not all possible interactions are listed in this medication guide.

## 2025-06-11 LAB — SURGICAL PATHOLOGY REPORT: NORMAL

## 2025-06-11 NOTE — TELEPHONE ENCOUNTER
Request for   Requested Prescriptions     Pending Prescriptions Disp Refills    sucralfate (CARAFATE) 1 GM tablet 90 tablet 2     Sig: Take 1 tablet by mouth in the morning, at noon, and at bedtime    .      Please review and e-scribe to pharmacy listed in chart if appropriate. Thank you.      Last Visit Date: Visit date not found  Next Visit Date: Visit date not found    Future Appointments   Date Time Provider Department Center   6/16/2025  9:40 AM Homar Montano MD [facility]   6/20/2025  1:00 PM MDCX PF, AWV NURSE SCHEDULE [facility]   7/1/2025 11:30 AM [facility] MAMMO ROOM MD MAMMO [facility] [place]   8/25/2025  1:45 PM Chidi Buchanan MD [facility]   12/4/2025 11:20 AM [facility]       Health Maintenance   Topic Date Due    COVID-19 Vaccine (7 - 2024-25 season) 04/01/2025    Breast cancer screen  06/04/2025    Annual Wellness Visit (Medicare)  06/12/2025    Diabetic retinal exam  10/08/2025    Diabetic Alb to Cr ratio (uACR) test  11/26/2025    Depression Screen  04/18/2026    A1C test (Diabetic or Prediabetic)  05/29/2026    Lipids  05/29/2026    Diabetic foot exam  06/04/2026    GFR test (Diabetes, CKD 3-4, OR last GFR 15-59)  06/09/2026    Colorectal Cancer Screen  06/18/2029    DTaP/Tdap/Td vaccine (3 - Td or Tdap) 06/28/2031    Flu vaccine  Completed    Shingles vaccine  Completed    Pneumococcal 50+ years Vaccine  Completed    Respiratory Syncytial Virus (RSV) Pregnant or age 60 yrs+  Completed    Hepatitis C screen  Completed    DEXA (modify frequency per FRAX score)  Addressed    Hepatitis A vaccine  Aged Out    Hepatitis B vaccine  Aged Out    Hib vaccine  Aged Out    Polio vaccine  Aged Out    Meningococcal (ACWY) vaccine  Aged Out    Meningococcal B vaccine  Aged Out    Depression Monitoring  Discontinued       Hemoglobin A1C (%)   Date Value   05/29/2025 6.5 (H)   11/26/2024 6.5 (H)   05/14/2024 6.7 (H)             ( goal A1C is < 7)   No components found for:

## 2025-06-12 ENCOUNTER — TELEPHONE (OUTPATIENT)
Dept: GASTROENTEROLOGY | Age: 75
End: 2025-06-12

## 2025-06-13 RX ORDER — MONTELUKAST SODIUM 10 MG/1
10 TABLET ORAL DAILY
Qty: 90 TABLET | Refills: 1 | Status: SHIPPED | OUTPATIENT
Start: 2025-06-13

## 2025-06-13 NOTE — TELEPHONE ENCOUNTER
Huyen called requesting a refill of the below medication which has been pended for you:     Requested Prescriptions     Pending Prescriptions Disp Refills    montelukast (SINGULAIR) 10 MG tablet 90 tablet 1     Sig: Take 1 tablet by mouth daily       Last Appointment Date: 6/4/2025  Next Appointment Date: 12/4/2025    Allergies   Allergen Reactions    Aspirin Other (See Comments)     Abdominal pain    Ibuprofen Other (See Comments)     Abdominal pain    Penicillin G Other (See Comments)    Sulfamethoxazole-Trimethoprim Nausea Only and Other (See Comments)    Penicillins Rash

## 2025-06-16 ENCOUNTER — ANESTHESIA (OUTPATIENT)
Dept: OPERATING ROOM | Age: 75
End: 2025-06-16
Payer: MEDICARE

## 2025-06-16 ENCOUNTER — TELEPHONE (OUTPATIENT)
Dept: GASTROENTEROLOGY | Age: 75
End: 2025-06-16

## 2025-06-16 ENCOUNTER — HOSPITAL ENCOUNTER (OUTPATIENT)
Age: 75
Setting detail: OUTPATIENT SURGERY
Discharge: HOME | End: 2025-06-16
Attending: SURGERY | Admitting: SURGERY
Payer: MEDICARE

## 2025-06-16 ENCOUNTER — OFFICE VISIT (OUTPATIENT)
Dept: SURGERY | Age: 75
End: 2025-06-16
Payer: MEDICARE

## 2025-06-16 ENCOUNTER — ANESTHESIA EVENT (OUTPATIENT)
Dept: OPERATING ROOM | Age: 75
End: 2025-06-16
Payer: MEDICARE

## 2025-06-16 VITALS
DIASTOLIC BLOOD PRESSURE: 70 MMHG | WEIGHT: 199 LBS | BODY MASS INDEX: 33.15 KG/M2 | TEMPERATURE: 98.4 F | HEART RATE: 74 BPM | HEIGHT: 65 IN | SYSTOLIC BLOOD PRESSURE: 120 MMHG | OXYGEN SATURATION: 92 %

## 2025-06-16 VITALS
RESPIRATION RATE: 16 BRPM | WEIGHT: 198.8 LBS | OXYGEN SATURATION: 92 % | HEART RATE: 72 BPM | SYSTOLIC BLOOD PRESSURE: 176 MMHG | DIASTOLIC BLOOD PRESSURE: 68 MMHG | TEMPERATURE: 97.1 F | HEIGHT: 65 IN | BODY MASS INDEX: 33.12 KG/M2

## 2025-06-16 DIAGNOSIS — R10.13 EPIGASTRIC PAIN: ICD-10-CM

## 2025-06-16 DIAGNOSIS — K80.20 SYMPTOMATIC CHOLELITHIASIS: ICD-10-CM

## 2025-06-16 DIAGNOSIS — K80.20 GALLSTONES: Primary | ICD-10-CM

## 2025-06-16 DIAGNOSIS — R11.0 NAUSEA: ICD-10-CM

## 2025-06-16 LAB — GLUCOSE BLD-MCNC: 115 MG/DL (ref 65–105)

## 2025-06-16 PROCEDURE — 3600000019 HC SURGERY ROBOT ADDTL 15MIN: Performed by: SURGERY

## 2025-06-16 PROCEDURE — 6360000002 HC RX W HCPCS: Performed by: SURGERY

## 2025-06-16 PROCEDURE — 1090F PRES/ABSN URINE INCON ASSESS: CPT | Performed by: SURGERY

## 2025-06-16 PROCEDURE — 6360000002 HC RX W HCPCS: Performed by: NURSE ANESTHETIST, CERTIFIED REGISTERED

## 2025-06-16 PROCEDURE — 3700000001 HC ADD 15 MINUTES (ANESTHESIA): Performed by: SURGERY

## 2025-06-16 PROCEDURE — 2580000003 HC RX 258: Performed by: NURSE ANESTHETIST, CERTIFIED REGISTERED

## 2025-06-16 PROCEDURE — 2580000003 HC RX 258: Performed by: SURGERY

## 2025-06-16 PROCEDURE — 7100000011 HC PHASE II RECOVERY - ADDTL 15 MIN: Performed by: SURGERY

## 2025-06-16 PROCEDURE — 3700000000 HC ANESTHESIA ATTENDED CARE: Performed by: SURGERY

## 2025-06-16 PROCEDURE — 7100000001 HC PACU RECOVERY - ADDTL 15 MIN: Performed by: SURGERY

## 2025-06-16 PROCEDURE — G8417 CALC BMI ABV UP PARAM F/U: HCPCS | Performed by: SURGERY

## 2025-06-16 PROCEDURE — S2900 ROBOTIC SURGICAL SYSTEM: HCPCS | Performed by: SURGERY

## 2025-06-16 PROCEDURE — G8400 PT W/DXA NO RESULTS DOC: HCPCS | Performed by: SURGERY

## 2025-06-16 PROCEDURE — 2709999900 HC NON-CHARGEABLE SUPPLY: Performed by: SURGERY

## 2025-06-16 PROCEDURE — 2500000003 HC RX 250 WO HCPCS: Performed by: NURSE ANESTHETIST, CERTIFIED REGISTERED

## 2025-06-16 PROCEDURE — 3600000009 HC SURGERY ROBOT BASE: Performed by: SURGERY

## 2025-06-16 PROCEDURE — 3017F COLORECTAL CA SCREEN DOC REV: CPT | Performed by: SURGERY

## 2025-06-16 PROCEDURE — 3078F DIAST BP <80 MM HG: CPT | Performed by: SURGERY

## 2025-06-16 PROCEDURE — 99215 OFFICE O/P EST HI 40 MIN: CPT | Performed by: SURGERY

## 2025-06-16 PROCEDURE — G8427 DOCREV CUR MEDS BY ELIG CLIN: HCPCS | Performed by: SURGERY

## 2025-06-16 PROCEDURE — 1123F ACP DISCUSS/DSCN MKR DOCD: CPT | Performed by: SURGERY

## 2025-06-16 PROCEDURE — 99214 OFFICE O/P EST MOD 30 MIN: CPT | Performed by: SURGERY

## 2025-06-16 PROCEDURE — 3074F SYST BP LT 130 MM HG: CPT | Performed by: SURGERY

## 2025-06-16 PROCEDURE — 7100000000 HC PACU RECOVERY - FIRST 15 MIN: Performed by: SURGERY

## 2025-06-16 PROCEDURE — 1159F MED LIST DOCD IN RCRD: CPT | Performed by: SURGERY

## 2025-06-16 PROCEDURE — 1036F TOBACCO NON-USER: CPT | Performed by: SURGERY

## 2025-06-16 PROCEDURE — 7100000010 HC PHASE II RECOVERY - FIRST 15 MIN: Performed by: SURGERY

## 2025-06-16 PROCEDURE — 88304 TISSUE EXAM BY PATHOLOGIST: CPT

## 2025-06-16 PROCEDURE — 6370000000 HC RX 637 (ALT 250 FOR IP): Performed by: NURSE ANESTHETIST, CERTIFIED REGISTERED

## 2025-06-16 PROCEDURE — 82947 ASSAY GLUCOSE BLOOD QUANT: CPT

## 2025-06-16 RX ORDER — DEXAMETHASONE SODIUM PHOSPHATE 4 MG/ML
INJECTION, SOLUTION INTRA-ARTICULAR; INTRALESIONAL; INTRAMUSCULAR; INTRAVENOUS; SOFT TISSUE
Status: DISCONTINUED | OUTPATIENT
Start: 2025-06-16 | End: 2025-06-16 | Stop reason: SDUPTHER

## 2025-06-16 RX ORDER — SODIUM CHLORIDE 0.9 % (FLUSH) 0.9 %
5-40 SYRINGE (ML) INJECTION PRN
Status: DISCONTINUED | OUTPATIENT
Start: 2025-06-16 | End: 2025-06-16 | Stop reason: HOSPADM

## 2025-06-16 RX ORDER — ONDANSETRON 2 MG/ML
4 INJECTION INTRAMUSCULAR; INTRAVENOUS EVERY 6 HOURS PRN
Status: DISCONTINUED | OUTPATIENT
Start: 2025-06-16 | End: 2025-06-16 | Stop reason: HOSPADM

## 2025-06-16 RX ORDER — ONDANSETRON 2 MG/ML
INJECTION INTRAMUSCULAR; INTRAVENOUS
Status: DISCONTINUED | OUTPATIENT
Start: 2025-06-16 | End: 2025-06-16 | Stop reason: SDUPTHER

## 2025-06-16 RX ORDER — SODIUM CHLORIDE 9 MG/ML
INJECTION, SOLUTION INTRAVENOUS PRN
Status: DISCONTINUED | OUTPATIENT
Start: 2025-06-16 | End: 2025-06-16 | Stop reason: HOSPADM

## 2025-06-16 RX ORDER — SODIUM CHLORIDE, SODIUM LACTATE, POTASSIUM CHLORIDE, CALCIUM CHLORIDE 600; 310; 30; 20 MG/100ML; MG/100ML; MG/100ML; MG/100ML
INJECTION, SOLUTION INTRAVENOUS CONTINUOUS
Status: DISCONTINUED | OUTPATIENT
Start: 2025-06-16 | End: 2025-06-16 | Stop reason: HOSPADM

## 2025-06-16 RX ORDER — NALOXONE HYDROCHLORIDE 0.4 MG/ML
INJECTION, SOLUTION INTRAMUSCULAR; INTRAVENOUS; SUBCUTANEOUS PRN
Status: DISCONTINUED | OUTPATIENT
Start: 2025-06-16 | End: 2025-06-16 | Stop reason: HOSPADM

## 2025-06-16 RX ORDER — OXYCODONE HYDROCHLORIDE 5 MG/1
5 TABLET ORAL
Status: DISCONTINUED | OUTPATIENT
Start: 2025-06-16 | End: 2025-06-16 | Stop reason: HOSPADM

## 2025-06-16 RX ORDER — GLUCAGON 1 MG/ML
1 KIT INJECTION PRN
Status: DISCONTINUED | OUTPATIENT
Start: 2025-06-16 | End: 2025-06-16 | Stop reason: HOSPADM

## 2025-06-16 RX ORDER — FENTANYL CITRATE 50 UG/ML
25 INJECTION, SOLUTION INTRAMUSCULAR; INTRAVENOUS EVERY 5 MIN PRN
Status: DISCONTINUED | OUTPATIENT
Start: 2025-06-16 | End: 2025-06-16 | Stop reason: HOSPADM

## 2025-06-16 RX ORDER — DEXTROSE MONOHYDRATE 100 MG/ML
INJECTION, SOLUTION INTRAVENOUS CONTINUOUS PRN
Status: DISCONTINUED | OUTPATIENT
Start: 2025-06-16 | End: 2025-06-16 | Stop reason: HOSPADM

## 2025-06-16 RX ORDER — SODIUM CHLORIDE 0.9 % (FLUSH) 0.9 %
5-40 SYRINGE (ML) INJECTION EVERY 12 HOURS SCHEDULED
Status: DISCONTINUED | OUTPATIENT
Start: 2025-06-16 | End: 2025-06-16 | Stop reason: HOSPADM

## 2025-06-16 RX ORDER — SCOPOLAMINE 1 MG/3D
1 PATCH, EXTENDED RELEASE TRANSDERMAL
Status: DISCONTINUED | OUTPATIENT
Start: 2025-06-16 | End: 2025-06-16 | Stop reason: HOSPADM

## 2025-06-16 RX ORDER — PROPOFOL 10 MG/ML
INJECTION, EMULSION INTRAVENOUS
Status: DISCONTINUED | OUTPATIENT
Start: 2025-06-16 | End: 2025-06-16 | Stop reason: SDUPTHER

## 2025-06-16 RX ORDER — INDOCYANINE GREEN AND WATER 25 MG
KIT INJECTION
Status: DISCONTINUED | OUTPATIENT
Start: 2025-06-16 | End: 2025-06-16 | Stop reason: SDUPTHER

## 2025-06-16 RX ORDER — DROPERIDOL 2.5 MG/ML
INJECTION, SOLUTION INTRAMUSCULAR; INTRAVENOUS
Status: DISCONTINUED | OUTPATIENT
Start: 2025-06-16 | End: 2025-06-16 | Stop reason: SDUPTHER

## 2025-06-16 RX ORDER — IPRATROPIUM BROMIDE AND ALBUTEROL SULFATE 2.5; .5 MG/3ML; MG/3ML
1 SOLUTION RESPIRATORY (INHALATION)
Status: DISCONTINUED | OUTPATIENT
Start: 2025-06-16 | End: 2025-06-16 | Stop reason: HOSPADM

## 2025-06-16 RX ORDER — SODIUM CHLORIDE, SODIUM LACTATE, POTASSIUM CHLORIDE, CALCIUM CHLORIDE 600; 310; 30; 20 MG/100ML; MG/100ML; MG/100ML; MG/100ML
INJECTION, SOLUTION INTRAVENOUS
Status: DISCONTINUED | OUTPATIENT
Start: 2025-06-16 | End: 2025-06-16 | Stop reason: SDUPTHER

## 2025-06-16 RX ORDER — DROPERIDOL 2.5 MG/ML
0.62 INJECTION, SOLUTION INTRAMUSCULAR; INTRAVENOUS
Status: DISCONTINUED | OUTPATIENT
Start: 2025-06-16 | End: 2025-06-16 | Stop reason: HOSPADM

## 2025-06-16 RX ORDER — ROCURONIUM BROMIDE 10 MG/ML
INJECTION, SOLUTION INTRAVENOUS
Status: DISCONTINUED | OUTPATIENT
Start: 2025-06-16 | End: 2025-06-16 | Stop reason: SDUPTHER

## 2025-06-16 RX ORDER — MIDAZOLAM HYDROCHLORIDE 1 MG/ML
INJECTION, SOLUTION INTRAMUSCULAR; INTRAVENOUS
Status: DISCONTINUED | OUTPATIENT
Start: 2025-06-16 | End: 2025-06-16 | Stop reason: SDUPTHER

## 2025-06-16 RX ADMIN — PROPOFOL 200 MG: 10 INJECTION, EMULSION INTRAVENOUS at 14:06

## 2025-06-16 RX ADMIN — PROPOFOL 100 MG: 10 INJECTION, EMULSION INTRAVENOUS at 14:51

## 2025-06-16 RX ADMIN — INDOCYANINE GREEN AND WATER 2.5 MG: KIT at 14:45

## 2025-06-16 RX ADMIN — DEXAMETHASONE SODIUM PHOSPHATE 4 MG: 4 INJECTION, SOLUTION INTRAMUSCULAR; INTRAVENOUS at 14:06

## 2025-06-16 RX ADMIN — SUGAMMADEX 200 MG: 100 INJECTION, SOLUTION INTRAVENOUS at 15:31

## 2025-06-16 RX ADMIN — Medication 25 MG: at 14:06

## 2025-06-16 RX ADMIN — PHENYLEPHRINE HYDROCHLORIDE 100 MCG: 10 INJECTION INTRAVENOUS at 15:17

## 2025-06-16 RX ADMIN — Medication 25 MG: at 14:14

## 2025-06-16 RX ADMIN — SODIUM CHLORIDE, POTASSIUM CHLORIDE, SODIUM LACTATE AND CALCIUM CHLORIDE: 600; 310; 30; 20 INJECTION, SOLUTION INTRAVENOUS at 14:04

## 2025-06-16 RX ADMIN — HYDROMORPHONE HYDROCHLORIDE 0.5 MG: 1 INJECTION, SOLUTION INTRAMUSCULAR; INTRAVENOUS; SUBCUTANEOUS at 15:13

## 2025-06-16 RX ADMIN — MIDAZOLAM HYDROCHLORIDE 4 MG: 1 INJECTION, SOLUTION INTRAMUSCULAR; INTRAVENOUS at 14:49

## 2025-06-16 RX ADMIN — Medication 2000 MG: at 14:04

## 2025-06-16 RX ADMIN — PHENYLEPHRINE HYDROCHLORIDE 100 MCG: 10 INJECTION INTRAVENOUS at 14:20

## 2025-06-16 RX ADMIN — ONDANSETRON 4 MG: 2 INJECTION, SOLUTION INTRAMUSCULAR; INTRAVENOUS at 14:06

## 2025-06-16 RX ADMIN — PROPOFOL 150 MCG/KG/MIN: 10 INJECTION, EMULSION INTRAVENOUS at 14:07

## 2025-06-16 RX ADMIN — PROPOFOL 50 MG: 10 INJECTION, EMULSION INTRAVENOUS at 14:23

## 2025-06-16 RX ADMIN — DROPERIDOL 1.25 MG: 2.5 INJECTION, SOLUTION INTRAMUSCULAR; INTRAVENOUS at 14:59

## 2025-06-16 RX ADMIN — ROCURONIUM BROMIDE 100 MG: 10 INJECTION, SOLUTION INTRAVENOUS at 14:06

## 2025-06-16 RX ADMIN — PROPOFOL 40 MG: 10 INJECTION, EMULSION INTRAVENOUS at 14:45

## 2025-06-16 RX ADMIN — SODIUM CHLORIDE, SODIUM LACTATE, POTASSIUM CHLORIDE, AND CALCIUM CHLORIDE: .6; .31; .03; .02 INJECTION, SOLUTION INTRAVENOUS at 11:43

## 2025-06-16 RX ADMIN — HYDROMORPHONE HYDROCHLORIDE 0.5 MG: 1 INJECTION, SOLUTION INTRAMUSCULAR; INTRAVENOUS; SUBCUTANEOUS at 14:06

## 2025-06-16 ASSESSMENT — PAIN DESCRIPTION - ORIENTATION
ORIENTATION: MID

## 2025-06-16 ASSESSMENT — PAIN SCALES - GENERAL
PAINLEVEL_OUTOF10: 5
PAINLEVEL_OUTOF10: 4
PAINLEVEL_OUTOF10: 5
PAINLEVEL_OUTOF10: 5

## 2025-06-16 ASSESSMENT — PAIN - FUNCTIONAL ASSESSMENT
PAIN_FUNCTIONAL_ASSESSMENT: 0-10
PAIN_FUNCTIONAL_ASSESSMENT: ACTIVITIES ARE NOT PREVENTED
PAIN_FUNCTIONAL_ASSESSMENT: ADULT NONVERBAL PAIN SCALE (NPVS)

## 2025-06-16 ASSESSMENT — PAIN DESCRIPTION - LOCATION
LOCATION: ABDOMEN

## 2025-06-16 ASSESSMENT — PAIN DESCRIPTION - DESCRIPTORS
DESCRIPTORS: ACHING
DESCRIPTORS: ACHING;DULL

## 2025-06-16 NOTE — TELEPHONE ENCOUNTER
General surgery office called stating that patient has been having Radiating RUQ pain post ERCP on 6-6-25. Patient has been to ED 2x with no relief. Gen surg thinks that patient may have biliary colic and would like clearance from GI before scheduling procedure. Patient has gen surg appointment today at 9:40.

## 2025-06-16 NOTE — PROGRESS NOTES
Patient had ERCP done on 6-6-25 by Dr Buchanan.  Last week patient was in the  [facility] ER twice since the procedure with epigastric pain that radiate to the Right side and back.     Patient states that every time she eats solid food she is having these attacks.  She had another one yesterday. She took a pain  medication and it helped. She is still having nausea and gas. Patient is taking Carafate 1 GM TID and Prilosec 20 mg BID    6-9-25 CTA chest at [facility]:     IMPRESSION:    1.  Negative for pulmonary embolus.    2.  Suggest small airway disease and/or effects of aging.       MCRP results from 5-20-25:     IMPRESSION:  1. Mild to moderate intra and extrahepatic biliary duct dilation likely  secondary to a 7 mm distal choledocholithiasis.  2. Cholelithiasis.  3. Very minimal hepatic steatosis.

## 2025-06-16 NOTE — ANESTHESIA PRE PROCEDURE
Department of Anesthesiology  Preprocedure Note       Name:  Huyen Mojica   Age:  75 y.o.  :  1950                                          MRN:  [id number]         Date:  2025      Surgeon: Surgeon(s):  Homar Montano MD    Procedure: Procedure(s):  CHOLECYSTECTOMY LAPAROSCOPIC ROBOTIC XI    Medications prior to admission:   Prior to Admission medications    Medication Sig Start Date End Date Taking? Authorizing Provider   montelukast (SINGULAIR) 10 MG tablet Take 1 tablet by mouth daily 25  Yes Fabby Lima DO   atorvastatin (LIPITOR) 10 MG tablet Take 1 tablet by mouth daily 25  Yes Fabby Lima DO   dicyclomine (BENTYL) 10 MG capsule Take 1 capsule by mouth 4 times daily (before meals and nightly) 25  Yes Grace Burgos APRN - CNP   meloxicam (MOBIC) 7.5 MG tablet Take 1 tablet by mouth once daily 25  Yes Fabby Lima DO   metFORMIN (GLUCOPHAGE-XR) 500 MG extended release tablet Take 1 tablet by mouth 2 times daily 4/3/25  Yes Fabby Lima DO   cetirizine (ZYRTEC ALLERGY) 10 MG tablet Take 1 tablet by mouth daily 3/6/25  Yes Fabby Lima DO   omeprazole (PRILOSEC) 20 MG delayed release capsule Take 1 capsule by mouth 2 times daily 25  Yes Fabby Lima DO   atenolol (TENORMIN) 25 MG tablet Take 1 tablet by mouth daily 24  Yes Fabby Lima DO   calcium carbonate (TUMS) 500 MG chewable tablet Take 1 tablet by mouth daily as needed for Heartburn   Yes Adelia Araya MD   simethicone (MYLICON) 125 MG chewable tablet Take 1 tablet by mouth every 6 hours as needed for Flatulence   Yes Adelia Araya MD   sucralfate (CARAFATE) 1 GM tablet Take 1 tablet by mouth in the morning, at noon, and at bedtime 24  Yes Fabby Lima DO   acetaminophen (TYLENOL) 500 MG tablet Take 2 tablets by mouth 3 times daily   Yes Adelia Araya MD   lactobacillus (CULTURELLE) capsule Take 1 capsule by mouth daily   Yes

## 2025-06-16 NOTE — OP NOTE
DATE OF PROCEDURE: 6/16/2025    SURGEON: MEDINA MCCOY MD, M.D.    ASSISTANT: meaghan    PREOPERATIVE DIAGNOSIS: Symptomatic cholelithiasis    POSTOPERATIVE DIAGNOSIS: Symptomatic cholelithiasis    OPERATION: Laparoscopic Robotic Assisted Cholecystectomy with ICG    ANESTHESIA: General endotracheal.Local with 1%lido with epi and 0.25% marcaine    ESTIMATED BLOOD LOSS: Less than 10 mL.    COMPLICATIONS: None.    FLUIDS: Crystalloid.    SPECIMEN: Gallbladder.    DESCRIPTION OF PROCEDURE: This is a 75 y.o. female increasingly symptomatic right upper quadrant epigastric pain. After being explained the risks, benefits, and alternatives of the procedure, the patient agreed to proceed. We discussed at length risks of bleeding, biliary and liver ductal injury, vascular injury and need for repeat procedures, possible need for open procedure or drainage catheter.     The patient was taken to the operating room and placed supine on the operating room table, administered general anesthesia and intubated. Once the airway was secured and the patient was adequately sedated a time-out was performed to confirm the surgical site and the patient's name.      We insuflated the abdominal cavity with the verese needle and CO2 to 13 mmHg.  We then placed an 8 mm trocars in the right lateral and right mid abdomen and left upper abdomen.  Then a 12 mm trocar in the supraumbilical area midline and then we docked the robot. We inspected the abdomen appreciating some inflammation around the right upper quadrant near the gallbladder. Edematous wall with pericholecystic fluid.  Full of small stones    A tip up retractor was inserted in the left abdomen and was used to grasp the gallbladder and retract cephalad..  A Pro grasper was used to identify the infundibulum and we dissected the peritoneal off the infundibulum identifying the gallbladder and cystic duct junction. The anatomy was confirmed and reevaluated thought the procedure. We

## 2025-06-16 NOTE — PROGRESS NOTES
Huyen Mojica is a 75 y.o. female      CC:    + gallstones  Biliary colic  S/p ERCP with stone extraction and stent placement    HISTORY OF PRESENT ILLNESS:    Patient is a 75-year-old female who had:                - Patient had gallstones and choledocholithiasis on the CT scan.  Her liver function tests were normal.  She does have symptoms.  I would recommend that she get her gallbladder out.  Although I do not think this is necessarily causing her lower abdominal pain.  I am worried about the choledocholithiasis.  I think we need to do an MRCP.  If there is truly in there and there is any other issues I think we need to have GI see the patient.  At that time if they do ERCP they may want to consider the colonoscopy.       She had an ERCP and stone extraction with stent placement.  She is now continuing to have episodes of biliary colic and is here to discuss gallbladder removal    Review of Systems:    General:  Fever: Negative  Weight Change:Negative  Night Sweats: Negative    Eye:  Blurry Vision:Negative  Double Vision: Negative    Ent:  Headaches: Negative  Sore throat: Negative    Allergy/Immunology:  Hives: Negative  Latex allergy: Negative    Hematology/Lymphatic:  Bleeding Problems: Negative  Blood Clots: Negative  Swollen Lymph Nodes: Negative    Lungs:  Cough: Negative  SOB: Negative  Wheezing:Negative    Cardiovascular:  Chest Pain: Negative  Palpitations:Negative    GI:   Decreased Appetite: Negative  Heartburn: Negative  Dysphagia: Negative  Nausea/Vomiting: Negative  Abdominal Pain: Negative  Change in Bowels:Negative  Constipation: Negative  Diarrhea: Negative  Rectal Bleeding: Negative    :   Dysuria: Negative  Increase Urinary Frequency/Urgency: Negative    Neuro:  Seizures: Negative  Confusion: Negative        PAST MEDICAL HISTORY:      Family History   Problem Relation Age of Onset    Prostate Cancer Father     Cancer Sister         breast    Breast Cancer Sister 60    Breast Cancer Sister

## 2025-06-16 NOTE — ANESTHESIA POSTPROCEDURE EVALUATION
Department of Anesthesiology  Postprocedure Note    Patient: Huyen Mojica  MRN: [id number]  YOB: 1950  Date of evaluation: 6/16/2025    Procedure Summary       Date: 06/16/25 Room / Location: [address]    Anesthesia Start: 1404 Anesthesia Stop: 1546    Procedure: CHOLECYSTECTOMY LAPAROSCOPIC ROBOTIC XI Diagnosis:       Epigastric pain      Nausea      Symptomatic cholelithiasis      (Epigastric pain [R10.13])      (Nausea [R11.0])      (Symptomatic cholelithiasis [K80.20])    Surgeons: Homar Montano MD Responsible Provider: Jac Posada APRN - CRNA    Anesthesia Type: General, TIVA ASA Status: 3            Anesthesia Type: General, TIVA    Lissette Phase I:      Lissette Phase II: Lissette Score: 8    Anesthesia Post Evaluation    Patient location during evaluation: PACU  Level of consciousness: awake and alert  Airway patency: patent  Nausea & Vomiting: no nausea and no vomiting  Cardiovascular status: hemodynamically stable  Respiratory status: spontaneous ventilation  Hydration status: stable  Multimodal analgesia pain management approach  Pain management: satisfactory to patient    No notable events documented.

## 2025-06-16 NOTE — DISCHARGE INSTRUCTIONS
Pt may take ibuprofen and/or tylenol , OTC as directed.  (Per pt choice)  Low fat diet for 2 weeks, then adv as tolerated.  May remove the dressing, and may shower  Activity as tolerated  Follow up with eBbeto Joya in 1-2 weeks for postop.  Then follow up with Dr. Montano in 4-6 weeks for final follow up.    WOUND CARE    *leave your bandages intact for 24 hours. After that they may be removed and left off if desired.  If your bandages get wet, replace them.    *AVOID GETTING YOUR INCISIONS AND BANDAGES WET FOR 24 HOURS AFTER YOUR SURGERY.    *after 24 hours you may shower or sponge bath, but do not soak the incisions in a tub for 48 hours.    *Do not scrub your incisions or apply any ointments or lotions to them unless otherwise instructed.    If you have paper tapes on you incision you may get them wet.  They typically will start to loosen and fall off in 5-7 days on their own.    *You may apply bandages or a dressing to your incisions if you desire.  It is not unusual to have a little clear or bloody drainage for the first day or two after surgery.  You should call if your incisions drain pus or creamy fluid, foul smelling drainage or if the drainage persists past 48 hours.  You should also call if the incisions become red, hot or painful.    ACTIVITY    *Do NOT drive a car, operate machinery or participate in any activity which has a significant risk for injury.

## 2025-06-17 ENCOUNTER — TELEPHONE (OUTPATIENT)
Dept: SURGERY | Age: 75
End: 2025-06-17

## 2025-06-18 LAB — SURGICAL PATHOLOGY REPORT: NORMAL

## 2025-06-20 ENCOUNTER — TELEMEDICINE (OUTPATIENT)
Dept: FAMILY MEDICINE CLINIC | Age: 75
End: 2025-06-20

## 2025-06-20 DIAGNOSIS — Z00.00 MEDICARE ANNUAL WELLNESS VISIT, SUBSEQUENT: Primary | ICD-10-CM

## 2025-06-20 ASSESSMENT — PATIENT HEALTH QUESTIONNAIRE - PHQ9
SUM OF ALL RESPONSES TO PHQ QUESTIONS 1-9: 0
SUM OF ALL RESPONSES TO PHQ QUESTIONS 1-9: 0
1. LITTLE INTEREST OR PLEASURE IN DOING THINGS: NOT AT ALL
SUM OF ALL RESPONSES TO PHQ QUESTIONS 1-9: 0
SUM OF ALL RESPONSES TO PHQ QUESTIONS 1-9: 0
2. FEELING DOWN, DEPRESSED OR HOPELESS: NOT AT ALL

## 2025-06-20 NOTE — PROGRESS NOTES
Medicare Annual Wellness Visit    Huyen Mojica is here for Medicare AWV (Subsequent; 6/11/2024)    Assessment & Plan   Medicare annual wellness visit, subsequent     Return in 1 year (on 6/20/2026) for Medicare AWV.     Subjective     Patient's complete Health Risk Assessment and screening values have been reviewed and are found in Flowsheets. The following problems were reviewed today and where indicated follow up appointments were made and/or referrals ordered.    Positive Risk Factor Screenings with Interventions:                Abnormal BMI (obese):  There is no height or weight on file to calculate BMI. (!) Abnormal  Interventions:  See AVS for additional education material          Objective    Patient-Reported Vitals  Patient-Reported Systolic (Top): 0 mmHg (patient does not monitor BP at home; during recent office visit BP stable at 126/80)  Patient-Reported Weight: 200lb  Patient-Reported Height: 5'5\"                 Allergies   Allergen Reactions    Aspirin Other (See Comments)     Abdominal pain    Other Reaction(s): Abdominal Pain    Ibuprofen Other (See Comments)     Abdominal pain    Other Reaction(s): Abdominal Pain    Penicillins Rash    Sulfa Antibiotics Nausea Only    Penicillin G Other (See Comments)    Sulfamethoxazole-Trimethoprim Nausea Only and Other (See Comments)     Prior to Visit Medications    Medication Sig Taking? Authorizing Provider   montelukast (SINGULAIR) 10 MG tablet Take 1 tablet by mouth daily Yes Fabby Lima, DO   atorvastatin (LIPITOR) 10 MG tablet Take 1 tablet by mouth daily Yes Fabby Lima, DO   dicyclomine (BENTYL) 10 MG capsule Take 1 capsule by mouth 4 times daily (before meals and nightly) Yes Grace Burgos APRN - CNP   meloxicam (MOBIC) 7.5 MG tablet Take 1 tablet by mouth once daily Yes Fabby Lima, DO   metFORMIN (GLUCOPHAGE-XR) 500 MG extended release tablet Take 1 tablet by mouth 2 times daily Yes Fabby Lima, DO   cetirizine (ZYRTEC

## 2025-06-23 RX ORDER — SUCRALFATE 1 G/1
1 TABLET ORAL 3 TIMES DAILY
Qty: 90 TABLET | Refills: 2 | Status: SHIPPED | OUTPATIENT
Start: 2025-06-23

## 2025-06-24 ENCOUNTER — PATIENT MESSAGE (OUTPATIENT)
Dept: SURGERY | Age: 75
End: 2025-06-24

## 2025-06-24 RX ORDER — CIPROFLOXACIN 500 MG/1
500 TABLET, FILM COATED ORAL 2 TIMES DAILY
Qty: 14 TABLET | Refills: 0 | Status: SHIPPED | OUTPATIENT
Start: 2025-06-24 | End: 2025-07-01

## 2025-06-24 NOTE — TELEPHONE ENCOUNTER
Spoke with Dr Montano regarding patient.  Dr Montano looked at the note and picture in chart.  Dr Montano believes that patient is starting to get a seroma at incision site. That incision is where the gallbladder was removed and possible start of infection. Verbal orders for Cipro 500 mg twice daily x 7 days. Sent medication to pharm.   Patient made aware of all this and verbalized understanding. Appointment made with Dr Montano for 6-25-25 @ 130

## 2025-06-24 NOTE — TELEPHONE ENCOUNTER
Patient called back.  Having ABD pain at the incision site.  The edema is getting bigger, more than a golf ball size.  Warm to touch.  Patient is having nausea. No drainage note to site. No fair at this time.  Patient had gallbladder surgery on 6-16-25.     Would you like to see her?

## 2025-06-25 ENCOUNTER — HOSPITAL ENCOUNTER (OUTPATIENT)
Age: 75
Discharge: HOME | End: 2025-06-25
Payer: MEDICARE

## 2025-06-25 ENCOUNTER — PATIENT MESSAGE (OUTPATIENT)
Dept: SURGERY | Age: 75
End: 2025-06-25

## 2025-06-25 ENCOUNTER — OFFICE VISIT (OUTPATIENT)
Dept: SURGERY | Age: 75
End: 2025-06-25
Payer: MEDICARE

## 2025-06-25 VITALS
WEIGHT: 195.4 LBS | RESPIRATION RATE: 18 BRPM | HEART RATE: 72 BPM | SYSTOLIC BLOOD PRESSURE: 128 MMHG | BODY MASS INDEX: 32.55 KG/M2 | TEMPERATURE: 97.8 F | HEIGHT: 65 IN | DIASTOLIC BLOOD PRESSURE: 84 MMHG

## 2025-06-25 DIAGNOSIS — S30.1XXA ABDOMINAL WALL SEROMA, INITIAL ENCOUNTER: ICD-10-CM

## 2025-06-25 DIAGNOSIS — Z09 POSTOP CHECK: Primary | ICD-10-CM

## 2025-06-25 DIAGNOSIS — G89.18 POST-OP PAIN: ICD-10-CM

## 2025-06-25 DIAGNOSIS — K80.50 CHOLEDOCHOLITHIASIS: ICD-10-CM

## 2025-06-25 DIAGNOSIS — R10.11 RUQ ABDOMINAL PAIN: ICD-10-CM

## 2025-06-25 DIAGNOSIS — Z09 POSTOP CHECK: ICD-10-CM

## 2025-06-25 DIAGNOSIS — Z90.49 S/P CHOLECYSTECTOMY: ICD-10-CM

## 2025-06-25 DIAGNOSIS — Z90.49 S/P CHOLE: ICD-10-CM

## 2025-06-25 LAB
ALBUMIN SERPL-MCNC: 3.9 G/DL (ref 3.5–5.2)
ALBUMIN SERPL-MCNC: 4 G/DL (ref 3.5–5.2)
ALBUMIN/GLOB SERPL: 1.2 {RATIO} (ref 1–2.5)
ALBUMIN/GLOB SERPL: 1.3 {RATIO} (ref 1–2.5)
ALP SERPL-CCNC: 92 U/L (ref 35–104)
ALP SERPL-CCNC: 93 U/L (ref 35–104)
ALT SERPL-CCNC: 9 U/L (ref 10–35)
ALT SERPL-CCNC: 9 U/L (ref 10–35)
ANION GAP SERPL CALCULATED.3IONS-SCNC: 13 MMOL/L (ref 9–16)
AST SERPL-CCNC: 13 U/L (ref 10–35)
AST SERPL-CCNC: 14 U/L (ref 10–35)
BASOPHILS # BLD: 0.06 K/UL (ref 0–0.2)
BASOPHILS NFR BLD: 0 % (ref 0–2)
BILIRUB DIRECT SERPL-MCNC: <0.1 MG/DL (ref 0–0.2)
BILIRUB INDIRECT SERPL-MCNC: ABNORMAL MG/DL (ref 0–1)
BILIRUB SERPL-MCNC: 0.2 MG/DL (ref 0–1.2)
BILIRUB SERPL-MCNC: 0.2 MG/DL (ref 0–1.2)
BUN SERPL-MCNC: 16 MG/DL (ref 8–23)
BUN/CREAT SERPL: 18 (ref 9–20)
CALCIUM SERPL-MCNC: 9.6 MG/DL (ref 8.6–10.4)
CHLORIDE SERPL-SCNC: 100 MMOL/L (ref 98–107)
CO2 SERPL-SCNC: 25 MMOL/L (ref 20–31)
CREAT SERPL-MCNC: 0.9 MG/DL (ref 0.6–0.9)
EOSINOPHIL # BLD: 0.3 K/UL (ref 0–0.44)
EOSINOPHILS RELATIVE PERCENT: 2 % (ref 1–4)
ERYTHROCYTE [DISTWIDTH] IN BLOOD BY AUTOMATED COUNT: 13.7 % (ref 11.8–14.4)
GFR, ESTIMATED: 67 ML/MIN/1.73M2
GLUCOSE SERPL-MCNC: 154 MG/DL (ref 74–99)
HCT VFR BLD AUTO: 37.6 % (ref 36.3–47.1)
HGB BLD-MCNC: 11.7 G/DL (ref 11.9–15.1)
IMM GRANULOCYTES # BLD AUTO: 0.06 K/UL (ref 0–0.3)
IMM GRANULOCYTES NFR BLD: 0 %
LIPASE SERPL-CCNC: 36 U/L (ref 13–60)
LYMPHOCYTES NFR BLD: 1.78 K/UL (ref 1.1–3.7)
LYMPHOCYTES RELATIVE PERCENT: 13 % (ref 24–43)
MCH RBC QN AUTO: 27.9 PG (ref 25.2–33.5)
MCHC RBC AUTO-ENTMCNC: 31.1 G/DL (ref 25.2–33.5)
MCV RBC AUTO: 89.7 FL (ref 82.6–102.9)
MONOCYTES NFR BLD: 0.9 K/UL (ref 0.1–1.2)
MONOCYTES NFR BLD: 7 % (ref 3–12)
NEUTROPHILS NFR BLD: 78 % (ref 36–65)
NEUTS SEG NFR BLD: 10.37 K/UL (ref 1.5–8.1)
NRBC BLD-RTO: 0 PER 100 WBC
PLATELET # BLD AUTO: 325 K/UL (ref 138–453)
PMV BLD AUTO: 8.9 FL (ref 8.1–13.5)
POTASSIUM SERPL-SCNC: 3.9 MMOL/L (ref 3.7–5.3)
PROT SERPL-MCNC: 7.1 G/DL (ref 6.6–8.7)
PROT SERPL-MCNC: 7.2 G/DL (ref 6.6–8.7)
RBC # BLD AUTO: 4.19 M/UL (ref 3.95–5.11)
SODIUM SERPL-SCNC: 138 MMOL/L (ref 136–145)
WBC OTHER # BLD: 13.5 K/UL (ref 3.5–11.3)

## 2025-06-25 PROCEDURE — 87077 CULTURE AEROBIC IDENTIFY: CPT

## 2025-06-25 PROCEDURE — 87205 SMEAR GRAM STAIN: CPT

## 2025-06-25 PROCEDURE — 85025 COMPLETE CBC W/AUTO DIFF WBC: CPT

## 2025-06-25 PROCEDURE — 80076 HEPATIC FUNCTION PANEL: CPT

## 2025-06-25 PROCEDURE — 87186 SC STD MICRODIL/AGAR DIL: CPT

## 2025-06-25 PROCEDURE — 83690 ASSAY OF LIPASE: CPT

## 2025-06-25 PROCEDURE — 87070 CULTURE OTHR SPECIMN AEROBIC: CPT

## 2025-06-25 PROCEDURE — 80053 COMPREHEN METABOLIC PANEL: CPT

## 2025-06-25 PROCEDURE — 87184 SC STD DISK METHOD PER PLATE: CPT

## 2025-06-25 PROCEDURE — 99213 OFFICE O/P EST LOW 20 MIN: CPT | Performed by: SURGERY

## 2025-06-25 PROCEDURE — 36415 COLL VENOUS BLD VENIPUNCTURE: CPT

## 2025-06-25 PROCEDURE — 99024 POSTOP FOLLOW-UP VISIT: CPT | Performed by: SURGERY

## 2025-06-25 NOTE — PROGRESS NOTES
Patient here for post op appointment.    Patient had lap robotic assisted cholecystectomy on 6/16/2025 by Dr. Montano at [facility].  -- Diagnosis --   GALLBLADDER, CHOLECYSTECTOMY:   - ACUTE AND CHRONIC CHOLECYSTITIS.   - CHOLELITHIASIS.   - TINY BENIGN CHOLESTEROL POLYP.   - LOCALIZED ADENOMYOMATOUS HYPERPLASIA (\"ADENOMYOMA\"), FUNDUS.   - SEE COMMENT.   -- Diagnosis Comment --   LOCALIZED ADENOMYOMATOUS HYPERPLASIA (SO-CALLED ADENOMYOMA) IS A   NONNEOPLASTIC REACTIVE PROCESS IN THE GALLBLADDER WALL ASSOCIATED WITH   CHOLECYSTITIS AND CHOLELITHIASIS.             Patient called in yesterday stating incision is red, warm to touch and swollen. Dr. Montano ordered Cipro 500 mg BID for 7 days and scheduled appt today with Dr. Montano.     She reports she noticed the redness on Friday. Has had some low grade fever. No drainage from incision. Reports incision area is swollen. Reports she feels \"terrible- her back and stomach\". Decreased appetite. Bowel movements have been normal.

## 2025-06-25 NOTE — PROGRESS NOTES
Patient here for post op appointment.  She had surgery on the 16th.  That was Monday and then on Friday she started getting some swelling and some pain around the umbilical incision.  This is the incision we had to open a little bit larger and get the gallbladder out.  It was very difficult there was some bile spillage in the subcu and the bile was more purulent with some small stones.  It had a bad odor.  We irrigated the subcu but it is probably got a mild cellulitis or seroma.     Patient had lap robotic assisted cholecystectomy on 6/16/2025 by Dr. Montano at [facility].  -- Diagnosis --   GALLBLADDER, CHOLECYSTECTOMY:   - ACUTE AND CHRONIC CHOLECYSTITIS.   - CHOLELITHIASIS.   - TINY BENIGN CHOLESTEROL POLYP.   - LOCALIZED ADENOMYOMATOUS HYPERPLASIA (\"ADENOMYOMA\"), FUNDUS.   - SEE COMMENT.   -- Diagnosis Comment --   LOCALIZED ADENOMYOMATOUS HYPERPLASIA (SO-CALLED ADENOMYOMA) IS A   NONNEOPLASTIC REACTIVE PROCESS IN THE GALLBLADDER WALL ASSOCIATED WITH   CHOLECYSTITIS AND CHOLELITHIASIS.               Procedure    We prepped and draped the area in the sterile fashion.  We used 1% lidocaine with epinephrine for local we made a small incision and drained a very odorous purulent drainage from the subcu.  We nd packed it with a wet-to-dry.  We will check CBC CMP.  We will change the dressing once or twice a day and see her back in 1 week for follow-up

## 2025-06-25 NOTE — PATIENT INSTRUCTIONS
Follow up as scheduled with Dr. Montano.    Continue the antibiotic that was prescribed.    CLEANSE  WOUND WITH NORMAL STERILE SALINE, LIGHTLY PACK  MOIST TO MOIST SALINE SOSA INTO WOUND, COVER WITH DRY GAUZE DRESSING  AND SECURE WITH TAPE. CHANGE DAILY.    How to do wet to dry dressing:    Cleanse wound with normal sterile saline and dry with dry gauze. Moisten gauze with normal sterile saline and squeeze out excess saline. Lightly pack the moist gauze into the wound, cut off excess gauze. Cover with dry gauze dressing and secure with tape. Change dressing daily or as needed.    HOW TO MAKE NORMAL SALINE    - Add 8 level teaspoons of table salt to one gallon of distilled water  - Cap and shake well to dissolve the salt  - Store in the refrigerator.  - Discard after one month    SIGNS OF INFECTION  - Redness, swelling, skin hot  - Wound bed turns black or stringy yellow  - Foul odor  - Increased drainage or pus  - Increased pain  - Fever greater than 100F    CALL YOUR DOCTOR OR SEEK MEDICAL ATTENTION IF SIGNS OF INFECTION.  DO NOT WAIT UNTIL YOUR NEXT APPOINTMENT    Call the Wound Care Nurse with any other questions or concerns- [phone]

## 2025-06-26 ENCOUNTER — RESULTS FOLLOW-UP (OUTPATIENT)
Dept: SURGERY | Age: 75
End: 2025-06-26

## 2025-06-27 NOTE — TELEPHONE ENCOUNTER
Patient call back, she reports \"still the same as Wednesday\". Still doesn't feel good. Area is still dark red and seeping, does not smell an odor at this time. Around wound is black and blue. She reports not as much drainage as the first day, still draining but only on the gauze and the dry gauze, not going through the dressing. No fevers. Reports having pain still, \"not horrible\" but knows its there and states finds herself placing her hand over her wound when moving, which seems to help with the pain. Dressing changes are going okay. Changing daily. Still taking antibiotic as prescribed.     Please advise on if any changes need to be done.    She is scheduled for a follow up on 7/2 at 10:40 AM.    Did discuss lab work results and culture results with the patient during the phone call.

## 2025-06-27 NOTE — TELEPHONE ENCOUNTER
LM on voicemail stating I was checking to see how patient was doing. Explained I sent a [employer] message yesterday. Told patient to either call the number provided on voicemail or to send a [employer] message with an update.

## 2025-06-28 LAB
MICROORGANISM SPEC CULT: ABNORMAL
MICROORGANISM SPEC CULT: ABNORMAL
MICROORGANISM/AGENT SPEC: ABNORMAL
SPECIMEN DESCRIPTION: ABNORMAL

## 2025-07-01 ENCOUNTER — HOSPITAL ENCOUNTER (OUTPATIENT)
Dept: MAMMOGRAPHY | Age: 75
Discharge: HOME | End: 2025-07-03
Attending: FAMILY MEDICINE
Payer: MEDICARE

## 2025-07-01 ENCOUNTER — RESULTS FOLLOW-UP (OUTPATIENT)
Dept: PRIMARY CARE CLINIC | Age: 75
End: 2025-07-01

## 2025-07-01 VITALS — BODY MASS INDEX: 33.32 KG/M2 | HEIGHT: 65 IN | WEIGHT: 200 LBS

## 2025-07-01 DIAGNOSIS — Z12.31 ENCOUNTER FOR SCREENING MAMMOGRAM FOR MALIGNANT NEOPLASM OF BREAST: ICD-10-CM

## 2025-07-01 PROCEDURE — 77063 BREAST TOMOSYNTHESIS BI: CPT

## 2025-07-01 RX ORDER — ATENOLOL 25 MG/1
25 TABLET ORAL DAILY
Qty: 90 TABLET | Refills: 1 | Status: SHIPPED | OUTPATIENT
Start: 2025-07-01

## 2025-07-02 ENCOUNTER — HOSPITAL ENCOUNTER (OUTPATIENT)
Age: 75
Discharge: HOME | End: 2025-07-02
Payer: MEDICARE

## 2025-07-02 ENCOUNTER — RESULTS FOLLOW-UP (OUTPATIENT)
Dept: SURGERY | Age: 75
End: 2025-07-02

## 2025-07-02 ENCOUNTER — OFFICE VISIT (OUTPATIENT)
Dept: PRIMARY CARE CLINIC | Age: 75
End: 2025-07-02
Payer: MEDICARE

## 2025-07-02 ENCOUNTER — OFFICE VISIT (OUTPATIENT)
Dept: SURGERY | Age: 75
End: 2025-07-02
Payer: MEDICARE

## 2025-07-02 VITALS
BODY MASS INDEX: 32.62 KG/M2 | DIASTOLIC BLOOD PRESSURE: 74 MMHG | TEMPERATURE: 97 F | RESPIRATION RATE: 16 BRPM | OXYGEN SATURATION: 98 % | HEART RATE: 80 BPM | WEIGHT: 196 LBS | SYSTOLIC BLOOD PRESSURE: 128 MMHG

## 2025-07-02 VITALS
RESPIRATION RATE: 18 BRPM | DIASTOLIC BLOOD PRESSURE: 74 MMHG | BODY MASS INDEX: 32.72 KG/M2 | WEIGHT: 196.4 LBS | HEART RATE: 80 BPM | TEMPERATURE: 96.5 F | HEIGHT: 65 IN | SYSTOLIC BLOOD PRESSURE: 128 MMHG

## 2025-07-02 DIAGNOSIS — S31.109D OPEN WOUND OF ABDOMINAL WALL, SUBSEQUENT ENCOUNTER: ICD-10-CM

## 2025-07-02 DIAGNOSIS — J30.2 SEASONAL ALLERGIES: Primary | ICD-10-CM

## 2025-07-02 DIAGNOSIS — S31.109D OPEN WOUND OF ABDOMINAL WALL, SUBSEQUENT ENCOUNTER: Primary | ICD-10-CM

## 2025-07-02 LAB
ALBUMIN SERPL-MCNC: 4.3 G/DL (ref 3.5–5.2)
ALBUMIN/GLOB SERPL: 1.4 {RATIO} (ref 1–2.5)
ALP SERPL-CCNC: 90 U/L (ref 35–104)
ALT SERPL-CCNC: 10 U/L (ref 10–35)
ANION GAP SERPL CALCULATED.3IONS-SCNC: 10 MMOL/L (ref 9–16)
AST SERPL-CCNC: 18 U/L (ref 10–35)
BASOPHILS # BLD: 0.06 K/UL (ref 0–0.2)
BASOPHILS NFR BLD: 1 % (ref 0–2)
BILIRUB SERPL-MCNC: <0.2 MG/DL (ref 0–1.2)
BUN SERPL-MCNC: 13 MG/DL (ref 8–23)
BUN/CREAT SERPL: 14 (ref 9–20)
CALCIUM SERPL-MCNC: 9.8 MG/DL (ref 8.6–10.4)
CHLORIDE SERPL-SCNC: 103 MMOL/L (ref 98–107)
CO2 SERPL-SCNC: 27 MMOL/L (ref 20–31)
CREAT SERPL-MCNC: 0.9 MG/DL (ref 0.6–0.9)
EOSINOPHIL # BLD: 0.4 K/UL (ref 0–0.44)
EOSINOPHILS RELATIVE PERCENT: 5 % (ref 1–4)
ERYTHROCYTE [DISTWIDTH] IN BLOOD BY AUTOMATED COUNT: 13.9 % (ref 11.8–14.4)
GFR, ESTIMATED: 67 ML/MIN/1.73M2
GLUCOSE SERPL-MCNC: 112 MG/DL (ref 74–99)
HCT VFR BLD AUTO: 37.6 % (ref 36.3–47.1)
HGB BLD-MCNC: 12 G/DL (ref 11.9–15.1)
IMM GRANULOCYTES # BLD AUTO: 0.04 K/UL (ref 0–0.3)
IMM GRANULOCYTES NFR BLD: 1 %
LIPASE SERPL-CCNC: 39 U/L (ref 13–60)
LYMPHOCYTES NFR BLD: 2.17 K/UL (ref 1.1–3.7)
LYMPHOCYTES RELATIVE PERCENT: 25 % (ref 24–43)
MCH RBC QN AUTO: 28.5 PG (ref 25.2–33.5)
MCHC RBC AUTO-ENTMCNC: 31.9 G/DL (ref 25.2–33.5)
MCV RBC AUTO: 89.3 FL (ref 82.6–102.9)
MONOCYTES NFR BLD: 0.68 K/UL (ref 0.1–1.2)
MONOCYTES NFR BLD: 8 % (ref 3–12)
NEUTROPHILS NFR BLD: 60 % (ref 36–65)
NEUTS SEG NFR BLD: 5.37 K/UL (ref 1.5–8.1)
NRBC BLD-RTO: 0 PER 100 WBC
PLATELET # BLD AUTO: 353 K/UL (ref 138–453)
PMV BLD AUTO: 8.7 FL (ref 8.1–13.5)
POTASSIUM SERPL-SCNC: 4.3 MMOL/L (ref 3.7–5.3)
PROT SERPL-MCNC: 7.3 G/DL (ref 6.6–8.7)
RBC # BLD AUTO: 4.21 M/UL (ref 3.95–5.11)
SODIUM SERPL-SCNC: 140 MMOL/L (ref 136–145)
WBC OTHER # BLD: 8.7 K/UL (ref 3.5–11.3)

## 2025-07-02 PROCEDURE — 83690 ASSAY OF LIPASE: CPT

## 2025-07-02 PROCEDURE — 85025 COMPLETE CBC W/AUTO DIFF WBC: CPT

## 2025-07-02 PROCEDURE — 99024 POSTOP FOLLOW-UP VISIT: CPT | Performed by: SURGERY

## 2025-07-02 PROCEDURE — 36415 COLL VENOUS BLD VENIPUNCTURE: CPT

## 2025-07-02 PROCEDURE — 99212 OFFICE O/P EST SF 10 MIN: CPT | Performed by: PHYSICIAN ASSISTANT

## 2025-07-02 PROCEDURE — 99213 OFFICE O/P EST LOW 20 MIN: CPT | Performed by: SURGERY

## 2025-07-02 PROCEDURE — 80053 COMPREHEN METABOLIC PANEL: CPT

## 2025-07-02 RX ORDER — DOXYCYCLINE HYCLATE 100 MG
100 TABLET ORAL 2 TIMES DAILY
Qty: 14 TABLET | Refills: 0 | Status: SHIPPED | OUTPATIENT
Start: 2025-07-02 | End: 2025-07-09

## 2025-07-02 RX ORDER — PREDNISONE 10 MG/1
10 TABLET ORAL 2 TIMES DAILY
Qty: 10 TABLET | Refills: 0 | Status: SHIPPED | OUTPATIENT
Start: 2025-07-02 | End: 2025-07-07

## 2025-07-02 ASSESSMENT — ENCOUNTER SYMPTOMS
RHINORRHEA: 1
EYE ITCHING: 1
SHORTNESS OF BREATH: 0
SORE THROAT: 1

## 2025-07-02 NOTE — PATIENT INSTRUCTIONS
Continue wet to dry dressing to abdominal wound. Change 1-2 times a day or as needed.    Ok to get in the pool.   Bring antibiotic with you to your vacation incase you needed.     If you have any questions or concerns during your vacation- Dr. Montano's cell # is [phone]  SIGNS OF INFECTION  - Redness, swelling, skin hot  - Wound bed turns black or stringy yellow  - Foul odor  - Increased drainage or pus  - Increased pain  - Fever greater than 100F    CALL YOUR DOCTOR OR SEEK MEDICAL ATTENTION IF SIGNS OF INFECTION.  DO NOT WAIT UNTIL YOUR NEXT APPOINTMENT    Call the Wound Care Nurse with any other questions or concerns- [phone]

## 2025-07-02 NOTE — PROGRESS NOTES
Incisional wound on abdomen:  Wound pink/red  Drainage; moderate yellow  No odor  L:1.2 cm  W:0.8 cm  D:0.5 cm      After Debridement Dr. Montano was able to get the depth to 6 cm.

## 2025-07-02 NOTE — PROGRESS NOTES
[facility]  [address]  [facility] [address]  Dept: [phone]  Dept Fax: [phone]    Huyen Mojica is a 75 y.o. female who presents today for her medical conditions/complaints as noted below. Huyen Mojica is c/o of   Chief Complaint   Patient presents with    Allergies     Pt wants an injection for allergies from being outside messing with plants    .    HPI:     Patient is a 74 yo female presenting today due to allergies. A few days ago she was trimming her outside plants. Since then she has developed runny nose, post nasal drip, and a scratchy throat.     Allergies  Presents for initial visit. She complains of eye itching (left), headaches (mild), rhinorrhea, sneezing and sore throat (scratchy). She reports no congestion, ear pain, fever, itchy nose or rash. Symptom severity has been mild. plants Treatments tried: sudafed. The treatment provided no relief. Her past medical history is significant for allergies.         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    Osteoarthritis     Palpitations     PONV (postoperative nausea and vomiting)      Past Surgical History:   Procedure Laterality Date    BREAST BIOPSY Right 2003    Excisional Biospy    CARPAL TUNNEL RELEASE Right 10/2021    CARPAL TUNNEL RELEASE Left 11/2021

## 2025-07-02 NOTE — PATIENT INSTRUCTIONS
Take medication as prescribed  Practice good hand hygiene  Increase fluids  May use tylenol/ ibuprofen for pain/ fever  May use humidifier  May use antihistamines (zyrtec, claritin)  May use nasal spray Flonase  If symptoms worsen follow up with PCP  Patient verbalized understanding and agrees with plan of care

## 2025-07-15 ENCOUNTER — OFFICE VISIT (OUTPATIENT)
Dept: SURGERY | Age: 75
End: 2025-07-15
Payer: MEDICARE

## 2025-07-15 VITALS
HEART RATE: 65 BPM | DIASTOLIC BLOOD PRESSURE: 82 MMHG | HEIGHT: 65 IN | OXYGEN SATURATION: 96 % | BODY MASS INDEX: 33.15 KG/M2 | WEIGHT: 199 LBS | SYSTOLIC BLOOD PRESSURE: 138 MMHG

## 2025-07-15 DIAGNOSIS — Z09 POSTOP CHECK: Primary | ICD-10-CM

## 2025-07-15 DIAGNOSIS — S31.109D OPEN WOUND OF ABDOMINAL WALL, SUBSEQUENT ENCOUNTER: ICD-10-CM

## 2025-07-15 PROCEDURE — 99024 POSTOP FOLLOW-UP VISIT: CPT | Performed by: PHYSICIAN ASSISTANT

## 2025-07-15 PROCEDURE — 99213 OFFICE O/P EST LOW 20 MIN: CPT | Performed by: PHYSICIAN ASSISTANT

## 2025-07-15 NOTE — PROGRESS NOTES
Subjective   Huyen Mojica is a 75 y.o. female who presents today for post operative visit after having lap yamini done on 6/16/25 by . Currently state pain is 2, and is using none for pain. Incision site did get a post op infection and now have some drainage but just light and bloody and decreasing every day, no redness or swelling or tenderness around it. Patient states is having regular bowel movements.  Is eating and drinking without difficulty. Patient states has the following concerns/questions: None    Abdomen wound: 1.1 by 0.9 depth is 2.5 at the top and 2.9 on the bottom.    Pathology: GALLBLADDER, CHOLECYSTECTOMY:   - ACUTE AND CHRONIC CHOLECYSTITIS.   - CHOLELITHIASIS.   - TINY BENIGN CHOLESTEROL POLYP.   - LOCALIZED ADENOMYOMATOUS HYPERPLASIA (\"ADENOMYOMA\"), FUNDUS.   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    Osteoarthritis     Palpitations     PONV (postoperative nausea and vomiting)        Past Surgical History:   Procedure Laterality Date    BREAST BIOPSY Right 2003    Excisional Biospy    CARPAL TUNNEL RELEASE Right 10/2021    CARPAL TUNNEL RELEASE Left 11/2021    CATARACT REMOVAL WITH IMPLANT Right 07/29/2017    Dr Najera    CATARACT REMOVAL WITH IMPLANT Left 08/05/2017    Dr Najera    CHOLECYSTECTOMY N/A 6/16/2025    CHOLECYSTECTOMY LAPAROSCOPIC ROBOTIC XI performed by Homar Montano MD at [facility] OR    COLONOSCOPY  12/2008

## 2025-07-24 ENCOUNTER — OFFICE VISIT (OUTPATIENT)
Dept: SURGERY | Age: 75
End: 2025-07-24
Payer: MEDICARE

## 2025-07-24 VITALS
BODY MASS INDEX: 33.12 KG/M2 | SYSTOLIC BLOOD PRESSURE: 110 MMHG | HEART RATE: 60 BPM | TEMPERATURE: 97.3 F | OXYGEN SATURATION: 95 % | HEIGHT: 65 IN | DIASTOLIC BLOOD PRESSURE: 78 MMHG

## 2025-07-24 DIAGNOSIS — S31.109D OPEN WOUND OF ABDOMINAL WALL, SUBSEQUENT ENCOUNTER: ICD-10-CM

## 2025-07-24 DIAGNOSIS — Z09 POSTOP CHECK: Primary | ICD-10-CM

## 2025-07-24 PROCEDURE — 99213 OFFICE O/P EST LOW 20 MIN: CPT | Performed by: PHYSICIAN ASSISTANT

## 2025-07-24 PROCEDURE — 99024 POSTOP FOLLOW-UP VISIT: CPT | Performed by: PHYSICIAN ASSISTANT

## 2025-08-06 ENCOUNTER — OFFICE VISIT (OUTPATIENT)
Dept: SURGERY | Age: 75
End: 2025-08-06
Payer: MEDICARE

## 2025-08-06 VITALS
DIASTOLIC BLOOD PRESSURE: 74 MMHG | BODY MASS INDEX: 32.59 KG/M2 | TEMPERATURE: 97.5 F | RESPIRATION RATE: 16 BRPM | HEART RATE: 64 BPM | SYSTOLIC BLOOD PRESSURE: 128 MMHG | HEIGHT: 65 IN | WEIGHT: 195.6 LBS

## 2025-08-06 DIAGNOSIS — Z09 POSTOP CHECK: Primary | ICD-10-CM

## 2025-08-06 PROCEDURE — 99024 POSTOP FOLLOW-UP VISIT: CPT | Performed by: SURGERY

## 2025-08-06 PROCEDURE — 99213 OFFICE O/P EST LOW 20 MIN: CPT | Performed by: SURGERY

## 2025-08-06 RX ORDER — OMEPRAZOLE 20 MG/1
20 CAPSULE, DELAYED RELEASE ORAL 2 TIMES DAILY
Qty: 180 CAPSULE | Refills: 1 | Status: SHIPPED | OUTPATIENT
Start: 2025-08-06

## 2025-08-13 ENCOUNTER — OFFICE VISIT (OUTPATIENT)
Dept: SURGERY | Age: 75
End: 2025-08-13

## 2025-08-13 VITALS
HEART RATE: 64 BPM | BODY MASS INDEX: 32.32 KG/M2 | TEMPERATURE: 97.4 F | DIASTOLIC BLOOD PRESSURE: 70 MMHG | SYSTOLIC BLOOD PRESSURE: 130 MMHG | WEIGHT: 194 LBS | HEIGHT: 65 IN | OXYGEN SATURATION: 95 %

## 2025-08-13 DIAGNOSIS — Z09 POSTOP CHECK: Primary | ICD-10-CM

## 2025-08-13 PROCEDURE — 99024 POSTOP FOLLOW-UP VISIT: CPT | Performed by: SURGERY

## 2025-08-20 ENCOUNTER — PROCEDURE VISIT (OUTPATIENT)
Dept: SURGERY | Age: 75
End: 2025-08-20

## 2025-08-20 VITALS
BODY MASS INDEX: 31.82 KG/M2 | HEIGHT: 65 IN | HEART RATE: 60 BPM | WEIGHT: 191 LBS | RESPIRATION RATE: 16 BRPM | DIASTOLIC BLOOD PRESSURE: 76 MMHG | SYSTOLIC BLOOD PRESSURE: 120 MMHG

## 2025-08-20 DIAGNOSIS — Z09 POSTOP CHECK: Primary | ICD-10-CM

## 2025-08-20 PROCEDURE — 99024 POSTOP FOLLOW-UP VISIT: CPT | Performed by: SURGERY

## 2025-08-25 ENCOUNTER — OFFICE VISIT (OUTPATIENT)
Dept: GASTROENTEROLOGY | Age: 75
End: 2025-08-25
Payer: MEDICARE

## 2025-08-25 ENCOUNTER — TELEPHONE (OUTPATIENT)
Dept: GASTROENTEROLOGY | Age: 75
End: 2025-08-25

## 2025-08-25 VITALS
WEIGHT: 199 LBS | OXYGEN SATURATION: 97 % | TEMPERATURE: 97.4 F | SYSTOLIC BLOOD PRESSURE: 146 MMHG | RESPIRATION RATE: 16 BRPM | DIASTOLIC BLOOD PRESSURE: 72 MMHG | HEART RATE: 64 BPM | BODY MASS INDEX: 33.12 KG/M2

## 2025-08-25 DIAGNOSIS — K59.09 OTHER CONSTIPATION: ICD-10-CM

## 2025-08-25 DIAGNOSIS — Z90.49 S/P CHOLE: ICD-10-CM

## 2025-08-25 DIAGNOSIS — K57.30 DIVERTICULOSIS LARGE INTESTINE W/O PERFORATION OR ABSCESS W/O BLEEDING: ICD-10-CM

## 2025-08-25 DIAGNOSIS — K80.50 CHOLEDOCHOLITHIASIS: Primary | ICD-10-CM

## 2025-08-25 DIAGNOSIS — K21.9 GASTROESOPHAGEAL REFLUX DISEASE, UNSPECIFIED WHETHER ESOPHAGITIS PRESENT: ICD-10-CM

## 2025-08-25 PROCEDURE — G8427 DOCREV CUR MEDS BY ELIG CLIN: HCPCS | Performed by: INTERNAL MEDICINE

## 2025-08-25 PROCEDURE — 1090F PRES/ABSN URINE INCON ASSESS: CPT | Performed by: INTERNAL MEDICINE

## 2025-08-25 PROCEDURE — 1126F AMNT PAIN NOTED NONE PRSNT: CPT | Performed by: PHYSICIAN ASSISTANT

## 2025-08-25 PROCEDURE — 1036F TOBACCO NON-USER: CPT | Performed by: PHYSICIAN ASSISTANT

## 2025-08-25 PROCEDURE — G8417 CALC BMI ABV UP PARAM F/U: HCPCS | Performed by: INTERNAL MEDICINE

## 2025-08-25 PROCEDURE — 1159F MED LIST DOCD IN RCRD: CPT | Performed by: PHYSICIAN ASSISTANT

## 2025-08-25 PROCEDURE — G8427 DOCREV CUR MEDS BY ELIG CLIN: HCPCS | Performed by: PHYSICIAN ASSISTANT

## 2025-08-25 PROCEDURE — 1160F RVW MEDS BY RX/DR IN RCRD: CPT | Performed by: PHYSICIAN ASSISTANT

## 2025-08-25 PROCEDURE — 99214 OFFICE O/P EST MOD 30 MIN: CPT | Performed by: PHYSICIAN ASSISTANT

## 2025-08-25 PROCEDURE — G8417 CALC BMI ABV UP PARAM F/U: HCPCS | Performed by: PHYSICIAN ASSISTANT

## 2025-08-25 PROCEDURE — G8400 PT W/DXA NO RESULTS DOC: HCPCS | Performed by: INTERNAL MEDICINE

## 2025-08-25 PROCEDURE — 1036F TOBACCO NON-USER: CPT | Performed by: INTERNAL MEDICINE

## 2025-08-25 PROCEDURE — 1090F PRES/ABSN URINE INCON ASSESS: CPT | Performed by: PHYSICIAN ASSISTANT

## 2025-08-25 PROCEDURE — 1123F ACP DISCUSS/DSCN MKR DOCD: CPT | Performed by: PHYSICIAN ASSISTANT

## 2025-08-25 PROCEDURE — 3017F COLORECTAL CA SCREEN DOC REV: CPT | Performed by: PHYSICIAN ASSISTANT

## 2025-08-25 PROCEDURE — 3017F COLORECTAL CA SCREEN DOC REV: CPT | Performed by: INTERNAL MEDICINE

## 2025-08-25 PROCEDURE — 3078F DIAST BP <80 MM HG: CPT | Performed by: PHYSICIAN ASSISTANT

## 2025-08-25 PROCEDURE — G8400 PT W/DXA NO RESULTS DOC: HCPCS | Performed by: PHYSICIAN ASSISTANT

## 2025-08-25 PROCEDURE — 3077F SYST BP >= 140 MM HG: CPT | Performed by: PHYSICIAN ASSISTANT

## 2025-08-25 ASSESSMENT — ENCOUNTER SYMPTOMS
ABDOMINAL PAIN: 0
RECTAL PAIN: 0
WHEEZING: 0
ANAL BLEEDING: 0
COUGH: 0
CHOKING: 0
TROUBLE SWALLOWING: 0
ABDOMINAL DISTENTION: 0
SORE THROAT: 0
VOICE CHANGE: 0
NAUSEA: 0
BLOOD IN STOOL: 0
CONSTIPATION: 0
VOMITING: 0
DIARRHEA: 0

## 2025-08-26 ENCOUNTER — OFFICE VISIT (OUTPATIENT)
Dept: SURGERY | Age: 75
End: 2025-08-26
Payer: MEDICARE

## 2025-08-26 VITALS
SYSTOLIC BLOOD PRESSURE: 120 MMHG | BODY MASS INDEX: 32.82 KG/M2 | OXYGEN SATURATION: 93 % | TEMPERATURE: 97.8 F | HEART RATE: 69 BPM | DIASTOLIC BLOOD PRESSURE: 60 MMHG | HEIGHT: 65 IN | WEIGHT: 197 LBS

## 2025-08-26 DIAGNOSIS — S31.109D OPEN WOUND OF ABDOMINAL WALL, SUBSEQUENT ENCOUNTER: Primary | ICD-10-CM

## 2025-08-26 PROCEDURE — 99214 OFFICE O/P EST MOD 30 MIN: CPT | Performed by: SURGERY

## 2025-08-26 PROCEDURE — 99024 POSTOP FOLLOW-UP VISIT: CPT | Performed by: SURGERY

## 2025-08-26 RX ORDER — CIPROFLOXACIN 500 MG/1
500 TABLET, FILM COATED ORAL 2 TIMES DAILY
Qty: 10 TABLET | Refills: 0 | Status: SHIPPED | OUTPATIENT
Start: 2025-08-26 | End: 2025-08-31

## 2025-08-28 ENCOUNTER — CLINICAL SUPPORT (OUTPATIENT)
Dept: SURGERY | Age: 75
End: 2025-08-28

## 2025-08-28 VITALS
WEIGHT: 198 LBS | OXYGEN SATURATION: 94 % | DIASTOLIC BLOOD PRESSURE: 70 MMHG | HEART RATE: 64 BPM | SYSTOLIC BLOOD PRESSURE: 136 MMHG | BODY MASS INDEX: 32.99 KG/M2 | HEIGHT: 65 IN | TEMPERATURE: 97.3 F

## 2025-08-28 DIAGNOSIS — Z48.02 VISIT FOR SUTURE REMOVAL: Primary | ICD-10-CM

## 2025-08-28 RX ORDER — FLUCONAZOLE 100 MG/1
100 TABLET ORAL DAILY
Qty: 4 TABLET | Refills: 0 | Status: SHIPPED | OUTPATIENT
Start: 2025-08-28 | End: 2025-09-01

## 2025-09-02 ENCOUNTER — ANESTHESIA EVENT (OUTPATIENT)
Dept: ENDOSCOPY | Age: 75
End: 2025-09-02
Payer: MEDICARE

## 2025-09-03 ENCOUNTER — HOSPITAL ENCOUNTER (INPATIENT)
Age: 75
LOS: 1 days | Discharge: HOME | End: 2025-09-04
Attending: INTERNAL MEDICINE | Admitting: STUDENT IN AN ORGANIZED HEALTH CARE EDUCATION/TRAINING PROGRAM
Payer: MEDICARE

## 2025-09-03 ENCOUNTER — APPOINTMENT (OUTPATIENT)
Dept: GENERAL RADIOLOGY | Age: 75
End: 2025-09-03
Attending: INTERNAL MEDICINE
Payer: MEDICARE

## 2025-09-03 ENCOUNTER — ANESTHESIA (OUTPATIENT)
Dept: ENDOSCOPY | Age: 75
End: 2025-09-03
Payer: MEDICARE

## 2025-09-03 PROBLEM — E66.9 OBESITY: Status: ACTIVE | Noted: 2025-09-03

## 2025-09-03 PROBLEM — T85.520A DISPLACEMENT OF BILIARY STENT: Status: ACTIVE | Noted: 2025-09-03

## 2025-09-03 LAB
BUN BLD-MCNC: 14 MG/DL (ref 8–26)
EGFR, POC: >90 ML/MIN/1.73M2
GLUCOSE BLD-MCNC: 110 MG/DL (ref 74–100)
GLUCOSE BLD-MCNC: 228 MG/DL (ref 65–105)
POC CREATININE: 0.7 MG/DL (ref 0.51–1.19)

## 2025-09-03 PROCEDURE — 3700000000 HC ANESTHESIA ATTENDED CARE: Performed by: INTERNAL MEDICINE

## 2025-09-03 PROCEDURE — 99222 1ST HOSP IP/OBS MODERATE 55: CPT | Performed by: STUDENT IN AN ORGANIZED HEALTH CARE EDUCATION/TRAINING PROGRAM

## 2025-09-03 PROCEDURE — 2709999900 HC NON-CHARGEABLE SUPPLY: Performed by: INTERNAL MEDICINE

## 2025-09-03 PROCEDURE — 6360000002 HC RX W HCPCS

## 2025-09-03 PROCEDURE — 6370000000 HC RX 637 (ALT 250 FOR IP): Performed by: ANESTHESIOLOGY

## 2025-09-03 PROCEDURE — 82947 ASSAY GLUCOSE BLOOD QUANT: CPT

## 2025-09-03 PROCEDURE — 2580000003 HC RX 258

## 2025-09-03 PROCEDURE — 6370000000 HC RX 637 (ALT 250 FOR IP): Performed by: STUDENT IN AN ORGANIZED HEALTH CARE EDUCATION/TRAINING PROGRAM

## 2025-09-03 PROCEDURE — C2625 STENT, NON-COR, TEM W/DEL SY: HCPCS | Performed by: INTERNAL MEDICINE

## 2025-09-03 PROCEDURE — 3609015100 HC ERCP STENT PLACEMENT BILIARY/PANCREATIC DUCT: Performed by: INTERNAL MEDICINE

## 2025-09-03 PROCEDURE — 6360000004 HC RX CONTRAST MEDICATION: Performed by: INTERNAL MEDICINE

## 2025-09-03 PROCEDURE — 82565 ASSAY OF CREATININE: CPT

## 2025-09-03 PROCEDURE — C1769 GUIDE WIRE: HCPCS | Performed by: INTERNAL MEDICINE

## 2025-09-03 PROCEDURE — 3609015200 HC ERCP REMOVE CALCULI/DEBRIS BILIARY/PANCREAS DUCT: Performed by: INTERNAL MEDICINE

## 2025-09-03 PROCEDURE — 2500000003 HC RX 250 WO HCPCS: Performed by: STUDENT IN AN ORGANIZED HEALTH CARE EDUCATION/TRAINING PROGRAM

## 2025-09-03 PROCEDURE — 7100000000 HC PACU RECOVERY - FIRST 15 MIN: Performed by: INTERNAL MEDICINE

## 2025-09-03 PROCEDURE — 3609018800 HC ERCP DX COLLECTION SPECIMEN BRUSHING/WASHING: Performed by: INTERNAL MEDICINE

## 2025-09-03 PROCEDURE — C1874 STENT, COATED/COV W/DEL SYS: HCPCS | Performed by: INTERNAL MEDICINE

## 2025-09-03 PROCEDURE — 6360000002 HC RX W HCPCS: Performed by: STUDENT IN AN ORGANIZED HEALTH CARE EDUCATION/TRAINING PROGRAM

## 2025-09-03 PROCEDURE — 2720000010 HC SURG SUPPLY STERILE: Performed by: INTERNAL MEDICINE

## 2025-09-03 PROCEDURE — 3609014900 HC ERCP W/SPHINCTEROTOMY &/OR PAPILLOTOMY: Performed by: INTERNAL MEDICINE

## 2025-09-03 PROCEDURE — 3700000001 HC ADD 15 MINUTES (ANESTHESIA): Performed by: INTERNAL MEDICINE

## 2025-09-03 PROCEDURE — 2580000003 HC RX 258: Performed by: STUDENT IN AN ORGANIZED HEALTH CARE EDUCATION/TRAINING PROGRAM

## 2025-09-03 PROCEDURE — 7100000001 HC PACU RECOVERY - ADDTL 15 MIN: Performed by: INTERNAL MEDICINE

## 2025-09-03 PROCEDURE — 2060000000 HC ICU INTERMEDIATE R&B

## 2025-09-03 PROCEDURE — 84520 ASSAY OF UREA NITROGEN: CPT

## 2025-09-03 DEVICE — IMPLANTABLE DEVICE: Type: IMPLANTABLE DEVICE | Site: COMMON BILE DUCT | Status: FUNCTIONAL

## 2025-09-03 DEVICE — STENT BILI 10FR L7CM PLAS DUODENAL BEND RAP EXCHG PRELD: Type: IMPLANTABLE DEVICE | Site: COMMON BILE DUCT | Status: FUNCTIONAL

## 2025-09-03 RX ORDER — DEXAMETHASONE SODIUM PHOSPHATE 10 MG/ML
INJECTION, SOLUTION INTRA-ARTICULAR; INTRALESIONAL; INTRAMUSCULAR; INTRAVENOUS; SOFT TISSUE
Status: DISCONTINUED | OUTPATIENT
Start: 2025-09-03 | End: 2025-09-03 | Stop reason: SDUPTHER

## 2025-09-03 RX ORDER — SODIUM CHLORIDE 0.9 % (FLUSH) 0.9 %
5-40 SYRINGE (ML) INJECTION EVERY 12 HOURS SCHEDULED
Status: DISCONTINUED | OUTPATIENT
Start: 2025-09-03 | End: 2025-09-03 | Stop reason: HOSPADM

## 2025-09-03 RX ORDER — ACETAMINOPHEN 650 MG/1
650 SUPPOSITORY RECTAL EVERY 6 HOURS PRN
Status: DISCONTINUED | OUTPATIENT
Start: 2025-09-03 | End: 2025-09-04 | Stop reason: HOSPADM

## 2025-09-03 RX ORDER — ATENOLOL 25 MG/1
25 TABLET ORAL DAILY
Status: DISCONTINUED | OUTPATIENT
Start: 2025-09-04 | End: 2025-09-04 | Stop reason: HOSPADM

## 2025-09-03 RX ORDER — LIDOCAINE HYDROCHLORIDE 10 MG/ML
INJECTION, SOLUTION INFILTRATION; PERINEURAL
Status: DISCONTINUED | OUTPATIENT
Start: 2025-09-03 | End: 2025-09-03 | Stop reason: SDUPTHER

## 2025-09-03 RX ORDER — ONDANSETRON 2 MG/ML
4 INJECTION INTRAMUSCULAR; INTRAVENOUS EVERY 6 HOURS PRN
Status: DISCONTINUED | OUTPATIENT
Start: 2025-09-03 | End: 2025-09-04 | Stop reason: HOSPADM

## 2025-09-03 RX ORDER — ENOXAPARIN SODIUM 100 MG/ML
40 INJECTION SUBCUTANEOUS EVERY EVENING
Status: DISCONTINUED | OUTPATIENT
Start: 2025-09-03 | End: 2025-09-04 | Stop reason: HOSPADM

## 2025-09-03 RX ORDER — SCOPOLAMINE 1 MG/3D
1 PATCH, EXTENDED RELEASE TRANSDERMAL ONCE
Status: DISCONTINUED | OUTPATIENT
Start: 2025-09-03 | End: 2025-09-04 | Stop reason: HOSPADM

## 2025-09-03 RX ORDER — PROPOFOL 10 MG/ML
INJECTION, EMULSION INTRAVENOUS
Status: DISCONTINUED | OUTPATIENT
Start: 2025-09-03 | End: 2025-09-03 | Stop reason: SDUPTHER

## 2025-09-03 RX ORDER — POTASSIUM CHLORIDE 1500 MG/1
40 TABLET, EXTENDED RELEASE ORAL PRN
Status: DISCONTINUED | OUTPATIENT
Start: 2025-09-03 | End: 2025-09-04 | Stop reason: HOSPADM

## 2025-09-03 RX ORDER — CETIRIZINE HYDROCHLORIDE 10 MG/1
10 TABLET ORAL DAILY
Status: DISCONTINUED | OUTPATIENT
Start: 2025-09-03 | End: 2025-09-04 | Stop reason: HOSPADM

## 2025-09-03 RX ORDER — SODIUM CHLORIDE 9 MG/ML
INJECTION, SOLUTION INTRAVENOUS PRN
Status: DISCONTINUED | OUTPATIENT
Start: 2025-09-03 | End: 2025-09-04 | Stop reason: HOSPADM

## 2025-09-03 RX ORDER — SUCCINYLCHOLINE CHLORIDE 20 MG/ML
INJECTION INTRAMUSCULAR; INTRAVENOUS
Status: DISCONTINUED | OUTPATIENT
Start: 2025-09-03 | End: 2025-09-03 | Stop reason: SDUPTHER

## 2025-09-03 RX ORDER — SUCRALFATE 1 G/1
1 TABLET ORAL 3 TIMES DAILY
Status: DISCONTINUED | OUTPATIENT
Start: 2025-09-03 | End: 2025-09-04 | Stop reason: HOSPADM

## 2025-09-03 RX ORDER — VITAMIN B COMPLEX
50 TABLET ORAL DAILY
Status: DISCONTINUED | OUTPATIENT
Start: 2025-09-03 | End: 2025-09-04 | Stop reason: HOSPADM

## 2025-09-03 RX ORDER — INDOMETHACIN 100 MG
SUPPOSITORY, RECTAL RECTAL PRN
Status: DISCONTINUED | OUTPATIENT
Start: 2025-09-03 | End: 2025-09-03 | Stop reason: HOSPADM

## 2025-09-03 RX ORDER — SODIUM CHLORIDE 0.9 % (FLUSH) 0.9 %
5-40 SYRINGE (ML) INJECTION PRN
Status: DISCONTINUED | OUTPATIENT
Start: 2025-09-03 | End: 2025-09-04 | Stop reason: HOSPADM

## 2025-09-03 RX ORDER — ACETAMINOPHEN 325 MG/1
650 TABLET ORAL EVERY 6 HOURS PRN
Status: DISCONTINUED | OUTPATIENT
Start: 2025-09-03 | End: 2025-09-04 | Stop reason: HOSPADM

## 2025-09-03 RX ORDER — SODIUM CHLORIDE 0.9 % (FLUSH) 0.9 %
5-40 SYRINGE (ML) INJECTION EVERY 12 HOURS SCHEDULED
Status: DISCONTINUED | OUTPATIENT
Start: 2025-09-03 | End: 2025-09-04 | Stop reason: HOSPADM

## 2025-09-03 RX ORDER — MONTELUKAST SODIUM 10 MG/1
10 TABLET ORAL DAILY
Status: DISCONTINUED | OUTPATIENT
Start: 2025-09-03 | End: 2025-09-04 | Stop reason: HOSPADM

## 2025-09-03 RX ORDER — ONDANSETRON 2 MG/ML
INJECTION INTRAMUSCULAR; INTRAVENOUS
Status: DISCONTINUED | OUTPATIENT
Start: 2025-09-03 | End: 2025-09-03 | Stop reason: SDUPTHER

## 2025-09-03 RX ORDER — SODIUM CHLORIDE, SODIUM LACTATE, POTASSIUM CHLORIDE, CALCIUM CHLORIDE 600; 310; 30; 20 MG/100ML; MG/100ML; MG/100ML; MG/100ML
INJECTION, SOLUTION INTRAVENOUS
Status: DISCONTINUED | OUTPATIENT
Start: 2025-09-03 | End: 2025-09-03 | Stop reason: SDUPTHER

## 2025-09-03 RX ORDER — POTASSIUM CHLORIDE 7.45 MG/ML
10 INJECTION INTRAVENOUS PRN
Status: DISCONTINUED | OUTPATIENT
Start: 2025-09-03 | End: 2025-09-04 | Stop reason: HOSPADM

## 2025-09-03 RX ORDER — PROCHLORPERAZINE EDISYLATE 5 MG/ML
5 INJECTION INTRAMUSCULAR; INTRAVENOUS
Status: DISCONTINUED | OUTPATIENT
Start: 2025-09-03 | End: 2025-09-03 | Stop reason: HOSPADM

## 2025-09-03 RX ORDER — PANTOPRAZOLE SODIUM 40 MG/1
40 TABLET, DELAYED RELEASE ORAL
Status: DISCONTINUED | OUTPATIENT
Start: 2025-09-03 | End: 2025-09-04 | Stop reason: HOSPADM

## 2025-09-03 RX ORDER — ONDANSETRON 4 MG/1
4 TABLET, ORALLY DISINTEGRATING ORAL EVERY 8 HOURS PRN
Status: DISCONTINUED | OUTPATIENT
Start: 2025-09-03 | End: 2025-09-04 | Stop reason: HOSPADM

## 2025-09-03 RX ORDER — SODIUM CHLORIDE 9 MG/ML
INJECTION, SOLUTION INTRAVENOUS PRN
Status: DISCONTINUED | OUTPATIENT
Start: 2025-09-03 | End: 2025-09-03 | Stop reason: HOSPADM

## 2025-09-03 RX ORDER — MAGNESIUM SULFATE IN WATER 40 MG/ML
2000 INJECTION, SOLUTION INTRAVENOUS PRN
Status: DISCONTINUED | OUTPATIENT
Start: 2025-09-03 | End: 2025-09-04 | Stop reason: HOSPADM

## 2025-09-03 RX ORDER — SODIUM CHLORIDE 9 MG/ML
INJECTION, SOLUTION INTRAVENOUS CONTINUOUS
Status: DISCONTINUED | OUTPATIENT
Start: 2025-09-03 | End: 2025-09-04

## 2025-09-03 RX ORDER — MULTIVITAMIN WITH IRON
1000 TABLET ORAL DAILY
Status: DISCONTINUED | OUTPATIENT
Start: 2025-09-03 | End: 2025-09-04 | Stop reason: HOSPADM

## 2025-09-03 RX ORDER — SODIUM CHLORIDE 0.9 % (FLUSH) 0.9 %
5-40 SYRINGE (ML) INJECTION PRN
Status: DISCONTINUED | OUTPATIENT
Start: 2025-09-03 | End: 2025-09-03 | Stop reason: HOSPADM

## 2025-09-03 RX ORDER — M-VIT,TX,IRON,MINS/CALC/FOLIC 27MG-0.4MG
1 TABLET ORAL 2 TIMES DAILY
Status: DISCONTINUED | OUTPATIENT
Start: 2025-09-03 | End: 2025-09-04 | Stop reason: HOSPADM

## 2025-09-03 RX ORDER — METRONIDAZOLE 500 MG/100ML
500 INJECTION, SOLUTION INTRAVENOUS EVERY 8 HOURS
Status: DISCONTINUED | OUTPATIENT
Start: 2025-09-03 | End: 2025-09-04 | Stop reason: HOSPADM

## 2025-09-03 RX ADMIN — SODIUM CHLORIDE 900 ML: 0.9 INJECTION, SOLUTION INTRAVENOUS at 16:09

## 2025-09-03 RX ADMIN — Medication 1 TABLET: at 21:00

## 2025-09-03 RX ADMIN — PHENYLEPHRINE HYDROCHLORIDE 50 MCG: 10 INJECTION INTRAVENOUS at 11:31

## 2025-09-03 RX ADMIN — SUCCINYLCHOLINE CHLORIDE 100 MG: 20 INJECTION, SOLUTION INTRAMUSCULAR; INTRAVENOUS at 10:59

## 2025-09-03 RX ADMIN — ACETAMINOPHEN 650 MG: 325 TABLET ORAL at 15:55

## 2025-09-03 RX ADMIN — DEXAMETHASONE SODIUM PHOSPHATE 10 MG: 10 INJECTION INTRAMUSCULAR; INTRAVENOUS at 11:02

## 2025-09-03 RX ADMIN — METRONIDAZOLE 500 MG: 500 INJECTION, SOLUTION INTRAVENOUS at 16:12

## 2025-09-03 RX ADMIN — PROPOFOL 150 MG: 10 INJECTION, EMULSION INTRAVENOUS at 10:57

## 2025-09-03 RX ADMIN — SODIUM CHLORIDE, PRESERVATIVE FREE 10 ML: 5 INJECTION INTRAVENOUS at 21:00

## 2025-09-03 RX ADMIN — ONDANSETRON 4 MG: 2 INJECTION INTRAMUSCULAR; INTRAVENOUS at 11:02

## 2025-09-03 RX ADMIN — CETIRIZINE HYDROCHLORIDE 10 MG: 10 TABLET, FILM COATED ORAL at 21:00

## 2025-09-03 RX ADMIN — SUCRALFATE 1 G: 1 TABLET ORAL at 21:00

## 2025-09-03 RX ADMIN — METRONIDAZOLE 500 MG: 500 INJECTION, SOLUTION INTRAVENOUS at 23:51

## 2025-09-03 RX ADMIN — LIDOCAINE HYDROCHLORIDE 100 MG: 10 INJECTION, SOLUTION INFILTRATION; PERINEURAL at 10:57

## 2025-09-03 RX ADMIN — WATER 2000 MG: 1 INJECTION INTRAMUSCULAR; INTRAVENOUS; SUBCUTANEOUS at 15:56

## 2025-09-03 RX ADMIN — ACETAMINOPHEN 650 MG: 325 TABLET ORAL at 22:11

## 2025-09-03 RX ADMIN — MONTELUKAST 10 MG: 10 TABLET, FILM COATED ORAL at 21:00

## 2025-09-03 RX ADMIN — PHENYLEPHRINE HYDROCHLORIDE 100 MCG: 10 INJECTION INTRAVENOUS at 11:34

## 2025-09-03 RX ADMIN — SODIUM CHLORIDE, POTASSIUM CHLORIDE, SODIUM LACTATE AND CALCIUM CHLORIDE: 600; 310; 30; 20 INJECTION, SOLUTION INTRAVENOUS at 10:52

## 2025-09-03 ASSESSMENT — PAIN SCALES - GENERAL
PAINLEVEL_OUTOF10: 3
PAINLEVEL_OUTOF10: 0
PAINLEVEL_OUTOF10: 0
PAINLEVEL_OUTOF10: 7

## 2025-09-03 ASSESSMENT — PAIN - FUNCTIONAL ASSESSMENT
PAIN_FUNCTIONAL_ASSESSMENT: 0-10

## 2025-09-03 ASSESSMENT — PAIN DESCRIPTION - LOCATION
LOCATION: HEAD
LOCATION: HEAD;THROAT

## 2025-09-03 ASSESSMENT — PAIN DESCRIPTION - DESCRIPTORS: DESCRIPTORS: DISCOMFORT;ACHING

## 2025-09-04 ENCOUNTER — ANESTHESIA (OUTPATIENT)
Dept: ENDOSCOPY | Age: 75
End: 2025-09-04
Payer: MEDICARE

## 2025-09-04 ENCOUNTER — ANESTHESIA EVENT (OUTPATIENT)
Dept: ENDOSCOPY | Age: 75
End: 2025-09-04
Payer: MEDICARE

## 2025-09-04 ENCOUNTER — APPOINTMENT (OUTPATIENT)
Dept: GENERAL RADIOLOGY | Age: 75
End: 2025-09-04
Attending: INTERNAL MEDICINE
Payer: MEDICARE

## 2025-09-04 VITALS
HEIGHT: 66 IN | RESPIRATION RATE: 19 BRPM | SYSTOLIC BLOOD PRESSURE: 139 MMHG | BODY MASS INDEX: 31.34 KG/M2 | TEMPERATURE: 97.9 F | WEIGHT: 195 LBS | OXYGEN SATURATION: 92 % | HEART RATE: 61 BPM | DIASTOLIC BLOOD PRESSURE: 72 MMHG

## 2025-09-04 LAB
ALBUMIN SERPL-MCNC: 3.7 G/DL (ref 3.5–5.2)
ALBUMIN/GLOB SERPL: 1.5 {RATIO} (ref 1–2.5)
ALP SERPL-CCNC: 79 U/L (ref 35–104)
ALT SERPL-CCNC: 19 U/L (ref 10–35)
ANION GAP SERPL CALCULATED.3IONS-SCNC: 9 MMOL/L (ref 9–16)
AST SERPL-CCNC: 22 U/L (ref 10–35)
BASOPHILS # BLD: <0.03 K/UL (ref 0–0.2)
BASOPHILS NFR BLD: 0 % (ref 0–2)
BILIRUB SERPL-MCNC: 0.2 MG/DL (ref 0–1.2)
BUN SERPL-MCNC: 13 MG/DL (ref 8–23)
CALCIUM SERPL-MCNC: 9.2 MG/DL (ref 8.6–10.4)
CHLORIDE SERPL-SCNC: 107 MMOL/L (ref 98–107)
CO2 SERPL-SCNC: 26 MMOL/L (ref 20–31)
CREAT SERPL-MCNC: 0.7 MG/DL (ref 0.6–0.9)
EOSINOPHIL # BLD: <0.03 K/UL (ref 0–0.44)
EOSINOPHILS RELATIVE PERCENT: 0 % (ref 1–4)
ERYTHROCYTE [DISTWIDTH] IN BLOOD BY AUTOMATED COUNT: 14.6 % (ref 11.8–14.4)
GFR, ESTIMATED: >90 ML/MIN/1.73M2
GLUCOSE BLD-MCNC: 190 MG/DL (ref 65–105)
GLUCOSE SERPL-MCNC: 117 MG/DL (ref 74–99)
HCT VFR BLD AUTO: 32.3 % (ref 36.3–47.1)
HGB BLD-MCNC: 10.4 G/DL (ref 11.9–15.1)
IMM GRANULOCYTES # BLD AUTO: 0.08 K/UL (ref 0–0.3)
IMM GRANULOCYTES NFR BLD: 1 %
LYMPHOCYTES NFR BLD: 1.01 K/UL (ref 1.1–3.7)
LYMPHOCYTES RELATIVE PERCENT: 8 % (ref 24–43)
MCH RBC QN AUTO: 28.4 PG (ref 25.2–33.5)
MCHC RBC AUTO-ENTMCNC: 32.2 G/DL (ref 28.4–34.8)
MCV RBC AUTO: 88.3 FL (ref 82.6–102.9)
MONOCYTES NFR BLD: 0.61 K/UL (ref 0.1–1.2)
MONOCYTES NFR BLD: 5 % (ref 3–12)
NEUTROPHILS NFR BLD: 86 % (ref 36–65)
NEUTS SEG NFR BLD: 11.23 K/UL (ref 1.5–8.1)
NRBC BLD-RTO: 0 PER 100 WBC
PLATELET # BLD AUTO: 212 K/UL (ref 138–453)
PMV BLD AUTO: 10 FL (ref 8.1–13.5)
POTASSIUM SERPL-SCNC: 3.9 MMOL/L (ref 3.7–5.3)
PROT SERPL-MCNC: 6.1 G/DL (ref 6.6–8.7)
RBC # BLD AUTO: 3.66 M/UL (ref 3.95–5.11)
RBC # BLD: ABNORMAL 10*6/UL
SODIUM SERPL-SCNC: 142 MMOL/L (ref 136–145)
WBC OTHER # BLD: 13 K/UL (ref 3.5–11.3)

## 2025-09-04 PROCEDURE — C1726 CATH, BAL DIL, NON-VASCULAR: HCPCS | Performed by: INTERNAL MEDICINE

## 2025-09-04 PROCEDURE — 3700000001 HC ADD 15 MINUTES (ANESTHESIA): Performed by: INTERNAL MEDICINE

## 2025-09-04 PROCEDURE — 36415 COLL VENOUS BLD VENIPUNCTURE: CPT

## 2025-09-04 PROCEDURE — 7100000001 HC PACU RECOVERY - ADDTL 15 MIN: Performed by: INTERNAL MEDICINE

## 2025-09-04 PROCEDURE — 2720000010 HC SURG SUPPLY STERILE: Performed by: INTERNAL MEDICINE

## 2025-09-04 PROCEDURE — 3609014300 HC ERCP BALLOON DILATE BILIARY/PANC DUCT/AMPULLA EA: Performed by: INTERNAL MEDICINE

## 2025-09-04 PROCEDURE — 6360000002 HC RX W HCPCS: Performed by: ANESTHESIOLOGY

## 2025-09-04 PROCEDURE — 3609015100 HC ERCP STENT PLACEMENT BILIARY/PANCREATIC DUCT: Performed by: INTERNAL MEDICINE

## 2025-09-04 PROCEDURE — 2580000003 HC RX 258

## 2025-09-04 PROCEDURE — 7100000000 HC PACU RECOVERY - FIRST 15 MIN: Performed by: INTERNAL MEDICINE

## 2025-09-04 PROCEDURE — 2709999900 HC NON-CHARGEABLE SUPPLY: Performed by: INTERNAL MEDICINE

## 2025-09-04 PROCEDURE — 85025 COMPLETE CBC W/AUTO DIFF WBC: CPT

## 2025-09-04 PROCEDURE — 2500000003 HC RX 250 WO HCPCS

## 2025-09-04 PROCEDURE — 3609018900 HC ERCP: Performed by: INTERNAL MEDICINE

## 2025-09-04 PROCEDURE — 6360000004 HC RX CONTRAST MEDICATION: Performed by: INTERNAL MEDICINE

## 2025-09-04 PROCEDURE — 80053 COMPREHEN METABOLIC PANEL: CPT

## 2025-09-04 PROCEDURE — 6360000002 HC RX W HCPCS

## 2025-09-04 PROCEDURE — 82947 ASSAY GLUCOSE BLOOD QUANT: CPT

## 2025-09-04 PROCEDURE — 3609015000 HC ERCP REMOVE FOREIGN BODY/STENT BILIARY/PANC DUCT: Performed by: INTERNAL MEDICINE

## 2025-09-04 PROCEDURE — 3700000000 HC ANESTHESIA ATTENDED CARE: Performed by: INTERNAL MEDICINE

## 2025-09-04 PROCEDURE — C1769 GUIDE WIRE: HCPCS | Performed by: INTERNAL MEDICINE

## 2025-09-04 PROCEDURE — C1874 STENT, COATED/COV W/DEL SYS: HCPCS | Performed by: INTERNAL MEDICINE

## 2025-09-04 RX ORDER — GLUCAGON 1 MG/ML
KIT INJECTION
Status: DISCONTINUED | OUTPATIENT
Start: 2025-09-04 | End: 2025-09-04 | Stop reason: SDUPTHER

## 2025-09-04 RX ORDER — SODIUM CHLORIDE 9 MG/ML
INJECTION, SOLUTION INTRAVENOUS PRN
Status: DISCONTINUED | OUTPATIENT
Start: 2025-09-04 | End: 2025-09-04 | Stop reason: HOSPADM

## 2025-09-04 RX ORDER — LABETALOL HYDROCHLORIDE 5 MG/ML
10 INJECTION, SOLUTION INTRAVENOUS
Status: DISCONTINUED | OUTPATIENT
Start: 2025-09-04 | End: 2025-09-04 | Stop reason: HOSPADM

## 2025-09-04 RX ORDER — SODIUM CHLORIDE 0.9 % (FLUSH) 0.9 %
5-40 SYRINGE (ML) INJECTION PRN
Status: DISCONTINUED | OUTPATIENT
Start: 2025-09-04 | End: 2025-09-04 | Stop reason: HOSPADM

## 2025-09-04 RX ORDER — ONDANSETRON 2 MG/ML
INJECTION INTRAMUSCULAR; INTRAVENOUS
Status: DISCONTINUED | OUTPATIENT
Start: 2025-09-04 | End: 2025-09-04 | Stop reason: SDUPTHER

## 2025-09-04 RX ORDER — ONDANSETRON 2 MG/ML
4 INJECTION INTRAMUSCULAR; INTRAVENOUS
Status: COMPLETED | OUTPATIENT
Start: 2025-09-04 | End: 2025-09-04

## 2025-09-04 RX ORDER — LIDOCAINE HYDROCHLORIDE 10 MG/ML
INJECTION, SOLUTION EPIDURAL; INFILTRATION; INTRACAUDAL; PERINEURAL
Status: DISCONTINUED | OUTPATIENT
Start: 2025-09-04 | End: 2025-09-04 | Stop reason: SDUPTHER

## 2025-09-04 RX ORDER — HYDRALAZINE HYDROCHLORIDE 20 MG/ML
10 INJECTION INTRAMUSCULAR; INTRAVENOUS
Status: DISCONTINUED | OUTPATIENT
Start: 2025-09-04 | End: 2025-09-04 | Stop reason: HOSPADM

## 2025-09-04 RX ORDER — PROCHLORPERAZINE EDISYLATE 5 MG/ML
10 INJECTION INTRAMUSCULAR; INTRAVENOUS EVERY 6 HOURS PRN
Status: DISCONTINUED | OUTPATIENT
Start: 2025-09-04 | End: 2025-09-04 | Stop reason: HOSPADM

## 2025-09-04 RX ORDER — PROPOFOL 10 MG/ML
INJECTION, EMULSION INTRAVENOUS
Status: DISCONTINUED | OUTPATIENT
Start: 2025-09-04 | End: 2025-09-04 | Stop reason: SDUPTHER

## 2025-09-04 RX ORDER — ROCURONIUM BROMIDE 10 MG/ML
INJECTION, SOLUTION INTRAVENOUS
Status: DISCONTINUED | OUTPATIENT
Start: 2025-09-04 | End: 2025-09-04 | Stop reason: SDUPTHER

## 2025-09-04 RX ORDER — SODIUM CHLORIDE 0.9 % (FLUSH) 0.9 %
5-40 SYRINGE (ML) INJECTION EVERY 12 HOURS SCHEDULED
Status: DISCONTINUED | OUTPATIENT
Start: 2025-09-04 | End: 2025-09-04 | Stop reason: HOSPADM

## 2025-09-04 RX ORDER — DEXAMETHASONE SODIUM PHOSPHATE 10 MG/ML
INJECTION, SOLUTION INTRA-ARTICULAR; INTRALESIONAL; INTRAMUSCULAR; INTRAVENOUS; SOFT TISSUE
Status: DISCONTINUED | OUTPATIENT
Start: 2025-09-04 | End: 2025-09-04 | Stop reason: SDUPTHER

## 2025-09-04 RX ORDER — FENTANYL CITRATE 50 UG/ML
INJECTION, SOLUTION INTRAMUSCULAR; INTRAVENOUS
Status: DISCONTINUED | OUTPATIENT
Start: 2025-09-04 | End: 2025-09-04 | Stop reason: SDUPTHER

## 2025-09-04 RX ORDER — SODIUM CHLORIDE, SODIUM LACTATE, POTASSIUM CHLORIDE, CALCIUM CHLORIDE 600; 310; 30; 20 MG/100ML; MG/100ML; MG/100ML; MG/100ML
INJECTION, SOLUTION INTRAVENOUS
Status: DISCONTINUED | OUTPATIENT
Start: 2025-09-04 | End: 2025-09-04 | Stop reason: SDUPTHER

## 2025-09-04 RX ADMIN — DEXAMETHASONE SODIUM PHOSPHATE 10 MG: 10 INJECTION INTRAMUSCULAR; INTRAVENOUS at 08:01

## 2025-09-04 RX ADMIN — ONDANSETRON 4 MG: 2 INJECTION, SOLUTION INTRAMUSCULAR; INTRAVENOUS at 10:59

## 2025-09-04 RX ADMIN — LIDOCAINE HYDROCHLORIDE 50 MG: 10 INJECTION, SOLUTION EPIDURAL; INFILTRATION; INTRACAUDAL; PERINEURAL at 07:53

## 2025-09-04 RX ADMIN — ROCURONIUM BROMIDE 50 MG: 10 INJECTION, SOLUTION INTRAVENOUS at 07:53

## 2025-09-04 RX ADMIN — GLUCAGON 1 MG: 1 INJECTION, POWDER, LYOPHILIZED, FOR SOLUTION INTRAMUSCULAR; INTRAVENOUS at 09:31

## 2025-09-04 RX ADMIN — PROPOFOL 150 MG: 10 INJECTION, EMULSION INTRAVENOUS at 07:53

## 2025-09-04 RX ADMIN — FENTANYL CITRATE 50 MCG: 50 INJECTION, SOLUTION INTRAMUSCULAR; INTRAVENOUS at 07:53

## 2025-09-04 RX ADMIN — ONDANSETRON 4 MG: 2 INJECTION, SOLUTION INTRAMUSCULAR; INTRAVENOUS at 10:16

## 2025-09-04 RX ADMIN — FENTANYL CITRATE 50 MCG: 50 INJECTION, SOLUTION INTRAMUSCULAR; INTRAVENOUS at 08:21

## 2025-09-04 RX ADMIN — SODIUM CHLORIDE, POTASSIUM CHLORIDE, SODIUM LACTATE AND CALCIUM CHLORIDE: 600; 310; 30; 20 INJECTION, SOLUTION INTRAVENOUS at 07:43

## 2025-09-04 RX ADMIN — SUGAMMADEX 200 MG: 100 INJECTION, SOLUTION INTRAVENOUS at 10:16

## 2025-09-04 ASSESSMENT — PAIN SCALES - GENERAL
PAINLEVEL_OUTOF10: 0

## 2025-09-04 ASSESSMENT — PAIN - FUNCTIONAL ASSESSMENT: PAIN_FUNCTIONAL_ASSESSMENT: 0-10

## 2025-09-05 ENCOUNTER — CARE COORDINATION (OUTPATIENT)
Dept: CARE COORDINATION | Age: 75
End: 2025-09-05

## 2025-09-05 DIAGNOSIS — T85.520A DISPLACEMENT OF BILIARY STENT, INITIAL ENCOUNTER: Primary | ICD-10-CM

## 2025-09-05 PROCEDURE — 1111F DSCHRG MED/CURRENT MED MERGE: CPT | Performed by: FAMILY MEDICINE

## 2025-09-05 RX ORDER — CETIRIZINE HYDROCHLORIDE 10 MG/1
10 TABLET ORAL DAILY
Qty: 90 TABLET | Refills: 1 | Status: SHIPPED | OUTPATIENT
Start: 2025-09-05

## (undated) DEVICE — JELLY LUBRICATING 4OZ FLIP TOP TB E Z

## (undated) DEVICE — BAG PRSS INFUS IV OR ART 3000 CC W STPCOCK NO THUMBWHEEL W

## (undated) DEVICE — 4-PORT MANIFOLD: Brand: NEPTUNE 2

## (undated) DEVICE — PACK SURG PROC REMINDER N WVN DISPOSABLE BEAC TIME OUT

## (undated) DEVICE — Z DISCONTINUED USE 2273271 SOLUTION PREP 4OZ 10% POVIDONE IOD BTL FLIP TOP CAP

## (undated) DEVICE — MARKER W/PRE PRINTED CUSTOM LABEL

## (undated) DEVICE — POUCH INSTR W40XL26IN BUTT FLD COLL FLTR DRNGE PRT

## (undated) DEVICE — SUTURE VCRL SZ 3-0 L27IN ABSRB VLT L26MM SH 1/2 CIR J316H

## (undated) DEVICE — INSUFFLATION TUBING SET, ENDOFLATOR 50: Brand: N.A.

## (undated) DEVICE — BLADELESS OBTURATOR: Brand: WECK VISTA

## (undated) DEVICE — 9165 UNIVERSAL PATIENT PLATE: Brand: 3M™

## (undated) DEVICE — BITE BLOCK ENDOSCP AD 60 FR W/ ADJ STRP PLAS GRN BLOX

## (undated) DEVICE — SUTURE MCRYL SZ 4-0 L18IN ABSRB UD L19MM PS-2 3/8 CIR PRIM Y496G

## (undated) DEVICE — PROTECTOR ULN NRV PUR FOAM HK LOOP STRP ANATOMICALLY

## (undated) DEVICE — GAUZE,SPONGE,4"X4",16PLY,XRAY,STRL,LF: Brand: MEDLINE

## (undated) DEVICE — CAP BX OLYMPUS AUTOCAP RX

## (undated) DEVICE — CHLORAPREP 26ML ORANGE

## (undated) DEVICE — GLOVE SURG SZ 65 THK91MIL LTX FREE SYN POLYISOPRENE

## (undated) DEVICE — STRAP POS FOAM SFT STR FOR KNEE BODY

## (undated) DEVICE — ARM DRAPE

## (undated) DEVICE — 3M™ WARMING BLANKET, UPPER BODY, 10 PER CASE, 42268: Brand: BAIR HUGGER™

## (undated) DEVICE — UNDERPAD HOSP W30XL36IN GRN POLYPR HI ABSRB DISP

## (undated) DEVICE — PAD MATERNITY CURITY ADH STRIP DISP

## (undated) DEVICE — RETRIEVER ENDOSCP L200CM BLLN DIA9-12MM CATH 6-7FR BILI PRO

## (undated) DEVICE — BALLOON STONE RETRV 7-6FR CATH L200CM BLLN DIA12-15MM

## (undated) DEVICE — CONTAINER,SPECIMEN,OR STERILE,4OZ: Brand: MEDLINE

## (undated) DEVICE — SUTURE VICRYL + SZ 0 L27IN ABSRB VLT L26MM CT-2 1/2 CIR VCP334H

## (undated) DEVICE — PAD N ADH W3XL4IN POLY COT SFT PERF FLM EASILY CUT ABSRB

## (undated) DEVICE — PAD,ARMBOARD,CONV,FOAM,2X8X20",12PR/CS: Brand: MEDLINE

## (undated) DEVICE — SINGLE USE DISTAL COVER MAJ-2315: Brand: SINGLE USE DISTAL COVER

## (undated) DEVICE — SEAL

## (undated) DEVICE — CO2 CANNULA,SSOFT,ADLT,7O2,4CO2,FEMALE: Brand: MEDLINE

## (undated) DEVICE — 60 ML SYRINGE REGULAR TIP: Brand: MONOJECT

## (undated) DEVICE — GAUZE,SPONGE,2"X2",8PLY,STERILE,LF,2'S: Brand: MEDLINE

## (undated) DEVICE — ENDOSCOPIC KIT 1.1+ 10 FT DE 2 GWN AAMI LEVEL 3

## (undated) DEVICE — Device

## (undated) DEVICE — TOWEL,OR,DSP,ST,BLUE,STD,4/PK,20PK/CS: Brand: MEDLINE

## (undated) DEVICE — BITE BLOCK W/VELCRO STRAP

## (undated) DEVICE — GLOVE ORANGE PI 7   MSG9070

## (undated) DEVICE — CONNECTOR TBNG AUX H2O JET DISP FOR OLY 160/180 SER

## (undated) DEVICE — MEDICINE CUP, GRADUATED, STER: Brand: MEDLINE

## (undated) DEVICE — GOWN,AURORA,NONRNF,XL,30/CS: Brand: MEDLINE

## (undated) DEVICE — PACK,LITHOTOMY,PK I: Brand: MEDLINE

## (undated) DEVICE — MASTISOL ADHESIVE LIQ 2/3ML

## (undated) DEVICE — SNARE ENDOSCP L240CM W15MM SHTH DIA2.4MM CHN 2.8MM STIFF

## (undated) DEVICE — GOWN,AURORA,NONREINFORCED,LARGE: Brand: MEDLINE

## (undated) DEVICE — SPHINCTEROTOME: Brand: DREAMTOME™ RX 44

## (undated) DEVICE — COVER ENDOSCP INSTRUMENT DSTL CVR DISP (EA = 20 UNITS)

## (undated) DEVICE — GLOVE ORANGE PI 7 1/2   MSG9075

## (undated) DEVICE — PACK SET UP

## (undated) DEVICE — FORCEPS BX 240CM 2.4MM L NDL RAD JAW 4 M00513334

## (undated) DEVICE — GARMENT COMPR M FOR 12-18IN CALF INTMIT SGL BLDR HEMO-FORCE

## (undated) DEVICE — CANNULA NSL AD L2IN ETCO2 SAMP SFT CRUSH RESIST FEM AIRLFE

## (undated) DEVICE — TUBING SET, SUCTION LAP, S-PILOT: Brand: N.A.

## (undated) DEVICE — DRESSING TRNSPAR W2XL2.75IN FLM SHT SEMIPERMEABLE WIND

## (undated) DEVICE — MDHZ GEN LAPAROSCOPY&ROBOT: Brand: MEDLINE INDUSTRIES, INC.

## (undated) DEVICE — ELECTRODE PT RET AD L9FT HI MOIST COND ADH HYDRGEL CORDED

## (undated) DEVICE — SUTURE MONOCRYL SZ 4-0 L18IN ABSRB UD L19MM PS-2 3/8 CIR PRIM Y496G

## (undated) DEVICE — CYSTO/BLADDER IRRIGATION SET, REGULATING CLAMP

## (undated) DEVICE — SOLUTION IV IRRIG POUR BRL 0.9% SODIUM CHL 2F7124

## (undated) DEVICE — STRIP,CLOSURE,WOUND,MEDI-STRIP,1/2X4: Brand: MEDLINE

## (undated) DEVICE — DEVICE INFLATION ENDOSCOPIC 20ML CAPACITY BILIARY F/BLLN DILATOR ENCORE 26

## (undated) DEVICE — RETRIEVAL BALLOON CATHETER: Brand: EXTRACTOR™ PRO RX

## (undated) DEVICE — SYRINGE MED 5ML STD CLR PLAS LUERLOCK TIP N CTRL DISP

## (undated) DEVICE — FORCEPS BX L240CM JAW DIA2.4MM ORNG L CAP W/ NDL DISP RAD

## (undated) DEVICE — ERBE NESSY®PLATE 170 SPLIT; 168CM²; CABLE 3M: Brand: ERBE

## (undated) DEVICE — SPHINCTEROTOME ENDOSCP TIP 4.4FR L5MM CUT WIRE L20MM GWIRE

## (undated) DEVICE — SOLUTION SCRB 4OZ 7.5% POVIDONE IOD FLIP TOP CAP

## (undated) DEVICE — MEDI-VAC NON-CONDUCTIVE SUCTION TUBING: Brand: CARDINAL HEALTH

## (undated) DEVICE — CATHETER URETH STR TIP 16 FRX12 IN SMOOTH RND DOVER

## (undated) DEVICE — GOWN,SIRUS,POLYRNF,BRTHSLV,LG,30/CS: Brand: MEDLINE

## (undated) DEVICE — SYSTEM BX CAP BILI RAP EXCHG CAP LOK DEV COMPATIBLE W/ OLY

## (undated) DEVICE — SYRINGE MED 10ML LUERLOCK TIP W/O SFTY DISP

## (undated) DEVICE — MERCY DEFIANCE ENDO KIT: Brand: MEDLINE INDUSTRIES, INC.

## (undated) DEVICE — SUTURE VCRL SZ 4-0 L18IN ABSRB UD L19MM PS-2 3/8 CIR PRIM J496H

## (undated) DEVICE — GLOVE ORANGE PI 8   MSG9080

## (undated) DEVICE — Z DISCONTINUED BY MEDLINE USE 2711682 TRAY SKIN PREP DRY W/ PREM GLV

## (undated) DEVICE — SNARE ENDOSCP OVL 2.4 MMX240 CM FOR POLYP CAPTFLX DISP

## (undated) DEVICE — 1200CC GUARDIAN II: Brand: GUARDIAN

## (undated) DEVICE — COVER LT HNDL BLU PLAS

## (undated) DEVICE — ANCHOR TISSUE RETRIEVAL SYSTEM, BAG SIZE 125 ML, PORT SIZE 8 MM: Brand: ANCHOR TISSUE RETRIEVAL SYSTEM

## (undated) DEVICE — BASIN SET: Brand: MEDLINE INDUSTRIES, INC.